# Patient Record
Sex: MALE | Race: BLACK OR AFRICAN AMERICAN | NOT HISPANIC OR LATINO | URBAN - METROPOLITAN AREA
[De-identification: names, ages, dates, MRNs, and addresses within clinical notes are randomized per-mention and may not be internally consistent; named-entity substitution may affect disease eponyms.]

---

## 2018-01-31 ENCOUNTER — INPATIENT (INPATIENT)
Facility: HOSPITAL | Age: 83
LOS: 6 days | Discharge: HOME | End: 2018-02-07
Attending: HOSPITALIST | Admitting: HOSPITALIST
Payer: MEDICAID

## 2018-01-31 VITALS
WEIGHT: 169.98 LBS | DIASTOLIC BLOOD PRESSURE: 59 MMHG | TEMPERATURE: 98 F | HEIGHT: 69 IN | SYSTOLIC BLOOD PRESSURE: 134 MMHG | RESPIRATION RATE: 18 BRPM | HEART RATE: 108 BPM | OXYGEN SATURATION: 99 %

## 2018-01-31 DIAGNOSIS — J18.9 PNEUMONIA, UNSPECIFIED ORGANISM: ICD-10-CM

## 2018-01-31 DIAGNOSIS — G93.41 METABOLIC ENCEPHALOPATHY: ICD-10-CM

## 2018-01-31 DIAGNOSIS — E11.9 TYPE 2 DIABETES MELLITUS WITHOUT COMPLICATIONS: ICD-10-CM

## 2018-01-31 LAB
ALBUMIN SERPL ELPH-MCNC: 2.7 G/DL — LOW (ref 3.3–5)
ALP SERPL-CCNC: 101 U/L — SIGNIFICANT CHANGE UP (ref 40–120)
ALT FLD-CCNC: 23 U/L — SIGNIFICANT CHANGE UP (ref 12–78)
ANION GAP SERPL CALC-SCNC: 9 MMOL/L — SIGNIFICANT CHANGE UP (ref 5–17)
APPEARANCE UR: ABNORMAL
APTT BLD: 30.9 SEC — SIGNIFICANT CHANGE UP (ref 27.5–37.4)
AST SERPL-CCNC: 31 U/L — SIGNIFICANT CHANGE UP (ref 15–37)
BASOPHILS # BLD AUTO: 0.01 K/UL — SIGNIFICANT CHANGE UP (ref 0–0.2)
BASOPHILS NFR BLD AUTO: 0.1 % — SIGNIFICANT CHANGE UP (ref 0–2)
BILIRUB SERPL-MCNC: 0.2 MG/DL — SIGNIFICANT CHANGE UP (ref 0.2–1.2)
BILIRUB UR-MCNC: NEGATIVE — SIGNIFICANT CHANGE UP
BUN SERPL-MCNC: 35 MG/DL — HIGH (ref 7–23)
CALCIUM SERPL-MCNC: 8.7 MG/DL — SIGNIFICANT CHANGE UP (ref 8.5–10.1)
CHLORIDE SERPL-SCNC: 119 MMOL/L — HIGH (ref 96–108)
CK MB CFR SERPL CALC: 1.4 NG/ML — SIGNIFICANT CHANGE UP (ref 0.5–3.6)
CO2 SERPL-SCNC: 21 MMOL/L — LOW (ref 22–31)
COLOR SPEC: YELLOW — SIGNIFICANT CHANGE UP
CREAT SERPL-MCNC: 1.66 MG/DL — HIGH (ref 0.5–1.3)
DIFF PNL FLD: ABNORMAL
EOSINOPHIL # BLD AUTO: 0 K/UL — SIGNIFICANT CHANGE UP (ref 0–0.5)
EOSINOPHIL NFR BLD AUTO: 0 % — SIGNIFICANT CHANGE UP (ref 0–6)
FLUAV SPEC QL CULT: NEGATIVE — SIGNIFICANT CHANGE UP
FLUBV AG SPEC QL IA: NEGATIVE — SIGNIFICANT CHANGE UP
GLUCOSE BLDC GLUCOMTR-MCNC: 219 MG/DL — HIGH (ref 70–99)
GLUCOSE BLDC GLUCOMTR-MCNC: 256 MG/DL — HIGH (ref 70–99)
GLUCOSE SERPL-MCNC: 239 MG/DL — HIGH (ref 70–99)
GLUCOSE UR QL: 100 MG/DL
HCT VFR BLD CALC: 39.2 % — SIGNIFICANT CHANGE UP (ref 39–50)
HGB BLD-MCNC: 12.5 G/DL — LOW (ref 13–17)
IMM GRANULOCYTES NFR BLD AUTO: 0.4 % — SIGNIFICANT CHANGE UP (ref 0–1.5)
INR BLD: 1.01 RATIO — SIGNIFICANT CHANGE UP (ref 0.88–1.16)
KETONES UR-MCNC: NEGATIVE — SIGNIFICANT CHANGE UP
LACTATE SERPL-SCNC: 1.5 MMOL/L — SIGNIFICANT CHANGE UP (ref 0.7–2)
LEUKOCYTE ESTERASE UR-ACNC: NEGATIVE — SIGNIFICANT CHANGE UP
LYMPHOCYTES # BLD AUTO: 0.52 K/UL — LOW (ref 1–3.3)
LYMPHOCYTES # BLD AUTO: 7.6 % — LOW (ref 13–44)
MCHC RBC-ENTMCNC: 29.2 PG — SIGNIFICANT CHANGE UP (ref 27–34)
MCHC RBC-ENTMCNC: 31.9 GM/DL — LOW (ref 32–36)
MCV RBC AUTO: 91.6 FL — SIGNIFICANT CHANGE UP (ref 80–100)
MONOCYTES # BLD AUTO: 0.32 K/UL — SIGNIFICANT CHANGE UP (ref 0–0.9)
MONOCYTES NFR BLD AUTO: 4.7 % — SIGNIFICANT CHANGE UP (ref 2–14)
NEUTROPHILS # BLD AUTO: 5.94 K/UL — SIGNIFICANT CHANGE UP (ref 1.8–7.4)
NEUTROPHILS NFR BLD AUTO: 87.2 % — HIGH (ref 43–77)
NITRITE UR-MCNC: NEGATIVE — SIGNIFICANT CHANGE UP
PH UR: 5 — SIGNIFICANT CHANGE UP (ref 5–8)
PLATELET # BLD AUTO: 104 K/UL — LOW (ref 150–400)
POTASSIUM SERPL-MCNC: 4.4 MMOL/L — SIGNIFICANT CHANGE UP (ref 3.5–5.3)
POTASSIUM SERPL-SCNC: 4.4 MMOL/L — SIGNIFICANT CHANGE UP (ref 3.5–5.3)
PROT SERPL-MCNC: 7.4 GM/DL — SIGNIFICANT CHANGE UP (ref 6–8.3)
PROT UR-MCNC: 100 MG/DL
PROTHROM AB SERPL-ACNC: 11 SEC — SIGNIFICANT CHANGE UP (ref 9.8–12.7)
RBC # BLD: 4.28 M/UL — SIGNIFICANT CHANGE UP (ref 4.2–5.8)
RBC # FLD: 14.6 % — HIGH (ref 10.3–14.5)
SODIUM SERPL-SCNC: 149 MMOL/L — HIGH (ref 135–145)
SP GR SPEC: 1.01 — SIGNIFICANT CHANGE UP (ref 1.01–1.02)
TROPONIN I SERPL-MCNC: 0.02 NG/ML — SIGNIFICANT CHANGE UP (ref 0.01–0.04)
UROBILINOGEN FLD QL: NEGATIVE MG/DL — SIGNIFICANT CHANGE UP
WBC # BLD: 6.82 K/UL — SIGNIFICANT CHANGE UP (ref 3.8–10.5)
WBC # FLD AUTO: 6.82 K/UL — SIGNIFICANT CHANGE UP (ref 3.8–10.5)

## 2018-01-31 PROCEDURE — 93010 ELECTROCARDIOGRAM REPORT: CPT

## 2018-01-31 PROCEDURE — 99285 EMERGENCY DEPT VISIT HI MDM: CPT

## 2018-01-31 PROCEDURE — 71045 X-RAY EXAM CHEST 1 VIEW: CPT | Mod: 26

## 2018-01-31 PROCEDURE — 70450 CT HEAD/BRAIN W/O DYE: CPT | Mod: 26

## 2018-01-31 RX ORDER — DEXTROSE 50 % IN WATER 50 %
25 SYRINGE (ML) INTRAVENOUS ONCE
Qty: 0 | Refills: 0 | Status: DISCONTINUED | OUTPATIENT
Start: 2018-01-31 | End: 2018-02-07

## 2018-01-31 RX ORDER — ACETAMINOPHEN 500 MG
650 TABLET ORAL EVERY 6 HOURS
Qty: 0 | Refills: 0 | Status: DISCONTINUED | OUTPATIENT
Start: 2018-01-31 | End: 2018-02-07

## 2018-01-31 RX ORDER — GLUCAGON INJECTION, SOLUTION 0.5 MG/.1ML
1 INJECTION, SOLUTION SUBCUTANEOUS ONCE
Qty: 0 | Refills: 0 | Status: DISCONTINUED | OUTPATIENT
Start: 2018-01-31 | End: 2018-02-07

## 2018-01-31 RX ORDER — ENOXAPARIN SODIUM 100 MG/ML
40 INJECTION SUBCUTANEOUS EVERY 24 HOURS
Qty: 0 | Refills: 0 | Status: DISCONTINUED | OUTPATIENT
Start: 2018-01-31 | End: 2018-02-07

## 2018-01-31 RX ORDER — VANCOMYCIN HCL 1 G
1000 VIAL (EA) INTRAVENOUS ONCE
Qty: 0 | Refills: 0 | Status: COMPLETED | OUTPATIENT
Start: 2018-01-31 | End: 2018-01-31

## 2018-01-31 RX ORDER — SODIUM CHLORIDE 9 MG/ML
1000 INJECTION, SOLUTION INTRAVENOUS
Qty: 0 | Refills: 0 | Status: DISCONTINUED | OUTPATIENT
Start: 2018-01-31 | End: 2018-02-07

## 2018-01-31 RX ORDER — AZITHROMYCIN 500 MG/1
500 TABLET, FILM COATED ORAL ONCE
Qty: 0 | Refills: 0 | Status: COMPLETED | OUTPATIENT
Start: 2018-01-31 | End: 2018-01-31

## 2018-01-31 RX ORDER — INSULIN LISPRO 100/ML
VIAL (ML) SUBCUTANEOUS AT BEDTIME
Qty: 0 | Refills: 0 | Status: DISCONTINUED | OUTPATIENT
Start: 2018-01-31 | End: 2018-02-07

## 2018-01-31 RX ORDER — DEXTROSE 50 % IN WATER 50 %
1 SYRINGE (ML) INTRAVENOUS ONCE
Qty: 0 | Refills: 0 | Status: DISCONTINUED | OUTPATIENT
Start: 2018-01-31 | End: 2018-02-07

## 2018-01-31 RX ORDER — SODIUM CHLORIDE 9 MG/ML
1000 INJECTION INTRAMUSCULAR; INTRAVENOUS; SUBCUTANEOUS
Qty: 0 | Refills: 0 | Status: DISCONTINUED | OUTPATIENT
Start: 2018-01-31 | End: 2018-02-01

## 2018-01-31 RX ORDER — INSULIN LISPRO 100/ML
VIAL (ML) SUBCUTANEOUS
Qty: 0 | Refills: 0 | Status: DISCONTINUED | OUTPATIENT
Start: 2018-01-31 | End: 2018-02-07

## 2018-01-31 RX ORDER — AZITHROMYCIN 500 MG/1
500 TABLET, FILM COATED ORAL EVERY 24 HOURS
Qty: 0 | Refills: 0 | Status: DISCONTINUED | OUTPATIENT
Start: 2018-02-01 | End: 2018-02-07

## 2018-01-31 RX ORDER — CEFTRIAXONE 500 MG/1
2 INJECTION, POWDER, FOR SOLUTION INTRAMUSCULAR; INTRAVENOUS ONCE
Qty: 0 | Refills: 0 | Status: COMPLETED | OUTPATIENT
Start: 2018-01-31 | End: 2018-01-31

## 2018-01-31 RX ORDER — DEXTROSE 50 % IN WATER 50 %
12.5 SYRINGE (ML) INTRAVENOUS ONCE
Qty: 0 | Refills: 0 | Status: DISCONTINUED | OUTPATIENT
Start: 2018-01-31 | End: 2018-02-07

## 2018-01-31 RX ORDER — ACETAMINOPHEN 500 MG
650 TABLET ORAL ONCE
Qty: 0 | Refills: 0 | Status: COMPLETED | OUTPATIENT
Start: 2018-01-31 | End: 2018-01-31

## 2018-01-31 RX ORDER — CEFTRIAXONE 500 MG/1
1 INJECTION, POWDER, FOR SOLUTION INTRAMUSCULAR; INTRAVENOUS EVERY 24 HOURS
Qty: 0 | Refills: 0 | Status: DISCONTINUED | OUTPATIENT
Start: 2018-02-01 | End: 2018-02-07

## 2018-01-31 RX ORDER — SODIUM CHLORIDE 9 MG/ML
2500 INJECTION INTRAMUSCULAR; INTRAVENOUS; SUBCUTANEOUS ONCE
Qty: 0 | Refills: 0 | Status: COMPLETED | OUTPATIENT
Start: 2018-01-31 | End: 2018-01-31

## 2018-01-31 RX ORDER — INFLUENZA VIRUS VACCINE 15; 15; 15; 15 UG/.5ML; UG/.5ML; UG/.5ML; UG/.5ML
0.5 SUSPENSION INTRAMUSCULAR ONCE
Qty: 0 | Refills: 0 | Status: COMPLETED | OUTPATIENT
Start: 2018-01-31 | End: 2018-01-31

## 2018-01-31 RX ADMIN — CEFTRIAXONE 100 GRAM(S): 500 INJECTION, POWDER, FOR SOLUTION INTRAMUSCULAR; INTRAVENOUS at 16:19

## 2018-01-31 RX ADMIN — SODIUM CHLORIDE 2500 MILLILITER(S): 9 INJECTION INTRAMUSCULAR; INTRAVENOUS; SUBCUTANEOUS at 16:15

## 2018-01-31 RX ADMIN — AZITHROMYCIN 255 MILLIGRAM(S): 500 TABLET, FILM COATED ORAL at 18:35

## 2018-01-31 RX ADMIN — Medication 650 MILLIGRAM(S): at 16:14

## 2018-01-31 RX ADMIN — Medication 2: at 22:05

## 2018-01-31 RX ADMIN — Medication 250 MILLIGRAM(S): at 17:07

## 2018-01-31 NOTE — ED PROVIDER NOTE - PROGRESS NOTE DETAILS
aKmala: spoke at length with daughter, no LP at this time as pt with recent uri and likely pna on cxr causing ams. pt with some dementia at baseline as well. given abx. admitted to Dr. Mckinney

## 2018-01-31 NOTE — ED PROVIDER NOTE - MEDICAL DECISION MAKING DETAILS
altered mental status, infectious vs metabolic vs less likely ich. recent uri, likely more flu vs pna/uti. no indication meningitis at this time, will send labs, cth, discuss with family about LP.

## 2018-01-31 NOTE — H&P ADULT - NSHPPHYSICALEXAM_GEN_ALL_CORE
GENERAL: NAD well-developed  HEAD:  Atraumatic, Normocephalic  EYES: EOMI, PERRLA, conjunctiva and sclera clear  ENMT: No tonsillar erythema, exudates, or enlargement; Moist mucous membranes, Good dentition, No lesions  NECK: Supple, No JVD, Normal thyroid  NERVOUS SYSTEM:  Alert & Oriented X3, Good concentration; Motor Strength 5/5 B/L upper and lower extremities; DTRs 2+ intact and symmetric  CHEST/LUNG: Clear to percussion bilaterally; No rales, rhonchi, wheezing, or rubs  HEART: Regular rate and rhythm; No murmurs, rubs, or gallops  ABDOMEN: Soft, Nontender, Nondistended; Bowel sounds present  EXTREMITIES:  2+ Peripheral Pulses, No clubbing, cyanosis, or edema  LYMPH: No lymphadenopathy   SKIN: No rashes or lesions GENERAL: NAD well-developed  HEAD:  Atraumatic, Normocephalic  EYES: EOMI, PERRLA, conjunctiva and sclera clear  ENMT: No tonsillar erythema, exudates, or enlargement; Moist mucous membranes, Good dentition, No lesions  NECK: Supple, No JVD, Normal thyroid  NERVOUS SYSTEM:  Alert but confused  Motor Strength 5/5 B/L upper and lower extremities; DTRs 2+ intact and symmetric  CHEST/LUNG: Clear to percussion bilaterally; No rales, rhonchi, wheezing, or rubs  HEART: Regular rate and rhythm; No murmurs, rubs, or gallops  ABDOMEN: Soft, Nontender, Nondistended; Bowel sounds present  EXTREMITIES:  2+ Peripheral Pulses, No clubbing, cyanosis, or edema  LYMPH: No lymphadenopathy   SKIN: No rashes or lesions

## 2018-01-31 NOTE — H&P ADULT - ASSESSMENT
82m with Altered Mental Status with fever 82m with Altered Mental Status with fever     IMPROVE VTE Individual Risk Assessment          RISK                                                          Points    [  ] Previous VTE                                                3    [  ] Thrombophilia                                             2    [  ] Lower limb paralysis                                    2        (unable to hold up >15 seconds)      [  ] Current Cancer                                             2         (within 6 months)    [ z ] Immobilization > 24 hrs                              1    [  ] ICU/CCU stay > 24 hours                            1    [ x ] Age > 60                                                    1  2  IMPROVE VTE Score __2_______    lovenox

## 2018-01-31 NOTE — ED PROVIDER NOTE - PHYSICAL EXAMINATION
Gen: mild tremors, eyes closed but rouses to stimuli.   HEENT: Mucous membranes moist, pink conjunctivae, EOMI  CV: borderline tachycardia, nl s1/s2.  Resp: breathing around 22  GI: Abdomen soft, NT, ND. No rebound, no guarding  : No CVAT  Neuro: mumbling, moving all extremities.   MSK: No spine or joint tenderness to palpation  Skin: No rashes. intact and perfused.

## 2018-01-31 NOTE — ED ADULT TRIAGE NOTE - CHIEF COMPLAINT QUOTE
BIBA for increased weaknes and altered mental status, family reports that patient has slowed increasing weakness for the past 3 days. Grandson reports patient had a cold last week.

## 2018-01-31 NOTE — H&P ADULT - HISTORY OF PRESENT ILLNESS
81 y/o male hx NIDDM biba for weakness and ams over past 1-2 days. Pt with cold like symptoms ~4 days ago per grandson, congestion, cough. Progressively ate a little less. States yesterday was still able to ambulate with cane but was declining somewhat. No vomiting or diarrhea, pt did not complain of anything else focally. Usually able to feed self, needs help with dressing. 83 y/o male hx NIDDM biba for weakness and ams over past 1-2 days. Pt with cold like symptoms ~4 days ago per grandson, congestion, cough. Progressively ate a little less. States yesterday was still able to ambulate with cane but was declining somewhat. No vomiting or diarrhea, pt did not complain of anything else focally. Usually able to feed self, needs help with dressing. the family also noted rigors

## 2018-01-31 NOTE — ED PROVIDER NOTE - OBJECTIVE STATEMENT
81 y/o male hx NIDDM biba for weakness and ams over past 1-2 days. Pt with cold like symptoms ~4 days ago per grandson, congestion, cough. Progressively ate a little less. States yesterday was still able to ambulate with cane but was declining somewhat. No vomiting or diarrhea, pt did not complain of anything else focally. Usually able to feed self, needs help with dressing.    limited ros.

## 2018-01-31 NOTE — ED ADULT NURSE NOTE - OBJECTIVE STATEMENT
as per grandson , patient had a cough last week , patient become unresponsive  yesterday , patient responding to verbal stimuli at this time , as per grandson patient has a tremors started yesterday

## 2018-01-31 NOTE — H&P ADULT - NSHPREVIEWOFSYSTEMS_GEN_ALL_CORE

## 2018-02-01 DIAGNOSIS — Z00.00 ENCOUNTER FOR GENERAL ADULT MEDICAL EXAMINATION WITHOUT ABNORMAL FINDINGS: ICD-10-CM

## 2018-02-01 DIAGNOSIS — E87.0 HYPEROSMOLALITY AND HYPERNATREMIA: ICD-10-CM

## 2018-02-01 LAB
ANION GAP SERPL CALC-SCNC: 9 MMOL/L — SIGNIFICANT CHANGE UP (ref 5–17)
BUN SERPL-MCNC: 23 MG/DL — SIGNIFICANT CHANGE UP (ref 7–23)
CALCIUM SERPL-MCNC: 7.9 MG/DL — LOW (ref 8.5–10.1)
CHLORIDE SERPL-SCNC: 122 MMOL/L — HIGH (ref 96–108)
CO2 SERPL-SCNC: 21 MMOL/L — LOW (ref 22–31)
CREAT SERPL-MCNC: 1.33 MG/DL — HIGH (ref 0.5–1.3)
CULTURE RESULTS: NO GROWTH — SIGNIFICANT CHANGE UP
GLUCOSE BLDC GLUCOMTR-MCNC: 117 MG/DL — HIGH (ref 70–99)
GLUCOSE SERPL-MCNC: 123 MG/DL — HIGH (ref 70–99)
HBA1C BLD-MCNC: 10.6 % — HIGH (ref 4–5.6)
HCT VFR BLD CALC: 38.5 % — LOW (ref 39–50)
HGB BLD-MCNC: 12.1 G/DL — LOW (ref 13–17)
MCHC RBC-ENTMCNC: 28.6 PG — SIGNIFICANT CHANGE UP (ref 27–34)
MCHC RBC-ENTMCNC: 31.4 GM/DL — LOW (ref 32–36)
MCV RBC AUTO: 91 FL — SIGNIFICANT CHANGE UP (ref 80–100)
NRBC # BLD: 0 /100 WBCS — SIGNIFICANT CHANGE UP (ref 0–0)
PLATELET # BLD AUTO: 115 K/UL — LOW (ref 150–400)
POTASSIUM SERPL-MCNC: 4.2 MMOL/L — SIGNIFICANT CHANGE UP (ref 3.5–5.3)
POTASSIUM SERPL-SCNC: 4.2 MMOL/L — SIGNIFICANT CHANGE UP (ref 3.5–5.3)
RBC # BLD: 4.23 M/UL — SIGNIFICANT CHANGE UP (ref 4.2–5.8)
RBC # FLD: 14.7 % — HIGH (ref 10.3–14.5)
SODIUM SERPL-SCNC: 152 MMOL/L — HIGH (ref 135–145)
SPECIMEN SOURCE: SIGNIFICANT CHANGE UP
WBC # BLD: 11.17 K/UL — HIGH (ref 3.8–10.5)
WBC # FLD AUTO: 11.17 K/UL — HIGH (ref 3.8–10.5)

## 2018-02-01 PROCEDURE — 99232 SBSQ HOSP IP/OBS MODERATE 35: CPT

## 2018-02-01 RX ORDER — SODIUM CHLORIDE 9 MG/ML
1000 INJECTION, SOLUTION INTRAVENOUS
Qty: 0 | Refills: 0 | Status: DISCONTINUED | OUTPATIENT
Start: 2018-02-01 | End: 2018-02-03

## 2018-02-01 RX ORDER — SODIUM CHLORIDE 9 MG/ML
1000 INJECTION INTRAMUSCULAR; INTRAVENOUS; SUBCUTANEOUS
Qty: 0 | Refills: 0 | Status: DISCONTINUED | OUTPATIENT
Start: 2018-02-01 | End: 2018-02-03

## 2018-02-01 RX ADMIN — ENOXAPARIN SODIUM 40 MILLIGRAM(S): 100 INJECTION SUBCUTANEOUS at 21:35

## 2018-02-01 RX ADMIN — SODIUM CHLORIDE 75 MILLILITER(S): 9 INJECTION, SOLUTION INTRAVENOUS at 19:39

## 2018-02-01 RX ADMIN — SODIUM CHLORIDE 75 MILLILITER(S): 9 INJECTION, SOLUTION INTRAVENOUS at 12:05

## 2018-02-01 RX ADMIN — AZITHROMYCIN 255 MILLIGRAM(S): 500 TABLET, FILM COATED ORAL at 21:34

## 2018-02-01 RX ADMIN — SODIUM CHLORIDE 100 MILLILITER(S): 9 INJECTION INTRAMUSCULAR; INTRAVENOUS; SUBCUTANEOUS at 21:37

## 2018-02-01 RX ADMIN — CEFTRIAXONE 100 GRAM(S): 500 INJECTION, POWDER, FOR SOLUTION INTRAMUSCULAR; INTRAVENOUS at 19:39

## 2018-02-01 RX ADMIN — Medication 2: at 17:25

## 2018-02-01 NOTE — CHART NOTE - NSCHARTNOTEFT_GEN_A_CORE
Pt seen and chart reviewed.  Psych consult requested to evaluate for s/s of dementia. Note that pt was too sedated to participate in assessment. Spoke with pts son in law - Basil at bedside,  at length. Will evaluate in AM on 2/2/2018.

## 2018-02-01 NOTE — PROGRESS NOTE ADULT - SUBJECTIVE AND OBJECTIVE BOX
Patient is a 82y old  Male who presents with a chief complaint of       HPI:  83 y/o male hx NIDDM biba for weakness and ams over past 1-2 days. Pt with cold like symptoms ~4 days ago per grandson, congestion, cough. Progressively ate a little less. States yesterday was still able to ambulate with cane but was declining somewhat. No vomiting or diarrhea, pt did not complain of anything else focally. Usually able to feed self, needs help with dressing. the family also noted rigors (2018 17:54)       SUBJECTIVE & OBJECTIVE: Pt seen and examined at bedside.  No acute events overnight.  Afebrile.      PHYSICAL EXAM:  T(C): 36.6 (18 @ 05:59), Max: 39.2 (18 @ 15:45)  HR: 96 (18 @ 05:59) (96 - 108)  BP: 131/73 (18 @ 05:59) (127/66 - 135/54)  RR: 18 (18 @ 05:59) (16 - 18)  SpO2: 97% (18 @ 05:59) (96% - 99%)    GENERAL: NAD, well-groomed, well-developed  HEAD:  Atraumatic, Normocephalic  EYES: EOMI, PERRLA, conjunctiva and sclera clear  ENMT: Moist mucous membranes  NECK: Supple, No JVD  NERVOUS SYSTEM:  Alert & Oriented X3, Motor Strength 5/5 B/L upper and lower extremities; DTRs 2+ intact and symmetric  CHEST/LUNG: Clear to auscultation bilaterally; No rales, rhonchi, wheezing, or rubs  HEART: Regular rate and rhythm; No murmurs, rubs, or gallops  ABDOMEN: Soft, Nontender, Nondistended; Bowel sounds present  EXTREMITIES:  2+ Peripheral Pulses, No clubbing, cyanosis, or edema        MEDICATIONS  (STANDING):  azithromycin  IVPB 500 milliGRAM(s) IV Intermittent every 24 hours  cefTRIAXone   IVPB 1 Gram(s) IV Intermittent every 24 hours  dextrose 5%. 1000 milliLiter(s) (50 mL/Hr) IV Continuous <Continuous>  dextrose 50% Injectable 12.5 Gram(s) IV Push once  dextrose 50% Injectable 25 Gram(s) IV Push once  dextrose 50% Injectable 25 Gram(s) IV Push once  enoxaparin Injectable 40 milliGRAM(s) SubCutaneous every 24 hours  insulin lispro (HumaLOG) corrective regimen sliding scale   SubCutaneous three times a day before meals  insulin lispro (HumaLOG) corrective regimen sliding scale   SubCutaneous at bedtime  sodium chloride 0.9%. 1000 milliLiter(s) (100 mL/Hr) IV Continuous <Continuous>    MEDICATIONS  (PRN):  acetaminophen   Tablet 650 milliGRAM(s) Oral every 6 hours PRN For Temp greater than 38 C (100.4 F)  dextrose Gel 1 Dose(s) Oral once PRN Blood Glucose LESS THAN 70 milliGRAM(s)/deciliter  glucagon  Injectable 1 milliGRAM(s) IntraMuscular once PRN Glucose LESS THAN 70 milligrams/deciliter      LABS:                        12.1   11.17 )-----------( 115      ( 2018 07:34 )             38.5     2018    152<H>  |  122<H>  |  23  ----------------------------<  123<H>  4.2   |  21<L>  |  1.33<H>    Calcium    7.9<L>      2018 07:34    TPro  7.4  /  Alb  2.7<L>  /  TBili  0.2  /  DBili  x   /  AST  31  /  ALT  23  /  AlkPhos  101      PT/INR - ( 2018 16:18 )   PT: 11.0 sec;   INR: 1.01 ratio         PTT - ( 2018 16:18 )  PTT:30.9 sec  Urinalysis Basic - ( 2018 16:44 )    Color: Yellow / Appearance: very cloudy / S.015 / pH: x  Gluc: x / Ketone: Negative  / Bili: Negative / Urobili: Negative mg/dL   Blood: x / Protein: 100 mg/dL / Nitrite: Negative   Leuk Esterase: Negative / RBC: x / WBC 3-5   Sq Epi: x / Non Sq Epi: Few / Bacteria: Many      POCT Blood Glucose.: 117 mg/dL (2018 07:34)  POCT Blood Glucose.: 256 mg/dL (2018 21:24)  POCT Blood Glucose.: 219 mg/dL (2018 16:04)      POCT Blood Glucose.: 117 mg/dL (2018 07:34)  POCT Blood Glucose.: 256 mg/dL (2018 21:24)  POCT Blood Glucose.: 219 mg/dL (2018 16:04)      POCT Blood Glucose.: 117 mg/dL (2018 07:34)      CARDIAC MARKERS ( 2018 16:18 )  .022 ng/mL / x     / x     / x     / 1.4 ng/mL    DVT/GI prophylaxsis   Discussed with patient @ bedside

## 2018-02-01 NOTE — PROGRESS NOTE ADULT - ASSESSMENT
82m with Altered Mental Status with fever     IMPROVE VTE Individual Risk Assessment          RISK                                                          Points    [  ] Previous VTE                                                3    [  ] Thrombophilia                                             2    [  ] Lower limb paralysis                                    2        (unable to hold up >15 seconds)      [  ] Current Cancer                                             2         (within 6 months)    [ z ] Immobilization > 24 hrs                              1    [  ] ICU/CCU stay > 24 hours                            1    [ x ] Age > 60                                                    1  2  IMPROVE VTE Score __2_______    lovenox

## 2018-02-02 LAB
ANION GAP SERPL CALC-SCNC: 11 MMOL/L — SIGNIFICANT CHANGE UP (ref 5–17)
BUN SERPL-MCNC: 21 MG/DL — SIGNIFICANT CHANGE UP (ref 7–23)
CALCIUM SERPL-MCNC: 8.1 MG/DL — LOW (ref 8.5–10.1)
CHLORIDE SERPL-SCNC: 118 MMOL/L — HIGH (ref 96–108)
CK SERPL-CCNC: 74 U/L — SIGNIFICANT CHANGE UP (ref 26–308)
CO2 SERPL-SCNC: 20 MMOL/L — LOW (ref 22–31)
CREAT SERPL-MCNC: 1.15 MG/DL — SIGNIFICANT CHANGE UP (ref 0.5–1.3)
GLUCOSE SERPL-MCNC: 100 MG/DL — HIGH (ref 70–99)
HCT VFR BLD CALC: 33 % — LOW (ref 39–50)
HGB BLD-MCNC: 10.7 G/DL — LOW (ref 13–17)
MCHC RBC-ENTMCNC: 28.7 PG — SIGNIFICANT CHANGE UP (ref 27–34)
MCHC RBC-ENTMCNC: 32.4 GM/DL — SIGNIFICANT CHANGE UP (ref 32–36)
MCV RBC AUTO: 88.5 FL — SIGNIFICANT CHANGE UP (ref 80–100)
NRBC # BLD: 0 /100 WBCS — SIGNIFICANT CHANGE UP (ref 0–0)
PLATELET # BLD AUTO: 117 K/UL — LOW (ref 150–400)
POTASSIUM SERPL-MCNC: 3.7 MMOL/L — SIGNIFICANT CHANGE UP (ref 3.5–5.3)
POTASSIUM SERPL-SCNC: 3.7 MMOL/L — SIGNIFICANT CHANGE UP (ref 3.5–5.3)
RBC # BLD: 3.73 M/UL — LOW (ref 4.2–5.8)
RBC # FLD: 14.8 % — HIGH (ref 10.3–14.5)
SODIUM SERPL-SCNC: 149 MMOL/L — HIGH (ref 135–145)
WBC # BLD: 8.89 K/UL — SIGNIFICANT CHANGE UP (ref 3.8–10.5)
WBC # FLD AUTO: 8.89 K/UL — SIGNIFICANT CHANGE UP (ref 3.8–10.5)

## 2018-02-02 PROCEDURE — 99239 HOSP IP/OBS DSCHRG MGMT >30: CPT

## 2018-02-02 RX ADMIN — CEFTRIAXONE 100 GRAM(S): 500 INJECTION, POWDER, FOR SOLUTION INTRAMUSCULAR; INTRAVENOUS at 18:48

## 2018-02-02 RX ADMIN — SODIUM CHLORIDE 100 MILLILITER(S): 9 INJECTION INTRAMUSCULAR; INTRAVENOUS; SUBCUTANEOUS at 05:16

## 2018-02-02 RX ADMIN — AZITHROMYCIN 255 MILLIGRAM(S): 500 TABLET, FILM COATED ORAL at 21:34

## 2018-02-02 RX ADMIN — ENOXAPARIN SODIUM 40 MILLIGRAM(S): 100 INJECTION SUBCUTANEOUS at 21:35

## 2018-02-02 NOTE — DISCHARGE NOTE ADULT - PROVIDER TOKENS
YO:'[id number]' TOKEN:'[id number]',TOKEN:'[id number]' TOKTAMRA:'[id number]'

## 2018-02-02 NOTE — DISCHARGE NOTE ADULT - SECONDARY DIAGNOSIS.
Type 2 diabetes mellitus without complication, without long-term current use of insulin Hypernatremia Septic encephalopathy Dementia

## 2018-02-02 NOTE — DIETITIAN INITIAL EVALUATION ADULT. - OTHER INFO
Pt seen for consult - BMI , 19. Unable to interview due to cognitive limitations; no family present. Per chart info pt takes Glipizide (no specifics on H & P) to control BG levels at home. Per CNA pt total feed; requires altered consistency food as has chewing difficulty; no issue c liquids; no coughing present. Pt seen for consult - BMI , 19. Unable to interview due to cognitive limitations; no family present. Per chart info pt lives c daughter who is caretaker; takes Glipizide (no specifics on H & P) to control BG levels at home. Per CNA pt total feed; requires altered consistency food as has chewing difficulty; no issue c liquids; no coughing present. No reports of N/V/C/D or swallowing difficulty. BM regular; last x 2 (2/2); + incontinence.

## 2018-02-02 NOTE — DISCHARGE NOTE ADULT - CARE PLAN
Principal Discharge DX:	Pneumonia due to infectious organism, unspecified laterality, unspecified part of lung  Goal:	Continue PO ABX until complete  Assessment and plan of treatment:	Follow up with PMD  Secondary Diagnosis:	Type 2 diabetes mellitus without complication, without long-term current use of insulin  Assessment and plan of treatment:	Continue home diabetic medications  Follow up with PCP  Low-carbohydrate diet  ADA Recipes - [email or web address]  Secondary Diagnosis:	Hypernatremia  Assessment and plan of treatment:	Resolved  Secondary Diagnosis:	Septic encephalopathy  Assessment and plan of treatment:	Follow up with PMD  Secondary Diagnosis:	Dementia  Assessment and plan of treatment:	Follow up with PMD Principal Discharge DX:	Pneumonia due to infectious organism, unspecified laterality, unspecified part of lung  Goal:	ABX course completed  Assessment and plan of treatment:	Follow up with PMD  Secondary Diagnosis:	Type 2 diabetes mellitus without complication, without long-term current use of insulin  Assessment and plan of treatment:	Continue home diabetic medications  Follow up with PCP  Low-carbohydrate diet  ADA Recipes - [email or web address]  Secondary Diagnosis:	Hypernatremia  Assessment and plan of treatment:	Resolved  Secondary Diagnosis:	Septic encephalopathy  Assessment and plan of treatment:	Follow up with PMD  Secondary Diagnosis:	Dementia  Assessment and plan of treatment:	Follow up with PMD Principal Discharge DX:	Pneumonia due to infectious organism, unspecified laterality, unspecified part of lung  Goal:	ABX course completed  Assessment and plan of treatment:	Follow up with Dr De La Rosa  Secondary Diagnosis:	Type 2 diabetes mellitus without complication, without long-term current use of insulin  Assessment and plan of treatment:	Continue home diabetic medications  Follow up with Dr De La Rosa  Low-carbohydrate diet  ADA Recipes - [email or web address]  Secondary Diagnosis:	Hypernatremia  Assessment and plan of treatment:	Resolved  Secondary Diagnosis:	Septic encephalopathy  Assessment and plan of treatment:	Follow up with PMD  Secondary Diagnosis:	Dementia  Assessment and plan of treatment:	Follow up with PMD

## 2018-02-02 NOTE — DISCHARGE NOTE ADULT - PLAN OF CARE
Continue PO ABX until complete Follow up with PMD Continue home diabetic medications  Follow up with PCP  Low-carbohydrate diet  ADA Recipes - [email or web address] Resolved ABX course completed Follow up with Dr De La Rosa Continue home diabetic medications  Follow up with Dr De La Rosa  Low-carbohydrate diet  ADA Recipes - [email or web address]

## 2018-02-02 NOTE — CHART NOTE - NSCHARTNOTEFT_GEN_A_CORE
Upon Nutritional Assessment by the Registered Dietitian your patient was determined to meet criteria / has evidence of the following diagnosis/diagnoses:          [ ]  Mild Protein Calorie Malnutrition        [ ]  Moderate Protein Calorie Malnutrition        [X ] Severe Protein Calorie Malnutrition        [ ] Unspecified Protein Calorie Malnutrition        [X ] Underweight / BMI <19        [ ] Morbid Obesity / BMI > 40      Findings as based on:  •  Comprehensive nutrition assessment and consultation  •  Calorie counts (nutrient intake analysis)  •  Food acceptance and intake status from observations by staff  •  Follow up  •  Patient education  •  Intervention secondary to interdisciplinary rounds  •   concerns      Treatment:    The following diet has been recommended:   CCHO c snack, mechanical soft consistency for ease of eating + Glucerna x 3/day (provides 660 kcal, 30 g protein)       PROVIDER Section:     By signing this assessment you are acknowledging and agree with the diagnosis/diagnoses assigned by the Registered Dietitian    Comments:

## 2018-02-02 NOTE — DISCHARGE NOTE ADULT - CARE PROVIDERS DIRECT ADDRESSES
[email or web address]

## 2018-02-02 NOTE — PHYSICAL THERAPY INITIAL EVALUATION ADULT - ADDITIONAL COMMENTS
Pt ambulates mostly household distances, weekly when weather in better, pt goes to [facility]. there are 3 steps to enter home with B handrails. Pt uses a cane, recently a rolling walker was purchased secondary to pt with increased unsteadiness. Pt was found x1 week ago by grand daughter on the floor and pt required assistance to get up. Family assists pt with ADLs and self care.

## 2018-02-02 NOTE — PHYSICAL THERAPY INITIAL EVALUATION ADULT - IMPAIRMENTS FOUND, PT EVAL
muscle strength/poor safety awareness/arousal, attention, and cognition/gait, locomotion, and balance

## 2018-02-02 NOTE — PHYSICAL THERAPY INITIAL EVALUATION ADULT - TRANSFER SAFETY CONCERNS NOTED: SIT/STAND, REHAB EVAL
decreased safety awareness/losing balance/decreased step length/Poor eccentric control/decreased balance during turns/decreased proprioception/decreased weight-shifting ability

## 2018-02-02 NOTE — DIETITIAN INITIAL EVALUATION ADULT. - PERTINENT LABORATORY DATA
02-02 Na149 mmol/L<H> Glu 100 mg/dL<H> K+ 3.7 mmol/L Cr  1.15 mg/dL BUN 21 mg/dL Phos n/a   Alb n/a   PAB n/a; (2/1) POCT: 140, 189, 112; A1c 10.6H

## 2018-02-02 NOTE — PHYSICAL THERAPY INITIAL EVALUATION ADULT - COORDINATION ASSESSED, REHAB EVAL
finger to nose/heel to shin/unable to follow commands at this time, no carryover unable to follow commands at this time, no carryover/finger to nose/heel to shin

## 2018-02-02 NOTE — DIETITIAN INITIAL EVALUATION ADULT. - PHYSICAL APPEARANCE
BMI = 17.1; no edema noted; Nutrition focused physical exam conducted:  Subcutaneous fat loss: [moderate ] Orbital fat pads region, [severe ]Buccal fat region, [severe ]Triceps region,  [severe ]Ribs region.  Muscle wasting: [severe ]Temples region, [severe ]Clavicle region, [severe ]Shoulder region, [unable ]Scapula region, [ severe]Interosseous region,  [severe ]thigh region, [severe ]Calf region/debilitated/emaciated/contracted/underweight

## 2018-02-02 NOTE — DISCHARGE NOTE ADULT - MEDICATION SUMMARY - MEDICATIONS TO TAKE
I will START or STAY ON the medications listed below when I get home from the hospital:    acetaminophen 325 mg oral tablet  -- 2 tab(s) by mouth every 6 hours, As needed, For Temp greater than 38 C (100.4 F)  -- Indication: For Pain    enoxaparin  -- 40 unit(s) subcutaneous once a day  -- Indication: For DVT Prophylaxis I will START or STAY ON the medications listed below when I get home from the hospital:    acetaminophen 325 mg oral tablet  -- 2 tab(s) by mouth every 6 hours, As needed, For Temp greater than 38 C (100.4 F)  -- Indication: For Pain    glipiZIDE 5 mg oral tablet  -- 1 tab(s) by mouth once a day  -- Indication: For DM (diabetes mellitus)    amLODIPine 5 mg oral tablet  -- 1 tab(s) by mouth once a day  -- Indication: For HTN    hydrocortisone 2.5% rectal cream with applicator  -- 1 application rectally 2 times a day  -- Indication: For Hemorrhoids I will START or STAY ON the medications listed below when I get home from the hospital:    acetaminophen 325 mg oral tablet  -- 2 tab(s) by mouth every 6 hours, As needed, For Temp greater than 38 C (100.4 F)  -- Indication: For Pain    glipiZIDE 5 mg oral tablet  -- 1 tab(s) by mouth once a day  -- Indication: For DM (diabetes mellitus)    amLODIPine 5 mg oral tablet  -- 1 tab(s) by mouth once a day  -- Indication: For Htn    hydrocortisone 2.5% rectal cream with applicator  -- 1 application rectally 2 times a day  -- Indication: For Hemorrhoids

## 2018-02-02 NOTE — PHYSICAL THERAPY INITIAL EVALUATION ADULT - MODIFIED CLINICAL TEST OF SENSORY INTEGRATION IN BALANCE TEST
Barthel Index: Feeding Score _0__, Bathing Score _0__, Grooming Score _0__, Dressing Score __0_, Bowels Score _0__, Bladder Score _0__, Toilet Score _0__, Transfers Score _0__, Mobility Score _0__, Stairs Score _0__,     Total Score _0__

## 2018-02-02 NOTE — DISCHARGE NOTE ADULT - CARE PROVIDER_API CALL
Waqra De La Roas), Internal Medicine  [address] [place] [address]  Phone: [phone]  Fax: [phone] Waqar De La Rosa), Internal Medicine  [address] [facility] [address]  [place] [address]  Phone: [phone]  Fax: [phone]    Bunny Evans), Medicine  [address] [facility]  [address]  [place] [address]  Phone: [phone]  Fax: [phone] Waqar De La Rosa), Internal Medicine  [address] [place] [address]  Phone: [phone]  Fax: [phone]    Aneudy Rizo), Medicine  [address] [place] [address]  Phone: [phone]  Fax: [phone]

## 2018-02-02 NOTE — CHART NOTE - NSCHARTNOTEFT_GEN_A_CORE
Pt seen and chart reviewed. Psych consult requested to evaluate for s/s of dementia. note that pt awake today, however, limited communication noted.  Spoke with pts son in law and grandson at bedside.  Full consult cannot be performed at this time.  If pt remains inpt, will have covering psychiatrist attempt full MMSE tomorrow.

## 2018-02-02 NOTE — PHYSICAL THERAPY INITIAL EVALUATION ADULT - FOLLOWS COMMANDS/ANSWERS QUESTIONS, REHAB EVAL
verbal cues and manual guidance for carryover/50% of the time/able to follow single-step instructions

## 2018-02-02 NOTE — DIETITIAN INITIAL EVALUATION ADULT. - NS AS NUTRI INTERV ED CONTENT
Left educational material bedside for family reference/Nutrition relationship to health/disease/Recommended modifications

## 2018-02-02 NOTE — DISCHARGE NOTE ADULT - PATIENT PORTAL LINK FT
“You can access the [facility] Patient Portal, offered by [facility], by registering with the following website: [email or web address]”

## 2018-02-02 NOTE — PHYSICAL THERAPY INITIAL EVALUATION ADULT - GENERAL OBSERVATIONS, REHAB EVAL
Pt encountered supine in bed, alert and oriented x1, IV and supplemental oxygen via nasal canula in place, NAD.

## 2018-02-02 NOTE — DISCHARGE NOTE ADULT - HOSPITAL COURSE
82m with Altered Mental Status with fever     Pt to stable for discharge. To complete PO ABX and follow up with PMD.    Problem/Plan - 1:  ·  Problem: Septic encephalopathy.  Plan: Intravenous hydration   Treat pneumonia infection.     Problem/Plan - 2:  ·  Problem: Pneumonia due to infectious organism, unspecified laterality, unspecified part of lung.  Plan: Continue with rocephin / zithromax (start date 1/31)  Follow clinically  Influenza A/B negative.     Problem/Plan - 3:  ·  Problem: Type 2 diabetes mellitus without complication, without long-term current use of insulin.  Plan: Insulin sliding scale  monitor accuchecks  Check HgbA1c.     Problem/Plan - 4:  ·  Problem: Hypernatremia.  Plan: Mildly elevated Er=297; change NS IV fluids to 1/2  repeat electrolytes in AM.     Problem/Plan - 5:  ·  Problem: Preventative health care.  Plan: DVT prophylaxsis  SQ lovenox daily. 82m with Altered Mental Status with fever found with pna    Pt to stable for discharge.      Septic encephalopathy.  : Pneumonia, complex; completed abx  DM, uncontrolled     Hypernatremia, resolved

## 2018-02-03 LAB
ANION GAP SERPL CALC-SCNC: 9 MMOL/L — SIGNIFICANT CHANGE UP (ref 5–17)
BUN SERPL-MCNC: 16 MG/DL — SIGNIFICANT CHANGE UP (ref 7–23)
CALCIUM SERPL-MCNC: 7.9 MG/DL — LOW (ref 8.5–10.1)
CHLORIDE SERPL-SCNC: 117 MMOL/L — HIGH (ref 96–108)
CO2 SERPL-SCNC: 21 MMOL/L — LOW (ref 22–31)
CREAT SERPL-MCNC: 0.99 MG/DL — SIGNIFICANT CHANGE UP (ref 0.5–1.3)
GLUCOSE SERPL-MCNC: 129 MG/DL — HIGH (ref 70–99)
HCT VFR BLD CALC: 36.2 % — LOW (ref 39–50)
HGB BLD-MCNC: 11.9 G/DL — LOW (ref 13–17)
MCHC RBC-ENTMCNC: 29.1 PG — SIGNIFICANT CHANGE UP (ref 27–34)
MCHC RBC-ENTMCNC: 32.9 GM/DL — SIGNIFICANT CHANGE UP (ref 32–36)
MCV RBC AUTO: 88.5 FL — SIGNIFICANT CHANGE UP (ref 80–100)
NRBC # BLD: 0 /100 WBCS — SIGNIFICANT CHANGE UP (ref 0–0)
PLATELET # BLD AUTO: 149 K/UL — LOW (ref 150–400)
POTASSIUM SERPL-MCNC: 3.9 MMOL/L — SIGNIFICANT CHANGE UP (ref 3.5–5.3)
POTASSIUM SERPL-SCNC: 3.9 MMOL/L — SIGNIFICANT CHANGE UP (ref 3.5–5.3)
RBC # BLD: 4.09 M/UL — LOW (ref 4.2–5.8)
RBC # FLD: 14.6 % — HIGH (ref 10.3–14.5)
SODIUM SERPL-SCNC: 147 MMOL/L — HIGH (ref 135–145)
WBC # BLD: 5.09 K/UL — SIGNIFICANT CHANGE UP (ref 3.8–10.5)
WBC # FLD AUTO: 5.09 K/UL — SIGNIFICANT CHANGE UP (ref 3.8–10.5)

## 2018-02-03 PROCEDURE — 99233 SBSQ HOSP IP/OBS HIGH 50: CPT

## 2018-02-03 RX ORDER — SODIUM CHLORIDE 9 MG/ML
1000 INJECTION, SOLUTION INTRAVENOUS
Qty: 0 | Refills: 0 | Status: DISCONTINUED | OUTPATIENT
Start: 2018-02-03 | End: 2018-02-04

## 2018-02-03 RX ADMIN — SODIUM CHLORIDE 100 MILLILITER(S): 9 INJECTION INTRAMUSCULAR; INTRAVENOUS; SUBCUTANEOUS at 11:45

## 2018-02-03 RX ADMIN — CEFTRIAXONE 100 GRAM(S): 500 INJECTION, POWDER, FOR SOLUTION INTRAMUSCULAR; INTRAVENOUS at 18:59

## 2018-02-03 RX ADMIN — AZITHROMYCIN 255 MILLIGRAM(S): 500 TABLET, FILM COATED ORAL at 21:38

## 2018-02-03 RX ADMIN — SODIUM CHLORIDE 100 MILLILITER(S): 9 INJECTION, SOLUTION INTRAVENOUS at 21:39

## 2018-02-03 RX ADMIN — ENOXAPARIN SODIUM 40 MILLIGRAM(S): 100 INJECTION SUBCUTANEOUS at 21:39

## 2018-02-03 RX ADMIN — Medication 2: at 11:45

## 2018-02-03 NOTE — PROGRESS NOTE ADULT - SUBJECTIVE AND OBJECTIVE BOX
CHIEF COMPLAINT/INTERVAL HISTORY:    Patient is a 82y old  Male who presents with a chief complaint of     HPI:  81 y/o male hx NIDDM biba for weakness and ams over past 1-2 days. Pt with cold like symptoms ~4 days ago per grandson, congestion, cough. Progressively ate a little less. States yesterday was still able to ambulate with cane but was declining somewhat. No vomiting or diarrhea, pt did not complain of anything else focally. Usually able to feed self, needs help with dressing. the family also noted rigors (31 Jan 2018 17:54)    Overnight issues  Patients seen and examined bedside.  Sitting up in bed having breakfast  denies any fever Chills feeling better        SUBJECTIVE & OBJECTIVE: Pt seen and examined at bedside.   ROS:  CONSTITUTIONAL: No fever, weight loss, or fatigue  EYES: No eye pain, visual disturbances, or discharge  ENMT:  No difficulty hearing, tinnitus, vertigo; No sinus or throat pain  NECK: No pain or stiffness  RESPIRATORY: No cough, wheezing, chills or hemoptysis; No shortness of breath  CARDIOVASCULAR: No chest pain, palpitations, dizziness, or leg swelling  GASTROINTESTINAL: No abdominal or epigastric pain. No nausea, vomiting, or hematemesis; No diarrhea or constipation. No melena or hematochezia.  GENITOURINARY: No dysuria, frequency, hematuria, or incontinence  NEUROLOGICAL: No headaches, memory loss, loss of strength, numbness, or tremors  SKIN: No itching, burning, rashes, or lesions   LYMPH NODES: No enlarged glands  ENDOCRINE: No heat or cold intolerance; No hair loss  MUSCULOSKELETAL: No joint pain or swelling; No muscle, back, or extremity pain  PSYCHIATRIC: No depression, anxiety, mood swings, or difficulty sleeping  HEME/LYMPH: No easy bruising, or bleeding gums  ALLERGY AND IMMUNOLOGIC: No hives or eczema  ICU Vital Signs Last 24 Hrs  T(C): 36.1 (03 Feb 2018 06:26), Max: 36.8 (02 Feb 2018 11:13)  T(F): 97 (03 Feb 2018 06:26), Max: 98.2 (02 Feb 2018 11:13)  HR: 77 (03 Feb 2018 06:26) (74 - 81)  BP: 154/85 (03 Feb 2018 06:26) (147/84 - 158/91)  BP(mean): --  ABP: --  ABP(mean): --  RR: 18 (03 Feb 2018 06:26) (18 - 19)  SpO2: 95% (03 Feb 2018 06:26) (95% - 99%)        MEDICATIONS  (STANDING):  azithromycin  IVPB 500 milliGRAM(s) IV Intermittent every 24 hours  cefTRIAXone   IVPB 1 Gram(s) IV Intermittent every 24 hours  dextrose 5%. 1000 milliLiter(s) (50 mL/Hr) IV Continuous <Continuous>  dextrose 50% Injectable 12.5 Gram(s) IV Push once  dextrose 50% Injectable 25 Gram(s) IV Push once  dextrose 50% Injectable 25 Gram(s) IV Push once  enoxaparin Injectable 40 milliGRAM(s) SubCutaneous every 24 hours  insulin lispro (HumaLOG) corrective regimen sliding scale   SubCutaneous three times a day before meals  insulin lispro (HumaLOG) corrective regimen sliding scale   SubCutaneous at bedtime  sodium chloride 0.45%. 1000 milliLiter(s) (75 mL/Hr) IV Continuous <Continuous>  sodium chloride 0.9%. 1000 milliLiter(s) (100 mL/Hr) IV Continuous <Continuous>    MEDICATIONS  (PRN):  acetaminophen   Tablet 650 milliGRAM(s) Oral every 6 hours PRN For Temp greater than 38 C (100.4 F)  dextrose Gel 1 Dose(s) Oral once PRN Blood Glucose LESS THAN 70 milliGRAM(s)/deciliter  glucagon  Injectable 1 milliGRAM(s) IntraMuscular once PRN Glucose LESS THAN 70 milligrams/deciliter        PHYSICAL EXAM:    GENERAL: NAD, well-groomed, well-developed  HEAD:  Atraumatic, Normocephalic  EYES: EOMI, PERRLA, conjunctiva and sclera clear  ENMT: Moist mucous membranes  NECK: Supple, No JVD  NERVOUS SYSTEM:  Alert & Oriented X3, Moving all 4 limbs  CHEST/LUNG: Clear to auscultation bilaterally; No rales, rhonchi, wheezing, or rubs  HEART: Regular rate and rhythm; No murmurs, rubs, or gallops  ABDOMEN: Soft, Nontender, Nondistended; Bowel sounds present  EXTREMITIES:  2+ Peripheral Pulses, No clubbing, cyanosis, or edema    LABS:                        11.9   5.09  )-----------( 149      ( 03 Feb 2018 06:00 )             36.2     02-03    147<H>  |  117<H>  |  16  ----------------------------<  129<H>  3.9   |  21<L>  |  0.99    Ca    7.9<L>      03 Feb 2018 06:00            CAPILLARY BLOOD GLUCOSE      POCT Blood Glucose.: 127 mg/dL (03 Feb 2018 07:32)  POCT Blood Glucose.: 193 mg/dL (02 Feb 2018 21:18)  POCT Blood Glucose.: 107 mg/dL (02 Feb 2018 15:33)  POCT Blood Glucose.: 131 mg/dL (02 Feb 2018 10:43)      RECENT CULTURES:      RADIOLOGY & ADDITIONAL TESTS:  Imaging Personally Reviewed:  [ ] YES      Consultant(s) Notes Reviewed:  [ ] YES     Care Discussed with [ ] Consultants [X ] Patient [ X] Family  [ ]    [x ]  Other; RN  HEALTH ISSUES - PROBLEM Dx:  Preventative health care: Preventative health care  Hypernatremia: Hypernatremia  Type 2 diabetes mellitus without complication, without long-term current use of insulin: Type 2 diabetes mellitus without complication, without long-term current use of insulin  Pneumonia due to infectious organism, unspecified laterality, unspecified part of lung: Pneumonia due to infectious organism, unspecified laterality, unspecified part of lung  Septic encephalopathy: Septic encephalopathy        DVT/GI ppx  Discussed with pt @ bedside

## 2018-02-04 LAB
ANION GAP SERPL CALC-SCNC: 9 MMOL/L — SIGNIFICANT CHANGE UP (ref 5–17)
BUN SERPL-MCNC: 16 MG/DL — SIGNIFICANT CHANGE UP (ref 7–23)
CALCIUM SERPL-MCNC: 8.4 MG/DL — LOW (ref 8.5–10.1)
CHLORIDE SERPL-SCNC: 116 MMOL/L — HIGH (ref 96–108)
CO2 SERPL-SCNC: 20 MMOL/L — LOW (ref 22–31)
CREAT SERPL-MCNC: 1.21 MG/DL — SIGNIFICANT CHANGE UP (ref 0.5–1.3)
GLUCOSE SERPL-MCNC: 120 MG/DL — HIGH (ref 70–99)
HCT VFR BLD CALC: 34.9 % — LOW (ref 39–50)
HGB BLD-MCNC: 11.3 G/DL — LOW (ref 13–17)
MCHC RBC-ENTMCNC: 28.8 PG — SIGNIFICANT CHANGE UP (ref 27–34)
MCHC RBC-ENTMCNC: 32.4 GM/DL — SIGNIFICANT CHANGE UP (ref 32–36)
MCV RBC AUTO: 89 FL — SIGNIFICANT CHANGE UP (ref 80–100)
NRBC # BLD: 0 /100 WBCS — SIGNIFICANT CHANGE UP (ref 0–0)
PLATELET # BLD AUTO: 172 K/UL — SIGNIFICANT CHANGE UP (ref 150–400)
POTASSIUM SERPL-MCNC: 3.8 MMOL/L — SIGNIFICANT CHANGE UP (ref 3.5–5.3)
POTASSIUM SERPL-SCNC: 3.8 MMOL/L — SIGNIFICANT CHANGE UP (ref 3.5–5.3)
RBC # BLD: 3.92 M/UL — LOW (ref 4.2–5.8)
RBC # FLD: 14.6 % — HIGH (ref 10.3–14.5)
SODIUM SERPL-SCNC: 145 MMOL/L — SIGNIFICANT CHANGE UP (ref 135–145)
WBC # BLD: 4.66 K/UL — SIGNIFICANT CHANGE UP (ref 3.8–10.5)
WBC # FLD AUTO: 4.66 K/UL — SIGNIFICANT CHANGE UP (ref 3.8–10.5)

## 2018-02-04 PROCEDURE — 99233 SBSQ HOSP IP/OBS HIGH 50: CPT

## 2018-02-04 RX ORDER — SODIUM CHLORIDE 9 MG/ML
1000 INJECTION, SOLUTION INTRAVENOUS
Qty: 0 | Refills: 0 | Status: DISCONTINUED | OUTPATIENT
Start: 2018-02-04 | End: 2018-02-07

## 2018-02-04 RX ADMIN — Medication 4: at 11:47

## 2018-02-04 RX ADMIN — SODIUM CHLORIDE 50 MILLILITER(S): 9 INJECTION, SOLUTION INTRAVENOUS at 10:32

## 2018-02-04 RX ADMIN — ENOXAPARIN SODIUM 40 MILLIGRAM(S): 100 INJECTION SUBCUTANEOUS at 21:27

## 2018-02-04 RX ADMIN — AZITHROMYCIN 255 MILLIGRAM(S): 500 TABLET, FILM COATED ORAL at 21:27

## 2018-02-04 RX ADMIN — CEFTRIAXONE 100 GRAM(S): 500 INJECTION, POWDER, FOR SOLUTION INTRAMUSCULAR; INTRAVENOUS at 19:20

## 2018-02-04 NOTE — PROGRESS NOTE ADULT - SUBJECTIVE AND OBJECTIVE BOX
CHIEF COMPLAINT/INTERVAL HISTORY:    Patient is a 82y old  Male who presents with a chief complaint of     HPI:  83 y/o male hx NIDDM biba for weakness and ams over past 1-2 days. Pt with cold like symptoms ~4 days ago per grandson, congestion, cough. Progressively ate a little less. States yesterday was still able to ambulate with cane but was declining somewhat. No vomiting or diarrhea, pt did not complain of anything else focally. Usually able to feed self, needs help with dressing. the family also noted rigors (31 Jan 2018 17:54)    Overnight issues    Patient sitting up in bed  Feeling well  No fever Chills  No cough  No chest pain SOB        SUBJECTIVE & OBJECTIVE: Pt seen and examined at bedside.   ROS:  CONSTITUTIONAL: No fever, weight loss, or fatigue  EYES: No eye pain, visual disturbances, or discharge  ENMT:  No difficulty hearing, tinnitus, vertigo; No sinus or throat pain  NECK: No pain or stiffness  RESPIRATORY: No cough, wheezing, chills or hemoptysis; No shortness of breath  CARDIOVASCULAR: No chest pain, palpitations, dizziness, or leg swelling  GASTROINTESTINAL: No abdominal or epigastric pain. No nausea, vomiting, or hematemesis; No diarrhea or constipation. No melena or hematochezia.  GENITOURINARY: No dysuria, frequency, hematuria, or incontinence  NEUROLOGICAL: No headaches, memory loss, loss of strength, numbness, or tremors  SKIN: No itching, burning, rashes, or lesions   LYMPH NODES: No enlarged glands  ENDOCRINE: No heat or cold intolerance; No hair loss  MUSCULOSKELETAL: No joint pain or swelling; No muscle, back, or extremity pain  PSYCHIATRIC: No depression, anxiety, mood swings, or difficulty sleeping  HEME/LYMPH: No easy bruising, or bleeding gums  ALLERGY AND IMMUNOLOGIC: No hives or eczema  ICU Vital Signs Last 24 Hrs  T(C): 36.3 (04 Feb 2018 05:39), Max: 36.3 (04 Feb 2018 05:39)  T(F): 97.3 (04 Feb 2018 05:39), Max: 97.3 (04 Feb 2018 05:39)  HR: 71 (04 Feb 2018 05:39) (71 - 82)  BP: 166/90 (04 Feb 2018 05:39) (139/97 - 178/107)  BP(mean): --  ABP: --  ABP(mean): --  RR: 18 (04 Feb 2018 05:39) (17 - 18)  SpO2: 99% (04 Feb 2018 05:39) (96% - 100%)        MEDICATIONS  (STANDING):  azithromycin  IVPB 500 milliGRAM(s) IV Intermittent every 24 hours  cefTRIAXone   IVPB 1 Gram(s) IV Intermittent every 24 hours  dextrose 5%. 1000 milliLiter(s) (50 mL/Hr) IV Continuous <Continuous>  dextrose 50% Injectable 12.5 Gram(s) IV Push once  dextrose 50% Injectable 25 Gram(s) IV Push once  dextrose 50% Injectable 25 Gram(s) IV Push once  enoxaparin Injectable 40 milliGRAM(s) SubCutaneous every 24 hours  insulin lispro (HumaLOG) corrective regimen sliding scale   SubCutaneous three times a day before meals  insulin lispro (HumaLOG) corrective regimen sliding scale   SubCutaneous at bedtime  sodium chloride 0.45%. 1000 milliLiter(s) (100 mL/Hr) IV Continuous <Continuous>    MEDICATIONS  (PRN):  acetaminophen   Tablet 650 milliGRAM(s) Oral every 6 hours PRN For Temp greater than 38 C (100.4 F)  dextrose Gel 1 Dose(s) Oral once PRN Blood Glucose LESS THAN 70 milliGRAM(s)/deciliter  glucagon  Injectable 1 milliGRAM(s) IntraMuscular once PRN Glucose LESS THAN 70 milligrams/deciliter        PHYSICAL EXAM:    GENERAL: NAD, well-groomed, well-developed  HEAD:  Atraumatic, Normocephalic  EYES: EOMI, PERRLA, conjunctiva and sclera clear  ENMT: Moist mucous membranes  NECK: Supple, No JVD  NERVOUS SYSTEM:  Alert & Oriented X3, Moving all 4 limbs  CHEST/LUNG: Clear to auscultation bilaterally; No rales, rhonchi, wheezing, or rubs  HEART: Regular rate and rhythm; No murmurs, rubs, or gallops  ABDOMEN: Soft, Nontender, Nondistended; Bowel sounds present  EXTREMITIES:  2+ Peripheral Pulses, No clubbing, cyanosis, or edema    LABS:                        11.3   4.66  )-----------( 172      ( 04 Feb 2018 06:45 )             34.9     02-04    145  |  116<H>  |  16  ----------------------------<  120<H>  3.8   |  20<L>  |  1.21    Ca    8.4<L>      04 Feb 2018 06:45            CAPILLARY BLOOD GLUCOSE      POCT Blood Glucose.: 115 mg/dL (04 Feb 2018 07:37)  POCT Blood Glucose.: 199 mg/dL (03 Feb 2018 21:44)  POCT Blood Glucose.: 140 mg/dL (03 Feb 2018 16:12)  POCT Blood Glucose.: 167 mg/dL (03 Feb 2018 11:43)      RECENT CULTURES:      RADIOLOGY & ADDITIONAL TESTS:  Imaging Personally Reviewed:  [ ] YES      Consultant(s) Notes Reviewed:  [ ] YES     Care Discussed with [ ] Consultants [X ] Patient [ ] Family  [ ]    [x ]  Other; RN  HEALTH ISSUES - PROBLEM Dx:  Preventative health care: Preventative health care  Hypernatremia: Hypernatremia  Type 2 diabetes mellitus without complication, without long-term current use of insulin: Type 2 diabetes mellitus without complication, without long-term current use of insulin  Pneumonia due to infectious organism, unspecified laterality, unspecified part of lung: Pneumonia due to infectious organism, unspecified laterality, unspecified part of lung  Septic encephalopathy: Septic encephalopathy        DVT/GI ppx  Discussed with pt @ bedside

## 2018-02-05 DIAGNOSIS — K62.5 HEMORRHAGE OF ANUS AND RECTUM: ICD-10-CM

## 2018-02-05 DIAGNOSIS — F03.90 UNSPECIFIED DEMENTIA WITHOUT BEHAVIORAL DISTURBANCE: ICD-10-CM

## 2018-02-05 LAB
ANION GAP SERPL CALC-SCNC: 9 MMOL/L — SIGNIFICANT CHANGE UP (ref 5–17)
BUN SERPL-MCNC: 16 MG/DL — SIGNIFICANT CHANGE UP (ref 7–23)
CALCIUM SERPL-MCNC: 8.4 MG/DL — LOW (ref 8.5–10.1)
CHLORIDE SERPL-SCNC: 110 MMOL/L — HIGH (ref 96–108)
CO2 SERPL-SCNC: 21 MMOL/L — LOW (ref 22–31)
CREAT SERPL-MCNC: 1.16 MG/DL — SIGNIFICANT CHANGE UP (ref 0.5–1.3)
GLUCOSE SERPL-MCNC: 200 MG/DL — HIGH (ref 70–99)
HCT VFR BLD CALC: 33.9 % — LOW (ref 39–50)
HGB BLD-MCNC: 11 G/DL — LOW (ref 13–17)
MCHC RBC-ENTMCNC: 28.5 PG — SIGNIFICANT CHANGE UP (ref 27–34)
MCHC RBC-ENTMCNC: 32.4 GM/DL — SIGNIFICANT CHANGE UP (ref 32–36)
MCV RBC AUTO: 87.8 FL — SIGNIFICANT CHANGE UP (ref 80–100)
NRBC # BLD: 0 /100 WBCS — SIGNIFICANT CHANGE UP (ref 0–0)
OB PNL STL: POSITIVE
PLATELET # BLD AUTO: 199 K/UL — SIGNIFICANT CHANGE UP (ref 150–400)
POTASSIUM SERPL-MCNC: 3.8 MMOL/L — SIGNIFICANT CHANGE UP (ref 3.5–5.3)
POTASSIUM SERPL-SCNC: 3.8 MMOL/L — SIGNIFICANT CHANGE UP (ref 3.5–5.3)
RBC # BLD: 3.86 M/UL — LOW (ref 4.2–5.8)
RBC # FLD: 14.2 % — SIGNIFICANT CHANGE UP (ref 10.3–14.5)
SODIUM SERPL-SCNC: 140 MMOL/L — SIGNIFICANT CHANGE UP (ref 135–145)
WBC # BLD: 4.68 K/UL — SIGNIFICANT CHANGE UP (ref 3.8–10.5)
WBC # FLD AUTO: 4.68 K/UL — SIGNIFICANT CHANGE UP (ref 3.8–10.5)

## 2018-02-05 PROCEDURE — 99233 SBSQ HOSP IP/OBS HIGH 50: CPT

## 2018-02-05 RX ORDER — AMLODIPINE BESYLATE 2.5 MG/1
5 TABLET ORAL DAILY
Qty: 0 | Refills: 0 | Status: DISCONTINUED | OUTPATIENT
Start: 2018-02-05 | End: 2018-02-07

## 2018-02-05 RX ORDER — ACETAMINOPHEN 500 MG
2 TABLET ORAL
Qty: 0 | Refills: 0 | COMMUNITY
Start: 2018-02-05

## 2018-02-05 RX ORDER — ENOXAPARIN SODIUM 100 MG/ML
40 INJECTION SUBCUTANEOUS
Qty: 0 | Refills: 0 | COMMUNITY
Start: 2018-02-05

## 2018-02-05 RX ORDER — HYDROCORTISONE 1 %
1 OINTMENT (GRAM) TOPICAL
Qty: 0 | Refills: 0 | Status: DISCONTINUED | OUTPATIENT
Start: 2018-02-05 | End: 2018-02-07

## 2018-02-05 RX ADMIN — Medication 2: at 07:38

## 2018-02-05 RX ADMIN — ENOXAPARIN SODIUM 40 MILLIGRAM(S): 100 INJECTION SUBCUTANEOUS at 22:28

## 2018-02-05 RX ADMIN — CEFTRIAXONE 100 GRAM(S): 500 INJECTION, POWDER, FOR SOLUTION INTRAMUSCULAR; INTRAVENOUS at 19:42

## 2018-02-05 RX ADMIN — AZITHROMYCIN 255 MILLIGRAM(S): 500 TABLET, FILM COATED ORAL at 22:28

## 2018-02-05 RX ADMIN — Medication 2: at 10:52

## 2018-02-05 RX ADMIN — Medication 6: at 15:54

## 2018-02-05 RX ADMIN — SODIUM CHLORIDE 50 MILLILITER(S): 9 INJECTION, SOLUTION INTRAVENOUS at 15:54

## 2018-02-05 NOTE — PROGRESS NOTE ADULT - PROBLEM SELECTOR PLAN 2
Continue with rocephin / zithromax (start date 1/31)  Follow clinically  Influenza A/B negative  improving  total 5 days abx Continue with rocephin / zithromax (start date 1/31)  Follow clinically  Influenza A/B negative  improving  total 5 days abx  suspect gm neg pna

## 2018-02-05 NOTE — PROGRESS NOTE ADULT - SUBJECTIVE AND OBJECTIVE BOX
Patient is a 82y old  Male who presents with a chief complaint of PNA         SUBJECTIVE / OVERNIGHT EVENTS: son at bedside and reports pt with blood in stool for last 3 days.  I informed son that blood counts have been stable and this is possibly due to hemorrhoids and can be eval as outpt; son prefers for inpt eval      MEDICATIONS  (STANDING):  azithromycin  IVPB 500 milliGRAM(s) IV Intermittent every 24 hours  cefTRIAXone   IVPB 1 Gram(s) IV Intermittent every 24 hours  dextrose 5%. 1000 milliLiter(s) (50 mL/Hr) IV Continuous <Continuous>  dextrose 50% Injectable 12.5 Gram(s) IV Push once  dextrose 50% Injectable 25 Gram(s) IV Push once  dextrose 50% Injectable 25 Gram(s) IV Push once  enoxaparin Injectable 40 milliGRAM(s) SubCutaneous every 24 hours  insulin lispro (HumaLOG) corrective regimen sliding scale   SubCutaneous three times a day before meals  insulin lispro (HumaLOG) corrective regimen sliding scale   SubCutaneous at bedtime  sodium chloride 0.45%. 1000 milliLiter(s) (50 mL/Hr) IV Continuous <Continuous>    MEDICATIONS  (PRN):  acetaminophen   Tablet 650 milliGRAM(s) Oral every 6 hours PRN For Temp greater than 38 C (100.4 F)  dextrose Gel 1 Dose(s) Oral once PRN Blood Glucose LESS THAN 70 milliGRAM(s)/deciliter  glucagon  Injectable 1 milliGRAM(s) IntraMuscular once PRN Glucose LESS THAN 70 milligrams/deciliter      Vital Signs Last 24 Hrs  T(F): 96.5 (05 Feb 2018 11:09), Max: 97.1 (04 Feb 2018 23:44)  HR: 83 (05 Feb 2018 11:48) (74 - 83)  BP: 141/90 (05 Feb 2018 11:48) (141/90 - 176/88)  RR: 16 (05 Feb 2018 11:48) (16 - 18)  SpO2: 95% (05 Feb 2018 11:48) (95% - 100%)    CAPILLARY BLOOD GLUCOSE  POCT Blood Glucose.: 174 mg/dL (05 Feb 2018 10:31)  POCT Blood Glucose.: 171 mg/dL (05 Feb 2018 07:24)  POCT Blood Glucose.: 199 mg/dL (04 Feb 2018 23:11)  POCT Blood Glucose.: 138 mg/dL (04 Feb 2018 15:44)          PHYSICAL EXAM  GENERAL: NAD, well-developed  HEAD:  Atraumatic, Normocephalic; edentulous  EYES: EOMI, PERRLA, conjunctiva and sclera clear  CHEST/LUNG: Clear to auscultation bilaterally; No wheeze  HEART: Regular rate and rhythm  ABDOMEN: Soft, Nontender, Nondistended; Bowel sounds present  EXTREMITIES:  No clubbing, cyanosis, or edema  PSYCH: pleasant, tiesha      LABS:                        11.0   4.68  )-----------( 199      ( 05 Feb 2018 06:41 )             33.9     02-05    140  |  110<H>  |  16  ----------------------------<  200<H>  3.8   |  21<L>  |  1.16    Ca    8.4<L>      05 Feb 2018 06:41            Care Discussed with Consultants/Other Providers: Dr Rizo

## 2018-02-05 NOTE — BEHAVIORAL HEALTH ASSESSMENT NOTE - NSBHCONSULTRECOMMENDOTHER_PSY_A_CORE FT
melatonin 3mg to help a healthy circadian rhythm  sleep study to r/o sleep apnea  tight sugar control  treat anemia  improve oral hygiene

## 2018-02-05 NOTE — BEHAVIORAL HEALTH ASSESSMENT NOTE - SUMMARY
83 y/o AAM h/o dementia for the past couple years, not on meds, no prior psych h/o hospitalizations/SI/violence/legal issues, PMH of DM a/w weakness in the context of decreased PO intake and gait problems. 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 No behavioral disturbance, now more lethargic questionably developing delirium due to infection and hospitalization, unable to participate in the interview. However, jovial on interview, no evidence of SI/HI/psychosis, enjoys good family support.

## 2018-02-05 NOTE — BEHAVIORAL HEALTH ASSESSMENT NOTE - RISK ASSESSMENT
chronically elevated given dementia, acute risk is that he is out of his regular environment. Protective factors are good family support, no evidence of SI/HI/psychosis

## 2018-02-05 NOTE — BEHAVIORAL HEALTH ASSESSMENT NOTE - HPI (INCLUDE ILLNESS QUALITY, SEVERITY, DURATION, TIMING, CONTEXT, MODIFYING FACTORS, ASSOCIATED SIGNS AND SYMPTOMS)
83 y/o AAM h/o dementia for the past couple years, not on meds, no prior psych h/o hospitalizations/SI/violence/legal issues, PMH of DM a/w weakness in the context of decreased PO intake and gait problems.    Pt is close to nonverbal on admission, says he is "not bad", endorses it is summer and he is at home.    Son-in-law at bedside reports pt had dementia for the past couple years, needs help dressing himself and to get around, however no behavioral disturbance whatsoever, he is usually very mellow "can't even get angry at you", enjoys his family and grandchildren. No h/o or recent SI/SIB whatsoever. Upon awakening he tends to be more confused/more disoriented pretty much every day. He has a lot of daily somnolence and dozes off several times a day and has shallow sleep pattern at night. Called daughter, who corroborated the above. No remote or recent h/o mood issues/anxiety. No evidence of psychosis. 83 y/o AAM h/o dementia for the past couple years, not on meds, no prior psych h/o hospitalizations/SI/violence/legal issues, PMH of DM a/w weakness in the context of decreased PO intake and gait problems.    Pt is close to nonverbal on admission, says he is "not bad", endorses it is summer and he is at home. MOCA was attempted however pt unable to participate.    Son-in-law at bedside reports pt had dementia for the past couple years, able to feed self, needs help dressing himself and to get around, however no behavioral disturbance whatsoever, he is usually very mellow "can't even get angry at you", enjoys his family and grandchildren. No h/o or recent SI/SIB whatsoever. Upon awakening he tends to be more confused/more disoriented pretty much every day. He has a lot of daily somnolence and dozes off several times a day and has shallow sleep pattern at night. Called daughter, who corroborated the above. No remote or recent h/o mood issues/anxiety. No evidence of psychosis.

## 2018-02-05 NOTE — CONSULT NOTE ADULT - SUBJECTIVE AND OBJECTIVE BOX
HPI:  81 y/o male hx NIDDM biba for weakness and ams over past 1-2 days. Pt with cold like symptoms ~4 days ago per grandson, congestion, cough. Progressively ate a little less. States yesterday was still able to ambulate with cane but was declining somewhat. No vomiting or diarrhea, pt did not complain of anything else focally. Usually able to feed self, needs help with dressing. the family also noted rigors (31 Jan 2018 17:54)  ----------------------------------------------------------------------------------------------------As Above -------------------------------------------------------------------------------  Work up revealed pneumonia. The patient is a poor historian.  Patient's son-in-law states that for 3 consecutive days he saw BRBPR in the toilet bowl after patient defecated. He saw it in the diaper, tissue paper and water. He saw it on he stool but was not sure if it was mixed in with it or not. The son-in-law denies melena, hematochezia, hematemesis, nausea, vomiting, abdominal pain,, diarrhea, or change in bowel movements. Constipation (+). Patient never had a colonoscopy. The son-in-lw also saw blood tinged urin a few days ago.      PAST MEDICAL & SURGICAL HISTORY:  DM (diabetes mellitus)      MEDICATIONS  (STANDING):  azithromycin  IVPB 500 milliGRAM(s) IV Intermittent every 24 hours  cefTRIAXone   IVPB 1 Gram(s) IV Intermittent every 24 hours  dextrose 5%. 1000 milliLiter(s) (50 mL/Hr) IV Continuous <Continuous>  dextrose 50% Injectable 12.5 Gram(s) IV Push once  dextrose 50% Injectable 25 Gram(s) IV Push once  dextrose 50% Injectable 25 Gram(s) IV Push once  enoxaparin Injectable 40 milliGRAM(s) SubCutaneous every 24 hours  insulin lispro (HumaLOG) corrective regimen sliding scale   SubCutaneous three times a day before meals  insulin lispro (HumaLOG) corrective regimen sliding scale   SubCutaneous at bedtime  sodium chloride 0.45%. 1000 milliLiter(s) (50 mL/Hr) IV Continuous <Continuous>    MEDICATIONS  (PRN):  acetaminophen   Tablet 650 milliGRAM(s) Oral every 6 hours PRN For Temp greater than 38 C (100.4 F)  dextrose Gel 1 Dose(s) Oral once PRN Blood Glucose LESS THAN 70 milliGRAM(s)/deciliter  glucagon  Injectable 1 milliGRAM(s) IntraMuscular once PRN Glucose LESS THAN 70 milligrams/deciliter      Allergies    No Known Allergies    Intolerances        FAMILY HISTORY:      REVIEW OF SYSTEMS:    CONSTITUTIONAL: No fever, weight loss,   EYES: No eye pain, visual disturbances, or discharge  ENMT:  No  tinnitus, vertigo; No sinus or throat pain  NECK: No pain or stiffness  BREASTS: No pain, masses, or nipple discharge  RESPIRATORY: No cough, wheezing, chills or hemoptysis; No shortness of breath  CARDIOVASCULAR: No chest pain, palpitations, dizziness, or leg swelling  GASTROINTESTINAL: See above  GENITOURINARY: No dysuria, frequency, hematuria, or incontinence  NEUROLOGICAL: No headaches, memory loss, loss of strength, numbness, or tremors  SKIN: No itching, burning, rashes, or lesions   LYMPH NODES: No enlarged glands  ENDOCRINE: No heat or cold intolerance; No hair loss  MUSCULOSKELETAL: No joint pain or swelling; No muscle, back, or extremity pain  PSYCHIATRIC: No depression, anxiety, mood swings, or difficulty sleeping  HEME/LYMPH: No easy bruising, or bleeding gums  ALLERGY AND IMMUNOLOGIC: No hives or eczema          SOCIAL HISTORY:    FAMILY HISTORY:      Vital Signs Last 24 Hrs  T(C): 36.7 (05 Feb 2018 19:09), Max: 36.7 (05 Feb 2018 19:09)  T(F): 98 (05 Feb 2018 19:09), Max: 98 (05 Feb 2018 19:09)  HR: 78 (05 Feb 2018 19:09) (74 - 83)  BP: 173/107 (05 Feb 2018 19:09) (141/90 - 173/107)  BP(mean): --  RR: 17 (05 Feb 2018 19:09) (16 - 18)  SpO2: 98% (05 Feb 2018 19:09) (95% - 100%)    PHYSICAL EXAM:    GENERAL: NAD, well-groomed, well-developed  HEAD:  Atraumatic, Normocephalic  EYES: EOMI, PERRLA, conjunctiva and sclera clear  NECK: Supple, No JVD, Normal thyroid  NERVOUS SYSTEM:  Alert & Oriented  CHEST/LUNG: Clear to percussion bilaterally; No rales, rhonchi, wheezing, or rubs  HEART: Regular rate and rhythm; No murmurs, rubs, or gallops  ABDOMEN: Soft, Nontender, Nondistended; Bowel sounds present  EXTREMITIES:  2+ Peripheral Pulses, No clubbing, cyanosis, or edema  LYMPH: No lymphadenopathy noted   RECTAL: No masses, prostate normal size and smooth, No stool in rectal vault. Mucus sent for hemocult   SKIN: No rashes or lesions    LABS:                        11.0   4.68  )-----------( 199      ( 05 Feb 2018 06:41 )             33.9       CBC:  02-05 @ 06:41  WBC  4.68  HGB 11.0  HCT 33.9 Plate 199  MCV 87.8  02-04 @ 06:45  WBC  4.66  HGB 11.3  HCT 34.9 Plate 172  MCV 89.0  02-03 @ 06:00  WBC  5.09  HGB 11.9  HCT 36.2 Plate 149  MCV 88.5  02-02 @ 06:21  WBC  8.89  HGB 10.7  HCT 33.0 Plate 117  MCV 88.5  02-01 @ 07:34  WBC  11.17  HGB 12.1  HCT 38.5 Plate 115  MCV 91.0  01-31 @ 16:18  WBC  6.82  HGB 12.5  HCT 39.2 Plate 104  MCV 91.6           05 Feb 2018 06:41    140    |  110    |  16     ----------------------------<  200    3.8     |  21     |  1.16   04 Feb 2018 06:45    145    |  116    |  16     ----------------------------<  120    3.8     |  20     |  1.21   03 Feb 2018 06:00    147    |  117    |  16     ----------------------------<  129    3.9     |  21     |  0.99   02 Feb 2018 06:21    149    |  118    |  21     ----------------------------<  100    3.7     |  20     |  1.15   01 Feb 2018 07:34    152    |  122    |  23     ----------------------------<  123    4.2     |  21     |  1.33   31 Jan 2018 16:18    149    |  119    |  35     ----------------------------<  239    4.4     |  21     |  1.66     Ca    8.4        05 Feb 2018 06:41  Ca    8.4        04 Feb 2018 06:45  Ca    7.9        03 Feb 2018 06:00  Ca    8.1        02 Feb 2018 06:21  Ca    7.9        01 Feb 2018 07:34  Ca    8.7        31 Jan 2018 16:18    TPro  7.4    /  Alb  2.7    /  TBili  0.2    /  DBili  x      /  AST  31     /  ALT  23     /  AlkPhos  101    31 Jan 2018 16:18            RADIOLOGY & ADDITIONAL STUDIES:

## 2018-02-05 NOTE — CONSULT NOTE ADULT - ASSESSMENT
HPI:  83 y/o male hx NIDDM biba for weakness and ams over past 1-2 days. Pt with cold like symptoms ~4 days ago per grandson, congestion, cough. Progressively ate a little less. States yesterday was still able to ambulate with cane but was declining somewhat. No vomiting or diarrhea, pt did not complain of anything else focally. Usually able to feed self, needs help with dressing. the family also noted rigors (31 Jan 2018 17:54)  ----------------------------------------------------------------------------------------------------As Above -------------------------------------------------------------------------------  Work up revealed pneumonia. The patient is a poor historian.  Patient's son-in-law states that for 3 consecutive days he saw BRBPR in the toilet bowl after patient defecated. He saw it in the diaper, tissue paper and water. He saw it on he stool but was not sure if it was mixed in with it or not. The son-in-law denies melena, hematochezia, hematemesis, nausea, vomiting, abdominal pain,, diarrhea, or change in bowel movements. Constipation (+). Patient never had a colonoscopy. The son-in-lw also saw blood tinged urin a few days ago.  ------Bright red blood per rectum ( BRBPR ) - H/H stable - probably hemorrhoidal/anal fissure - Discussed with son-in-law ~ 20 minutes. Testing ( invasive ) VS supportive care. Son-IN-Law to discuss with his wife before making decision. Name/number given to son-in-law. f/u CBC,     Fe/TIBC/ferritin

## 2018-02-06 LAB
ANION GAP SERPL CALC-SCNC: 8 MMOL/L — SIGNIFICANT CHANGE UP (ref 5–17)
BUN SERPL-MCNC: 14 MG/DL — SIGNIFICANT CHANGE UP (ref 7–23)
CALCIUM SERPL-MCNC: 8.8 MG/DL — SIGNIFICANT CHANGE UP (ref 8.5–10.1)
CHLORIDE SERPL-SCNC: 109 MMOL/L — HIGH (ref 96–108)
CO2 SERPL-SCNC: 24 MMOL/L — SIGNIFICANT CHANGE UP (ref 22–31)
CREAT SERPL-MCNC: 1.06 MG/DL — SIGNIFICANT CHANGE UP (ref 0.5–1.3)
CULTURE RESULTS: SIGNIFICANT CHANGE UP
CULTURE RESULTS: SIGNIFICANT CHANGE UP
FERRITIN SERPL-MCNC: 413 NG/ML — HIGH (ref 30–400)
GLUCOSE SERPL-MCNC: 135 MG/DL — HIGH (ref 70–99)
HCT VFR BLD CALC: 35.7 % — LOW (ref 39–50)
HGB BLD-MCNC: 11.9 G/DL — LOW (ref 13–17)
IRON SATN MFR SERPL: 21 % — SIGNIFICANT CHANGE UP (ref 16–55)
IRON SATN MFR SERPL: 42 UG/DL — LOW (ref 45–165)
MCHC RBC-ENTMCNC: 29.3 PG — SIGNIFICANT CHANGE UP (ref 27–34)
MCHC RBC-ENTMCNC: 33.3 GM/DL — SIGNIFICANT CHANGE UP (ref 32–36)
MCV RBC AUTO: 87.9 FL — SIGNIFICANT CHANGE UP (ref 80–100)
NRBC # BLD: 0 /100 WBCS — SIGNIFICANT CHANGE UP (ref 0–0)
PLATELET # BLD AUTO: 223 K/UL — SIGNIFICANT CHANGE UP (ref 150–400)
POTASSIUM SERPL-MCNC: 3.7 MMOL/L — SIGNIFICANT CHANGE UP (ref 3.5–5.3)
POTASSIUM SERPL-SCNC: 3.7 MMOL/L — SIGNIFICANT CHANGE UP (ref 3.5–5.3)
RBC # BLD: 4.06 M/UL — LOW (ref 4.2–5.8)
RBC # FLD: 14.1 % — SIGNIFICANT CHANGE UP (ref 10.3–14.5)
SODIUM SERPL-SCNC: 141 MMOL/L — SIGNIFICANT CHANGE UP (ref 135–145)
SPECIMEN SOURCE: SIGNIFICANT CHANGE UP
SPECIMEN SOURCE: SIGNIFICANT CHANGE UP
TIBC SERPL-MCNC: 201 UG/DL — LOW (ref 220–430)
UIBC SERPL-MCNC: 159 UG/DL — SIGNIFICANT CHANGE UP (ref 110–370)
WBC # BLD: 5.05 K/UL — SIGNIFICANT CHANGE UP (ref 3.8–10.5)
WBC # FLD AUTO: 5.05 K/UL — SIGNIFICANT CHANGE UP (ref 3.8–10.5)

## 2018-02-06 PROCEDURE — 99231 SBSQ HOSP IP/OBS SF/LOW 25: CPT

## 2018-02-06 RX ORDER — HYDROCORTISONE 1 %
1 OINTMENT (GRAM) TOPICAL
Qty: 0 | Refills: 0 | COMMUNITY
Start: 2018-02-06

## 2018-02-06 RX ORDER — AMLODIPINE BESYLATE 2.5 MG/1
1 TABLET ORAL
Qty: 0 | Refills: 0 | COMMUNITY
Start: 2018-02-06

## 2018-02-06 RX ADMIN — Medication 0: at 23:11

## 2018-02-06 RX ADMIN — SODIUM CHLORIDE 50 MILLILITER(S): 9 INJECTION, SOLUTION INTRAVENOUS at 17:49

## 2018-02-06 RX ADMIN — AZITHROMYCIN 255 MILLIGRAM(S): 500 TABLET, FILM COATED ORAL at 21:33

## 2018-02-06 RX ADMIN — AMLODIPINE BESYLATE 5 MILLIGRAM(S): 2.5 TABLET ORAL at 00:09

## 2018-02-06 RX ADMIN — Medication 1 APPLICATION(S): at 17:47

## 2018-02-06 RX ADMIN — SODIUM CHLORIDE 50 MILLILITER(S): 9 INJECTION, SOLUTION INTRAVENOUS at 04:53

## 2018-02-06 RX ADMIN — ENOXAPARIN SODIUM 40 MILLIGRAM(S): 100 INJECTION SUBCUTANEOUS at 21:36

## 2018-02-06 RX ADMIN — CEFTRIAXONE 100 GRAM(S): 500 INJECTION, POWDER, FOR SOLUTION INTRAMUSCULAR; INTRAVENOUS at 20:55

## 2018-02-06 RX ADMIN — Medication 6: at 11:35

## 2018-02-06 NOTE — PROGRESS NOTE ADULT - SUBJECTIVE AND OBJECTIVE BOX
Patient is a 82y old  Male who presents with a chief complaint of PNA (02 Feb 2018 11:04)      HPI:  83 y/o male hx NIDDM biba for weakness and ams over past 1-2 days. Pt with cold like symptoms ~4 days ago per grandson, congestion, cough. Progressively ate a little less. States yesterday was still able to ambulate with cane but was declining somewhat. No vomiting or diarrhea, pt did not complain of anything else focally. Usually able to feed self, needs help with dressing. the family also noted rigors (31 Jan 2018 17:54)      INTERVAL HPI/OVERNIGHT EVENTS:  The patient and nurse denie melena, hematochezia, hematemesis, nausea, vomiting, abdominal pain, constipation, diarrhea, or change in bowel movements. Tolerating diet.    MEDICATIONS  (STANDING):  amLODIPine   Tablet 5 milliGRAM(s) Oral daily  azithromycin  IVPB 500 milliGRAM(s) IV Intermittent every 24 hours  cefTRIAXone   IVPB 1 Gram(s) IV Intermittent every 24 hours  dextrose 5%. 1000 milliLiter(s) (50 mL/Hr) IV Continuous <Continuous>  dextrose 50% Injectable 12.5 Gram(s) IV Push once  dextrose 50% Injectable 25 Gram(s) IV Push once  dextrose 50% Injectable 25 Gram(s) IV Push once  enoxaparin Injectable 40 milliGRAM(s) SubCutaneous every 24 hours  hydrocortisone 2.5% Rectal Cream 1 Application(s) Rectal two times a day  insulin lispro (HumaLOG) corrective regimen sliding scale   SubCutaneous three times a day before meals  insulin lispro (HumaLOG) corrective regimen sliding scale   SubCutaneous at bedtime  sodium chloride 0.45%. 1000 milliLiter(s) (50 mL/Hr) IV Continuous <Continuous>    MEDICATIONS  (PRN):  acetaminophen   Tablet 650 milliGRAM(s) Oral every 6 hours PRN For Temp greater than 38 C (100.4 F)  dextrose Gel 1 Dose(s) Oral once PRN Blood Glucose LESS THAN 70 milliGRAM(s)/deciliter  glucagon  Injectable 1 milliGRAM(s) IntraMuscular once PRN Glucose LESS THAN 70 milligrams/deciliter      FAMILY HISTORY:      Allergies    No Known Allergies    Intolerances        PMH/PSH:  DM (diabetes mellitus)        REVIEW OF SYSTEMS:  CONSTITUTIONAL: No fever, or fatigue  EYES: No eye pain, visual disturbances, or discharge  ENMT:  No difficulty hearing, tinnitus, vertigo; No sinus or throat pain  NECK: No pain or stiffness  BREASTS: No pain, masses, or nipple discharge  RESPIRATORY: No cough, wheezing, chills or hemoptysis; No shortness of breath  CARDIOVASCULAR: No chest pain, palpitations, dizziness, or leg swelling  GASTROINTESTINAL: No abdominal or epigastric pain. No nausea, vomiting, or hematemesis; No diarrhea or constipation. No melena or hematochezia.  GENITOURINARY: No dysuria, frequency, hematuria, or incontinence  NEUROLOGICAL: No headaches, memory loss, loss of strength, numbness, or tremors  SKIN: No itching, burning, rashes, or lesions   LYMPH NODES: No enlarged glands  ENDOCRINE: No heat or cold intolerance; No hair loss  MUSCULOSKELETAL: No joint pain or swelling; No muscle, back, or extremity pain  PSYCHIATRIC: No depression, anxiety, mood swings, or difficulty sleeping  HEME/LYMPH: No easy bruising, or bleeding gums  ALLERGY AND IMMUNOLOGIC: No hives or eczema    Vital Signs Last 24 Hrs  T(C): 36.7 (06 Feb 2018 12:18), Max: 36.7 (05 Feb 2018 19:09)  T(F): 98 (06 Feb 2018 12:18), Max: 98 (05 Feb 2018 19:09)  HR: 86 (06 Feb 2018 10:30) (76 - 86)  BP: 128/75 (06 Feb 2018 10:30) (128/75 - 185/101)  BP(mean): --  RR: 18 (06 Feb 2018 10:30) (17 - 18)  SpO2: 96% (06 Feb 2018 10:30) (96% - 98%)    PHYSICAL EXAM:  GENERAL: NAD, well-groomed, well-developed  HEAD:  Atraumatic, Normocephalic  EYES: EOMI, PERRLA, conjunctiva and sclera clear  NECK: Supple, No JVD, Normal thyroid  NERVOUS SYSTEM:  Alert & Oriented   CHEST/LUNG: Clear to percussion bilaterally; No rales, rhonchi, wheezing, or rubs  HEART: Regular rate and rhythm; No murmurs, rubs, or gallops  ABDOMEN: Soft, Nontender, Nondistended; Bowel sounds present  EXTREMITIES:  2+ Peripheral Pulses, No clubbing, cyanosis, or edema  LYMPH: No lymphadenopathy noted  SKIN: No rashes or lesions    LAB                          11.9   5.05  )-----------( 223      ( 06 Feb 2018 07:22 )             35.7       CBC:  02-06 @ 07:22  WBC 5.05   Hgb 11.9   Hct 35.7   Plts 223  MCV 87.9  02-05 @ 06:41  WBC 4.68   Hgb 11.0   Hct 33.9   Plts 199  MCV 87.8  02-04 @ 06:45  WBC 4.66   Hgb 11.3   Hct 34.9   Plts 172  MCV 89.0  02-03 @ 06:00  WBC 5.09   Hgb 11.9   Hct 36.2   Plts 149  MCV 88.5  02-02 @ 06:21  WBC 8.89   Hgb 10.7   Hct 33.0   Plts 117  MCV 88.5  02-01 @ 07:34  WBC 11.17   Hgb 12.1   Hct 38.5   Plts 115  MCV 91.0  01-31 @ 16:18  WBC 6.82   Hgb 12.5   Hct 39.2   Plts 104  MCV 91.6      Chemistry:  02-06 @ 07:22  Na+ 141  K+ 3.7  Cl- 109  CO2 24  BUN 14  Cr 1.06     02-05 @ 06:41  Na+ 140  K+ 3.8  Cl- 110  CO2 21  BUN 16  Cr 1.16     02-04 @ 06:45  Na+ 145  K+ 3.8  Cl- 116  CO2 20  BUN 16  Cr 1.21     02-03 @ 06:00  Na+ 147  K+ 3.9  Cl- 117  CO2 21  BUN 16  Cr 0.99     02-02 @ 06:21  Na+ 149  K+ 3.7  Cl- 118  CO2 20  BUN 21  Cr 1.15     02-01 @ 07:34  Na+ 152  K+ 4.2  Cl- 122  CO2 21  BUN 23  Cr 1.33     01-31 @ 16:18  Na+ 149  K+ 4.4  Cl- 119  CO2 21  BUN 35  Cr 1.66         Glucose, Serum: 135 mg/dL (02-06 @ 07:22)  Glucose, Serum: 200 mg/dL (02-05 @ 06:41)  Glucose, Serum: 120 mg/dL (02-04 @ 06:45)  Glucose, Serum: 129 mg/dL (02-03 @ 06:00)  Glucose, Serum: 100 mg/dL (02-02 @ 06:21)  Glucose, Serum: 123 mg/dL (02-01 @ 07:34)  Glucose, Serum: 239 mg/dL (01-31 @ 16:18)      06 Feb 2018 07:22    141    |  109    |  14     ----------------------------<  135    3.7     |  24     |  1.06   05 Feb 2018 06:41    140    |  110    |  16     ----------------------------<  200    3.8     |  21     |  1.16   04 Feb 2018 06:45    145    |  116    |  16     ----------------------------<  120    3.8     |  20     |  1.21   03 Feb 2018 06:00    147    |  117    |  16     ----------------------------<  129    3.9     |  21     |  0.99   02 Feb 2018 06:21    149    |  118    |  21     ----------------------------<  100    3.7     |  20     |  1.15   01 Feb 2018 07:34    152    |  122    |  23     ----------------------------<  123    4.2     |  21     |  1.33   31 Jan 2018 16:18    149    |  119    |  35     ----------------------------<  239    4.4     |  21     |  1.66     Ca    8.8        06 Feb 2018 07:22  Ca    8.4        05 Feb 2018 06:41  Ca    8.4        04 Feb 2018 06:45  Ca    7.9        03 Feb 2018 06:00  Ca    8.1        02 Feb 2018 06:21  Ca    7.9        01 Feb 2018 07:34  Ca    8.7        31 Jan 2018 16:18    TPro  7.4    /  Alb  2.7    /  TBili  0.2    /  DBili  x      /  AST  31     /  ALT  23     /  AlkPhos  101    31 Jan 2018 16:18              CAPILLARY BLOOD GLUCOSE      POCT Blood Glucose.: 292 mg/dL (06 Feb 2018 11:06)  POCT Blood Glucose.: 117 mg/dL (06 Feb 2018 07:25)  POCT Blood Glucose.: 146 mg/dL (05 Feb 2018 22:30)  POCT Blood Glucose.: 287 mg/dL (05 Feb 2018 15:36)          RADIOLOGY & ADDITIONAL TESTS:    Imaging Personally Reviewed:  [ ] YES  [ ] NO    Consultant(s) Notes Reviewed:  [ ] YES  [ ] NO    Care Discussed with Consultants/Other Providers [ ] YES  [ ] NO

## 2018-02-06 NOTE — PROGRESS NOTE ADULT - PROBLEM SELECTOR PLAN 1
No further episodes of bleeding. HGB 11.9 ( stable ). Discussed with daughter. At the present time, she only wants supportive care. No colonoscopy/BE/virtual colonoscopy at present time. Proctosol/Sitz bath BID. Will follow

## 2018-02-07 VITALS
HEART RATE: 87 BPM | DIASTOLIC BLOOD PRESSURE: 81 MMHG | RESPIRATION RATE: 16 BRPM | SYSTOLIC BLOOD PRESSURE: 147 MMHG | OXYGEN SATURATION: 99 % | TEMPERATURE: 97 F

## 2018-02-07 PROCEDURE — 99231 SBSQ HOSP IP/OBS SF/LOW 25: CPT

## 2018-02-07 RX ORDER — AMLODIPINE BESYLATE 2.5 MG/1
1 TABLET ORAL
Qty: 30 | Refills: 0 | OUTPATIENT
Start: 2018-02-07 | End: 2018-03-08

## 2018-02-07 RX ADMIN — AMLODIPINE BESYLATE 5 MILLIGRAM(S): 2.5 TABLET ORAL at 06:07

## 2018-02-07 RX ADMIN — Medication 1 APPLICATION(S): at 06:07

## 2018-02-07 RX ADMIN — Medication 6: at 11:54

## 2018-02-07 NOTE — CHART NOTE - NSCHARTNOTEFT_GEN_A_CORE
pt remains stable for discharge  family needed to receive oil for heat prior to   cecy PO  35 min spent with d/c planning

## 2018-02-07 NOTE — CHART NOTE - NSCHARTNOTEFT_GEN_A_CORE
Assessment:     82 y.o. male c PMHx T2DM; admitted c weakness, AMS, & decreased appetite; pt non-verbal    Factors impacting intake: [ ] none [ ] nausea  [ ] vomiting [ ] diarrhea [ ] constipation  [ ]chewing problems [ ] swallowing issues  [ X] other: lethargy, AMS    Diet Prescription: Diet, Consistent Carbohydrate w/Evening Snack:   Mechanical Soft  DASH/TLC {Sodium & Cholesterol Restricted}  Supplement Feeding Modality:  Oral  Glucerna Shake Cans or Servings Per Day:  1       Frequency:  Three Times a day (02-06-18 @ 09:43)    Intake: 75 - 100% most meals c encouragement; total feeding assistance provided  Discussed c family importance of nutritional supplement to improve/maintain nutrition status & supplement PO intake    Current Weight: Weight (kg): 46 kg (2/7); previous wt 46.8 kg (2/2)  % Weight Change: 2% loss x 4 days    Pertinent Medications: MEDICATIONS  (STANDING):  amLODIPine   Tablet 5 milliGRAM(s) Oral daily  azithromycin  IVPB 500 milliGRAM(s) IV Intermittent every 24 hours  cefTRIAXone   IVPB 1 Gram(s) IV Intermittent every 24 hours  dextrose 5%. 1000 milliLiter(s) (50 mL/Hr) IV Continuous <Continuous>  dextrose 50% Injectable 12.5 Gram(s) IV Push once  dextrose 50% Injectable 25 Gram(s) IV Push once  dextrose 50% Injectable 25 Gram(s) IV Push once  enoxaparin Injectable 40 milliGRAM(s) SubCutaneous every 24 hours  hydrocortisone 2.5% Rectal Cream 1 Application(s) Rectal two times a day  insulin lispro (HumaLOG) corrective regimen sliding scale   SubCutaneous three times a day before meals  insulin lispro (HumaLOG) corrective regimen sliding scale   SubCutaneous at bedtime  sodium chloride 0.45%. 1000 milliLiter(s) (50 mL/Hr) IV Continuous <Continuous>    MEDICATIONS  (PRN):  acetaminophen   Tablet 650 milliGRAM(s) Oral every 6 hours PRN For Temp greater than 38 C (100.4 F)  dextrose Gel 1 Dose(s) Oral once PRN Blood Glucose LESS THAN 70 milliGRAM(s)/deciliter  glucagon  Injectable 1 milliGRAM(s) IntraMuscular once PRN Glucose LESS THAN 70 milligrams/deciliter    Pertinent Labs:    CAPILLARY BLOOD GLUCOSE      POCT Blood Glucose.: 252 mg/dL (07 Feb 2018 11:28)  POCT Blood Glucose.: 123 mg/dL (07 Feb 2018 07:35)  POCT Blood Glucose.: 242 mg/dL (06 Feb 2018 21:52)  POCT Blood Glucose.: 94 mg/dL (06 Feb 2018 16:09)    Skin: WDL; no edema noted  BM regular; last x 1 (2/7)    Estimated Needs:   [X ] no change since previous assessment  [ ] recalculated:     Previous Nutrition Diagnosis:   [ ] Inadequate Energy Intake [ ]Inadequate Oral Intake [ ] Excessive Energy Intake   [ ] Underweight [ ] Increased Nutrient Needs [ ] Overweight/Obesity   [ ] Altered GI Function [ ] Unintended Weight Loss [ ] Food & Nutrition Related Knowledge Deficit   [X ] Severe Malnutrition in context of chronic illness     Nutrition Diagnosis is [X ] ongoing  [ ] resolved [ ] not applicable     New Nutrition Diagnosis: [X ] not applicable       Interventions:  continue current diet rx as noted  Recommend  [ ] Change Diet To:  [ ] Nutrition Supplement  [ ] Nutrition Support  [ ] Other:     Monitoring and Evaluation:   [X ] PO intake [ x ] Tolerance to diet prescription [ x ] weights [ x ] labs[ x ] follow up per protocol  [ ] other:

## 2018-02-12 DIAGNOSIS — F03.90 UNSPECIFIED DEMENTIA WITHOUT BEHAVIORAL DISTURBANCE: ICD-10-CM

## 2018-02-12 DIAGNOSIS — R45.1 RESTLESSNESS AND AGITATION: ICD-10-CM

## 2018-02-12 DIAGNOSIS — R41.82 ALTERED MENTAL STATUS, UNSPECIFIED: ICD-10-CM

## 2018-02-12 DIAGNOSIS — E87.0 HYPEROSMOLALITY AND HYPERNATREMIA: ICD-10-CM

## 2018-02-12 DIAGNOSIS — G93.41 METABOLIC ENCEPHALOPATHY: ICD-10-CM

## 2018-02-12 DIAGNOSIS — Z87.891 PERSONAL HISTORY OF NICOTINE DEPENDENCE: ICD-10-CM

## 2018-02-12 DIAGNOSIS — E43 UNSPECIFIED SEVERE PROTEIN-CALORIE MALNUTRITION: ICD-10-CM

## 2018-02-12 DIAGNOSIS — K59.00 CONSTIPATION, UNSPECIFIED: ICD-10-CM

## 2018-02-12 DIAGNOSIS — J15.6 PNEUMONIA DUE TO OTHER GRAM-NEGATIVE BACTERIA: ICD-10-CM

## 2018-02-12 DIAGNOSIS — K64.9 UNSPECIFIED HEMORRHOIDS: ICD-10-CM

## 2018-02-12 DIAGNOSIS — E11.65 TYPE 2 DIABETES MELLITUS WITH HYPERGLYCEMIA: ICD-10-CM

## 2018-03-22 ENCOUNTER — INPATIENT (INPATIENT)
Facility: HOSPITAL | Age: 83
LOS: 3 days | Discharge: HOME HEALTH | End: 2018-03-26
Attending: INTERNAL MEDICINE | Admitting: INTERNAL MEDICINE
Payer: MEDICAID

## 2018-03-22 VITALS
OXYGEN SATURATION: 100 % | DIASTOLIC BLOOD PRESSURE: 114 MMHG | SYSTOLIC BLOOD PRESSURE: 202 MMHG | HEIGHT: 72 IN | RESPIRATION RATE: 16 BRPM | HEART RATE: 87 BPM | TEMPERATURE: 97 F | WEIGHT: 119.93 LBS

## 2018-03-22 LAB
BASOPHILS # BLD AUTO: 0.01 K/UL — SIGNIFICANT CHANGE UP (ref 0–0.2)
BASOPHILS NFR BLD AUTO: 0.1 % — SIGNIFICANT CHANGE UP (ref 0–2)
EOSINOPHIL # BLD AUTO: 0.01 K/UL — SIGNIFICANT CHANGE UP (ref 0–0.5)
EOSINOPHIL NFR BLD AUTO: 0.1 % — SIGNIFICANT CHANGE UP (ref 0–6)
HCT VFR BLD CALC: 41.5 % — SIGNIFICANT CHANGE UP (ref 39–50)
HGB BLD-MCNC: 13.5 G/DL — SIGNIFICANT CHANGE UP (ref 13–17)
IMM GRANULOCYTES NFR BLD AUTO: 0.8 % — SIGNIFICANT CHANGE UP (ref 0–1.5)
LYMPHOCYTES # BLD AUTO: 0.89 K/UL — LOW (ref 1–3.3)
LYMPHOCYTES # BLD AUTO: 9.1 % — LOW (ref 13–44)
MCHC RBC-ENTMCNC: 29.9 PG — SIGNIFICANT CHANGE UP (ref 27–34)
MCHC RBC-ENTMCNC: 32.5 GM/DL — SIGNIFICANT CHANGE UP (ref 32–36)
MCV RBC AUTO: 91.8 FL — SIGNIFICANT CHANGE UP (ref 80–100)
MONOCYTES # BLD AUTO: 0.47 K/UL — SIGNIFICANT CHANGE UP (ref 0–0.9)
MONOCYTES NFR BLD AUTO: 4.8 % — SIGNIFICANT CHANGE UP (ref 2–14)
NEUTROPHILS # BLD AUTO: 8.34 K/UL — HIGH (ref 1.8–7.4)
NEUTROPHILS NFR BLD AUTO: 85.1 % — HIGH (ref 43–77)
NRBC # BLD: 0 /100 WBCS — SIGNIFICANT CHANGE UP (ref 0–0)
PLATELET # BLD AUTO: 126 K/UL — LOW (ref 150–400)
RBC # BLD: 4.52 M/UL — SIGNIFICANT CHANGE UP (ref 4.2–5.8)
RBC # FLD: 15.8 % — HIGH (ref 10.3–14.5)
WBC # BLD: 9.8 K/UL — SIGNIFICANT CHANGE UP (ref 3.8–10.5)
WBC # FLD AUTO: 9.8 K/UL — SIGNIFICANT CHANGE UP (ref 3.8–10.5)

## 2018-03-22 PROCEDURE — 99285 EMERGENCY DEPT VISIT HI MDM: CPT | Mod: 25

## 2018-03-22 PROCEDURE — 99053 MED SERV 10PM-8AM 24 HR FAC: CPT

## 2018-03-22 NOTE — ED ADULT NURSE NOTE - CHIEF COMPLAINT QUOTE
dementia pt fall from standing position , c/o lower back pain , no loc , no head trauma no blood thinner, has unsteady gait

## 2018-03-22 NOTE — ED ADULT NURSE NOTE - OBJECTIVE STATEMENT
daughter states that patient fell at home while getting up from the bed,grandson saw patient sitting on the floor

## 2018-03-23 DIAGNOSIS — I10 ESSENTIAL (PRIMARY) HYPERTENSION: ICD-10-CM

## 2018-03-23 DIAGNOSIS — R55 SYNCOPE AND COLLAPSE: ICD-10-CM

## 2018-03-23 DIAGNOSIS — S32.019A UNSPECIFIED FRACTURE OF FIRST LUMBAR VERTEBRA, INITIAL ENCOUNTER FOR CLOSED FRACTURE: ICD-10-CM

## 2018-03-23 DIAGNOSIS — E11.9 TYPE 2 DIABETES MELLITUS WITHOUT COMPLICATIONS: ICD-10-CM

## 2018-03-23 DIAGNOSIS — I16.0 HYPERTENSIVE URGENCY: ICD-10-CM

## 2018-03-23 LAB
ALBUMIN SERPL ELPH-MCNC: 3.7 G/DL — SIGNIFICANT CHANGE UP (ref 3.3–5)
ALP SERPL-CCNC: 133 U/L — HIGH (ref 40–120)
ALT FLD-CCNC: 37 U/L — SIGNIFICANT CHANGE UP (ref 12–78)
ANION GAP SERPL CALC-SCNC: 7 MMOL/L — SIGNIFICANT CHANGE UP (ref 5–17)
APPEARANCE UR: CLEAR — SIGNIFICANT CHANGE UP
APTT BLD: 37.5 SEC — HIGH (ref 27.5–37.4)
AST SERPL-CCNC: 23 U/L — SIGNIFICANT CHANGE UP (ref 15–37)
BACTERIA # UR AUTO: ABNORMAL
BILIRUB SERPL-MCNC: 0.3 MG/DL — SIGNIFICANT CHANGE UP (ref 0.2–1.2)
BILIRUB UR-MCNC: NEGATIVE — SIGNIFICANT CHANGE UP
BUN SERPL-MCNC: 21 MG/DL — SIGNIFICANT CHANGE UP (ref 7–23)
CALCIUM SERPL-MCNC: 9.8 MG/DL — SIGNIFICANT CHANGE UP (ref 8.5–10.1)
CHLORIDE SERPL-SCNC: 104 MMOL/L — SIGNIFICANT CHANGE UP (ref 96–108)
CK MB BLD-MCNC: 1.8 % — SIGNIFICANT CHANGE UP (ref 0–3.5)
CK MB CFR SERPL CALC: 2 NG/ML — SIGNIFICANT CHANGE UP (ref 0.5–3.6)
CK SERPL-CCNC: 109 U/L — SIGNIFICANT CHANGE UP (ref 26–308)
CO2 SERPL-SCNC: 27 MMOL/L — SIGNIFICANT CHANGE UP (ref 22–31)
COLOR SPEC: YELLOW — SIGNIFICANT CHANGE UP
CREAT SERPL-MCNC: 1.28 MG/DL — SIGNIFICANT CHANGE UP (ref 0.5–1.3)
DIFF PNL FLD: ABNORMAL
EPI CELLS # UR: SIGNIFICANT CHANGE UP
GLUCOSE BLDC GLUCOMTR-MCNC: 129 MG/DL — HIGH (ref 70–99)
GLUCOSE BLDC GLUCOMTR-MCNC: 159 MG/DL — HIGH (ref 70–99)
GLUCOSE SERPL-MCNC: 277 MG/DL — HIGH (ref 70–99)
GLUCOSE UR QL: 1000 MG/DL
GRAN CASTS # UR COMP ASSIST: ABNORMAL /LPF
HYALINE CASTS # UR AUTO: ABNORMAL /LPF
INR BLD: 0.96 RATIO — SIGNIFICANT CHANGE UP (ref 0.88–1.16)
KETONES UR-MCNC: NEGATIVE — SIGNIFICANT CHANGE UP
LACTATE SERPL-SCNC: 1.3 MMOL/L — SIGNIFICANT CHANGE UP (ref 0.7–2)
LEUKOCYTE ESTERASE UR-ACNC: NEGATIVE — SIGNIFICANT CHANGE UP
NITRITE UR-MCNC: NEGATIVE — SIGNIFICANT CHANGE UP
PH UR: 7 — SIGNIFICANT CHANGE UP (ref 5–8)
POTASSIUM SERPL-MCNC: 4.7 MMOL/L — SIGNIFICANT CHANGE UP (ref 3.5–5.3)
POTASSIUM SERPL-SCNC: 4.7 MMOL/L — SIGNIFICANT CHANGE UP (ref 3.5–5.3)
PROT SERPL-MCNC: 8.6 GM/DL — HIGH (ref 6–8.3)
PROT UR-MCNC: 30 MG/DL
PROTHROM AB SERPL-ACNC: 10.4 SEC — SIGNIFICANT CHANGE UP (ref 9.8–12.7)
RBC CASTS # UR COMP ASSIST: ABNORMAL /HPF (ref 0–4)
SODIUM SERPL-SCNC: 138 MMOL/L — SIGNIFICANT CHANGE UP (ref 135–145)
SP GR SPEC: 1.01 — SIGNIFICANT CHANGE UP (ref 1.01–1.02)
TROPONIN I SERPL-MCNC: <.015 NG/ML — SIGNIFICANT CHANGE UP (ref 0.01–0.04)
UROBILINOGEN FLD QL: NEGATIVE MG/DL — SIGNIFICANT CHANGE UP
WBC UR QL: SIGNIFICANT CHANGE UP

## 2018-03-23 PROCEDURE — 71045 X-RAY EXAM CHEST 1 VIEW: CPT | Mod: 26

## 2018-03-23 PROCEDURE — 72125 CT NECK SPINE W/O DYE: CPT | Mod: 26

## 2018-03-23 PROCEDURE — 72170 X-RAY EXAM OF PELVIS: CPT | Mod: 26

## 2018-03-23 PROCEDURE — 93010 ELECTROCARDIOGRAM REPORT: CPT

## 2018-03-23 PROCEDURE — 74019 RADEX ABDOMEN 2 VIEWS: CPT | Mod: 26

## 2018-03-23 PROCEDURE — 70450 CT HEAD/BRAIN W/O DYE: CPT | Mod: 26

## 2018-03-23 PROCEDURE — 72100 X-RAY EXAM L-S SPINE 2/3 VWS: CPT | Mod: 26

## 2018-03-23 PROCEDURE — 74177 CT ABD & PELVIS W/CONTRAST: CPT | Mod: 26

## 2018-03-23 RX ORDER — ACETAMINOPHEN 500 MG
650 TABLET ORAL EVERY 6 HOURS
Qty: 0 | Refills: 0 | Status: DISCONTINUED | OUTPATIENT
Start: 2018-03-23 | End: 2018-03-26

## 2018-03-23 RX ORDER — AMLODIPINE BESYLATE 2.5 MG/1
5 TABLET ORAL DAILY
Qty: 0 | Refills: 0 | Status: DISCONTINUED | OUTPATIENT
Start: 2018-03-23 | End: 2018-03-26

## 2018-03-23 RX ORDER — SODIUM CHLORIDE 9 MG/ML
1000 INJECTION, SOLUTION INTRAVENOUS
Qty: 0 | Refills: 0 | Status: DISCONTINUED | OUTPATIENT
Start: 2018-03-23 | End: 2018-03-26

## 2018-03-23 RX ORDER — ENOXAPARIN SODIUM 100 MG/ML
40 INJECTION SUBCUTANEOUS EVERY 24 HOURS
Qty: 0 | Refills: 0 | Status: DISCONTINUED | OUTPATIENT
Start: 2018-03-23 | End: 2018-03-26

## 2018-03-23 RX ORDER — IOHEXOL 300 MG/ML
30 INJECTION, SOLUTION INTRAVENOUS ONCE
Qty: 0 | Refills: 0 | Status: COMPLETED | OUTPATIENT
Start: 2018-03-23 | End: 2018-03-23

## 2018-03-23 RX ADMIN — IOHEXOL 30 MILLILITER(S): 300 INJECTION, SOLUTION INTRAVENOUS at 03:24

## 2018-03-23 RX ADMIN — AMLODIPINE BESYLATE 5 MILLIGRAM(S): 2.5 TABLET ORAL at 12:20

## 2018-03-23 RX ADMIN — ENOXAPARIN SODIUM 40 MILLIGRAM(S): 100 INJECTION SUBCUTANEOUS at 12:20

## 2018-03-23 RX ADMIN — Medication 0.1 MILLIGRAM(S): at 01:53

## 2018-03-23 RX ADMIN — SODIUM CHLORIDE 80 MILLILITER(S): 9 INJECTION, SOLUTION INTRAVENOUS at 17:22

## 2018-03-23 NOTE — H&P ADULT - NSHPPHYSICALEXAM_GEN_ALL_CORE
Constitutional: NAD, well-groomed, asthenic,fair hydration  HEENT: PERRLA, EOMI, Normal Hearing, MMM  Neck: No LAD, No JVD  Back: Normal spine flexure, No CVA tenderness  Respiratory: CTAB/L   Cardiovascular: S1 and S2, RRR, no M/G/R  Gastrointestinal: BS+, soft, NT/ND  Extremities: No peripheral edema  Vascular: 2+ peripheral pulses  Neurological: A/O x 3, no focal deficits  Skin: No rashes

## 2018-03-23 NOTE — PHYSICAL THERAPY INITIAL EVALUATION ADULT - IMPAIRMENTS FOUND, PT EVAL
poor safety awareness/muscle strength/gait, locomotion, and balance/arousal, attention, and cognition/aerobic capacity/endurance

## 2018-03-23 NOTE — CONSULT NOTE ADULT - ASSESSMENT
A/P: 83y Male with L1 VCF  Pain control  WBAT with assistive devices as needed  PT  DVT ppx  Will d/w attending Dr. Frausto and advise as plan changes  d/w pt no need for orthopedic surgical intervention at this time  Ortho stable

## 2018-03-23 NOTE — PHYSICAL THERAPY INITIAL EVALUATION ADULT - TRANSFER SAFETY CONCERNS NOTED: SIT/STAND, REHAB EVAL
losing balance/decreased weight-shifting ability/Poor eccentric control/decreased balance during turns/decreased proprioception/decreased step length/decreased safety awareness

## 2018-03-23 NOTE — H&P ADULT - NSHPLABSRESULTS_GEN_ALL_CORE
13.5   9.80  )-----------( 126      ( 22 Mar 2018 23:48 )             41.5         138  |  104  |  21  ----------------------------<  277<H>  4.7   |  27  |  1.28    Ca    9.8      22 Mar 2018 23:48    TPro  8.6<H>  /  Alb  3.7  /  TBili  0.3  /  DBili  x   /  AST  23  /  ALT  37  /  AlkPhos  133<H>      PT/INR - ( 22 Mar 2018 23:48 )   PT: 10.4 sec;   INR: 0.96 ratio         PTT - ( 22 Mar 2018 23:48 )  PTT:37.5 sec  Urinalysis Basic - ( 22 Mar 2018 23:51 )    Color: Yellow / Appearance: Clear / S.010 / pH: x  Gluc: x / Ketone: Negative  / Bili: Negative / Urobili: Negative mg/dL   Blood: x / Protein: 30 mg/dL / Nitrite: Negative   Leuk Esterase: Negative / RBC: 6-10 /HPF / WBC 0-2   Sq Epi: x / Non Sq Epi: Few / Bacteria: Occasional        MICROBIOLOGY:  RECENT CULTURES:

## 2018-03-23 NOTE — ED PROVIDER NOTE - PHYSICAL EXAMINATION
Gen: Alert, NAD  Head: NC, AT, PERRL, EOMI, normal lids/conjunctiva  ENT: B TM WNL, normal hearing, patent oropharynx without erythema/exudate, uvula midline  Neck: +supple, no tenderness/meningismus/JVD, +Trachea midline  Pulm: Bilateral BS, normal resp effort, no wheeze/stridor/retractions  CV: RRR, no M/R/G, +dist pulses  Abd: soft, NT/ND, +BS, no hepatosplenomegaly  Mskel: no edema/erythema/cyanosis  Skin: no rash  Neuro: AAOx2, no sensory/motor deficits, CN 2-12 intact

## 2018-03-23 NOTE — PHYSICAL THERAPY INITIAL EVALUATION ADULT - FOLLOWS COMMANDS/ANSWERS QUESTIONS, REHAB EVAL
50% of the time/able to follow single-step instructions/verbal cues and manual guidance for carryover

## 2018-03-23 NOTE — ED PROVIDER NOTE - OBJECTIVE STATEMENT
83yoM; with pmh signif for early dementia, DM, HTN; now p/w fall. patient found on ground near bed when daughter went to check on him. unwitnessed fall. unknown loc.  patient unable to stand s/p fall. c/o abd pain--lower abd cramping, constipation. denies f/c/s. denies cp/sob/palp. denies sick contacts. denies travel. denies headache.  PMH: dementia, DM, HTN  SOCIAL: No tobacco/illicit substance use/EtOH

## 2018-03-23 NOTE — ED PROVIDER NOTE - CARE PLAN
Principal Discharge DX:	Syncope and collapse  Secondary Diagnosis:	L1 vertebral fracture Principal Discharge DX:	Syncope and collapse  Secondary Diagnosis:	L1 vertebral fracture  Secondary Diagnosis:	Hypertensive urgency

## 2018-03-23 NOTE — H&P ADULT - HISTORY OF PRESENT ILLNESS
83yoM; with pmh signif for early dementia, DM, HTN; now p/w fall. patient found on ground near bed when daughter went to check on him. unwitnessed fall. unknown loc.  patient unable to stand s/p fall. c/o abd pain--lower abd cramping, constipation. denies f/c/s. denies cp/sob/palp. denies sick contacts. denies travel. denies headache.  confused, unable to add to hx

## 2018-03-23 NOTE — CONSULT NOTE ADULT - SUBJECTIVE AND OBJECTIVE BOX
Patient is a 83M community ambulator w/ assistance who presents c/o exacerbation of low back pain s/p mechanical fall. Denies HS/LOC. Denies pain/injury elsewhere. Denies numbness/tingling/paresthesias/weakness. Denies bowel/bladder incontinence. Denies fevers/chills. No other complaints at this time.    PAST MEDICAL & SURGICAL HISTORY:  HTN (hypertension)  DM (diabetes mellitus)    Home Medications:  acetaminophen 325 mg oral tablet: 2 tab(s) orally every 6 hours, As needed, For Temp greater than 38 C (100.4 F) (2018 19:08)  glipiZIDE 5 mg oral tablet: 1 tab(s) orally once a day (2018 12:35)  hydrocortisone 2.5% rectal cream with applicator: 1 application rectal 2 times a day (2018 12:30)    Allergies  No Known Allergies  Intolerances                          13.5   9.80  )-----------( 126      ( 22 Mar 2018 23:48 )             41.5       22 Mar 2018 23:48    138    |  104    |  21     ----------------------------<  277    4.7     |  27     |  1.28     Ca    9.8        22 Mar 2018 23:48    TPro  8.6    /  Alb  3.7    /  TBili  0.3    /  DBili  x      /  AST  23     /  ALT  37     /  AlkPhos  133    22 Mar 2018 23:48  PT/INR - ( 22 Mar 2018 23:48 )   PT: 10.4 sec;   INR: 0.96 ratio    PTT - ( 22 Mar 2018 23:48 )  PTT:37.5 sec    Urinalysis Basic - ( 22 Mar 2018 23:51 )    Color: Yellow / Appearance: Clear / S.010 / pH: x  Gluc: x / Ketone: Negative  / Bili: Negative / Urobili: Negative mg/dL   Blood: x / Protein: 30 mg/dL / Nitrite: Negative   Leuk Esterase: Negative / RBC: 6-10 /HPF / WBC 0-2   Sq Epi: x / Non Sq Epi: Few / Bacteria: Occasional    Vital Signs Last 24 Hrs  T(C): 36.6 (18 @ 06:28), Max: 36.6 (18 @ 06:28)  T(F): 97.8 (18 @ 06:28), Max: 97.8 (18 @ 06:28)  HR: 80 (18 @ 06:28) (78 - 87)  BP: 187/103 (18 @ 06:28) (164/97 - 202/114)  BP(mean): --  RR: 20 (18 @ 06:28) (16 - 22)  SpO2: 97% (18 @ 06:28) (97% - 100%)    Gen: NAD    Spine PE:  Skin intact  No gross deformity  No midnline TTP C/T/L/S spine  No bony step offs  No paraspinal muscle ttp/hypertonicity   Negative clonus  Negative babinski  Negative singh    Motor:                   C5                C6              C7               C8           T1   R            5/5                5/5            5/5             5/5          5/5  L             5/5               5/5             5/5             5/5          5/5                L2             L3             L4               L5            S1  R         5/5           5/5          5/5             5/5           5/5  L          5/5          5/5           5/5             5/5           5/5    Sensory:            C5         C6         C7      C8       T1        (0=absent, 1=impaired, 2=normal, NT=not testable)  R         2            2           2        2         2  L          2            2           2        2         2               L2          L3         L4      L5       S1         (0=absent, 1=impaired, 2=normal, NT=not testable)  R         2            2            2        2        2  L          2            2           2        2         2    Imaging:   CTAP: L1 VCF

## 2018-03-23 NOTE — PHYSICAL THERAPY INITIAL EVALUATION ADULT - COORDINATION ASSESSED, REHAB EVAL
unable to follow commands at this time, no carryover/finger to nose/heel to shin finger to nose/heel to shin/unable to follow commands at this time, no carryover

## 2018-03-23 NOTE — ED ADULT NURSE REASSESSMENT NOTE - NS ED NURSE REASSESS COMMENT FT1
Patient is A&Ox1. Confused. Patient reports no pain and no shortness of breath. 20 gauge at left AC. No pressure ulcers noted on skin.

## 2018-03-23 NOTE — PHYSICAL THERAPY INITIAL EVALUATION ADULT - ADDITIONAL COMMENTS
As per last evaluation: Pt ambulates mostly household distances, weekly when weather in better, pt goes to [facility]. there are 3 steps to enter home with B handrails. Pt uses a cane, recently a rolling walker was purchased secondary to pt with increased unsteadiness. Pt was found x1 week ago by grand daughter on the floor and pt required assistance to get up. Family assists pt with ADLs and self care.

## 2018-03-24 LAB
ANION GAP SERPL CALC-SCNC: 9 MMOL/L — SIGNIFICANT CHANGE UP (ref 5–17)
BUN SERPL-MCNC: 19 MG/DL — SIGNIFICANT CHANGE UP (ref 7–23)
CALCIUM SERPL-MCNC: 9.1 MG/DL — SIGNIFICANT CHANGE UP (ref 8.5–10.1)
CHLORIDE SERPL-SCNC: 105 MMOL/L — SIGNIFICANT CHANGE UP (ref 96–108)
CO2 SERPL-SCNC: 23 MMOL/L — SIGNIFICANT CHANGE UP (ref 22–31)
CREAT SERPL-MCNC: 1.14 MG/DL — SIGNIFICANT CHANGE UP (ref 0.5–1.3)
CULTURE RESULTS: SIGNIFICANT CHANGE UP
GLUCOSE SERPL-MCNC: 112 MG/DL — HIGH (ref 70–99)
HCT VFR BLD CALC: 36.6 % — LOW (ref 39–50)
HGB BLD-MCNC: 12.2 G/DL — LOW (ref 13–17)
MCHC RBC-ENTMCNC: 29.8 PG — SIGNIFICANT CHANGE UP (ref 27–34)
MCHC RBC-ENTMCNC: 33.3 GM/DL — SIGNIFICANT CHANGE UP (ref 32–36)
MCV RBC AUTO: 89.3 FL — SIGNIFICANT CHANGE UP (ref 80–100)
NRBC # BLD: 0 /100 WBCS — SIGNIFICANT CHANGE UP (ref 0–0)
PLATELET # BLD AUTO: 136 K/UL — LOW (ref 150–400)
POTASSIUM SERPL-MCNC: 4.4 MMOL/L — SIGNIFICANT CHANGE UP (ref 3.5–5.3)
POTASSIUM SERPL-SCNC: 4.4 MMOL/L — SIGNIFICANT CHANGE UP (ref 3.5–5.3)
RBC # BLD: 4.1 M/UL — LOW (ref 4.2–5.8)
RBC # FLD: 15.5 % — HIGH (ref 10.3–14.5)
SODIUM SERPL-SCNC: 137 MMOL/L — SIGNIFICANT CHANGE UP (ref 135–145)
SPECIMEN SOURCE: SIGNIFICANT CHANGE UP
WBC # BLD: 8.06 K/UL — SIGNIFICANT CHANGE UP (ref 3.8–10.5)
WBC # FLD AUTO: 8.06 K/UL — SIGNIFICANT CHANGE UP (ref 3.8–10.5)

## 2018-03-24 PROCEDURE — 93306 TTE W/DOPPLER COMPLETE: CPT | Mod: 26

## 2018-03-24 RX ADMIN — ENOXAPARIN SODIUM 40 MILLIGRAM(S): 100 INJECTION SUBCUTANEOUS at 11:29

## 2018-03-24 RX ADMIN — AMLODIPINE BESYLATE 5 MILLIGRAM(S): 2.5 TABLET ORAL at 05:46

## 2018-03-24 RX ADMIN — SODIUM CHLORIDE 80 MILLILITER(S): 9 INJECTION, SOLUTION INTRAVENOUS at 05:46

## 2018-03-24 RX ADMIN — SODIUM CHLORIDE 80 MILLILITER(S): 9 INJECTION, SOLUTION INTRAVENOUS at 21:41

## 2018-03-24 NOTE — DIETITIAN INITIAL EVALUATION ADULT. - PERTINENT LABORATORY DATA
03-24 Na137 mmol/L Glu 112 mg/dL<H> K+ 4.4 mmol/L Cr  1.14 mg/dL BUN 19 mg/dL 03-22 Alb 3.7 g/dL03-22 ALT 37 U/L AST 23 U/L Alkaline Phosphatase 133 U/L<H> 02-01 HbA1/GC 10.6 % <H> 01-31 Alb 2.7 g/dL<L>

## 2018-03-24 NOTE — DIETITIAN INITIAL EVALUATION ADULT. - SOURCE
Chart review , Nursing , pt's son whom the patient does not live with was @ beside & was unable to provide diet hx/other (specify)

## 2018-03-24 NOTE — DIETITIAN INITIAL EVALUATION ADULT. - ENERGY NEEDS
Height (cm): 165.1 (03-23)  Weight (kg): 47.5 (03-23)  BMI (kg/m2): 17.4 (03-23)  IBW:  61.6 kg        % IBW: 77%            UBW: ?             %UBW: ?

## 2018-03-24 NOTE — DIETITIAN INITIAL EVALUATION ADULT. - PHYSICAL APPEARANCE
underweight/debilitated/emaciated/BMI= 17.4(03-23), Nutrition focused physical exam conducted; Subcutaneous fat Exam;  [ moderate ]  Orbital fat pads region,  [unable  ]Buccal fat region,  [  severe]triceps region, [ severe ]ribs region.  Muscle Exam; [ moderate ->severe ]temples region, [severe  ]clavicle region, [ severe ]shoulder region, [unable   ]Scapula region, [unable due to contracture  ]Interosseous region, [ severe ]thigh region, [  severe]Calf region

## 2018-03-24 NOTE — DIETITIAN INITIAL EVALUATION ADULT. - NUTRITION INTERVENTION
Medical Food Supplements/Collaboration and Referral of Nutrition Care/Meals and Snack/Nutrition Education Medical Food Supplements/Vitamin/Nutrition Education/Collaboration and Referral of Nutrition Care/Meals and Snack

## 2018-03-24 NOTE — DIETITIAN INITIAL EVALUATION ADULT. - FACTORS AFF FOOD INTAKE
difficulty chewing/difficulty feeding self/pt is edentulous, unable to tolerate whole foods, will need puree consistency

## 2018-03-24 NOTE — PROGRESS NOTE ADULT - SUBJECTIVE AND OBJECTIVE BOX
Patient is a 83y old  Male who presents with a chief complaint of s/p fall (23 Mar 2018 12:51)      INTERVAL HPI/OVERNIGHT EVENTS:    clinically stable  hx of recurrent falls  now with L1 fracture  PT follow up noted- rehab dc    MEDICATIONS  (STANDING):  amLODIPine   Tablet 5 milliGRAM(s) Oral daily  enoxaparin Injectable 40 milliGRAM(s) SubCutaneous every 24 hours  sodium chloride 0.45%. 1000 milliLiter(s) (80 mL/Hr) IV Continuous <Continuous>    MEDICATIONS  (PRN):  acetaminophen   Tablet 650 milliGRAM(s) Oral every 6 hours PRN For Temp greater than 38 C (100.4 F)        REVIEW OF SYSTEMS:  CONSTITUTIONAL: No fever, weight loss, or fatigue  EYES: No eye pain, visual disturbances, or discharge  ENMT:  No difficulty hearing, tinnitus, vertigo; No sinus or throat pain  NECK: No pain or stiffness  RESPIRATORY: No cough, wheezing, chills or hemoptysis; No shortness of breath  CARDIOVASCULAR: No chest pain, palpitations, dizziness, or leg swelling  GASTROINTESTINAL: No abdominal or epigastric pain. No nausea, vomiting, or hematemesis; No diarrhea or constipation. No melena or hematochezia.  GENITOURINARY: No dysuria, frequency, hematuria, or incontinence  NEUROLOGICAL: No headaches, memory loss, loss of strength, numbness, or tremors  SKIN: No itching, burning, rashes, or lesions      Vital Signs Last 24 Hrs  T(C): 36.4 (24 Mar 2018 11:18), Max: 36.6 (24 Mar 2018 04:51)  T(F): 97.5 (24 Mar 2018 11:18), Max: 97.8 (24 Mar 2018 04:51)  HR: 78 (24 Mar 2018 11:18) (74 - 82)  BP: 134/71 (24 Mar 2018 11:18) (134/71 - 174/94)  BP(mean): --  RR: 18 (24 Mar 2018 11:18) (16 - 18)  SpO2: 100% (24 Mar 2018 11:18) (97% - 100%)    PHYSICAL EXAM:  GENERAL: NAD, well-groomed, well-developed  HEAD:  Atraumatic, Normocephalic  EYES: EOMI, PERRLA, conjunctiva and sclera clear  ENMT: No tonsillar erythema, exudates, or enlargement; Moist mucous membranes   NECK: Supple, No JVD   NERVOUS SYSTEM:  Alert & Oriented X3, Good concentration; Motor Strength 5/5 B/L upper and lower extremities; DTRs 2+ intact and symmetric  CHEST/LUNG: Clear to percussion bilaterally; No rales, rhonchi, wheezing, or rubs  HEART: Regular rate and rhythm; No murmurs, rubs, or gallops  ABDOMEN: Soft, Nontender, Nondistended; Bowel sounds present  EXTREMITIES:  2+ Peripheral Pulses, No clubbing, cyanosis, or edema  LYMPH: No lymphadenopathy noted  SKIN: No rashes or lesions    LABS:                        12.2   8.06  )-----------( 136      ( 24 Mar 2018 07:26 )             36.6         137  |  105  |  19  ----------------------------<  112<H>  4.4   |  23  |  1.14    Ca    9.1      24 Mar 2018 07:26    TPro  8.6<H>  /  Alb  3.7  /  TBili  0.3  /  DBili  x   /  AST  23  /  ALT  37  /  AlkPhos  133<H>  03-22    PT/INR - ( 22 Mar 2018 23:48 )   PT: 10.4 sec;   INR: 0.96 ratio         PTT - ( 22 Mar 2018 23:48 )  PTT:37.5 sec  Urinalysis Basic - ( 22 Mar 2018 23:51 )    Color: Yellow / Appearance: Clear / S.010 / pH: x  Gluc: x / Ketone: Negative  / Bili: Negative / Urobili: Negative mg/dL   Blood: x / Protein: 30 mg/dL / Nitrite: Negative   Leuk Esterase: Negative / RBC: 6-10 /HPF / WBC 0-2   Sq Epi: x / Non Sq Epi: Few / Bacteria: Occasional      CAPILLARY BLOOD GLUCOSE          RADIOLOGY & ADDITIONAL TESTS:    Imaging Personally Reviewed:  [ ] YES  [ ] NO    Consultant(s) Notes Reviewed:  [ ] YES  [ ] NO    Care Discussed with Consultants/Other Providers [ ] YES  [ ] NO

## 2018-03-24 NOTE — CHART NOTE - NSCHARTNOTEFT_GEN_A_CORE
Upon Nutritional Assessment by the Registered Dietitian your patient was determined to meet criteria / has evidence of the following diagnosis/diagnoses:          [ ]  Mild Protein Calorie Malnutrition        [ ]  Moderate Protein Calorie Malnutrition        [x ] Severe Protein Calorie Malnutrition        [ ] Unspecified Protein Calorie Malnutrition        [ x] Underweight / BMI <19        [ ] Morbid Obesity / BMI > 40      Findings as based on:  •  Comprehensive nutrition assessment and consultation  •  Calorie counts (nutrient intake analysis)  •  Food acceptance and intake status from observations by staff  •  Follow up  •  Patient education  •  Intervention secondary to interdisciplinary rounds  •   concerns      Treatment:    The following diet has been recommended:  Dysphagia 1 Pureed- Thin Liquids , Low sodium , consistent carbohydrate c evening snack c Glucerna Shake 3 x daily = 600 calories, 30 grams protein daily   aspiration precautions   Recommend add multivitamin c minerals daily    PROVIDER Section:     By signing this assessment you are acknowledging and agree with the diagnosis/diagnoses assigned by the Registered Dietitian    Comments:

## 2018-03-24 NOTE — DIETITIAN INITIAL EVALUATION ADULT. - NS AS NUTRI INTERV MEALS SNACK
Recommend Dysphagia 1 Pureed- Thin Liquids , Low sodium , consistent carbohydrate c evening snack/Mineral - modified diet/Carbohydrate - modified diet

## 2018-03-25 RX ADMIN — AMLODIPINE BESYLATE 5 MILLIGRAM(S): 2.5 TABLET ORAL at 05:23

## 2018-03-25 RX ADMIN — SODIUM CHLORIDE 80 MILLILITER(S): 9 INJECTION, SOLUTION INTRAVENOUS at 22:48

## 2018-03-25 RX ADMIN — ENOXAPARIN SODIUM 40 MILLIGRAM(S): 100 INJECTION SUBCUTANEOUS at 11:06

## 2018-03-25 NOTE — PROGRESS NOTE ADULT - SUBJECTIVE AND OBJECTIVE BOX
Patient is a 83y old  Male who presents with a chief complaint of s/p fall (23 Mar 2018 12:51)      INTERVAL HPI/OVERNIGHT EVENTS:  no changes  responsive , ECHO normal  Case management follow up noted- not eligible for rehab secondary to insurance    MEDICATIONS  (STANDING):  amLODIPine   Tablet 5 milliGRAM(s) Oral daily  enoxaparin Injectable 40 milliGRAM(s) SubCutaneous every 24 hours  sodium chloride 0.45%. 1000 milliLiter(s) (80 mL/Hr) IV Continuous <Continuous>    MEDICATIONS  (PRN):  acetaminophen   Tablet 650 milliGRAM(s) Oral every 6 hours PRN For Temp greater than 38 C (100.4 F)        REVIEW OF SYSTEMS:  CONSTITUTIONAL: No fever, weight loss, or fatigue  EYES: No eye pain, visual disturbances, or discharge  ENMT:  No difficulty hearing, tinnitus, vertigo; No sinus or throat pain  NECK: No pain or stiffness  RESPIRATORY: No cough, wheezing, chills or hemoptysis; No shortness of breath  CARDIOVASCULAR: No chest pain, palpitations, dizziness, or leg swelling  GASTROINTESTINAL: No abdominal or epigastric pain. No nausea, vomiting, or hematemesis; No diarrhea or constipation. No melena or hematochezia.  GENITOURINARY: No dysuria, frequency, hematuria, or incontinence  NEUROLOGICAL: No headaches, memory loss, loss of strength, numbness, or tremors  SKIN: No itching, burning, rashes, or lesions      Vital Signs Last 24 Hrs  T(C): 36.5 (25 Mar 2018 11:25), Max: 37.1 (24 Mar 2018 23:31)  T(F): 97.7 (25 Mar 2018 11:25), Max: 98.8 (24 Mar 2018 23:31)  HR: 78 (25 Mar 2018 11:25) (74 - 78)  BP: 139/74 (25 Mar 2018 11:25) (128/74 - 146/68)  BP(mean): --  RR: 16 (25 Mar 2018 11:25) (16 - 16)  SpO2: 98% (25 Mar 2018 11:25) (98% - 99%)    PHYSICAL EXAM:  GENERAL: NAD, well-groomed, well-developed  HEAD:  Atraumatic, Normocephalic  EYES: EOMI, PERRLA, conjunctiva and sclera clear  ENMT: No tonsillar erythema, exudates, or enlargement; Moist mucous membranes   NECK: Supple, No JVD   NERVOUS SYSTEM:  Alert & Oriented X 1, Good concentration; Motor Strength 5/5 B/L upper and lower extremities; DTRs 2+ intact and symmetric  CHEST/LUNG: Clear to percussion bilaterally; No rales, rhonchi, wheezing, or rubs  HEART: Regular rate and rhythm; No murmurs, rubs, or gallops  ABDOMEN: Soft, Nontender, Nondistended; Bowel sounds present  EXTREMITIES:  2+ Peripheral Pulses, No clubbing, cyanosis, or edema  LYMPH: No lymphadenopathy noted  SKIN: No rashes or lesions    LABS:                        12.2   8.06  )-----------( 136      ( 24 Mar 2018 07:26 )             36.6     03-24    137  |  105  |  19  ----------------------------<  112<H>  4.4   |  23  |  1.14    Ca    9.1      24 Mar 2018 07:26          CAPILLARY BLOOD GLUCOSE          RADIOLOGY & ADDITIONAL TESTS:    Imaging Personally Reviewed:  [ ] YES  [ ] NO    Consultant(s) Notes Reviewed:  [ ] YES  [ ] NO    Care Discussed with Consultants/Other Providers [ ] YES  [ ] NO

## 2018-03-26 VITALS
DIASTOLIC BLOOD PRESSURE: 75 MMHG | SYSTOLIC BLOOD PRESSURE: 136 MMHG | HEART RATE: 75 BPM | TEMPERATURE: 97 F | OXYGEN SATURATION: 100 % | RESPIRATION RATE: 17 BRPM

## 2018-03-26 RX ADMIN — AMLODIPINE BESYLATE 5 MILLIGRAM(S): 2.5 TABLET ORAL at 06:09

## 2018-03-26 RX ADMIN — ENOXAPARIN SODIUM 40 MILLIGRAM(S): 100 INJECTION SUBCUTANEOUS at 12:20

## 2018-03-26 RX ADMIN — SODIUM CHLORIDE 80 MILLILITER(S): 9 INJECTION, SOLUTION INTRAVENOUS at 12:20

## 2018-03-26 NOTE — OCCUPATIONAL THERAPY INITIAL EVALUATION ADULT - PLANNED THERAPY INTERVENTIONS, OT EVAL
strengthening/stretching/IADL retraining/energy conservation techniques/balance training/cognitive, visual perceptual/joint mobilization/motor coordination training/neuromuscular re-education/ROM/ADL retraining/bed mobility training/fine motor coordination training/massage/parent/caregiver training.../transfer training

## 2018-03-26 NOTE — OCCUPATIONAL THERAPY INITIAL EVALUATION ADULT - ADDITIONAL COMMENTS
Chart review showed that prior to admission, pt was ambulating independently with a rolling walker  and went to [employer] with his family. Presently, pt needs assistance with all levels of ADL and functional mobility . Dexterity and fine motor skills are diminished in  both hands. The scale below depicts a picture of the pt's current level of functioning. Barthel Index: Feeding Score____0__, Bathing Score____0__, Grooming Score___0__, Dressing Score___0__, Bowel Score___0__, Bladder Score___0___, Toilet Score___0__, Transfer Score_____0_, Mobility Score__0___, Stairs Score____0_, Total Score_____. . Chart review showed that prior to admission, pt was ambulating independently with a rolling walker  and went to [employer] with his family. Presently, pt needs assistance with all levels of ADL and functional mobility . Dexterity and fine motor skills are diminished in both hands. The scale below depicts a picture of the pt's current level of functioning. Barthel Index: Feeding Score____0__, Bathing Score____0__, Grooming Score___0__, Dressing Score___0__, Bowel Score___0__, Bladder Score___0___, Toilet Score___0__, Transfer Score_____0_, Mobility Score__0___, Stairs Score____0_, Total Score_____. .

## 2018-03-26 NOTE — OCCUPATIONAL THERAPY INITIAL EVALUATION ADULT - PERTINENT HX OF CURRENT PROBLEM, REHAB EVAL
Pt presented to ER  after unwitnessed fall ; pt was found  on the ground by his daughter near his bed. Pt is diagnosed with  syncope, collapse , L1 vertebral fracture  and hypertensive urgency . X-ray of LS o on 3/23/8 results confirm fracture through superior endplate . Pt presented to ER  after unwitnessed fall ; pt was found on the ground by his daughter near his bed. Pt is diagnosed with  syncope, collapse , L1 vertebral fracture  and hypertensive urgency . X-ray of LS o on 3/23/8 results confirm fracture through superior endplate .

## 2018-03-26 NOTE — OCCUPATIONAL THERAPY INITIAL EVALUATION ADULT - RANGE OF MOTION EXAMINATION, UPPER EXTREMITY
bilateral UE Active Assistive ROM was WFL  (within functional limits)/bilateral UE Passive ROM was WFL  (within functional limits)

## 2018-03-26 NOTE — DISCHARGE NOTE ADULT - MEDICATION SUMMARY - MEDICATIONS TO STOP TAKING
I will STOP taking the medications listed below when I get home from the hospital:    glipiZIDE 5 mg oral tablet  -- 1 tab(s) by mouth once a day

## 2018-03-26 NOTE — OCCUPATIONAL THERAPY INITIAL EVALUATION ADULT - TRANSFER SAFETY CONCERNS NOTED: BED/CHAIR, REHAB EVAL
decreased safety awareness/losing balance/decreased step length/decreased sequencing ability/stand pivot/decreased balance during turns/decreased proprioception/squat pivot/stepping too close to front of assistive device/decreased weight-shifting ability

## 2018-03-26 NOTE — DISCHARGE NOTE ADULT - MEDICATION SUMMARY - MEDICATIONS TO TAKE
I will START or STAY ON the medications listed below when I get home from the hospital:    acetaminophen 325 mg oral tablet  -- 2 tab(s) by mouth every 6 hours, As needed, For Temp greater than 38 C (100.4 F)  -- Indication: For pain    amLODIPine 5 mg oral tablet  -- 1 tab(s) by mouth once a day  -- Indication: For HTN (hypertension)    hydrocortisone 2.5% rectal cream with applicator  -- 1 application rectally 2 times a day  -- Indication: For Hemorrhoids

## 2018-03-26 NOTE — OCCUPATIONAL THERAPY INITIAL EVALUATION ADULT - IMPAIRMENTS CONTRIBUTING IMPAIRED BED MOBILITY, REHAB EVAL
decreased flexibility/abnormal muscle tone/cognition/narrow base of support/impaired balance/decreased sensation/ataxic/impaired coordination/pain/decreased ROM/impaired sensory feedback/decreased strength

## 2018-03-26 NOTE — OCCUPATIONAL THERAPY INITIAL EVALUATION ADULT - GENERAL OBSERVATIONS, REHAB EVAL
Pt was seen for initial OT consult, encountered OOB to chair on enhanced supervision; pt was observed with DINORAO bradelgado [place]. Pt was AA&Ox to name , confused, but cooperative & followed command with verbal cues for sequencing . Pt presents with cognitive limitations, decreased endurance, balance ,ADL management and functional mobility ;pt displays retropulsive posture

## 2018-03-26 NOTE — OCCUPATIONAL THERAPY INITIAL EVALUATION ADULT - RANGE OF MOTION EXAMINATION, LOWER EXTREMITY
bilateral LE Passive ROM was WFL  (within functional limits)/bilateral LE Active Assistive ROM was WFL  (within functional limits)

## 2018-03-26 NOTE — OCCUPATIONAL THERAPY INITIAL EVALUATION ADULT - IMPAIRED TRANSFERS: BED/CHAIR, REHAB EVAL
impaired motor control/cognition/impaired coordination/abnormal muscle tone/impaired postural control/impaired sensory feedback/impaired balance/decreased flexibility/narrow base of support/pain/decreased ROM/decreased sensation/decreased strength

## 2018-03-26 NOTE — OCCUPATIONAL THERAPY INITIAL EVALUATION ADULT - PRECAUTIONS/LIMITATIONS, REHAB EVAL
Pt has diminished reaction time due to age related changes , diminished depth perception , figure ground , spatial relation  and proprioception/cardiac precautions/fall precautions

## 2018-03-26 NOTE — OCCUPATIONAL THERAPY INITIAL EVALUATION ADULT - LIVES WITH, PROFILE
his daughter an d son -in law in a private house with 3 steps to enter. Pt is unable to provide details about the set up of his  living surrounding at home. his daughter and son -in law in a private house with 3 steps to enter. Pt is unable to provide details about the set up of his  living surrounding at home.

## 2018-03-26 NOTE — DISCHARGE NOTE ADULT - HOSPITAL COURSE
83yoM; with pmh signif for early dementia, DM, HTN; now p/w fall. patient found on ground near bed when daughter went to check on him. unwitnessed fall. unknown loc.  patient unable to stand s/p fall. c/o abd pain--lower abd cramping, constipation. denies f/c/s. denies cp/sob/palp. denies sick contacts. denies travel. denies headache.  confused, unable to add to hx  Hx of falls in past. found to have L1 fracture, hydration improved. syncope w/up neg  Advised rehab dc but no insurance coverage hence dc to home pt

## 2018-03-26 NOTE — DISCHARGE NOTE ADULT - PLAN OF CARE
prevent falls fall precautions, supervision continue routine meds off hypoglycemis. monitor blood sugar pain control

## 2018-03-26 NOTE — OCCUPATIONAL THERAPY INITIAL EVALUATION ADULT - BALANCE DISTURBANCE, IDENTIFIED IMPAIRMENT CONTRIBUTE, REHAB EVAL
abnormal muscle tone/pain/decreased sensation/impaired sensory feedback/decreased strength/impaired postural control/decreased ROM/impaired coordination/impaired motor control

## 2018-03-26 NOTE — OCCUPATIONAL THERAPY INITIAL EVALUATION ADULT - SOCIAL CONCERNS
Pt is more confused due to new surrounding and care givers./Complex psychosocial needs/coping issues

## 2018-03-26 NOTE — DISCHARGE NOTE ADULT - PATIENT PORTAL LINK FT
You can access the [facility] Patient Portal, offered by [facility], by registering with the following website: [email or web address]

## 2018-03-26 NOTE — DISCHARGE NOTE ADULT - SECONDARY DIAGNOSIS.
Hypertensive urgency Type 2 diabetes mellitus without complication, without long-term current use of insulin Closed fracture of first lumbar vertebra, unspecified fracture morphology, initial encounter

## 2018-03-29 DIAGNOSIS — F03.90 UNSPECIFIED DEMENTIA WITHOUT BEHAVIORAL DISTURBANCE: ICD-10-CM
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
## 2018-03-29 DIAGNOSIS — R63.6 UNDERWEIGHT: ICD-10-CM

## 2018-03-29 DIAGNOSIS — S32.019A UNSPECIFIED FRACTURE OF FIRST LUMBAR VERTEBRA, INITIAL ENCOUNTER FOR CLOSED FRACTURE: ICD-10-CM

## 2018-03-29 DIAGNOSIS — E43 UNSPECIFIED SEVERE PROTEIN-CALORIE MALNUTRITION: ICD-10-CM

## 2018-03-29 DIAGNOSIS — E11.65 TYPE 2 DIABETES MELLITUS WITH HYPERGLYCEMIA: ICD-10-CM

## 2018-03-29 DIAGNOSIS — K64.9 UNSPECIFIED HEMORRHOIDS: ICD-10-CM

## 2018-03-29 DIAGNOSIS — Z79.84 LONG TERM (CURRENT) USE OF ORAL HYPOGLYCEMIC DRUGS: ICD-10-CM

## 2018-03-29 DIAGNOSIS — I16.0 HYPERTENSIVE URGENCY: ICD-10-CM

## 2018-03-29 DIAGNOSIS — R55 SYNCOPE AND COLLAPSE: ICD-10-CM

## 2018-03-29 DIAGNOSIS — E86.0 DEHYDRATION: ICD-10-CM
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
## 2018-03-29 DIAGNOSIS — I10 ESSENTIAL (PRIMARY) HYPERTENSION: ICD-10-CM

## 2018-05-28 ENCOUNTER — INPATIENT (INPATIENT)
Facility: HOSPITAL | Age: 83
LOS: 30 days | End: 2018-06-28
Attending: INTERNAL MEDICINE | Admitting: INTERNAL MEDICINE
Payer: MEDICAID

## 2018-05-28 VITALS
RESPIRATION RATE: 16 BRPM | OXYGEN SATURATION: 90 % | DIASTOLIC BLOOD PRESSURE: 67 MMHG | HEART RATE: 40 BPM | SYSTOLIC BLOOD PRESSURE: 155 MMHG

## 2018-05-28 DIAGNOSIS — A41.9 SEPSIS, UNSPECIFIED ORGANISM: ICD-10-CM

## 2018-05-28 DIAGNOSIS — Z96.649 PRESENCE OF UNSPECIFIED ARTIFICIAL HIP JOINT: Chronic | ICD-10-CM

## 2018-05-28 LAB
ALBUMIN SERPL ELPH-MCNC: 2.6 G/DL — LOW (ref 3.3–5)
ALBUMIN SERPL ELPH-MCNC: 2.7 G/DL — LOW (ref 3.3–5)
ALP SERPL-CCNC: 192 U/L — HIGH (ref 40–120)
ALP SERPL-CCNC: 198 U/L — HIGH (ref 40–120)
ALT FLD-CCNC: 28 U/L — SIGNIFICANT CHANGE UP (ref 4–41)
ALT FLD-CCNC: 31 U/L — SIGNIFICANT CHANGE UP (ref 4–41)
ANISOCYTOSIS BLD QL: SIGNIFICANT CHANGE UP
APPEARANCE UR: SIGNIFICANT CHANGE UP
APTT BLD: 28.7 SEC — SIGNIFICANT CHANGE UP (ref 27.5–37.4)
AST SERPL-CCNC: 27 U/L — SIGNIFICANT CHANGE UP (ref 4–40)
AST SERPL-CCNC: 45 U/L — HIGH (ref 4–40)
B-OH-BUTYR SERPL-SCNC: 0.4 MMOL/L — SIGNIFICANT CHANGE UP (ref 0–0.4)
BASE EXCESS BLDA CALC-SCNC: -17.6 MMOL/L — SIGNIFICANT CHANGE UP
BASE EXCESS BLDV CALC-SCNC: -13.4 MMOL/L — SIGNIFICANT CHANGE UP
BASOPHILS # BLD AUTO: 0.01 K/UL — SIGNIFICANT CHANGE UP (ref 0–0.2)
BASOPHILS NFR BLD AUTO: 0 % — SIGNIFICANT CHANGE UP (ref 0–2)
BASOPHILS NFR SPEC: 0 % — SIGNIFICANT CHANGE UP (ref 0–2)
BILIRUB SERPL-MCNC: 0.2 MG/DL — SIGNIFICANT CHANGE UP (ref 0.2–1.2)
BILIRUB SERPL-MCNC: < 0.2 MG/DL — LOW (ref 0.2–1.2)
BILIRUB UR-MCNC: NEGATIVE — SIGNIFICANT CHANGE UP
BLOOD GAS VENOUS - CREATININE: 5.8 MG/DL — HIGH (ref 0.5–1.3)
BLOOD UR QL VISUAL: HIGH
BUN SERPL-MCNC: 103 MG/DL — HIGH (ref 7–23)
BUN SERPL-MCNC: 104 MG/DL — HIGH (ref 7–23)
BUN SERPL-MCNC: 106 MG/DL — HIGH (ref 7–23)
CALCIUM SERPL-MCNC: 10 MG/DL — SIGNIFICANT CHANGE UP (ref 8.4–10.5)
CALCIUM SERPL-MCNC: 8.9 MG/DL — SIGNIFICANT CHANGE UP (ref 8.4–10.5)
CALCIUM SERPL-MCNC: 9.5 MG/DL — SIGNIFICANT CHANGE UP (ref 8.4–10.5)
CHLORIDE BLDA-SCNC: > 120 MMOL/L — HIGH (ref 96–108)
CHLORIDE BLDV-SCNC: > 120 MMOL/L — HIGH (ref 96–108)
CHLORIDE SERPL-SCNC: 131 MMOL/L — HIGH (ref 98–107)
CHLORIDE SERPL-SCNC: 132 MMOL/L — HIGH (ref 98–107)
CHLORIDE SERPL-SCNC: 135 MMOL/L — HIGH (ref 98–107)
CO2 SERPL-SCNC: 11 MMOL/L — LOW (ref 22–31)
CO2 SERPL-SCNC: 8 MMOL/L — CRITICAL LOW (ref 22–31)
CO2 SERPL-SCNC: 9 MMOL/L — CRITICAL LOW (ref 22–31)
COLOR SPEC: YELLOW — SIGNIFICANT CHANGE UP
CREAT SERPL-MCNC: 5.22 MG/DL — HIGH (ref 0.5–1.3)
CREAT SERPL-MCNC: 5.51 MG/DL — HIGH (ref 0.5–1.3)
CREAT SERPL-MCNC: 5.59 MG/DL — HIGH (ref 0.5–1.3)
EOSINOPHIL # BLD AUTO: 0 K/UL — SIGNIFICANT CHANGE UP (ref 0–0.5)
EOSINOPHIL NFR BLD AUTO: 0 % — SIGNIFICANT CHANGE UP (ref 0–6)
EOSINOPHIL NFR FLD: 0 % — SIGNIFICANT CHANGE UP (ref 0–6)
GAS PNL BLDV: 163 MMOL/L — CRITICAL HIGH (ref 136–146)
GIANT PLATELETS BLD QL SMEAR: PRESENT — SIGNIFICANT CHANGE UP
GLUCOSE BLDA-MCNC: 379 MG/DL — HIGH (ref 70–99)
GLUCOSE BLDC GLUCOMTR-MCNC: 316 MG/DL — HIGH (ref 70–99)
GLUCOSE BLDV-MCNC: 443 — HIGH (ref 70–99)
GLUCOSE SERPL-MCNC: 381 MG/DL — HIGH (ref 70–99)
GLUCOSE SERPL-MCNC: 399 MG/DL — HIGH (ref 70–99)
GLUCOSE SERPL-MCNC: 443 MG/DL — HIGH (ref 70–99)
GLUCOSE UR-MCNC: 300 — SIGNIFICANT CHANGE UP
HBA1C BLD-MCNC: 10.3 % — HIGH (ref 4–5.6)
HCO3 BLDA-SCNC: 11 MMOL/L — LOW (ref 22–26)
HCO3 BLDV-SCNC: 13 MMOL/L — LOW (ref 20–27)
HCT VFR BLD CALC: 36.1 % — LOW (ref 39–50)
HCT VFR BLDA CALC: 28.9 % — LOW (ref 39–51)
HCT VFR BLDV CALC: 34.5 % — LOW (ref 39–51)
HGB BLD-MCNC: 10.7 G/DL — LOW (ref 13–17)
HGB BLDA-MCNC: 9.3 G/DL — LOW (ref 13–17)
HGB BLDV-MCNC: 11.2 G/DL — LOW (ref 13–17)
HYPOCHROMIA BLD QL: SIGNIFICANT CHANGE UP
IMM GRANULOCYTES # BLD AUTO: 0.19 # — SIGNIFICANT CHANGE UP
IMM GRANULOCYTES NFR BLD AUTO: 0.9 % — SIGNIFICANT CHANGE UP (ref 0–1.5)
INR BLD: 1.09 — SIGNIFICANT CHANGE UP (ref 0.88–1.17)
KETONES UR-MCNC: NEGATIVE — SIGNIFICANT CHANGE UP
LACTATE BLDA-SCNC: 2.2 MMOL/L — HIGH (ref 0.5–2)
LACTATE BLDV-MCNC: 1.9 MMOL/L — SIGNIFICANT CHANGE UP (ref 0.5–2)
LEUKOCYTE ESTERASE UR-ACNC: HIGH
LYMPHOCYTES # BLD AUTO: 0.93 K/UL — LOW (ref 1–3.3)
LYMPHOCYTES # BLD AUTO: 4.4 % — LOW (ref 13–44)
LYMPHOCYTES NFR SPEC AUTO: 4.2 % — LOW (ref 13–44)
MACROCYTES BLD QL: SIGNIFICANT CHANGE UP
MCHC RBC-ENTMCNC: 28.6 PG — SIGNIFICANT CHANGE UP (ref 27–34)
MCHC RBC-ENTMCNC: 29.6 % — LOW (ref 32–36)
MCV RBC AUTO: 96.5 FL — SIGNIFICANT CHANGE UP (ref 80–100)
MONOCYTES # BLD AUTO: 0.6 K/UL — SIGNIFICANT CHANGE UP (ref 0–0.9)
MONOCYTES NFR BLD AUTO: 2.9 % — SIGNIFICANT CHANGE UP (ref 2–14)
MONOCYTES NFR BLD: 1.7 % — LOW (ref 2–9)
MUCOUS THREADS # UR AUTO: SIGNIFICANT CHANGE UP
NEUTROPHIL AB SER-ACNC: 94.1 % — HIGH (ref 43–77)
NEUTROPHILS # BLD AUTO: 19.23 K/UL — HIGH (ref 1.8–7.4)
NEUTROPHILS NFR BLD AUTO: 91.8 % — HIGH (ref 43–77)
NITRITE UR-MCNC: NEGATIVE — SIGNIFICANT CHANGE UP
NON-SQ EPI CELLS # UR AUTO: 2 — SIGNIFICANT CHANGE UP
NRBC # FLD: 0 — SIGNIFICANT CHANGE UP
PCO2 BLDA: 27 MMHG — LOW (ref 35–48)
PCO2 BLDV: 39 MMHG — LOW (ref 41–51)
PH BLDA: 7.17 PH — CRITICAL LOW (ref 7.35–7.45)
PH BLDV: 7.17 PH — CRITICAL LOW (ref 7.32–7.43)
PH UR: 6 — SIGNIFICANT CHANGE UP (ref 4.6–8)
PLATELET # BLD AUTO: 163 K/UL — SIGNIFICANT CHANGE UP (ref 150–400)
PLATELET COUNT - ESTIMATE: NORMAL — SIGNIFICANT CHANGE UP
PMV BLD: 12.2 FL — SIGNIFICANT CHANGE UP (ref 7–13)
PO2 BLDA: 190 MMHG — HIGH (ref 83–108)
PO2 BLDV: 36 MMHG — SIGNIFICANT CHANGE UP (ref 35–40)
POIKILOCYTOSIS BLD QL AUTO: SIGNIFICANT CHANGE UP
POLYCHROMASIA BLD QL SMEAR: SLIGHT — SIGNIFICANT CHANGE UP
POTASSIUM BLDA-SCNC: 4.4 MMOL/L — SIGNIFICANT CHANGE UP (ref 3.4–4.5)
POTASSIUM BLDV-SCNC: 6.1 MMOL/L — HIGH (ref 3.4–4.5)
POTASSIUM SERPL-MCNC: 4.6 MMOL/L — SIGNIFICANT CHANGE UP (ref 3.5–5.3)
POTASSIUM SERPL-MCNC: 6.2 MMOL/L — CRITICAL HIGH (ref 3.5–5.3)
POTASSIUM SERPL-MCNC: 7.5 MMOL/L — CRITICAL HIGH (ref 3.5–5.3)
POTASSIUM SERPL-SCNC: 4.6 MMOL/L — SIGNIFICANT CHANGE UP (ref 3.5–5.3)
POTASSIUM SERPL-SCNC: 6.2 MMOL/L — CRITICAL HIGH (ref 3.5–5.3)
POTASSIUM SERPL-SCNC: 7.5 MMOL/L — CRITICAL HIGH (ref 3.5–5.3)
PROT SERPL-MCNC: 7.2 G/DL — SIGNIFICANT CHANGE UP (ref 6–8.3)
PROT SERPL-MCNC: 7.5 G/DL — SIGNIFICANT CHANGE UP (ref 6–8.3)
PROT UR-MCNC: 150 MG/DL — HIGH
PROTHROM AB SERPL-ACNC: 12.1 SEC — SIGNIFICANT CHANGE UP (ref 9.8–13.1)
RBC # BLD: 3.74 M/UL — LOW (ref 4.2–5.8)
RBC # FLD: 18 % — HIGH (ref 10.3–14.5)
RBC CASTS # UR COMP ASSIST: >50 — HIGH (ref 0–?)
SAO2 % BLDA: 98.2 % — SIGNIFICANT CHANGE UP (ref 95–99)
SAO2 % BLDV: 51.4 % — LOW (ref 60–85)
SODIUM BLDA-SCNC: 161 MMOL/L — CRITICAL HIGH (ref 136–146)
SODIUM SERPL-SCNC: 161 MMOL/L — CRITICAL HIGH (ref 135–145)
SODIUM SERPL-SCNC: 162 MMOL/L — CRITICAL HIGH (ref 135–145)
SODIUM SERPL-SCNC: 163 MMOL/L — CRITICAL HIGH (ref 135–145)
SP GR SPEC: 1.02 — SIGNIFICANT CHANGE UP (ref 1–1.04)
SQUAMOUS # UR AUTO: SIGNIFICANT CHANGE UP
TROPONIN T SERPL-MCNC: < 0.06 NG/ML — SIGNIFICANT CHANGE UP (ref 0–0.06)
UROBILINOGEN FLD QL: NORMAL MG/DL — SIGNIFICANT CHANGE UP
WBC # BLD: 20.96 K/UL — HIGH (ref 3.8–10.5)
WBC # FLD AUTO: 20.96 K/UL — HIGH (ref 3.8–10.5)
WBC CLUMPS #/AREA URNS HPF: PRESENT — HIGH (ref 0–?)
WBC UR QL: >50 — HIGH (ref 0–?)
YEAST BUDDING # UR COMP ASSIST: SIGNIFICANT CHANGE UP

## 2018-05-28 PROCEDURE — 71045 X-RAY EXAM CHEST 1 VIEW: CPT | Mod: 26

## 2018-05-28 PROCEDURE — 99291 CRITICAL CARE FIRST HOUR: CPT

## 2018-05-28 RX ORDER — VANCOMYCIN HCL 1 G
1000 VIAL (EA) INTRAVENOUS EVERY 24 HOURS
Qty: 0 | Refills: 0 | Status: DISCONTINUED | OUTPATIENT
Start: 2018-05-28 | End: 2018-05-28

## 2018-05-28 RX ORDER — GLUCAGON INJECTION, SOLUTION 0.5 MG/.1ML
1 INJECTION, SOLUTION SUBCUTANEOUS ONCE
Qty: 0 | Refills: 0 | Status: DISCONTINUED | OUTPATIENT
Start: 2018-05-28 | End: 2018-06-28

## 2018-05-28 RX ORDER — SODIUM CHLORIDE 9 MG/ML
1000 INJECTION, SOLUTION INTRAVENOUS
Qty: 0 | Refills: 0 | Status: DISCONTINUED | OUTPATIENT
Start: 2018-05-28 | End: 2018-05-29

## 2018-05-28 RX ORDER — DEXTROSE 50 % IN WATER 50 %
25 SYRINGE (ML) INTRAVENOUS ONCE
Qty: 0 | Refills: 0 | Status: DISCONTINUED | OUTPATIENT
Start: 2018-05-28 | End: 2018-06-28

## 2018-05-28 RX ORDER — PIPERACILLIN AND TAZOBACTAM 4; .5 G/20ML; G/20ML
3.38 INJECTION, POWDER, LYOPHILIZED, FOR SOLUTION INTRAVENOUS EVERY 12 HOURS
Qty: 0 | Refills: 0 | Status: DISCONTINUED | OUTPATIENT
Start: 2018-05-28 | End: 2018-05-28

## 2018-05-28 RX ORDER — INSULIN HUMAN 100 [IU]/ML
10 INJECTION, SOLUTION SUBCUTANEOUS ONCE
Qty: 0 | Refills: 0 | Status: COMPLETED | OUTPATIENT
Start: 2018-05-28 | End: 2018-05-28

## 2018-05-28 RX ORDER — PIPERACILLIN AND TAZOBACTAM 4; .5 G/20ML; G/20ML
3.38 INJECTION, POWDER, LYOPHILIZED, FOR SOLUTION INTRAVENOUS ONCE
Qty: 0 | Refills: 0 | Status: COMPLETED | OUTPATIENT
Start: 2018-05-28 | End: 2018-05-28

## 2018-05-28 RX ORDER — INSULIN LISPRO 100/ML
VIAL (ML) SUBCUTANEOUS
Qty: 0 | Refills: 0 | Status: DISCONTINUED | OUTPATIENT
Start: 2018-05-28 | End: 2018-05-28

## 2018-05-28 RX ORDER — FENTANYL CITRATE 50 UG/ML
3 INJECTION INTRAVENOUS
Qty: 2500 | Refills: 0 | Status: DISCONTINUED | OUTPATIENT
Start: 2018-05-28 | End: 2018-05-31

## 2018-05-28 RX ORDER — CALCIUM GLUCONATE 100 MG/ML
1 VIAL (ML) INTRAVENOUS ONCE
Qty: 0 | Refills: 0 | Status: COMPLETED | OUTPATIENT
Start: 2018-05-28 | End: 2018-05-28

## 2018-05-28 RX ORDER — PROPOFOL 10 MG/ML
3 INJECTION, EMULSION INTRAVENOUS ONCE
Qty: 0 | Refills: 0 | Status: DISCONTINUED | OUTPATIENT
Start: 2018-05-28 | End: 2018-05-28

## 2018-05-28 RX ORDER — SODIUM CHLORIDE 9 MG/ML
1000 INJECTION INTRAMUSCULAR; INTRAVENOUS; SUBCUTANEOUS ONCE
Qty: 0 | Refills: 0 | Status: DISCONTINUED | OUTPATIENT
Start: 2018-05-28 | End: 2018-05-28

## 2018-05-28 RX ORDER — PIPERACILLIN AND TAZOBACTAM 4; .5 G/20ML; G/20ML
3.38 INJECTION, POWDER, LYOPHILIZED, FOR SOLUTION INTRAVENOUS EVERY 12 HOURS
Qty: 0 | Refills: 0 | Status: DISCONTINUED | OUTPATIENT
Start: 2018-05-29 | End: 2018-06-03

## 2018-05-28 RX ORDER — IPRATROPIUM/ALBUTEROL SULFATE 18-103MCG
3 AEROSOL WITH ADAPTER (GRAM) INHALATION ONCE
Qty: 0 | Refills: 0 | Status: COMPLETED | OUTPATIENT
Start: 2018-05-28 | End: 2018-05-28

## 2018-05-28 RX ORDER — HEPARIN SODIUM 5000 [USP'U]/ML
5000 INJECTION INTRAVENOUS; SUBCUTANEOUS EVERY 8 HOURS
Qty: 0 | Refills: 0 | Status: DISCONTINUED | OUTPATIENT
Start: 2018-05-28 | End: 2018-06-06

## 2018-05-28 RX ORDER — VANCOMYCIN HCL 1 G
1000 VIAL (EA) INTRAVENOUS ONCE
Qty: 0 | Refills: 0 | Status: COMPLETED | OUTPATIENT
Start: 2018-05-28 | End: 2018-05-28

## 2018-05-28 RX ORDER — PROPOFOL 10 MG/ML
5 INJECTION, EMULSION INTRAVENOUS
Qty: 1000 | Refills: 0 | Status: DISCONTINUED | OUTPATIENT
Start: 2018-05-28 | End: 2018-06-01

## 2018-05-28 RX ORDER — SODIUM CHLORIDE 9 MG/ML
1000 INJECTION INTRAMUSCULAR; INTRAVENOUS; SUBCUTANEOUS ONCE
Qty: 0 | Refills: 0 | Status: COMPLETED | OUTPATIENT
Start: 2018-05-28 | End: 2018-05-28

## 2018-05-28 RX ORDER — DEXTROSE 50 % IN WATER 50 %
12.5 SYRINGE (ML) INTRAVENOUS ONCE
Qty: 0 | Refills: 0 | Status: DISCONTINUED | OUTPATIENT
Start: 2018-05-28 | End: 2018-06-28

## 2018-05-28 RX ORDER — FENTANYL CITRATE 50 UG/ML
50 INJECTION INTRAVENOUS ONCE
Qty: 0 | Refills: 0 | Status: DISCONTINUED | OUTPATIENT
Start: 2018-05-28 | End: 2018-05-28

## 2018-05-28 RX ORDER — INSULIN HUMAN 100 [IU]/ML
5 INJECTION, SOLUTION SUBCUTANEOUS ONCE
Qty: 0 | Refills: 0 | Status: DISCONTINUED | OUTPATIENT
Start: 2018-05-28 | End: 2018-05-28

## 2018-05-28 RX ORDER — INSULIN HUMAN 100 [IU]/ML
5 INJECTION, SOLUTION SUBCUTANEOUS ONCE
Qty: 0 | Refills: 0 | Status: COMPLETED | OUTPATIENT
Start: 2018-05-28 | End: 2018-05-28

## 2018-05-28 RX ORDER — DEXTROSE 50 % IN WATER 50 %
50 SYRINGE (ML) INTRAVENOUS ONCE
Qty: 0 | Refills: 0 | Status: COMPLETED | OUTPATIENT
Start: 2018-05-28 | End: 2018-05-28

## 2018-05-28 RX ORDER — FUROSEMIDE 40 MG
80 TABLET ORAL ONCE
Qty: 0 | Refills: 0 | Status: COMPLETED | OUTPATIENT
Start: 2018-05-28 | End: 2018-05-28

## 2018-05-28 RX ORDER — NOREPINEPHRINE BITARTRATE/D5W 8 MG/250ML
0.05 PLASTIC BAG, INJECTION (ML) INTRAVENOUS
Qty: 8 | Refills: 0 | Status: DISCONTINUED | OUTPATIENT
Start: 2018-05-28 | End: 2018-05-29

## 2018-05-28 RX ORDER — SODIUM CHLORIDE 9 MG/ML
500 INJECTION INTRAMUSCULAR; INTRAVENOUS; SUBCUTANEOUS ONCE
Qty: 0 | Refills: 0 | Status: COMPLETED | OUTPATIENT
Start: 2018-05-28 | End: 2018-05-28

## 2018-05-28 RX ORDER — INSULIN LISPRO 100/ML
VIAL (ML) SUBCUTANEOUS EVERY 6 HOURS
Qty: 0 | Refills: 0 | Status: DISCONTINUED | OUTPATIENT
Start: 2018-05-28 | End: 2018-06-08

## 2018-05-28 RX ORDER — CHLORHEXIDINE GLUCONATE 213 G/1000ML
1 SOLUTION TOPICAL ONCE
Qty: 0 | Refills: 0 | Status: COMPLETED | OUTPATIENT
Start: 2018-05-28 | End: 2018-05-28

## 2018-05-28 RX ORDER — DEXTROSE 50 % IN WATER 50 %
15 SYRINGE (ML) INTRAVENOUS ONCE
Qty: 0 | Refills: 0 | Status: DISCONTINUED | OUTPATIENT
Start: 2018-05-28 | End: 2018-06-28

## 2018-05-28 RX ORDER — SODIUM CHLORIDE 9 MG/ML
1000 INJECTION, SOLUTION INTRAVENOUS
Qty: 0 | Refills: 0 | Status: DISCONTINUED | OUTPATIENT
Start: 2018-05-28 | End: 2018-06-28

## 2018-05-28 RX ORDER — CHLORHEXIDINE GLUCONATE 213 G/1000ML
1 SOLUTION TOPICAL
Qty: 0 | Refills: 0 | Status: DISCONTINUED | OUTPATIENT
Start: 2018-05-29 | End: 2018-06-19

## 2018-05-28 RX ORDER — PROPOFOL 10 MG/ML
30 INJECTION, EMULSION INTRAVENOUS ONCE
Qty: 0 | Refills: 0 | Status: COMPLETED | OUTPATIENT
Start: 2018-05-28 | End: 2018-05-28

## 2018-05-28 RX ORDER — SODIUM CHLORIDE 9 MG/ML
1000 INJECTION INTRAMUSCULAR; INTRAVENOUS; SUBCUTANEOUS
Qty: 0 | Refills: 0 | Status: DISCONTINUED | OUTPATIENT
Start: 2018-05-28 | End: 2018-05-28

## 2018-05-28 RX ORDER — INSULIN LISPRO 100/ML
VIAL (ML) SUBCUTANEOUS AT BEDTIME
Qty: 0 | Refills: 0 | Status: DISCONTINUED | OUTPATIENT
Start: 2018-05-28 | End: 2018-05-28

## 2018-05-28 RX ADMIN — PIPERACILLIN AND TAZOBACTAM 200 GRAM(S): 4; .5 INJECTION, POWDER, LYOPHILIZED, FOR SOLUTION INTRAVENOUS at 16:30

## 2018-05-28 RX ADMIN — INSULIN HUMAN 5 UNIT(S): 100 INJECTION, SOLUTION SUBCUTANEOUS at 17:03

## 2018-05-28 RX ADMIN — Medication 50 MILLILITER(S): at 20:07

## 2018-05-28 RX ADMIN — Medication 3.87 MICROGRAM(S)/KG/MIN: at 23:34

## 2018-05-28 RX ADMIN — Medication 3 MILLILITER(S): at 17:15

## 2018-05-28 RX ADMIN — INSULIN HUMAN 10 UNIT(S): 100 INJECTION, SOLUTION SUBCUTANEOUS at 20:15

## 2018-05-28 RX ADMIN — FENTANYL CITRATE 50 MICROGRAM(S): 50 INJECTION INTRAVENOUS at 23:50

## 2018-05-28 RX ADMIN — PROPOFOL 30 MILLIGRAM(S): 10 INJECTION, EMULSION INTRAVENOUS at 22:59

## 2018-05-28 RX ADMIN — Medication 3 MILLILITER(S): at 17:36

## 2018-05-28 RX ADMIN — PROPOFOL 1.24 MICROGRAM(S)/KG/MIN: 10 INJECTION, EMULSION INTRAVENOUS at 23:35

## 2018-05-28 RX ADMIN — Medication 80 MILLIGRAM(S): at 17:15

## 2018-05-28 RX ADMIN — SODIUM CHLORIDE 75 MILLILITER(S): 9 INJECTION, SOLUTION INTRAVENOUS at 23:34

## 2018-05-28 RX ADMIN — Medication 3 MILLILITER(S): at 17:03

## 2018-05-28 RX ADMIN — SODIUM CHLORIDE 1000 MILLILITER(S): 9 INJECTION INTRAMUSCULAR; INTRAVENOUS; SUBCUTANEOUS at 19:51

## 2018-05-28 RX ADMIN — CHLORHEXIDINE GLUCONATE 1 APPLICATION(S): 213 SOLUTION TOPICAL at 22:37

## 2018-05-28 RX ADMIN — SODIUM CHLORIDE 500 MILLILITER(S): 9 INJECTION INTRAMUSCULAR; INTRAVENOUS; SUBCUTANEOUS at 18:02

## 2018-05-28 RX ADMIN — PROPOFOL 30 MILLIGRAM(S): 10 INJECTION, EMULSION INTRAVENOUS at 23:00

## 2018-05-28 RX ADMIN — Medication 200 GRAM(S): at 17:04

## 2018-05-28 RX ADMIN — FENTANYL CITRATE 8.26 MICROGRAM(S)/KG/HR: 50 INJECTION INTRAVENOUS at 23:51

## 2018-05-28 RX ADMIN — Medication 250 MILLIGRAM(S): at 18:16

## 2018-05-28 RX ADMIN — SODIUM CHLORIDE 75 MILLILITER(S): 9 INJECTION INTRAMUSCULAR; INTRAVENOUS; SUBCUTANEOUS at 20:50

## 2018-05-28 RX ADMIN — HEPARIN SODIUM 5000 UNIT(S): 5000 INJECTION INTRAVENOUS; SUBCUTANEOUS at 22:37

## 2018-05-28 NOTE — H&P ADULT - ASSESSMENT
A/P  82 y/o M w/ pmh significant for mild dementia, DM2 (last A1c, ) HTN presenting to the ED w/ declining functional status, subjective hypothermia, and rigors  fuond to have leukocytosis (20.9)       #Neuro  - Metabolic encephalopathy in setting of Sepsis 2/2 UTI and PNA   - Pt awake, alert, and conversive at baseline w/ intermittent periods of confusion  - s/p Vanc and Zosyn in ED x 1. Will dose Vanc by level. Zosyn (renally dosed)  - Bcx and Ucx pending      #CV  - Hemodynamically stable, not requiring pressor support in ED  - Hx of HTN on Amlodipine in setting of Sepsis     #Pulm  -Acute hypoxemic respiratory failure requiring supplemental oxygen 2/2 PNA as seen on CXR  - Pt tachypneic in ED to 40's requiring NRB w/ improvement in respiratory status     #ID  - Hypothermic w/ leukocytosis w/ RML opacity concerning for PNA  - Urine appeared grossly abnormal. UA pending. Lactate 1.9   - s/p Vanc and Zosyn x1 in ED  - Will cont w/ empiric abx coverage w/ Vanc and Zosyn  - Bcx and Ucx pending    #GI  - NPO     #Renal  - Hypernatremia (161) Hyperkalemia (6.2) w/ metabolic acidosi (HCO3-11),Glucose 400's  - Initial concern for potential ensuing DKA however will repeat labs to further characterize electrolyte derangements  - Cr- 5.1 concern for acute renal failure. Likely prerenal azotemia in setting of hypoperfusion 2/2 distributive shock.   - s/p Lasix 80mg x1 in ED. Bhakta in place  - Monitor UOP     #Endo  - Initially presented following FSG's 400's at home. Serum glucose on admission 443 s/p Glipizide x2 at home  - In ED, pt given Humulin 5u x1.   - Will monitor FSG's on ISS and add basal as needed    #Heme  - Chronic normocytic anemia  - Hb stable 10 (baseline 10)   - No evidence of overt bleeding at this time  - Trend CBC's    #DVT ppx  - HSQ q12 A/P  82 y/o M w/ pmh significant for mild dementia, DM2 (last A1c, ) HTN presenting to the ED w/ declining functional status, subjective hypothermia, and rigors  found to have leukocytosis (20.9) admitted to MICU for sepsis 2/2 PNA and ?UTI       #Neuro  - Metabolic encephalopathy in setting of Sepsis 2/2 UTI and PNA   - Pt awake, alert, and conversive at baseline w/ intermittent periods of confusion  - s/p Vanc and Zosyn in ED x 1. Will dose Vanc by level. Zosyn (renally dosed)  - Bcx and Ucx pending      #CV  - Hemodynamically stable, not requiring pressor support in ED  - Hx of HTN on Amlodipine in setting of Sepsis     #Pulm  -Acute hypoxemic respiratory failure requiring supplemental oxygen 2/2 PNA as seen on CXR  - Pt tachypneic in ED to 40's requiring NRB w/ improvement in respiratory status     #ID  - Hypothermic w/ leukocytosis w/ RML opacity concerning for PNA  - Urine appeared grossly abnormal. UA pending. Lactate 1.9   - s/p Vanc and Zosyn x1 in ED  - Will cont w/ empiric abx coverage w/ Vanc and Zosyn  - Bcx and Ucx pending    #GI  - NPO     #Renal  - Hypernatremia (161) Hyperkalemia (6.2) w/ metabolic acidosi (HCO3-11),Glucose 400's  - Initial concern for potential ensuing DKA however will repeat labs to further characterize electrolyte derangements  - Cr- 5.1 concern for acute renal failure. Likely prerenal azotemia in setting of hypoperfusion 2/2 distributive shock.   - s/p Lasix 80mg x1 in ED. Bhakta in place  - Monitor UOP     #Endo  - Initially presented following FSG's 400's at home. Serum glucose on admission 443 s/p Glipizide x2 at home  - In ED, pt given Humulin 5u x1.   - Will monitor FSG's on ISS and add basal as needed    #Heme  - Chronic normocytic anemia  - Hb stable 10 (baseline 10)   - No evidence of overt bleeding at this time  - Trend CBC's    #DVT ppx  - HSQ q12

## 2018-05-28 NOTE — H&P ADULT - NSHPLABSRESULTS_GEN_ALL_CORE
10.7   20.96 )-----------( 163      ( 28 May 2018 16:20 )             36.1       05-28    161<HH>  |  131<H>  |  104<H>  ----------------------------<  443<H>  6.2<HH>   |  11<L>  |  5.59<H>    Ca    9.5      28 May 2018 16:20    TPro  7.2  /  Alb  2.7<L>  /  TBili  < 0.2<L>  /  DBili  x   /  AST  27  /  ALT  28  /  AlkPhos  192<H>  05-28                      Lactate Trend      CARDIAC MARKERS ( 28 May 2018 16:20 )  x     / < 0.06 ng/mL / x     / x     / x            CAPILLARY BLOOD GLUCOSE      POCT Blood Glucose.: 368 mg/dL (28 May 2018 15:43)

## 2018-05-28 NOTE — ED ADULT TRIAGE NOTE - CHIEF COMPLAINT QUOTE
Pt w/ hx of DM dementia does not respond to interview Daughter at bedside reports Pt aaox3 w/ confusion at baseline  c/o weakness x 1 week and chest pain today with associated abdominal and neck pain reported glucose = 400.    Unable to obtain valid temperature in triage.  FS = 368 in triage.  EKG in progress.

## 2018-05-28 NOTE — ED PROVIDER NOTE - MEDICAL DECISION MAKING DETAILS
pt with sepsis, likely 2/2 pna vs UTI, also ARF, micu consulted. severe metabolic acidosis.  Family would like pt to be full code.

## 2018-05-28 NOTE — GOALS OF CARE CONVERSATION - PERSONAL ADVANCE DIRECTIVE - CONVERSATION DETAILS
Discussed goals of care with daughter Alexia Guevara who is health care surrogate by next of kin.  Explained to daughter that pt is currently critical ill with evidence of severe infection, acute renal failure and poor respiratory status which may lead to require more aggressive interventions such as hemodialysis and mechanical ventilation.  Inquired about code status and pt wishes in case of an emergency.  Daughter reports that this was never discussed but believes that her father would have wanted aggressive measures including mechanical ventilation, hemodialysis. Discussed goals of care with daughter Alexia Guevara who is health care surrogate by next of kin.  Explained to daughter that pt is currently critical ill with evidence of severe infection, acute renal failure and poor respiratory status which may lead to require more aggressive interventions such as hemodialysis and mechanical ventilation.  Inquired about code status and pt wishes in case of an emergency.  Daughter reports that this was never discussed but believes that her father would have wanted aggressive measures including mechanical ventilation, hemodialysis and CPR in the event of a cardiac arrest.  Pt currently FULL CODE. Discussed goals of care with daughter Alexia Guevara who is health care surrogate by next of kin.  Explained to daughter that pt is currently critical ill with evidence of severe infection, acute renal failure and poor respiratory status which may lead to require more aggressive interventions such as hemodialysis and mechanical ventilation.  Inquired about code status and pt wishes in case of an emergency.  Daughter reports that the pt does not have formal advanced directives and that they never personally discussed this before but she believes that her father would have wanted aggressive measures including mechanical ventilation, hemodialysis and CPR in the event of a cardiac arrest.  Pt currently FULL CODE.

## 2018-05-28 NOTE — ED PROVIDER NOTE - OBJECTIVE STATEMENT
82yo m pmh dm misael, presents from home for generalized weakness, ? chest discomfort. Hx obtained from family, pt was acting usual self yesterday, today would not get out of bed, less verbal today, aox1 at baseline.

## 2018-05-28 NOTE — H&P ADULT - ATTENDING COMMENTS
pt seen and examined at bedside . Pt is an 84 yo male hx dm, htn mild dementia  per daughter can walk around with a cane. Pt presents with 2 days of weakness and  not getting out of bed. Pt noted to be bradycardic in the 40's with hypothermia  tachypneic. Noted to be in acute renal failure , cr 5.3.  hco3 11.  UA gross pus with  cxr showing right sided consolidation.  PE bp 120/80 p 74 rr 28 lungs rhonchi b/l heart s1s2  abdomen cachectic, ext poor skin turgor.  labs noted.  -sepsis, urinalysis  -acute renal failure, likely sepsis, urine vs pneumonia.  -panculture, zosyn, vanco,  repeat labs for hypernatremia and hyperkalemia.  critically ill

## 2018-05-28 NOTE — CHART NOTE - NSCHARTNOTEFT_GEN_A_CORE
Critically ill pt requiring PIV access for medical management and/or pressor support. Under US guidance identified Rt  UE vein, proximal to AC and successfully placed 20g x 1.88 inch angiocath into vessel. .Placement confirmed s/p with ultrasound and catheter determined to be in patent lumen of vein. Pt tolerated well w/o complication.

## 2018-05-28 NOTE — ED PROVIDER NOTE - ATTENDING CONTRIBUTION TO CARE
clive: hx from daughter at bedside.   Pt lives with family. Past 2 weeks pt at times felt well, at times weak.   Not eating by himself past 2 weeks (usually feeds self). FAmily had to feed him. Had a cough approx 8 days ago. Today did not eat and became less responsive. Temp 92 orally at home. Brought to ED.   PMH includes HTN DM and dementia.   exam: Pt awake. responds to name. HE states his first name. will squeeze fingers and close eyes when asked. RR mid-upper 20s but not appearing to fatigue at time of exam.   rectal temp 95. sat 100% partial rebreather. abd soft and nontender. exam otherwise unremarkable.   labs: hyernatremic, RF (creat earlier this year from [facility] was normal).   CXR suggests pneumonia and fluid in fissure.   impression: renal failure, likely sepsis, metabolic abnormalities.  antibiotics given after cultures obtained. noland with thermometer placed; on warming blanket.   ICU consulted. Dialysis discussed.   Family at this time requests full code status. clive: hx from daughter at bedside.   Pt lives with family. Past 2 weeks pt at times felt well, at times weak.   Not eating by himself past 2 weeks (usually feeds self). FAmily had to feed him. Had a cough approx 8 days ago. Today did not eat and became less responsive. Temp 92 orally at home. Brought to ED.   PMH includes HTN DM and dementia.   exam: Pt awake. responds to name. HE states his first name. will squeeze fingers and close eyes when asked. RR mid-upper 20s but not appearing to fatigue at time of exam.   rectal temp 95. sat 100% partial rebreather. abd soft and nontender. exam otherwise unremarkable.   labs: hyernatremic, RF (creat earlier this year from [facility] was normal).   CXR suggests pneumonia and fluid in fissure.   impression: renal failure, likely sepsis, metabolic abnormalities.  antibiotics given after cultures obtained. noland with thermometer placed; on warming blanket.   ICU consulted. Dialysis discussed.   Family at this time requests full code status..

## 2018-05-28 NOTE — ED PROVIDER NOTE - CRITICAL CARE PROVIDED
consultation with other physicians/additional history taking/consult w/ pt's family directly relating to pts condition/documentation/direct patient care (not related to procedure)/interpretation of diagnostic studies/review of labs from [facility].

## 2018-05-28 NOTE — H&P ADULT - HISTORY OF PRESENT ILLNESS
83M hx mild dementia, DM, HTN who was brought to ED by his daughter for functional decline over the past week.  Hx provided by daughter, Alexia Guevara ([phone]) as pt unable to participate in interview due to altered mental status.  At baseline, pt is awake, alert, conversant with intermittent periods of confusion and can ambulate with a cane.  However this week, he was more lethargic this week and required full person assistance to get around.  Pt also stopped feeding himself and he had to be fed by his daughter, which is unusual for him.  Pt eats regular meals, has no known hx of dysphagia and does not cough after he eats.  On day of admission, pt complained of some chest and abdominal pain and had a shivering episode  At home, he had a low oral temperature of 92 and had a home finger stick of about 400.  Daughter gave the pt 2 glipizide tablets and then took him to the ED for further eval.     ED course:  T 95.1, HR 40 (repeat 92), /67, O2 90% RA.  Labs remarkable for WBC 20.9, PMN 83M hx mild dementia, DM, HTN who was brought to ED by his daughter for functional decline over the past week.  Hx provided by daughter, Alexia Guevara ([phone]) as pt unable to participate in interview due to altered mental status.  At baseline, pt is awake, alert, conversant with intermittent periods of confusion and can ambulate with a cane.  However this week, he was more lethargic this week and required full person assistance to get around.  Pt also stopped feeding himself and he had to be fed by his daughter, which is unusual for him.  Pt eats regular meals, has no known hx of dysphagia and does not cough after he eats.  On day of admission, pt complained of some chest and abdominal pain and had a shivering episode  At home, he had a low oral temperature of 92 and had a home finger stick of about 400.  Daughter gave the pt 2 glipizide tablets and then took him to the ED for further eval.     ED course:  T 95.1, HR 40 (repeat 92), /67, O2 90% RA.  Labs remarkable for WBC 20.96, Na 161, K 6.2, HCO3 11, BUN/Cr 104/5.59.  VB.17/39/36/13.  CXR showed mid R-lung opacity.  UA pending, but appearance of urine was pink and frothy. Received vanco, zosyn, lasix, Ca2+ gluconate in ED. 83M hx mild dementia, DM, HTN who was brought to ED by his daughter for functional decline over the past week.  Hx provided by daughter, Alexia Guevara ([phone]) as pt unable to participate in interview due to altered mental status.  At baseline, pt is awake, alert, conversant with intermittent periods of confusion and can ambulate with a cane.   This week, he became lethargic and required full person assistance to get around.  Pt also stopped feeding himself and he had to be fed by his daughter, which is unusual for him.  Pt eats regular meals, has no known hx of dysphagia and does not cough after he eats.  On day of admission, pt complained of some chest and abdominal pain and had a shivering episode  At home, he had a low oral temperature of 92 and had a home finger stick of about 400.  Daughter gave the pt 2 glipizide tablets and then took him to the ED for further eval.     ED course:  T 95.1, HR 40 (repeat 92), /67, O2 90% RA.  Labs remarkable for WBC 20.96, Na 161, K 6.2, HCO3 11, BUN/Cr 104/5.59.  VB.17/39/36/13.  CXR showed mid R-lung opacity.  UA pending, but appearance of urine was pink and frothy. Received vanco, zosyn, lasix, Ca2+ gluconate in ED.

## 2018-05-28 NOTE — ED ADULT NURSE NOTE - OBJECTIVE STATEMENT
83M PMH of DM, dementia, HTN, brought in by family for AMS and weakness. As per family pt has been declining over the past week, not taking care of himself, not eating much and this morning was more altered than normal. Pt is AOx1 at baseline, currently AOx0, minimally alert, cachetic, not responding appropriately, in respiratory distress, tachypneic in the 40s, on NRB. Full code as per family. Pt hypothermic, CC noland placed draining grossly abnormal urine, jose hugger applied. Scattered stage II half dollar sized decubitus to the sacral region. ED team and family at bedside during assessment.

## 2018-05-29 DIAGNOSIS — N17.9 ACUTE KIDNEY FAILURE, UNSPECIFIED: ICD-10-CM

## 2018-05-29 DIAGNOSIS — E87.2 ACIDOSIS: ICD-10-CM

## 2018-05-29 DIAGNOSIS — E87.0 HYPEROSMOLALITY AND HYPERNATREMIA: ICD-10-CM

## 2018-05-29 DIAGNOSIS — E87.5 HYPERKALEMIA: ICD-10-CM

## 2018-05-29 LAB
ALBUMIN SERPL ELPH-MCNC: 1.7 G/DL — LOW (ref 3.3–5)
ALBUMIN SERPL ELPH-MCNC: 1.8 G/DL — LOW (ref 3.3–5)
ALP SERPL-CCNC: 146 U/L — HIGH (ref 40–120)
ALP SERPL-CCNC: 176 U/L — HIGH (ref 40–120)
ALT FLD-CCNC: 27 U/L — SIGNIFICANT CHANGE UP (ref 4–41)
ALT FLD-CCNC: 29 U/L — SIGNIFICANT CHANGE UP (ref 4–41)
APTT BLD: 32.2 SEC — SIGNIFICANT CHANGE UP (ref 27.5–37.4)
AST SERPL-CCNC: 30 U/L — SIGNIFICANT CHANGE UP (ref 4–40)
AST SERPL-CCNC: 35 U/L — SIGNIFICANT CHANGE UP (ref 4–40)
BASE EXCESS BLDA CALC-SCNC: -15 MMOL/L — SIGNIFICANT CHANGE UP
BASE EXCESS BLDA CALC-SCNC: -15.9 MMOL/L — SIGNIFICANT CHANGE UP
BASE EXCESS BLDA CALC-SCNC: -18.2 MMOL/L — SIGNIFICANT CHANGE UP
BASE EXCESS BLDA CALC-SCNC: -18.3 MMOL/L — SIGNIFICANT CHANGE UP
BASOPHILS # BLD AUTO: 0.01 K/UL — SIGNIFICANT CHANGE UP (ref 0–0.2)
BASOPHILS NFR BLD AUTO: 0.1 % — SIGNIFICANT CHANGE UP (ref 0–2)
BILIRUB SERPL-MCNC: 0.3 MG/DL — SIGNIFICANT CHANGE UP (ref 0.2–1.2)
BILIRUB SERPL-MCNC: 0.3 MG/DL — SIGNIFICANT CHANGE UP (ref 0.2–1.2)
BUN SERPL-MCNC: 100 MG/DL — HIGH (ref 7–23)
BUN SERPL-MCNC: 101 MG/DL — HIGH (ref 7–23)
BUN SERPL-MCNC: 105 MG/DL — HIGH (ref 7–23)
BUN SERPL-MCNC: 107 MG/DL — HIGH (ref 7–23)
BUN SERPL-MCNC: 95 MG/DL — HIGH (ref 7–23)
CALCIUM SERPL-MCNC: 7.5 MG/DL — LOW (ref 8.4–10.5)
CALCIUM SERPL-MCNC: 8 MG/DL — LOW (ref 8.4–10.5)
CALCIUM SERPL-MCNC: 8.1 MG/DL — LOW (ref 8.4–10.5)
CALCIUM SERPL-MCNC: 8.8 MG/DL — SIGNIFICANT CHANGE UP (ref 8.4–10.5)
CALCIUM SERPL-MCNC: 9.2 MG/DL — SIGNIFICANT CHANGE UP (ref 8.4–10.5)
CHLORIDE BLDA-SCNC: 138 MMOL/L — HIGH (ref 96–108)
CHLORIDE BLDA-SCNC: 143 MMOL/L — HIGH (ref 96–108)
CHLORIDE BLDA-SCNC: 143 MMOL/L — HIGH (ref 96–108)
CHLORIDE BLDA-SCNC: > 120 MMOL/L — HIGH (ref 96–108)
CHLORIDE SERPL-SCNC: 121 MMOL/L — HIGH (ref 98–107)
CHLORIDE SERPL-SCNC: 128 MMOL/L — HIGH (ref 98–107)
CHLORIDE SERPL-SCNC: 129 MMOL/L — HIGH (ref 98–107)
CHLORIDE SERPL-SCNC: 134 MMOL/L — HIGH (ref 98–107)
CHLORIDE SERPL-SCNC: 136 MMOL/L — HIGH (ref 98–107)
CO2 SERPL-SCNC: 11 MMOL/L — LOW (ref 22–31)
CO2 SERPL-SCNC: 8 MMOL/L — CRITICAL LOW (ref 22–31)
CO2 SERPL-SCNC: 9 MMOL/L — CRITICAL LOW (ref 22–31)
CREAT SERPL-MCNC: 4.84 MG/DL — HIGH (ref 0.5–1.3)
CREAT SERPL-MCNC: 5.21 MG/DL — HIGH (ref 0.5–1.3)
CREAT SERPL-MCNC: 5.23 MG/DL — HIGH (ref 0.5–1.3)
CREAT SERPL-MCNC: 5.34 MG/DL — HIGH (ref 0.5–1.3)
CREAT SERPL-MCNC: 5.36 MG/DL — HIGH (ref 0.5–1.3)
EOSINOPHIL # BLD AUTO: 0 K/UL — SIGNIFICANT CHANGE UP (ref 0–0.5)
EOSINOPHIL NFR BLD AUTO: 0 % — SIGNIFICANT CHANGE UP (ref 0–6)
GLUCOSE BLDA-MCNC: 104 MG/DL — HIGH (ref 70–99)
GLUCOSE BLDA-MCNC: 162 MG/DL — HIGH (ref 70–99)
GLUCOSE BLDA-MCNC: 167 MG/DL — HIGH (ref 70–99)
GLUCOSE BLDA-MCNC: 219 MG/DL — HIGH (ref 70–99)
GLUCOSE BLDC GLUCOMTR-MCNC: 130 MG/DL — HIGH (ref 70–99)
GLUCOSE BLDC GLUCOMTR-MCNC: 144 MG/DL — HIGH (ref 70–99)
GLUCOSE BLDC GLUCOMTR-MCNC: 165 MG/DL — HIGH (ref 70–99)
GLUCOSE BLDC GLUCOMTR-MCNC: 199 MG/DL — HIGH (ref 70–99)
GLUCOSE BLDC GLUCOMTR-MCNC: 82 MG/DL — SIGNIFICANT CHANGE UP (ref 70–99)
GLUCOSE SERPL-MCNC: 101 MG/DL — HIGH (ref 70–99)
GLUCOSE SERPL-MCNC: 146 MG/DL — HIGH (ref 70–99)
GLUCOSE SERPL-MCNC: 153 MG/DL — HIGH (ref 70–99)
GLUCOSE SERPL-MCNC: 169 MG/DL — HIGH (ref 70–99)
GLUCOSE SERPL-MCNC: 204 MG/DL — HIGH (ref 70–99)
GRAM STN SPT: SIGNIFICANT CHANGE UP
HCO3 BLDA-SCNC: 10 MMOL/L — CRITICAL LOW (ref 22–26)
HCO3 BLDA-SCNC: 11 MMOL/L — LOW (ref 22–26)
HCO3 BLDA-SCNC: 12 MMOL/L — LOW (ref 22–26)
HCO3 BLDA-SCNC: 13 MMOL/L — LOW (ref 22–26)
HCT VFR BLD CALC: 33.7 % — LOW (ref 39–50)
HCT VFR BLDA CALC: 25.1 % — LOW (ref 39–51)
HCT VFR BLDA CALC: 28.3 % — LOW (ref 39–51)
HCT VFR BLDA CALC: 31.2 % — LOW (ref 39–51)
HCT VFR BLDA CALC: 31.7 % — LOW (ref 39–51)
HGB BLD-MCNC: 9.8 G/DL — LOW (ref 13–17)
HGB BLDA-MCNC: 10.1 G/DL — LOW (ref 13–17)
HGB BLDA-MCNC: 10.3 G/DL — LOW (ref 13–17)
HGB BLDA-MCNC: 8.1 G/DL — LOW (ref 13–17)
HGB BLDA-MCNC: 9.1 G/DL — LOW (ref 13–17)
IMM GRANULOCYTES # BLD AUTO: 0.11 # — SIGNIFICANT CHANGE UP
IMM GRANULOCYTES NFR BLD AUTO: 1.3 % — SIGNIFICANT CHANGE UP (ref 0–1.5)
INR BLD: 1.05 — SIGNIFICANT CHANGE UP (ref 0.88–1.17)
LACTATE BLDA-SCNC: 1.5 MMOL/L — SIGNIFICANT CHANGE UP (ref 0.5–2)
LACTATE BLDA-SCNC: 1.6 MMOL/L — SIGNIFICANT CHANGE UP (ref 0.5–2)
LACTATE BLDA-SCNC: 2.1 MMOL/L — HIGH (ref 0.5–2)
LACTATE BLDA-SCNC: 2.1 MMOL/L — HIGH (ref 0.5–2)
LYMPHOCYTES # BLD AUTO: 0.7 K/UL — LOW (ref 1–3.3)
LYMPHOCYTES # BLD AUTO: 8.2 % — LOW (ref 13–44)
MAGNESIUM SERPL-MCNC: 2 MG/DL — SIGNIFICANT CHANGE UP (ref 1.6–2.6)
MAGNESIUM SERPL-MCNC: 2.2 MG/DL — SIGNIFICANT CHANGE UP (ref 1.6–2.6)
MAGNESIUM SERPL-MCNC: 2.2 MG/DL — SIGNIFICANT CHANGE UP (ref 1.6–2.6)
MAGNESIUM SERPL-MCNC: 2.5 MG/DL — SIGNIFICANT CHANGE UP (ref 1.6–2.6)
MAGNESIUM SERPL-MCNC: 2.7 MG/DL — HIGH (ref 1.6–2.6)
MANUAL SMEAR VERIFICATION: SIGNIFICANT CHANGE UP
MCHC RBC-ENTMCNC: 28.2 PG — SIGNIFICANT CHANGE UP (ref 27–34)
MCHC RBC-ENTMCNC: 29.1 % — LOW (ref 32–36)
MCV RBC AUTO: 96.8 FL — SIGNIFICANT CHANGE UP (ref 80–100)
MONOCYTES # BLD AUTO: 0.16 K/UL — SIGNIFICANT CHANGE UP (ref 0–0.9)
MONOCYTES NFR BLD AUTO: 1.9 % — LOW (ref 2–14)
NEUTROPHILS # BLD AUTO: 7.53 K/UL — HIGH (ref 1.8–7.4)
NEUTROPHILS NFR BLD AUTO: 88.5 % — HIGH (ref 43–77)
NRBC # FLD: 0.04 — SIGNIFICANT CHANGE UP
PCO2 BLDA: 25 MMHG — LOW (ref 35–48)
PCO2 BLDA: 26 MMHG — LOW (ref 35–48)
PCO2 BLDA: 29 MMHG — LOW (ref 35–48)
PCO2 BLDA: 36 MMHG — SIGNIFICANT CHANGE UP (ref 35–48)
PH BLDA: 7.13 PH — CRITICAL LOW (ref 7.35–7.45)
PH BLDA: 7.16 PH — CRITICAL LOW (ref 7.35–7.45)
PH BLDA: 7.17 PH — CRITICAL LOW (ref 7.35–7.45)
PH BLDA: 7.22 PH — LOW (ref 7.35–7.45)
PHOSPHATE SERPL-MCNC: 4.4 MG/DL — SIGNIFICANT CHANGE UP (ref 2.5–4.5)
PHOSPHATE SERPL-MCNC: 4.9 MG/DL — HIGH (ref 2.5–4.5)
PHOSPHATE SERPL-MCNC: 5.2 MG/DL — HIGH (ref 2.5–4.5)
PHOSPHATE SERPL-MCNC: 5.7 MG/DL — HIGH (ref 2.5–4.5)
PHOSPHATE SERPL-MCNC: 6.1 MG/DL — HIGH (ref 2.5–4.5)
PLATELET # BLD AUTO: 174 K/UL — SIGNIFICANT CHANGE UP (ref 150–400)
PMV BLD: 11.9 FL — SIGNIFICANT CHANGE UP (ref 7–13)
PO2 BLDA: 106 MMHG — SIGNIFICANT CHANGE UP (ref 83–108)
PO2 BLDA: 125 MMHG — HIGH (ref 83–108)
PO2 BLDA: 177 MMHG — HIGH (ref 83–108)
PO2 BLDA: 96 MMHG — SIGNIFICANT CHANGE UP (ref 83–108)
POTASSIUM BLDA-SCNC: 5.1 MMOL/L — HIGH (ref 3.4–4.5)
POTASSIUM BLDA-SCNC: 5.3 MMOL/L — HIGH (ref 3.4–4.5)
POTASSIUM BLDA-SCNC: 5.8 MMOL/L — HIGH (ref 3.4–4.5)
POTASSIUM BLDA-SCNC: 5.9 MMOL/L — HIGH (ref 3.4–4.5)
POTASSIUM SERPL-MCNC: 5.4 MMOL/L — HIGH (ref 3.5–5.3)
POTASSIUM SERPL-MCNC: 5.6 MMOL/L — HIGH (ref 3.5–5.3)
POTASSIUM SERPL-MCNC: 5.8 MMOL/L — HIGH (ref 3.5–5.3)
POTASSIUM SERPL-MCNC: 6.2 MMOL/L — CRITICAL HIGH (ref 3.5–5.3)
POTASSIUM SERPL-MCNC: 6.3 MMOL/L — CRITICAL HIGH (ref 3.5–5.3)
POTASSIUM SERPL-SCNC: 5.4 MMOL/L — HIGH (ref 3.5–5.3)
POTASSIUM SERPL-SCNC: 5.6 MMOL/L — HIGH (ref 3.5–5.3)
POTASSIUM SERPL-SCNC: 5.8 MMOL/L — HIGH (ref 3.5–5.3)
POTASSIUM SERPL-SCNC: 6.2 MMOL/L — CRITICAL HIGH (ref 3.5–5.3)
POTASSIUM SERPL-SCNC: 6.3 MMOL/L — CRITICAL HIGH (ref 3.5–5.3)
PROT SERPL-MCNC: 5.2 G/DL — LOW (ref 6–8.3)
PROT SERPL-MCNC: 5.7 G/DL — LOW (ref 6–8.3)
PROTHROM AB SERPL-ACNC: 11.7 SEC — SIGNIFICANT CHANGE UP (ref 9.8–13.1)
RBC # BLD: 3.48 M/UL — LOW (ref 4.2–5.8)
RBC # FLD: 18.4 % — HIGH (ref 10.3–14.5)
SAO2 % BLDA: 95.3 % — SIGNIFICANT CHANGE UP (ref 95–99)
SAO2 % BLDA: 95.9 % — SIGNIFICANT CHANGE UP (ref 95–99)
SAO2 % BLDA: 97 % — SIGNIFICANT CHANGE UP (ref 95–99)
SAO2 % BLDA: 98 % — SIGNIFICANT CHANGE UP (ref 95–99)
SODIUM BLDA-SCNC: 146 MMOL/L — SIGNIFICANT CHANGE UP (ref 136–146)
SODIUM BLDA-SCNC: 151 MMOL/L — HIGH (ref 136–146)
SODIUM BLDA-SCNC: 160 MMOL/L — CRITICAL HIGH (ref 136–146)
SODIUM BLDA-SCNC: 161 MMOL/L — CRITICAL HIGH (ref 136–146)
SODIUM SERPL-SCNC: 148 MMOL/L — HIGH (ref 135–145)
SODIUM SERPL-SCNC: 156 MMOL/L — HIGH (ref 135–145)
SODIUM SERPL-SCNC: 157 MMOL/L — HIGH (ref 135–145)
SODIUM SERPL-SCNC: 163 MMOL/L — CRITICAL HIGH (ref 135–145)
SODIUM SERPL-SCNC: 164 MMOL/L — CRITICAL HIGH (ref 135–145)
SPECIMEN SOURCE: SIGNIFICANT CHANGE UP
VANCOMYCIN FLD-MCNC: 12.7 UG/ML — SIGNIFICANT CHANGE UP
WBC # BLD: 8.51 K/UL — SIGNIFICANT CHANGE UP (ref 3.8–10.5)
WBC # FLD AUTO: 8.51 K/UL — SIGNIFICANT CHANGE UP (ref 3.8–10.5)

## 2018-05-29 PROCEDURE — 36556 INSERT NON-TUNNEL CV CATH: CPT | Mod: GC

## 2018-05-29 PROCEDURE — 36800 INSERTION OF CANNULA: CPT | Mod: GC

## 2018-05-29 PROCEDURE — 99255 IP/OBS CONSLTJ NEW/EST HI 80: CPT | Mod: GC

## 2018-05-29 PROCEDURE — 31500 INSERT EMERGENCY AIRWAY: CPT

## 2018-05-29 PROCEDURE — 36620 INSERTION CATHETER ARTERY: CPT | Mod: GC

## 2018-05-29 PROCEDURE — 71045 X-RAY EXAM CHEST 1 VIEW: CPT | Mod: 26,77

## 2018-05-29 PROCEDURE — 99291 CRITICAL CARE FIRST HOUR: CPT | Mod: 25

## 2018-05-29 PROCEDURE — 71045 X-RAY EXAM CHEST 1 VIEW: CPT | Mod: 26

## 2018-05-29 RX ORDER — SODIUM BICARBONATE 1 MEQ/ML
50 SYRINGE (ML) INTRAVENOUS ONCE
Qty: 0 | Refills: 0 | Status: COMPLETED | OUTPATIENT
Start: 2018-05-29 | End: 2018-05-29

## 2018-05-29 RX ORDER — NOREPINEPHRINE BITARTRATE/D5W 8 MG/250ML
0.05 PLASTIC BAG, INJECTION (ML) INTRAVENOUS
Qty: 8 | Refills: 0 | Status: DISCONTINUED | OUTPATIENT
Start: 2018-05-29 | End: 2018-05-29

## 2018-05-29 RX ORDER — DESMOPRESSIN ACETATE 0.1 MG/1
12 TABLET ORAL ONCE
Qty: 0 | Refills: 0 | Status: DISCONTINUED | OUTPATIENT
Start: 2018-05-29 | End: 2018-05-29

## 2018-05-29 RX ORDER — SODIUM CHLORIDE 9 MG/ML
1000 INJECTION INTRAMUSCULAR; INTRAVENOUS; SUBCUTANEOUS ONCE
Qty: 0 | Refills: 0 | Status: COMPLETED | OUTPATIENT
Start: 2018-05-29 | End: 2018-05-29

## 2018-05-29 RX ORDER — VANCOMYCIN HCL 1 G
1250 VIAL (EA) INTRAVENOUS ONCE
Qty: 0 | Refills: 0 | Status: COMPLETED | OUTPATIENT
Start: 2018-05-29 | End: 2018-05-29

## 2018-05-29 RX ORDER — DESMOPRESSIN ACETATE 0.1 MG/1
12 TABLET ORAL ONCE
Qty: 0 | Refills: 0 | Status: COMPLETED | OUTPATIENT
Start: 2018-05-29 | End: 2018-05-29

## 2018-05-29 RX ORDER — SODIUM CHLORIDE 9 MG/ML
500 INJECTION INTRAMUSCULAR; INTRAVENOUS; SUBCUTANEOUS ONCE
Qty: 0 | Refills: 0 | Status: COMPLETED | OUTPATIENT
Start: 2018-05-29 | End: 2018-05-29

## 2018-05-29 RX ORDER — NOREPINEPHRINE BITARTRATE/D5W 8 MG/250ML
0.05 PLASTIC BAG, INJECTION (ML) INTRAVENOUS
Qty: 16 | Refills: 0 | Status: DISCONTINUED | OUTPATIENT
Start: 2018-05-29 | End: 2018-05-29

## 2018-05-29 RX ORDER — SODIUM CHLORIDE 9 MG/ML
1000 INJECTION, SOLUTION INTRAVENOUS
Qty: 0 | Refills: 0 | Status: DISCONTINUED | OUTPATIENT
Start: 2018-05-29 | End: 2018-06-01

## 2018-05-29 RX ORDER — SODIUM CHLORIDE 9 MG/ML
1000 INJECTION, SOLUTION INTRAVENOUS
Qty: 0 | Refills: 0 | Status: DISCONTINUED | OUTPATIENT
Start: 2018-05-29 | End: 2018-05-29

## 2018-05-29 RX ORDER — NOREPINEPHRINE BITARTRATE/D5W 8 MG/250ML
0.05 PLASTIC BAG, INJECTION (ML) INTRAVENOUS
Qty: 16 | Refills: 0 | Status: DISCONTINUED | OUTPATIENT
Start: 2018-05-29 | End: 2018-06-01

## 2018-05-29 RX ADMIN — PROPOFOL 1.24 MICROGRAM(S)/KG/MIN: 10 INJECTION, EMULSION INTRAVENOUS at 07:56

## 2018-05-29 RX ADMIN — Medication 3.87 MICROGRAM(S)/KG/MIN: at 19:36

## 2018-05-29 RX ADMIN — Medication 1.94 MICROGRAM(S)/KG/MIN: at 22:18

## 2018-05-29 RX ADMIN — Medication 1: at 23:33

## 2018-05-29 RX ADMIN — Medication 50 MILLIEQUIVALENT(S): at 03:57

## 2018-05-29 RX ADMIN — DESMOPRESSIN ACETATE 212 MICROGRAM(S): 0.1 TABLET ORAL at 14:55

## 2018-05-29 RX ADMIN — Medication 3.87 MICROGRAM(S)/KG/MIN: at 07:55

## 2018-05-29 RX ADMIN — Medication 1: at 00:42

## 2018-05-29 RX ADMIN — SODIUM CHLORIDE 2000 MILLILITER(S): 9 INJECTION INTRAMUSCULAR; INTRAVENOUS; SUBCUTANEOUS at 14:55

## 2018-05-29 RX ADMIN — HEPARIN SODIUM 5000 UNIT(S): 5000 INJECTION INTRAVENOUS; SUBCUTANEOUS at 22:19

## 2018-05-29 RX ADMIN — SODIUM CHLORIDE 75 MILLILITER(S): 9 INJECTION, SOLUTION INTRAVENOUS at 07:54

## 2018-05-29 RX ADMIN — SODIUM CHLORIDE 500 MILLILITER(S): 9 INJECTION INTRAMUSCULAR; INTRAVENOUS; SUBCUTANEOUS at 01:55

## 2018-05-29 RX ADMIN — SODIUM CHLORIDE 75 MILLILITER(S): 9 INJECTION, SOLUTION INTRAVENOUS at 19:36

## 2018-05-29 RX ADMIN — Medication 166.67 MILLIGRAM(S): at 07:56

## 2018-05-29 RX ADMIN — PROPOFOL 1.24 MICROGRAM(S)/KG/MIN: 10 INJECTION, EMULSION INTRAVENOUS at 19:34

## 2018-05-29 RX ADMIN — SODIUM CHLORIDE 75 MILLILITER(S): 9 INJECTION, SOLUTION INTRAVENOUS at 06:37

## 2018-05-29 RX ADMIN — SODIUM CHLORIDE 75 MILLILITER(S): 9 INJECTION, SOLUTION INTRAVENOUS at 02:59

## 2018-05-29 RX ADMIN — FENTANYL CITRATE 50 MICROGRAM(S): 50 INJECTION INTRAVENOUS at 00:00

## 2018-05-29 RX ADMIN — HEPARIN SODIUM 5000 UNIT(S): 5000 INJECTION INTRAVENOUS; SUBCUTANEOUS at 14:18

## 2018-05-29 RX ADMIN — SODIUM CHLORIDE 1000 MILLILITER(S): 9 INJECTION INTRAMUSCULAR; INTRAVENOUS; SUBCUTANEOUS at 09:00

## 2018-05-29 RX ADMIN — PIPERACILLIN AND TAZOBACTAM 25 GRAM(S): 4; .5 INJECTION, POWDER, LYOPHILIZED, FOR SOLUTION INTRAVENOUS at 17:27

## 2018-05-29 RX ADMIN — HEPARIN SODIUM 5000 UNIT(S): 5000 INJECTION INTRAVENOUS; SUBCUTANEOUS at 05:01

## 2018-05-29 RX ADMIN — FENTANYL CITRATE 8.26 MICROGRAM(S)/KG/HR: 50 INJECTION INTRAVENOUS at 07:55

## 2018-05-29 RX ADMIN — CHLORHEXIDINE GLUCONATE 1 APPLICATION(S): 213 SOLUTION TOPICAL at 05:02

## 2018-05-29 RX ADMIN — FENTANYL CITRATE 12.39 MICROGRAM(S)/KG/HR: 50 INJECTION INTRAVENOUS at 19:37

## 2018-05-29 RX ADMIN — PIPERACILLIN AND TAZOBACTAM 25 GRAM(S): 4; .5 INJECTION, POWDER, LYOPHILIZED, FOR SOLUTION INTRAVENOUS at 05:01

## 2018-05-29 RX ADMIN — Medication 3.87 MICROGRAM(S)/KG/MIN: at 05:02

## 2018-05-29 NOTE — PROCEDURE NOTE - SUPERVISORY STATEMENT
pt tachypneic, rr 40's, from metabolic acidosis.  Intubated under direct laryngoscopy,  positive color change on end tidal,  positive tube frost, b/l air movement in both  lungs, taped at 23 cm. pulled back 2 cm at the rj.    Pt tolerated the procedure well.  o2 sat 96%.  f/u cxr reviewed.

## 2018-05-29 NOTE — PROCEDURE NOTE - NSINDICATIONS_GEN_A_CORE
critical patient/monitoring purposes/blood sampling
dialysis/CRRT/critical illness
venous access/critical illness
critical patient/respiratory distress

## 2018-05-29 NOTE — CONSULT NOTE ADULT - PROBLEM SELECTOR RECOMMENDATION 3
In the setting of septic shock and acute renal failure.  Continue IV bicarbonate PRN.  monitor pH, serum bicarbonate. In the setting of septic shock and acute renal failure. Last ABG suggestive of mixed resp and metabolic acidosis. Continue IV bicarbonate PRN.  monitor pH, serum bicarbonate.

## 2018-05-29 NOTE — PROGRESS NOTE ADULT - ATTENDING COMMENTS
Agree with above. Seen and examined with residents. Stroke s/p TPA. Non-verbal today. Follows simple commands. Neurology following. PT/OT.  failed bedside speech and swallow. Will need full swallow evaluation. Agree with above. Seen and examined with residents. Altered mental status with UTI, poor po intake, cachectic with failure to thrive. ARF with creatinine of 6, may need HD for correction of acid base. Critically ill requiring frequent bedside visits. Renal evaluation appreciated. GOC discussion with family.

## 2018-05-29 NOTE — PROCEDURE NOTE - NSPROCDETAILS_GEN_ALL_CORE
positive blood return obtained via catheter/location identified, draped/prepped, sterile technique used, needle inserted/introduced/Seldinger technique/sutured in place/connected to a pressurized flush line
connected to ventilator/patient pre-oxygenated, tube inserted, placement confirmed
sterile technique, catheter placed/guidewire recovered/sterile dressing applied/lumen(s) aspirated and flushed/ultrasound guidance

## 2018-05-29 NOTE — CONSULT NOTE ADULT - PROBLEM SELECTOR RECOMMENDATION 9
Acute renal failure in the setting of septic shock from pneumonia vs urinary tract infection with increasing vasopressor requirements and decreasing, turbid brown urine output.  Patient is significantly acidotic and progressively hyperkalemic and volume depleted on exam.  Would replete volume with isotonic fluids, recommend saline at this time.  If urine output does not improve in next several hours then will reach out to family to obtain consent for CVVHDF. Acute renal failure in the setting of septic shock with increasing vasopressor requirements and decreasing urine output.  Patient is significantly acidotic and progressively hyperkalemic and volume depleted on exam.  Would replete volume with isotonic fluids, recommend saline at this time.  If urine output does not improve in next several hours then might need to consider CVVHDF.

## 2018-05-29 NOTE — PROGRESS NOTE ADULT - ASSESSMENT
83M hx mild dementia, uncontrolled T2DM, HTN presenting to the ED w/ declining functional status, subjective hypothermia, and rigors  found to have leukocytosis (20.9) with, R-lung opacity and purulent urine/+UA admitted to MICU for sepsis 2/2 PNA and UTI     #Neuro  -Sedated with fentanyl, daily sedation vacation   -Reported hx of mild dementia, not on any home meds    #CV  -Distributive shock in setting of sepsis and iatrogenic from sedation  -On levophed since 5/28, wean as tolerated    #Pulm  -Intubated on 5/28 respiratory distress/tachypnea, likely related setting of respiratory compensation for severe metabolic acidosis and underlying PNA  -Satting well on ventilator     #ID  -Septic shock in setting PNA and UTI  -On zosyn and vanco by level since 5/28  -Urine and blood cultures pending    #GI  -Currently NPO but withOGT in place, plan to initiate enteral feedings within 48hrs    #Renal  -Acute renal failure with severe acidosis and multiple electrolyte derangements (hypernatremia, hyperkalemia)  -Etiology of acute renal failure possible hemodynamically mediated in setting of sepsis, unclear if superimposed on pre-existing CKD  -Received lasix in ED, then Urine output overnight of about 1.2L into Bhakta  -Renal consult re: if need for CVVH given refractory acidosis and hyperkalemia    #Endo  -Uncontrolled T2DM (HgbA1c 10.3) with hyperglycemia peaking in the 400s on admission  -Low suspicion for DKA as B-hydroxy negative, acidosis more likely related to acute renal failure  -More euglycemia today with most recent FS <100, monitor for hypoglycemia as pt NPO    #Heme  - Chronic normocytic anemia with Hgb stable in 10 range, no thrombocytopenia    #VTE ppx  -Heparin SQ    #Advanced care planning  -GOC discussion with health care surrogate (daughter, Alexia Guevara) who wishes for aggressive measures and FULL CODE at this time. Refer to GOC note dated on 5/28 for more details      Nelly Guerrero MD, PGY3  Internal Medicine Resident  Pager #[phone] ([facility]), [phone] ([facility])

## 2018-05-29 NOTE — CONSULT NOTE ADULT - PROBLEM SELECTOR RECOMMENDATION 2
IV saline repletion, increased sodium delivery to distal tubule should increase potassium excretion.  s/p IV bicarb x 1 dose this AM.  monitor serum potassium.  once volume replete can consider IV lasix. in the setting of RUSH and acidosis. s/p IV bicarb x 1 dose this AM.  monitor serum potassium.  Recommend to give IV NS.

## 2018-05-29 NOTE — PROCEDURE NOTE - NSPOSTPRCRAD_GEN_A_CORE
post-procedure radiography performed
central line located in the superior vena cava/post-procedure radiography performed

## 2018-05-29 NOTE — PROGRESS NOTE ADULT - SUBJECTIVE AND OBJECTIVE BOX
INTERVAL HPI/OVERNIGHT EVENTS:  Admitted to the ICU yesterday evening for sepsis in setting of PNA and UTI, acute renal failure with hypernatremia, hyperkalemia severe acidosis.  Overnight, was intubated for respiratory distress (tachypnea to 50s) and was started on pressors (SBP 50s).  POCUS- IVC collapsed, given more IVF.  Changed IVF from NS to D5 due to persistent hypernatremia and received free water boluses.     SUBJECTIVE: Patient seen and examined at bedside.     ROS:  Unable to obtain ROS due to sedation and intubation.    VITAL SIGNS:  ICU Vital Signs Last 24 Hrs  T(C): 36.6 (29 May 2018 04:00), Max: 36.6 (29 May 2018 04:00)  T(F): 97.9 (29 May 2018 04:00), Max: 97.9 (29 May 2018 04:00)  HR: 92 (29 May 2018 06:22) (40 - 103)  BP: 93/54 (29 May 2018 06:00) (77/48 - 155/67)  BP(mean): 66 (29 May 2018 06:00) (47 - 87)  ABP: --  ABP(mean): --  RR: 28 (29 May 2018 06:00) (16 - 52)  SpO2: 99% (29 May 2018 06:22) (85% - 100%)    Mode: AC/ CMV (Assist Control/ Continuous Mandatory Ventilation), RR (machine): 20, TV (machine): 350, FiO2: 80, PEEP: 5, MAP: 7, PIP: 11     @ 07:01  -   @ 07:00  --------------------------------------------------------  IN: 1859.8 mL / OUT: 1300 mL / NET: 559.8 mL      CAPILLARY BLOOD GLUCOSE  POCT Blood Glucose.: 82 mg/dL (29 May 2018 05:14)      PHYSICAL EXAM:  General:   HEENT:   Neck:   Respiratory:   Cardiovascular:   Abdomen:   Extremities:  Skin:   Neurological:    MEDICATIONS:  MEDICATIONS  (STANDING):  chlorhexidine 4% Liquid 1 Application(s) Topical <User Schedule>  dextrose 5%. 1000 milliLiter(s) (50 mL/Hr) IV Continuous <Continuous>  dextrose 5%. 1000 milliLiter(s) (75 mL/Hr) IV Continuous <Continuous>  dextrose 50% Injectable 12.5 Gram(s) IV Push once  dextrose 50% Injectable 25 Gram(s) IV Push once  dextrose 50% Injectable 25 Gram(s) IV Push once  fentaNYL   Infusion. 2 MICROgram(s)/kG/Hr (8.26 mL/Hr) IV Continuous <Continuous>  heparin  Injectable 5000 Unit(s) SubCutaneous every 8 hours  insulin lispro (HumaLOG) corrective regimen sliding scale   SubCutaneous every 6 hours  norepinephrine Infusion 0.05 MICROgram(s)/kG/Min (3.872 mL/Hr) IV Continuous <Continuous>  piperacillin/tazobactam IVPB. 3.375 Gram(s) IV Intermittent every 12 hours  propofol Infusion 5 MICROgram(s)/kG/Min (1.239 mL/Hr) IV Continuous <Continuous>  vancomycin  IVPB 1250 milliGRAM(s) IV Intermittent once    MEDICATIONS  (PRN):  dextrose 40% Gel 15 Gram(s) Oral once PRN Blood Glucose LESS THAN 70 milliGRAM(s)/deciliter  glucagon  Injectable 1 milliGRAM(s) IntraMuscular once PRN Glucose LESS THAN 70 milligrams/deciliter    ALLERGIES:  No Known Allergies    LABS:                        9.8    8.51  )-----------( 174      ( 29 May 2018 04:40 )             33.7     05-    163<HH>  |  136<H>  |  105<H>  ----------------------------<  101<H>  5.6<H>   |  9<LL>  |  5.34<H>    Ca    8.8      29 May 2018 04:40  Phos  5.2       Mg     2.5         TPro  5.7<L>  /  Alb  1.7<L>  /  TBili  0.3  /  DBili  x   /  AST  35  /  ALT  29  /  AlkPhos  176<H>  05-29    PT/INR - ( 29 May 2018 04:40 )   PT: 11.7 SEC;   INR: 1.05          PTT - ( 29 May 2018 04:40 )  PTT:32.2 SEC  Urinalysis Basic - ( 28 May 2018 17:30 )    Color: YELLOW / Appearance: TURBID / S.016 / pH: 6.0  Gluc: 300 / Ketone: NEGATIVE  / Bili: NEGATIVE / Urobili: NORMAL mg/dL   Blood: LARGE / Protein: 150 mg/dL / Nitrite: NEGATIVE   Leuk Esterase: LARGE / RBC: >50 / WBC >50   Sq Epi: OCC / Non Sq Epi: x / Bacteria: x        RADIOLOGY & ADDITIONAL TESTS: Reviewed. INTERVAL HPI/OVERNIGHT EVENTS:  Admitted to the ICU yesterday evening for sepsis in setting of PNA and UTI, acute renal failure with hypernatremia, hyperkalemia severe acidosis.  Overnight, was intubated for respiratory distress (tachypnea to 50s) and was started on pressors (SBP 50s).  POCUS- IVC collapsed, given more IVF.  Changed IVF from NS to D5 due to persistent hypernatremia and received free water boluses.     SUBJECTIVE: Patient seen and examined at bedside.     ROS:  Unable to obtain ROS due to sedation and intubation.    VITAL SIGNS:  ICU Vital Signs Last 24 Hrs  T(C): 36.6 (29 May 2018 04:00), Max: 36.6 (29 May 2018 04:00)  T(F): 97.9 (29 May 2018 04:00), Max: 97.9 (29 May 2018 04:00)  HR: 92 (29 May 2018 06:22) (40 - 103)  BP: 93/54 (29 May 2018 06:00) (77/48 - 155/67)  BP(mean): 66 (29 May 2018 06:00) (47 - 87)  ABP: --  ABP(mean): --  RR: 28 (29 May 2018 06:00) (16 - 52)  SpO2: 99% (29 May 2018 06:22) (85% - 100%)    Mode: AC/ CMV (Assist Control/ Continuous Mandatory Ventilation), RR (machine): 20, TV (machine): 350, FiO2: 80, PEEP: 5, MAP: 7, PIP: 11    - @ 07:01  -   @ 07:00  --------------------------------------------------------  IN: 1859.8 mL / OUT: 1300 mL / NET: 559.8 mL      CAPILLARY BLOOD GLUCOSE  POCT Blood Glucose.: 82 mg/dL (29 May 2018 05:14)      PHYSICAL EXAM:  General:  Elderly, cachetic male, currently sedated and intubated  HEENT:  Normocephalic, atrumatic  Respiratory:  Non-labored breathing pattern, purulent secretions in vent tubing, transmitted upper airway breath sounds  Cardiovascular:  Regular rate and rhythm   Abdomen: Soft, non-distended  Extremities: Thin, no edema  Skin:  Warm and dry  Neurological: Unable to assess, sedated    MEDICATIONS:  MEDICATIONS  (STANDING):  chlorhexidine 4% Liquid 1 Application(s) Topical <User Schedule>  dextrose 5%. 1000 milliLiter(s) (50 mL/Hr) IV Continuous <Continuous>  dextrose 5%. 1000 milliLiter(s) (75 mL/Hr) IV Continuous <Continuous>  dextrose 50% Injectable 12.5 Gram(s) IV Push once  dextrose 50% Injectable 25 Gram(s) IV Push once  dextrose 50% Injectable 25 Gram(s) IV Push once  fentaNYL   Infusion. 2 MICROgram(s)/kG/Hr (8.26 mL/Hr) IV Continuous <Continuous>  heparin  Injectable 5000 Unit(s) SubCutaneous every 8 hours  insulin lispro (HumaLOG) corrective regimen sliding scale   SubCutaneous every 6 hours  norepinephrine Infusion 0.05 MICROgram(s)/kG/Min (3.872 mL/Hr) IV Continuous <Continuous>  piperacillin/tazobactam IVPB. 3.375 Gram(s) IV Intermittent every 12 hours  propofol Infusion 5 MICROgram(s)/kG/Min (1.239 mL/Hr) IV Continuous <Continuous>  vancomycin  IVPB 1250 milliGRAM(s) IV Intermittent once    MEDICATIONS  (PRN):  dextrose 40% Gel 15 Gram(s) Oral once PRN Blood Glucose LESS THAN 70 milliGRAM(s)/deciliter  glucagon  Injectable 1 milliGRAM(s) IntraMuscular once PRN Glucose LESS THAN 70 milligrams/deciliter    ALLERGIES:  No Known Allergies    LABS:                        9.8    8.51  )-----------( 174      ( 29 May 2018 04:40 )             33.7     -    163<HH>  |  136<H>  |  105<H>  ----------------------------<  101<H>  5.6<H>   |  9<LL>  |  5.34<H>    Ca    8.8      29 May 2018 04:40  Phos  5.2       Mg     2.5         TPro  5.7<L>  /  Alb  1.7<L>  /  TBili  0.3  /  DBili  x   /  AST  35  /  ALT  29  /  AlkPhos  176<H>  05-29    PT/INR - ( 29 May 2018 04:40 )   PT: 11.7 SEC;   INR: 1.05          PTT - ( 29 May 2018 04:40 )  PTT:32.2 SEC  Urinalysis Basic - ( 28 May 2018 17:30 )    Color: YELLOW / Appearance: TURBID / S.016 / pH: 6.0  Gluc: 300 / Ketone: NEGATIVE  / Bili: NEGATIVE / Urobili: NORMAL mg/dL   Blood: LARGE / Protein: 150 mg/dL / Nitrite: NEGATIVE   Leuk Esterase: LARGE / RBC: >50 / WBC >50   Sq Epi: OCC / Non Sq Epi: x / Bacteria: x        RADIOLOGY & ADDITIONAL TESTS: Reviewed.

## 2018-05-29 NOTE — CONSULT NOTE ADULT - PROBLEM SELECTOR RECOMMENDATION 4
Volume depletion with hypernatremia.  Would replete volume first before managing hypernatremia.  Recommend isotonic fluids at this time.  Monitor serum sodium. Volume depletion with hypernatremia. Recommend isotonic fluids at this time.  Monitor serum sodium.

## 2018-05-29 NOTE — PROCEDURE NOTE - NSINFORMCONSENT_GEN_A_CORE
Benefits, risks, and possible complications of procedure explained to patient/caregiver who verbalized understanding and gave verbal consent.
Benefits, risks, and possible complications of procedure explained to patient/caregiver who verbalized understanding and gave written consent.
This was an emergent procedure.

## 2018-05-30 PROBLEM — E11.9 TYPE 2 DIABETES MELLITUS WITHOUT COMPLICATIONS: Chronic | Status: ACTIVE | Noted: 2018-01-31

## 2018-05-30 LAB
ANISOCYTOSIS BLD QL: SLIGHT — SIGNIFICANT CHANGE UP
APTT BLD: 38.9 SEC — HIGH (ref 27.5–37.4)
BASE EXCESS BLDA CALC-SCNC: -11.5 MMOL/L — SIGNIFICANT CHANGE UP
BASE EXCESS BLDA CALC-SCNC: -12.6 MMOL/L — SIGNIFICANT CHANGE UP
BASOPHILS # BLD AUTO: 0.02 K/UL — SIGNIFICANT CHANGE UP (ref 0–0.2)
BASOPHILS NFR BLD AUTO: 0.1 % — SIGNIFICANT CHANGE UP (ref 0–2)
BASOPHILS NFR SPEC: 0 % — SIGNIFICANT CHANGE UP (ref 0–2)
BUN SERPL-MCNC: 70 MG/DL — HIGH (ref 7–23)
BUN SERPL-MCNC: 74 MG/DL — HIGH (ref 7–23)
BUN SERPL-MCNC: 74 MG/DL — HIGH (ref 7–23)
CALCIUM SERPL-MCNC: 7.5 MG/DL — LOW (ref 8.4–10.5)
CALCIUM SERPL-MCNC: 7.5 MG/DL — LOW (ref 8.4–10.5)
CALCIUM SERPL-MCNC: 7.7 MG/DL — LOW (ref 8.4–10.5)
CHLORIDE BLDA-SCNC: 116 MMOL/L — HIGH (ref 96–108)
CHLORIDE BLDA-SCNC: 118 MMOL/L — HIGH (ref 96–108)
CHLORIDE SERPL-SCNC: 107 MMOL/L — SIGNIFICANT CHANGE UP (ref 98–107)
CHLORIDE SERPL-SCNC: 110 MMOL/L — HIGH (ref 98–107)
CHLORIDE SERPL-SCNC: 111 MMOL/L — HIGH (ref 98–107)
CO2 SERPL-SCNC: 13 MMOL/L — LOW (ref 22–31)
CO2 SERPL-SCNC: 13 MMOL/L — LOW (ref 22–31)
CO2 SERPL-SCNC: 14 MMOL/L — LOW (ref 22–31)
CORTIS SERPL-MCNC: 9.5 UG/DL — SIGNIFICANT CHANGE UP (ref 2.7–18.4)
CREAT SERPL-MCNC: 3.49 MG/DL — HIGH (ref 0.5–1.3)
CREAT SERPL-MCNC: 3.59 MG/DL — HIGH (ref 0.5–1.3)
CREAT SERPL-MCNC: 3.65 MG/DL — HIGH (ref 0.5–1.3)
EOSINOPHIL # BLD AUTO: 0.05 K/UL — SIGNIFICANT CHANGE UP (ref 0–0.5)
EOSINOPHIL NFR BLD AUTO: 0.3 % — SIGNIFICANT CHANGE UP (ref 0–6)
EOSINOPHIL NFR FLD: 0.9 % — SIGNIFICANT CHANGE UP (ref 0–6)
GIANT PLATELETS BLD QL SMEAR: PRESENT — SIGNIFICANT CHANGE UP
GLUCOSE BLDA-MCNC: 164 MG/DL — HIGH (ref 70–99)
GLUCOSE BLDA-MCNC: 166 MG/DL — HIGH (ref 70–99)
GLUCOSE BLDC GLUCOMTR-MCNC: 150 MG/DL — HIGH (ref 70–99)
GLUCOSE BLDC GLUCOMTR-MCNC: 158 MG/DL — HIGH (ref 70–99)
GLUCOSE BLDC GLUCOMTR-MCNC: 218 MG/DL — HIGH (ref 70–99)
GLUCOSE BLDC GLUCOMTR-MCNC: 256 MG/DL — HIGH (ref 70–99)
GLUCOSE SERPL-MCNC: 157 MG/DL — HIGH (ref 70–99)
GLUCOSE SERPL-MCNC: 164 MG/DL — HIGH (ref 70–99)
GLUCOSE SERPL-MCNC: 165 MG/DL — HIGH (ref 70–99)
HBV SURFACE AG SER-ACNC: NEGATIVE — SIGNIFICANT CHANGE UP
HCO3 BLDA-SCNC: 14 MMOL/L — LOW (ref 22–26)
HCO3 BLDA-SCNC: 15 MMOL/L — LOW (ref 22–26)
HCT VFR BLD CALC: 23.4 % — LOW (ref 39–50)
HCT VFR BLDA CALC: 23.1 % — LOW (ref 39–51)
HCT VFR BLDA CALC: 23.9 % — LOW (ref 39–51)
HGB BLD-MCNC: 7.3 G/DL — LOW (ref 13–17)
HGB BLDA-MCNC: 7.4 G/DL — LOW (ref 13–17)
HGB BLDA-MCNC: 7.7 G/DL — LOW (ref 13–17)
IMM GRANULOCYTES # BLD AUTO: 0.55 # — SIGNIFICANT CHANGE UP
IMM GRANULOCYTES NFR BLD AUTO: 3 % — HIGH (ref 0–1.5)
INR BLD: 1.1 — SIGNIFICANT CHANGE UP (ref 0.88–1.17)
LACTATE BLDA-SCNC: 1.6 MMOL/L — SIGNIFICANT CHANGE UP (ref 0.5–2)
LACTATE BLDA-SCNC: 1.9 MMOL/L — SIGNIFICANT CHANGE UP (ref 0.5–2)
LYMPHOCYTES # BLD AUTO: 1.44 K/UL — SIGNIFICANT CHANGE UP (ref 1–3.3)
LYMPHOCYTES # BLD AUTO: 7.8 % — LOW (ref 13–44)
LYMPHOCYTES NFR SPEC AUTO: 7 % — LOW (ref 13–44)
MACROCYTES BLD QL: SLIGHT — SIGNIFICANT CHANGE UP
MAGNESIUM SERPL-MCNC: 1.8 MG/DL — SIGNIFICANT CHANGE UP (ref 1.6–2.6)
MAGNESIUM SERPL-MCNC: 1.9 MG/DL — SIGNIFICANT CHANGE UP (ref 1.6–2.6)
MAGNESIUM SERPL-MCNC: 1.9 MG/DL — SIGNIFICANT CHANGE UP (ref 1.6–2.6)
MCHC RBC-ENTMCNC: 29.1 PG — SIGNIFICANT CHANGE UP (ref 27–34)
MCHC RBC-ENTMCNC: 31.2 % — LOW (ref 32–36)
MCV RBC AUTO: 93.2 FL — SIGNIFICANT CHANGE UP (ref 80–100)
METAMYELOCYTES # FLD: 18.4 % — HIGH (ref 0–1)
MONOCYTES # BLD AUTO: 0.18 K/UL — SIGNIFICANT CHANGE UP (ref 0–0.9)
MONOCYTES NFR BLD AUTO: 1 % — LOW (ref 2–14)
MONOCYTES NFR BLD: 0.9 % — LOW (ref 2–9)
MYELOCYTES NFR BLD: 3.5 % — HIGH (ref 0–0)
NEUTROPHIL AB SER-ACNC: 16.7 % — LOW (ref 43–77)
NEUTROPHILS # BLD AUTO: 16.32 K/UL — HIGH (ref 1.8–7.4)
NEUTROPHILS NFR BLD AUTO: 87.8 % — HIGH (ref 43–77)
NEUTS BAND # BLD: 51.7 % — HIGH (ref 0–6)
NRBC # FLD: 0 — SIGNIFICANT CHANGE UP
OVALOCYTES BLD QL SMEAR: SLIGHT — SIGNIFICANT CHANGE UP
PCO2 BLDA: 33 MMHG — LOW (ref 35–48)
PCO2 BLDA: 33 MMHG — LOW (ref 35–48)
PH BLDA: 7.24 PH — LOW (ref 7.35–7.45)
PH BLDA: 7.26 PH — LOW (ref 7.35–7.45)
PHOSPHATE SERPL-MCNC: 5 MG/DL — HIGH (ref 2.5–4.5)
PHOSPHATE SERPL-MCNC: 5.8 MG/DL — HIGH (ref 2.5–4.5)
PHOSPHATE SERPL-MCNC: 6.9 MG/DL — HIGH (ref 2.5–4.5)
PLATELET # BLD AUTO: 105 K/UL — LOW (ref 150–400)
PLATELET COUNT - ESTIMATE: SIGNIFICANT CHANGE UP
PMV BLD: 11.8 FL — SIGNIFICANT CHANGE UP (ref 7–13)
PO2 BLDA: 111 MMHG — HIGH (ref 83–108)
PO2 BLDA: 141 MMHG — HIGH (ref 83–108)
POIKILOCYTOSIS BLD QL AUTO: SLIGHT — SIGNIFICANT CHANGE UP
POTASSIUM BLDA-SCNC: 3.9 MMOL/L — SIGNIFICANT CHANGE UP (ref 3.4–4.5)
POTASSIUM BLDA-SCNC: 4.6 MMOL/L — HIGH (ref 3.4–4.5)
POTASSIUM SERPL-MCNC: 4.2 MMOL/L — SIGNIFICANT CHANGE UP (ref 3.5–5.3)
POTASSIUM SERPL-MCNC: 4.5 MMOL/L — SIGNIFICANT CHANGE UP (ref 3.5–5.3)
POTASSIUM SERPL-MCNC: 5.1 MMOL/L — SIGNIFICANT CHANGE UP (ref 3.5–5.3)
POTASSIUM SERPL-SCNC: 4.2 MMOL/L — SIGNIFICANT CHANGE UP (ref 3.5–5.3)
POTASSIUM SERPL-SCNC: 4.5 MMOL/L — SIGNIFICANT CHANGE UP (ref 3.5–5.3)
POTASSIUM SERPL-SCNC: 5.1 MMOL/L — SIGNIFICANT CHANGE UP (ref 3.5–5.3)
PROTHROM AB SERPL-ACNC: 12.7 SEC — SIGNIFICANT CHANGE UP (ref 9.8–13.1)
RBC # BLD: 2.51 M/UL — LOW (ref 4.2–5.8)
RBC # FLD: 18.1 % — HIGH (ref 10.3–14.5)
SAO2 % BLDA: 97.1 % — SIGNIFICANT CHANGE UP (ref 95–99)
SAO2 % BLDA: 98.2 % — SIGNIFICANT CHANGE UP (ref 95–99)
SODIUM BLDA-SCNC: 135 MMOL/L — LOW (ref 136–146)
SODIUM BLDA-SCNC: 138 MMOL/L — SIGNIFICANT CHANGE UP (ref 136–146)
SODIUM SERPL-SCNC: 138 MMOL/L — SIGNIFICANT CHANGE UP (ref 135–145)
SODIUM SERPL-SCNC: 141 MMOL/L — SIGNIFICANT CHANGE UP (ref 135–145)
SODIUM SERPL-SCNC: 143 MMOL/L — SIGNIFICANT CHANGE UP (ref 135–145)
SPECIMEN SOURCE: SIGNIFICANT CHANGE UP
VANCOMYCIN FLD-MCNC: 17.7 UG/ML — SIGNIFICANT CHANGE UP
VARIANT LYMPHS # BLD: 0.9 % — SIGNIFICANT CHANGE UP
WBC # BLD: 18.56 K/UL — HIGH (ref 3.8–10.5)
WBC # FLD AUTO: 18.56 K/UL — HIGH (ref 3.8–10.5)

## 2018-05-30 PROCEDURE — 76604 US EXAM CHEST: CPT | Mod: 26,GC

## 2018-05-30 PROCEDURE — 99291 CRITICAL CARE FIRST HOUR: CPT | Mod: 25

## 2018-05-30 PROCEDURE — 99233 SBSQ HOSP IP/OBS HIGH 50: CPT | Mod: GC

## 2018-05-30 PROCEDURE — 93308 TTE F-UP OR LMTD: CPT | Mod: 26,GC

## 2018-05-30 RX ORDER — HYDROCORTISONE 20 MG
100 TABLET ORAL EVERY 8 HOURS
Qty: 0 | Refills: 0 | Status: DISCONTINUED | OUTPATIENT
Start: 2018-05-30 | End: 2018-05-30

## 2018-05-30 RX ORDER — HYDROCORTISONE 20 MG
50 TABLET ORAL EVERY 8 HOURS
Qty: 0 | Refills: 0 | Status: DISCONTINUED | OUTPATIENT
Start: 2018-05-30 | End: 2018-06-01

## 2018-05-30 RX ORDER — VANCOMYCIN HCL 1 G
1250 VIAL (EA) INTRAVENOUS ONCE
Qty: 0 | Refills: 0 | Status: COMPLETED | OUTPATIENT
Start: 2018-05-30 | End: 2018-05-30

## 2018-05-30 RX ADMIN — HEPARIN SODIUM 5000 UNIT(S): 5000 INJECTION INTRAVENOUS; SUBCUTANEOUS at 22:18

## 2018-05-30 RX ADMIN — Medication 1.94 MICROGRAM(S)/KG/MIN: at 08:16

## 2018-05-30 RX ADMIN — FENTANYL CITRATE 12.39 MICROGRAM(S)/KG/HR: 50 INJECTION INTRAVENOUS at 19:31

## 2018-05-30 RX ADMIN — SODIUM CHLORIDE 75 MILLILITER(S): 9 INJECTION, SOLUTION INTRAVENOUS at 08:17

## 2018-05-30 RX ADMIN — Medication 1.94 MICROGRAM(S)/KG/MIN: at 19:31

## 2018-05-30 RX ADMIN — Medication 1: at 05:08

## 2018-05-30 RX ADMIN — SODIUM CHLORIDE 75 MILLILITER(S): 9 INJECTION, SOLUTION INTRAVENOUS at 19:32

## 2018-05-30 RX ADMIN — SODIUM CHLORIDE 75 MILLILITER(S): 9 INJECTION, SOLUTION INTRAVENOUS at 17:06

## 2018-05-30 RX ADMIN — HEPARIN SODIUM 5000 UNIT(S): 5000 INJECTION INTRAVENOUS; SUBCUTANEOUS at 05:07

## 2018-05-30 RX ADMIN — PROPOFOL 1.24 MICROGRAM(S)/KG/MIN: 10 INJECTION, EMULSION INTRAVENOUS at 14:32

## 2018-05-30 RX ADMIN — Medication 50 MILLIGRAM(S): at 22:18

## 2018-05-30 RX ADMIN — Medication 50 MILLIGRAM(S): at 14:07

## 2018-05-30 RX ADMIN — FENTANYL CITRATE 12.39 MICROGRAM(S)/KG/HR: 50 INJECTION INTRAVENOUS at 14:31

## 2018-05-30 RX ADMIN — FENTANYL CITRATE 12.39 MICROGRAM(S)/KG/HR: 50 INJECTION INTRAVENOUS at 08:16

## 2018-05-30 RX ADMIN — PROPOFOL 1.24 MICROGRAM(S)/KG/MIN: 10 INJECTION, EMULSION INTRAVENOUS at 19:31

## 2018-05-30 RX ADMIN — Medication 100 MILLIGRAM(S): at 05:07

## 2018-05-30 RX ADMIN — PROPOFOL 1.24 MICROGRAM(S)/KG/MIN: 10 INJECTION, EMULSION INTRAVENOUS at 08:16

## 2018-05-30 RX ADMIN — Medication 1.94 MICROGRAM(S)/KG/MIN: at 01:49

## 2018-05-30 RX ADMIN — Medication 3: at 23:28

## 2018-05-30 RX ADMIN — HEPARIN SODIUM 5000 UNIT(S): 5000 INJECTION INTRAVENOUS; SUBCUTANEOUS at 14:07

## 2018-05-30 RX ADMIN — PIPERACILLIN AND TAZOBACTAM 25 GRAM(S): 4; .5 INJECTION, POWDER, LYOPHILIZED, FOR SOLUTION INTRAVENOUS at 17:07

## 2018-05-30 RX ADMIN — Medication 1.94 MICROGRAM(S)/KG/MIN: at 14:31

## 2018-05-30 RX ADMIN — Medication 2: at 11:18

## 2018-05-30 RX ADMIN — PIPERACILLIN AND TAZOBACTAM 25 GRAM(S): 4; .5 INJECTION, POWDER, LYOPHILIZED, FOR SOLUTION INTRAVENOUS at 05:07

## 2018-05-30 RX ADMIN — CHLORHEXIDINE GLUCONATE 1 APPLICATION(S): 213 SOLUTION TOPICAL at 05:08

## 2018-05-30 RX ADMIN — Medication 166.67 MILLIGRAM(S): at 22:18

## 2018-05-30 NOTE — PROGRESS NOTE ADULT - ASSESSMENT
Patient is an 84 y/o man with history of dementia, DM, HTN who presents with a chief complaint of abdominal pain and chills found to be in septic shock from pneumonia versus urinary tract infection and in acute renal failure.

## 2018-05-30 NOTE — PROGRESS NOTE ADULT - ATTENDING COMMENTS
Agree with above. Seen and examined with residents. Gram negative eduardo bacteremia with multi organ failure. Will try HD today as CVVHD clotted off. Poor prognosis for functional recovery. GOC discussion with daughter. Critically ill requiring frequent bedside visits.

## 2018-05-30 NOTE — PROGRESS NOTE ADULT - SUBJECTIVE AND OBJECTIVE BOX
CHIEF COMPLAINT:  Admitted to the ICU  evening for sepsis in setting of PNA and UTI, acute renal failure with hypernatremia, hyperkalemia severe acidosis, intubated  for respiratory distress (tachypnea to 50s) and was started on pressors (SBP 50s).  POCUS- IVC collapsed, given more IVF.  Changed IVF from NS to D5 due to persistent hypernatremia and received free water boluses.    Interval Events: received CVVHD yesterday with improvement in electrolyte and acid/base abnormalalites; continues to be sedated while intubated; CVVHD circuit clotted at approximately 3:00 AM yesterday, planning on attempting HD today if BP allows (still on pressors). Unable to obtain ROS from patient directly.    REVIEW OF SYSTEMS:  [ ] All other systems negative  [X] Unable to assess ROS because patient is sedated, intubated    OBJECTIVE:  ICU Vital Signs Last 24 Hrs  T(C): 34.4 (30 May 2018 04:00), Max: 36.6 (29 May 2018 16:00)  T(F): 94 (30 May 2018 04:00), Max: 97.8 (29 May 2018 16:00)  HR: 71 (30 May 2018 07:09) (58 - 92)  BP: 112/59 (29 May 2018 20:00) (91/58 - 120/79)  BP(mean): 75 (29 May 2018 20:00) (62 - 92)  ABP: 109/43 (30 May 2018 06:00) (104/40 - 141/52)  ABP(mean): 62 (30 May 2018 06:00) (60 - 80)  RR: 24 (30 May 2018 06:00) (20 - 29)  SpO2: 100% (30 May 2018 07:09) (98% - 100%)    Mode: AC/ CMV (Assist Control/ Continuous Mandatory Ventilation), RR (machine): 24, TV (machine): 400, FiO2: 35, PEEP: 5, MAP: 9, PIP: 18    - @ 07:01  -   @ 07:00  --------------------------------------------------------  IN: 7250.8 mL / OUT: 1346 mL / NET: 5904.8 mL     @ 07:01   @ 08:05  --------------------------------------------------------  IN: 157.5 mL / OUT: 0 mL / NET: 157.5 mL      CAPILLARY BLOOD GLUCOSE      POCT Blood Glucose.: 158 mg/dL (30 May 2018 05:06)      PHYSICAL EXAM:  General:  Elderly, cachetic male, currently sedated and intubated; nonresponsive to noxious stimuli; warming blanket in place  HEENT:  Normocephalic, atrumatic; pupils equally round, minimally responsive to light  Respiratory:  Non-labored breathing pattern, purulent secretions in vent tubing, transmitted upper airway breath sounds; concordant with vent  Cardiovascular:  Regular rate and rhythm, no m/r/g appreciated  Abdomen: Soft, non-distended  Genital: no lymphadenopathy; noland in place with red sediment in noland tubing; dark / tea-colored urine in noland bag  Extremities: Thin, no edema; palpable pulses  Skin:  Warm and dry  Neurological: Unable to assess, sedated    LINES:  R a-line  Left [facility] MEDICATIONS:  Standing Meds:  chlorhexidine 4% Liquid 1 Application(s) Topical <User Schedule>  dextrose 5%. 1000 milliLiter(s) IV Continuous <Continuous>  dextrose 5%. 1000 milliLiter(s) IV Continuous <Continuous>  dextrose 50% Injectable 12.5 Gram(s) IV Push once  dextrose 50% Injectable 25 Gram(s) IV Push once  dextrose 50% Injectable 25 Gram(s) IV Push once  fentaNYL   Infusion. 3 MICROgram(s)/kG/Hr IV Continuous <Continuous>  heparin  Injectable 5000 Unit(s) SubCutaneous every 8 hours  hydrocortisone sodium succinate Injectable 100 milliGRAM(s) IV Push every 8 hours  insulin lispro (HumaLOG) corrective regimen sliding scale   SubCutaneous every 6 hours  norepinephrine Infusion 0.05 MICROgram(s)/kG/Min IV Continuous <Continuous>  piperacillin/tazobactam IVPB. 3.375 Gram(s) IV Intermittent every 12 hours  PrismaSATE Dialysate BK 0 / 3.5 5000 milliLiter(s) CRRT <Continuous>  PrismaSOL Filtration BGK 0 / 2.5 5000 milliLiter(s) CRRT <Continuous>  PrismaSOL Filtration BGK 0 / 2.5 5000 milliLiter(s) CRRT <Continuous>  propofol Infusion 5 MICROgram(s)/kG/Min IV Continuous <Continuous>      PRN Meds:  dextrose 40% Gel 15 Gram(s) Oral once PRN  glucagon  Injectable 1 milliGRAM(s) IntraMuscular once PRN      LABS:                        7.3    18.56 )-----------( 105      ( 30 May 2018 06:00 )             23.4     Hgb Trend: 7.3<--, 9.8<--, 10.7<--      141  |  110<H>  |  70<H>  ----------------------------<  164<H>  4.2   |  13<L>  |  3.49<H>    Ca    7.5<L>      30 May 2018 06:00  Phos  5.8       Mg     1.9         TPro  5.2<L>  /  Alb  1.8<L>  /  TBili  0.3  /  DBili  x   /  AST  30  /  ALT  27  /  AlkPhos  146<H>      Creatinine Trend: 3.49<--, 3.59<--, 4.84<--, 5.21<--, 5.23<--, 5.34<--  PT/INR - ( 30 May 2018 06:00 )   PT: 12.7 SEC;   INR: 1.10          PTT - ( 30 May 2018 06:00 )  PTT:38.9 SEC  Urinalysis Basic - ( 28 May 2018 17:30 )    Color: YELLOW / Appearance: TURBID / S.016 / pH: 6.0  Gluc: 300 / Ketone: NEGATIVE  / Bili: NEGATIVE / Urobili: NORMAL mg/dL   Blood: LARGE / Protein: 150 mg/dL / Nitrite: NEGATIVE   Leuk Esterase: LARGE / RBC: >50 / WBC >50   Sq Epi: OCC / Non Sq Epi: x / Bacteria: x      Arterial Blood Gas:   @ 06:00  7.26/33/141/15/98.2/-11.5  ABG lactate: 1.6  Arterial Blood Gas:   @ 20:25  7./29/125/10/97.0/-18.3  ABG lactate: 1.5  Arterial Blood Gas:   @ 13:15  7.22/26/96/12/95.3/-15.9  ABG lactate: 1.6  Arterial Blood Gas:   @ 04:40  7.16/36/106/13/95.9/-15.0  ABG lactate: 2.1  Arterial Blood Gas:   @ 23:59  7./25/177/11/98.0/-18.2  ABG lactate: 2.1  Arterial Blood Gas:   @ 21:00  7.17/27/190/11/98.2/-17.6  ABG lactate: 2.2    Venous Blood Gas:   @ 16:20  7.17/39/36/13/51.4  VBG Lactate: 1.9      MICROBIOLOGY:     RADIOLOGY:  [X] Reviewed and interpreted by me

## 2018-05-30 NOTE — PROGRESS NOTE ADULT - ASSESSMENT
83M hx mild dementia, uncontrolled T2DM, HTN presenting to the ED w/ declining functional status, subjective hypothermia, and rigors  found to have leukocytosis (20.9) with, R-lung opacity and purulent urine/+UA admitted to MICU for sepsis 2/2 PNA and UTI     #Neuro  -Sedated with fentanyl, propofol; sedation vacation  -Reported hx of mild dementia, not on any home meds    #CV  -Distributive shock in setting of sepsis and iatrogenic from sedation  -On levophed since 5/28, wean as tolerated    #Pulm  -Intubated on 5/28 respiratory distress/tachypnea, likely related setting of respiratory compensation for severe metabolic acidosis and underlying PNA  -Satting well on ventilator  -ABG Q6H    #ID  -Septic shock in setting PNA and UTI  -On zosyn and vanco by level since 5/28; vanc level this morning therapeutic  -Urine and blood cultures pending  -hemodynamically stable; rising white count in setting of getting solucortef overnight; no new fevers    #GI  -enteral feeds started on 5/29 via OGT    #Renal  -Acute renal failure with severe acidosis and multiple electrolyte derangements (hypernatremia, hyperkalemia) --> corrected  -Etiology of acute renal failure possible hemodynamically mediated in setting of sepsis, unclear if superimposed on pre-existing CKD  -possible HD today instead of CVVHD  -appreciate nephrology recs    #Endo  -Uncontrolled T2DM (HgbA1c 10.3) with hyperglycemia peaking in the 400s on admission  -Low suspicion for DKA as B-hydroxy negative, acidosis more likely related to acute renal failure  -More euglycemia today with most recent FS <100, monitor for hypoglycemia as pt NPO  -started on hydrocorisone 100 mg Q8H due to concern for adrenal insufficiency by night team    #Heme  - Chronic normocytic anemia with Hgb stable in 10 range --> acute drop to 7.3  - will start PPI for GI ppx  - monitor Hgb    #VTE ppx  -Heparin SQ    #Advanced care planning  -GOC discussion with health care surrogate (daughter, Alexia Guevara) who wishes for aggressive measures and FULL CODE at this time. Refer to GOC note dated on 5/28 for more details      Km Desai MD  PGY3, MICU resident  spectra [phone]

## 2018-05-30 NOTE — PROGRESS NOTE ADULT - SUBJECTIVE AND OBJECTIVE BOX
[facility] DIVISION OF KIDNEY DISEASES AND HYPERTENSION -- FOLLOW UP NOTE  --------------------------------------------------------------------------------  Chief Complaint:  [facility] requiring dialysis    24 hour events/subjective:  CVVHDF clotted overnight around 3 am and has been held since.  Patient remains intubated, sedated on vasopressors.        PAST HISTORY  --------------------------------------------------------------------------------  No significant changes to PMH, PSH, FHx, SHx, unless otherwise noted    ALLERGIES & MEDICATIONS  --------------------------------------------------------------------------------  Allergies    No Known Allergies    Intolerances      Standing Inpatient Medications  chlorhexidine 4% Liquid 1 Application(s) Topical <User Schedule>  dextrose 5%. 1000 milliLiter(s) IV Continuous <Continuous>  dextrose 5%. 1000 milliLiter(s) IV Continuous <Continuous>  dextrose 50% Injectable 12.5 Gram(s) IV Push once  dextrose 50% Injectable 25 Gram(s) IV Push once  dextrose 50% Injectable 25 Gram(s) IV Push once  fentaNYL   Infusion. 3 MICROgram(s)/kG/Hr IV Continuous <Continuous>  heparin  Injectable 5000 Unit(s) SubCutaneous every 8 hours  hydrocortisone sodium succinate Injectable 100 milliGRAM(s) IV Push every 8 hours  insulin lispro (HumaLOG) corrective regimen sliding scale   SubCutaneous every 6 hours  norepinephrine Infusion 0.05 MICROgram(s)/kG/Min IV Continuous <Continuous>  piperacillin/tazobactam IVPB. 3.375 Gram(s) IV Intermittent every 12 hours  PrismaSATE Dialysate BK 0 / 3.5 5000 milliLiter(s) CRRT <Continuous>  PrismaSOL Filtration BGK 0 / 2.5 5000 milliLiter(s) CRRT <Continuous>  PrismaSOL Filtration BGK 0 / 2.5 5000 milliLiter(s) CRRT <Continuous>  propofol Infusion 5 MICROgram(s)/kG/Min IV Continuous <Continuous>    PRN Inpatient Medications  dextrose 40% Gel 15 Gram(s) Oral once PRN  glucagon  Injectable 1 milliGRAM(s) IntraMuscular once PRN      REVIEW OF SYSTEMS  --------------------------------------------------------------------------------  Patient is intubated, sedated, nonverbal at this time.    VITALS/PHYSICAL EXAM  --------------------------------------------------------------------------------  T(C): 34.4 (05-30-18 @ 04:00), Max: 36.7 (05-29-18 @ 08:00)  HR: 71 (05-30-18 @ 07:09) (58 - 94)  BP: 112/59 (05-29-18 @ 20:00) (91/58 - 120/79)  RR: 24 (05-30-18 @ 06:00) (20 - 29)  SpO2: 100% (05-30-18 @ 07:09) (98% - 100%)  Wt(kg): --  Height (cm): 160.02 (05-28-18 @ 19:31)  Weight (kg): 41.3 (05-28-18 @ 19:31)  BMI (kg/m2): 16.1 (05-28-18 @ 19:31)  BSA (m2): 1.38 (05-28-18 @ 19:31)      05-29-18 @ 07:01  -  05-30-18 @ 07:00  --------------------------------------------------------  IN: 7250.8 mL / OUT: 1346 mL / NET: 5904.8 mL    05-30-18 @ 07:01  -  05-30-18 @ 07:54  --------------------------------------------------------  IN: 157.5 mL / OUT: 0 mL / NET: 157.5 mL      Physical Exam:  	Gen: NAD, cachectic  	HEENT: +intubated  	Pulm: +bilateral ventilator breath sounds audible  	CV: RRR, S1S2; no rub  	Abd: soft, nontender/nondistended  	: No suprapubic tenderness  	UE:  no edema  	LE:  no edema  	Neuro: nonverbal  	Psych: sedated  	Skin: Warm  	Vascular access:  RIJ nontunneled diaylsis catheter nontender, dried blood at insertion site    LABS/STUDIES  --------------------------------------------------------------------------------              7.3    18.56 >-----------<  105      [05-30-18 @ 06:00]              23.4     141  |  110  |  70  ----------------------------<  164      [05-30-18 @ 06:00]  4.2   |  13  |  3.49        Ca     7.5     [05-30-18 @ 06:00]      Mg     1.9     [05-30-18 @ 06:00]      Phos  5.8     [05-30-18 @ 06:00]    Creatinine Trend:  SCr 3.49 [05-30 @ 06:00]  SCr 3.59 [05-30 @ 01:30]  SCr 4.84 [05-29 @ 20:40]  SCr 5.21 [05-29 @ 13:15]  SCr 5.23 [05-29 @ 10:30] [facility] DIVISION OF KIDNEY DISEASES AND HYPERTENSION -- FOLLOW UP NOTE  --------------------------------------------------------------------------------  Chief Complaint:  [facility] requiring dialysis    24 hour events/subjective:  CVVHDF clotted overnight around 3 am and has been held since.  Patient remains intubated, sedated on vasopressors.        PAST HISTORY  --------------------------------------------------------------------------------  No significant changes to PMH, PSH, FHx, SHx, unless otherwise noted    ALLERGIES & MEDICATIONS  --------------------------------------------------------------------------------  Allergies    No Known Allergies    Intolerances      Standing Inpatient Medications  chlorhexidine 4% Liquid 1 Application(s) Topical <User Schedule>  dextrose 5%. 1000 milliLiter(s) IV Continuous <Continuous>  dextrose 5%. 1000 milliLiter(s) IV Continuous <Continuous>  dextrose 50% Injectable 12.5 Gram(s) IV Push once  dextrose 50% Injectable 25 Gram(s) IV Push once  dextrose 50% Injectable 25 Gram(s) IV Push once  fentaNYL   Infusion. 3 MICROgram(s)/kG/Hr IV Continuous <Continuous>  heparin  Injectable 5000 Unit(s) SubCutaneous every 8 hours  hydrocortisone sodium succinate Injectable 100 milliGRAM(s) IV Push every 8 hours  insulin lispro (HumaLOG) corrective regimen sliding scale   SubCutaneous every 6 hours  norepinephrine Infusion 0.05 MICROgram(s)/kG/Min IV Continuous <Continuous>  piperacillin/tazobactam IVPB. 3.375 Gram(s) IV Intermittent every 12 hours  PrismaSATE Dialysate BK 0 / 3.5 5000 milliLiter(s) CRRT <Continuous>  PrismaSOL Filtration BGK 0 / 2.5 5000 milliLiter(s) CRRT <Continuous>  PrismaSOL Filtration BGK 0 / 2.5 5000 milliLiter(s) CRRT <Continuous>  propofol Infusion 5 MICROgram(s)/kG/Min IV Continuous <Continuous>    PRN Inpatient Medications  dextrose 40% Gel 15 Gram(s) Oral once PRN  glucagon  Injectable 1 milliGRAM(s) IntraMuscular once PRN      REVIEW OF SYSTEMS  --------------------------------------------------------------------------------  Patient is intubated, sedated, nonverbal at this time.    VITALS/PHYSICAL EXAM  --------------------------------------------------------------------------------  T(C): 34.4 (05-30-18 @ 04:00), Max: 36.7 (05-29-18 @ 08:00)  HR: 71 (05-30-18 @ 07:09) (58 - 94)  BP: 112/59 (05-29-18 @ 20:00) (91/58 - 120/79)  RR: 24 (05-30-18 @ 06:00) (20 - 29)  SpO2: 100% (05-30-18 @ 07:09) (98% - 100%)  Wt(kg): --  Height (cm): 160.02 (05-28-18 @ 19:31)  Weight (kg): 41.3 (05-28-18 @ 19:31)  BMI (kg/m2): 16.1 (05-28-18 @ 19:31)  BSA (m2): 1.38 (05-28-18 @ 19:31)      05-29-18 @ 07:01  -  05-30-18 @ 07:00  --------------------------------------------------------  IN: 7250.8 mL / OUT: 1346 mL / NET: 5904.8 mL    05-30-18 @ 07:01  -  05-30-18 @ 07:54  --------------------------------------------------------  IN: 157.5 mL / OUT: 0 mL / NET: 157.5 mL      Physical Exam:  	Gen: NAD, cachectic  	HEENT: +intubated  	Pulm: +bilateral ventilator breath sounds audible  	CV: RRR, S1S2; no rub  	Abd: soft, nontender/nondistended  	: No suprapubic tenderness  	UE:  no edema  	LE:  no edema  	Neuro: nonverbal  	Psych: sedated  	Skin: Warm  	Vascular access:  RIJ nontunneled diaylsis catheter , dried blood at insertion site    LABS/STUDIES  --------------------------------------------------------------------------------              7.3    18.56 >-----------<  105      [05-30-18 @ 06:00]              23.4     141  |  110  |  70  ----------------------------<  164      [05-30-18 @ 06:00]  4.2   |  13  |  3.49        Ca     7.5     [05-30-18 @ 06:00]      Mg     1.9     [05-30-18 @ 06:00]      Phos  5.8     [05-30-18 @ 06:00]    Creatinine Trend:  SCr 3.49 [05-30 @ 06:00]  SCr 3.59 [05-30 @ 01:30]  SCr 4.84 [05-29 @ 20:40]  SCr 5.21 [05-29 @ 13:15]  SCr 5.23 [05-29 @ 10:30]

## 2018-05-30 NOTE — PROGRESS NOTE ADULT - PROBLEM SELECTOR PLAN 1
Patient is now oliguric.  Bhakta in place.  CVVHDF held.  Patient remains acidotic with rapid correction of serum sodium.  Will plan for HD today with high sodium bath.  Monitor BMP, UOP.  No plans for volume removal at this time. Patient is now oliguric.  Bhakta in place.  CVVHDF held.  Patient remains acidotic with rapid correction of serum sodium. Plan for HD today with high sodium bath.  Monitor BMP, UOP.  No plans for volume removal at this time.

## 2018-05-30 NOTE — PROGRESS NOTE ADULT - PROBLEM SELECTOR PLAN 2
Improved after hemodialysis and improvement of acidosis.  Plan for additional HD today.  Monitor serum K. Improved with CVVHDF and improvement of acidosis.  Plan for additional HD today.  Monitor serum K.

## 2018-05-30 NOTE — CHART NOTE - NSCHARTNOTEFT_GEN_A_CORE
: Beni    INDICATION: cp failure    PROCEDURE:  [ x] LIMITED ECHO  [ x] LIMITED CHEST  [ ] LIMITED RETROPERITONEAL  [ ] LIMITED ABDOMINAL  [ ] LIMITED DVT  [ ] NEEDLE GUIDANCE VASCULAR  [ ] NEEDLE GUIDANCE THORACENTESIS  [ ] NEEDLE GUIDANCE PARACENTESIS  [ ] NEEDLE GUIDANCE PERICARDIOCENTESIS  [ ] OTHER    FINDINGS:     lung: bilateral consolidations at bases. Bilateral A line pattern     Cardiac: Nl LV fxn, no Rv enlargement, no PEF, IVC indeterminate    INTERPRETATION:    Normal aeration pattern. Bilateral consolidations at bases.

## 2018-05-30 NOTE — PROGRESS NOTE ADULT - PROBLEM SELECTOR PLAN 3
Remains acidotic.  No urgency for bicarbonate infusion.  Should improve with HD today.  Monitor serum bicarbonate. Remains acidotic. Plan for HD today.  Monitor serum bicarbonate.

## 2018-05-30 NOTE — PROGRESS NOTE ADULT - PROBLEM SELECTOR PLAN 4
Overcorrection 2/2 CVVHDF for shock with life-threatening hyperkalemia and metabolic acidosis.  High sodium bath with HD today.

## 2018-05-31 LAB
-  AMIKACIN: SIGNIFICANT CHANGE UP
-  AMIKACIN: SIGNIFICANT CHANGE UP
-  AMPICILLIN/SULBACTAM: SIGNIFICANT CHANGE UP
-  AMPICILLIN/SULBACTAM: SIGNIFICANT CHANGE UP
-  AMPICILLIN: SIGNIFICANT CHANGE UP
-  AMPICILLIN: SIGNIFICANT CHANGE UP
-  AZTREONAM: SIGNIFICANT CHANGE UP
-  AZTREONAM: SIGNIFICANT CHANGE UP
-  CEFAZOLIN: SIGNIFICANT CHANGE UP
-  CEFAZOLIN: SIGNIFICANT CHANGE UP
-  CEFEPIME: SIGNIFICANT CHANGE UP
-  CEFEPIME: SIGNIFICANT CHANGE UP
-  CEFOXITIN: SIGNIFICANT CHANGE UP
-  CEFOXITIN: SIGNIFICANT CHANGE UP
-  CEFTAZIDIME: SIGNIFICANT CHANGE UP
-  CEFTAZIDIME: SIGNIFICANT CHANGE UP
-  CEFTRIAXONE: SIGNIFICANT CHANGE UP
-  CEFTRIAXONE: SIGNIFICANT CHANGE UP
-  CIPROFLOXACIN: SIGNIFICANT CHANGE UP
-  CIPROFLOXACIN: SIGNIFICANT CHANGE UP
-  ERTAPENEM: SIGNIFICANT CHANGE UP
-  ERTAPENEM: SIGNIFICANT CHANGE UP
-  GENTAMICIN: SIGNIFICANT CHANGE UP
-  GENTAMICIN: SIGNIFICANT CHANGE UP
-  IMIPENEM: SIGNIFICANT CHANGE UP
-  IMIPENEM: SIGNIFICANT CHANGE UP
-  LEVOFLOXACIN: SIGNIFICANT CHANGE UP
-  LEVOFLOXACIN: SIGNIFICANT CHANGE UP
-  MEROPENEM: SIGNIFICANT CHANGE UP
-  MEROPENEM: SIGNIFICANT CHANGE UP
-  NITROFURANTOIN: SIGNIFICANT CHANGE UP
-  PIPERACILLIN/TAZOBACTAM: SIGNIFICANT CHANGE UP
-  PIPERACILLIN/TAZOBACTAM: SIGNIFICANT CHANGE UP
-  TIGECYCLINE: SIGNIFICANT CHANGE UP
-  TIGECYCLINE: SIGNIFICANT CHANGE UP
-  TOBRAMYCIN: SIGNIFICANT CHANGE UP
-  TOBRAMYCIN: SIGNIFICANT CHANGE UP
-  TRIMETHOPRIM/SULFAMETHOXAZOLE: SIGNIFICANT CHANGE UP
-  TRIMETHOPRIM/SULFAMETHOXAZOLE: SIGNIFICANT CHANGE UP
ALBUMIN SERPL ELPH-MCNC: 1.3 G/DL — LOW (ref 3.3–5)
ALP SERPL-CCNC: 129 U/L — HIGH (ref 40–120)
ALT FLD-CCNC: 21 U/L — SIGNIFICANT CHANGE UP (ref 4–41)
ANISOCYTOSIS BLD QL: SLIGHT — SIGNIFICANT CHANGE UP
AST SERPL-CCNC: 20 U/L — SIGNIFICANT CHANGE UP (ref 4–40)
BACTERIA SPT RESP CULT: SIGNIFICANT CHANGE UP
BACTERIA UR CULT: SIGNIFICANT CHANGE UP
BASE EXCESS BLDA CALC-SCNC: -4.9 MMOL/L — SIGNIFICANT CHANGE UP
BASOPHILS # BLD AUTO: 0.01 K/UL — SIGNIFICANT CHANGE UP (ref 0–0.2)
BASOPHILS NFR BLD AUTO: 0.1 % — SIGNIFICANT CHANGE UP (ref 0–2)
BASOPHILS NFR SPEC: 0 % — SIGNIFICANT CHANGE UP (ref 0–2)
BILIRUB SERPL-MCNC: 0.2 MG/DL — SIGNIFICANT CHANGE UP (ref 0.2–1.2)
BLD GP AB SCN SERPL QL: NEGATIVE — SIGNIFICANT CHANGE UP
BUN SERPL-MCNC: 46 MG/DL — HIGH (ref 7–23)
CALCIUM SERPL-MCNC: 7.4 MG/DL — LOW (ref 8.4–10.5)
CHLORIDE BLDA-SCNC: 105 MMOL/L — SIGNIFICANT CHANGE UP (ref 96–108)
CHLORIDE SERPL-SCNC: 97 MMOL/L — LOW (ref 98–107)
CO2 SERPL-SCNC: 18 MMOL/L — LOW (ref 22–31)
CREAT SERPL-MCNC: 2.58 MG/DL — HIGH (ref 0.5–1.3)
EOSINOPHIL # BLD AUTO: 0 K/UL — SIGNIFICANT CHANGE UP (ref 0–0.5)
EOSINOPHIL NFR BLD AUTO: 0 % — SIGNIFICANT CHANGE UP (ref 0–6)
EOSINOPHIL NFR FLD: 0 % — SIGNIFICANT CHANGE UP (ref 0–6)
GIANT PLATELETS BLD QL SMEAR: PRESENT — SIGNIFICANT CHANGE UP
GLUCOSE BLDA-MCNC: 170 MG/DL — HIGH (ref 70–99)
GLUCOSE BLDC GLUCOMTR-MCNC: 129 MG/DL — HIGH (ref 70–99)
GLUCOSE BLDC GLUCOMTR-MCNC: 173 MG/DL — HIGH (ref 70–99)
GLUCOSE BLDC GLUCOMTR-MCNC: 224 MG/DL — HIGH (ref 70–99)
GLUCOSE SERPL-MCNC: 170 MG/DL — HIGH (ref 70–99)
GRAM STN SPT: SIGNIFICANT CHANGE UP
HAPTOGLOB SERPL-MCNC: 293 MG/DL — HIGH (ref 34–200)
HBV CORE AB SER-ACNC: NONREACTIVE — SIGNIFICANT CHANGE UP
HBV SURFACE AB SER-ACNC: <3 MLU/ML — LOW
HCO3 BLDA-SCNC: 20 MMOL/L — LOW (ref 22–26)
HCT VFR BLD CALC: 19.1 % — CRITICAL LOW (ref 39–50)
HCT VFR BLD CALC: 19.4 % — CRITICAL LOW (ref 39–50)
HCT VFR BLD CALC: 22.8 % — LOW (ref 39–50)
HCT VFR BLDA CALC: 20.8 % — CRITICAL LOW (ref 39–51)
HCV AB S/CO SERPL IA: 0.12 S/CO — SIGNIFICANT CHANGE UP
HCV AB SERPL-IMP: SIGNIFICANT CHANGE UP
HGB BLD-MCNC: 6.3 G/DL — CRITICAL LOW (ref 13–17)
HGB BLD-MCNC: 6.4 G/DL — CRITICAL LOW (ref 13–17)
HGB BLD-MCNC: 7.6 G/DL — LOW (ref 13–17)
HGB BLDA-MCNC: 6.6 G/DL — CRITICAL LOW (ref 13–17)
IMM GRANULOCYTES # BLD AUTO: 0.22 # — SIGNIFICANT CHANGE UP
IMM GRANULOCYTES # BLD AUTO: 0.9 # — SIGNIFICANT CHANGE UP
IMM GRANULOCYTES # BLD AUTO: 1.12 # — SIGNIFICANT CHANGE UP
IMM GRANULOCYTES NFR BLD AUTO: 2.1 % — HIGH (ref 0–1.5)
IMM GRANULOCYTES NFR BLD AUTO: 6.5 % — HIGH (ref 0–1.5)
IMM GRANULOCYTES NFR BLD AUTO: 8.3 % — HIGH (ref 0–1.5)
LACTATE BLDA-SCNC: 2.2 MMOL/L — HIGH (ref 0.5–2)
LDH SERPL L TO P-CCNC: 206 U/L — SIGNIFICANT CHANGE UP (ref 135–225)
LYMPHOCYTES # BLD AUTO: 0.46 K/UL — LOW (ref 1–3.3)
LYMPHOCYTES # BLD AUTO: 0.47 K/UL — LOW (ref 1–3.3)
LYMPHOCYTES # BLD AUTO: 0.48 K/UL — LOW (ref 1–3.3)
LYMPHOCYTES # BLD AUTO: 3.3 % — LOW (ref 13–44)
LYMPHOCYTES # BLD AUTO: 3.5 % — LOW (ref 13–44)
LYMPHOCYTES # BLD AUTO: 4.6 % — LOW (ref 13–44)
LYMPHOCYTES NFR SPEC AUTO: 2.8 % — LOW (ref 13–44)
MACROCYTES BLD QL: SLIGHT — SIGNIFICANT CHANGE UP
MAGNESIUM SERPL-MCNC: 1.9 MG/DL — SIGNIFICANT CHANGE UP (ref 1.6–2.6)
MCHC RBC-ENTMCNC: 28.2 PG — SIGNIFICANT CHANGE UP (ref 27–34)
MCHC RBC-ENTMCNC: 28.9 PG — SIGNIFICANT CHANGE UP (ref 27–34)
MCHC RBC-ENTMCNC: 29 PG — SIGNIFICANT CHANGE UP (ref 27–34)
MCHC RBC-ENTMCNC: 33 % — SIGNIFICANT CHANGE UP (ref 32–36)
MCHC RBC-ENTMCNC: 33 % — SIGNIFICANT CHANGE UP (ref 32–36)
MCHC RBC-ENTMCNC: 33.3 % — SIGNIFICANT CHANGE UP (ref 32–36)
MCV RBC AUTO: 85.5 FL — SIGNIFICANT CHANGE UP (ref 80–100)
MCV RBC AUTO: 86.7 FL — SIGNIFICANT CHANGE UP (ref 80–100)
MCV RBC AUTO: 88 FL — SIGNIFICANT CHANGE UP (ref 80–100)
METHOD TYPE: SIGNIFICANT CHANGE UP
METHOD TYPE: SIGNIFICANT CHANGE UP
MONOCYTES # BLD AUTO: 0.17 K/UL — SIGNIFICANT CHANGE UP (ref 0–0.9)
MONOCYTES # BLD AUTO: 0.17 K/UL — SIGNIFICANT CHANGE UP (ref 0–0.9)
MONOCYTES # BLD AUTO: 0.18 K/UL — SIGNIFICANT CHANGE UP (ref 0–0.9)
MONOCYTES NFR BLD AUTO: 1.3 % — LOW (ref 2–14)
MONOCYTES NFR BLD AUTO: 1.3 % — LOW (ref 2–14)
MONOCYTES NFR BLD AUTO: 1.6 % — LOW (ref 2–14)
MONOCYTES NFR BLD: 0.9 % — LOW (ref 2–9)
NEUTROPHIL AB SER-ACNC: 93.5 % — HIGH (ref 43–77)
NEUTROPHILS # BLD AUTO: 11.72 K/UL — HIGH (ref 1.8–7.4)
NEUTROPHILS # BLD AUTO: 12.3 K/UL — HIGH (ref 1.8–7.4)
NEUTROPHILS # BLD AUTO: 9.49 K/UL — HIGH (ref 1.8–7.4)
NEUTROPHILS NFR BLD AUTO: 86.8 % — HIGH (ref 43–77)
NEUTROPHILS NFR BLD AUTO: 88.8 % — HIGH (ref 43–77)
NEUTROPHILS NFR BLD AUTO: 91.6 % — HIGH (ref 43–77)
NEUTS BAND # BLD: 2.8 % — SIGNIFICANT CHANGE UP (ref 0–6)
NRBC # FLD: 0 — SIGNIFICANT CHANGE UP
NRBC # FLD: 0.02 — SIGNIFICANT CHANGE UP
NRBC # FLD: 0.02 — SIGNIFICANT CHANGE UP
ORGANISM # SPEC MICROSCOPIC CNT: SIGNIFICANT CHANGE UP
PCO2 BLDA: 33 MMHG — LOW (ref 35–48)
PH BLDA: 7.39 PH — SIGNIFICANT CHANGE UP (ref 7.35–7.45)
PHOSPHATE SERPL-MCNC: 5.1 MG/DL — HIGH (ref 2.5–4.5)
PLATELET # BLD AUTO: 104 K/UL — LOW (ref 150–400)
PLATELET # BLD AUTO: 90 K/UL — LOW (ref 150–400)
PLATELET # BLD AUTO: 90 K/UL — LOW (ref 150–400)
PLATELET COUNT - ESTIMATE: SIGNIFICANT CHANGE UP
PMV BLD: 10.9 FL — SIGNIFICANT CHANGE UP (ref 7–13)
PMV BLD: 11.1 FL — SIGNIFICANT CHANGE UP (ref 7–13)
PMV BLD: 11.2 FL — SIGNIFICANT CHANGE UP (ref 7–13)
PO2 BLDA: 128 MMHG — HIGH (ref 83–108)
POIKILOCYTOSIS BLD QL AUTO: SLIGHT — SIGNIFICANT CHANGE UP
POTASSIUM BLDA-SCNC: 3.7 MMOL/L — SIGNIFICANT CHANGE UP (ref 3.4–4.5)
POTASSIUM SERPL-MCNC: 4.1 MMOL/L — SIGNIFICANT CHANGE UP (ref 3.5–5.3)
POTASSIUM SERPL-SCNC: 4.1 MMOL/L — SIGNIFICANT CHANGE UP (ref 3.5–5.3)
PROT SERPL-MCNC: 4.7 G/DL — LOW (ref 6–8.3)
RBC # BLD: 2.17 M/UL — LOW (ref 4.2–5.8)
RBC # BLD: 2.27 M/UL — LOW (ref 4.2–5.8)
RBC # BLD: 2.63 M/UL — LOW (ref 4.2–5.8)
RBC # FLD: 15.9 % — HIGH (ref 10.3–14.5)
RBC # FLD: 17.1 % — HIGH (ref 10.3–14.5)
RBC # FLD: 17.3 % — HIGH (ref 10.3–14.5)
RH IG SCN BLD-IMP: POSITIVE — SIGNIFICANT CHANGE UP
SAO2 % BLDA: 98.1 % — SIGNIFICANT CHANGE UP (ref 95–99)
SCHISTOCYTES BLD QL AUTO: SLIGHT — SIGNIFICANT CHANGE UP
SODIUM BLDA-SCNC: 133 MMOL/L — LOW (ref 136–146)
SODIUM SERPL-SCNC: 135 MMOL/L — SIGNIFICANT CHANGE UP (ref 135–145)
VANCOMYCIN FLD-MCNC: 38.3 UG/ML — CRITICAL HIGH
WBC # BLD: 10.37 K/UL — SIGNIFICANT CHANGE UP (ref 3.8–10.5)
WBC # BLD: 13.49 K/UL — HIGH (ref 3.8–10.5)
WBC # BLD: 13.85 K/UL — HIGH (ref 3.8–10.5)
WBC # FLD AUTO: 10.37 K/UL — SIGNIFICANT CHANGE UP (ref 3.8–10.5)
WBC # FLD AUTO: 13.49 K/UL — HIGH (ref 3.8–10.5)
WBC # FLD AUTO: 13.85 K/UL — HIGH (ref 3.8–10.5)

## 2018-05-31 PROCEDURE — 99291 CRITICAL CARE FIRST HOUR: CPT

## 2018-05-31 PROCEDURE — 99233 SBSQ HOSP IP/OBS HIGH 50: CPT | Mod: GC

## 2018-05-31 RX ADMIN — SODIUM CHLORIDE 75 MILLILITER(S): 9 INJECTION, SOLUTION INTRAVENOUS at 10:55

## 2018-05-31 RX ADMIN — Medication 1.94 MICROGRAM(S)/KG/MIN: at 08:03

## 2018-05-31 RX ADMIN — Medication 50 MILLIGRAM(S): at 21:25

## 2018-05-31 RX ADMIN — FENTANYL CITRATE 12.39 MICROGRAM(S)/KG/HR: 50 INJECTION INTRAVENOUS at 10:54

## 2018-05-31 RX ADMIN — Medication 50 MILLIGRAM(S): at 05:22

## 2018-05-31 RX ADMIN — HEPARIN SODIUM 5000 UNIT(S): 5000 INJECTION INTRAVENOUS; SUBCUTANEOUS at 21:25

## 2018-05-31 RX ADMIN — PROPOFOL 1.24 MICROGRAM(S)/KG/MIN: 10 INJECTION, EMULSION INTRAVENOUS at 10:54

## 2018-05-31 RX ADMIN — HEPARIN SODIUM 5000 UNIT(S): 5000 INJECTION INTRAVENOUS; SUBCUTANEOUS at 13:51

## 2018-05-31 RX ADMIN — Medication 1.94 MICROGRAM(S)/KG/MIN: at 19:48

## 2018-05-31 RX ADMIN — SODIUM CHLORIDE 75 MILLILITER(S): 9 INJECTION, SOLUTION INTRAVENOUS at 19:49

## 2018-05-31 RX ADMIN — Medication 50 MILLIGRAM(S): at 13:51

## 2018-05-31 RX ADMIN — Medication 2: at 11:09

## 2018-05-31 RX ADMIN — FENTANYL CITRATE 12.39 MICROGRAM(S)/KG/HR: 50 INJECTION INTRAVENOUS at 08:04

## 2018-05-31 RX ADMIN — SODIUM CHLORIDE 75 MILLILITER(S): 9 INJECTION, SOLUTION INTRAVENOUS at 08:05

## 2018-05-31 RX ADMIN — PIPERACILLIN AND TAZOBACTAM 25 GRAM(S): 4; .5 INJECTION, POWDER, LYOPHILIZED, FOR SOLUTION INTRAVENOUS at 17:18

## 2018-05-31 RX ADMIN — PIPERACILLIN AND TAZOBACTAM 25 GRAM(S): 4; .5 INJECTION, POWDER, LYOPHILIZED, FOR SOLUTION INTRAVENOUS at 05:21

## 2018-05-31 RX ADMIN — CHLORHEXIDINE GLUCONATE 1 APPLICATION(S): 213 SOLUTION TOPICAL at 05:23

## 2018-05-31 RX ADMIN — PROPOFOL 1.24 MICROGRAM(S)/KG/MIN: 10 INJECTION, EMULSION INTRAVENOUS at 08:04

## 2018-05-31 RX ADMIN — Medication 1: at 05:22

## 2018-05-31 RX ADMIN — HEPARIN SODIUM 5000 UNIT(S): 5000 INJECTION INTRAVENOUS; SUBCUTANEOUS at 05:22

## 2018-05-31 RX ADMIN — SODIUM CHLORIDE 75 MILLILITER(S): 9 INJECTION, SOLUTION INTRAVENOUS at 17:18

## 2018-05-31 NOTE — PROGRESS NOTE ADULT - ASSESSMENT
83M hx mild dementia, uncontrolled T2DM, HTN presenting to the ED w/ declining functional status, subjective hypothermia, and rigors  found to have leukocytosis (20.9) with, R-lung opacity and purulent urine/+UA admitted to MICU for severe sepsis 2/2 PNA and UTI and ARF, course c/b septic shock with refractory acidosis and hyperkalemic requiring CVVH, now with down-trending Hgb and platelets    #Neuro  -Sedated with fentanyl, propofol; daily sedation vacation    #CV  -Distributive shock in setting of sepsis and iatrogenic from sedation  -On levophed since 5/28, wean as tolerated  -Started on hydrocortisone on 5/30 due to concern for adrenal insufficiency     #Pulm  -Intubated on 5/28 respiratory distress/tachypnea, likely related setting of respiratory compensation for severe metabolic acidosis and underlying PNA  -Sedation vacation and CPAP trials when indicated     #ID  -Septic shock in setting PNA and UTI with tracheal aspirate and urine culture growing GNR >100K, yet to be speciated. Blood cultures NGTD.  -On zosyn and vanco by level since 5/28  -Vanc level elevated at 38, hold dose and repeat AM vanco level  -Remains on levophed, wean as tolerated    #GI  -Enteral feeds started on 5/29 via OGT    #Renal  -Acute renal failure with severe acidosis and multiple electrolyte derangements (hypernatremia, hyperkalemia), now with improvement in acidosis (most recent ABG pH 7.39) and resolution of hypernatremia and hyperkalemia  -Etiology of acute renal failure possible hemodynamically mediated in setting of sepsis, unclear if superimposed on pre-existing CKD  -CVVH as per nephrology    #Endo  -Uncontrolled T2DM (HgbA1c 10.3) with hyperglycemia peaking in the 400s on admission  -More euglycemia with FS in  -Started on hydrocortisone due to concern for adrenal insufficiency, wean as tolerated    #Heme  -Acute on chronic anemia (Hgb steadily dropping from 10 to 6.3 today) without no active source of bleeding identified.  -Worsening thrombocytopenia (platelets decreased from 163 to 90 in 3 days); 4T score about 2 which is low clinical probability of HIT  -Suspect acute anemia and worsening thrombocytopenia related to myelosuppression from critical illness  -Plan for pRBC today.  Cont to trend CBC with active T+S    #VTE ppx  -Heparin SQ, monitor platelets closely and hold if platelets <50    #Advanced care planning  -GOC discussion with health care surrogate (daughter, Alexia Guevara) who wishes for aggressive measures and FULL CODE at this time. Refer to GOC note dated on 5/28 for more details      Nelly Guerrero MD, PGY3  Internal Medicine Resident  Pager #[phone] ([facility]), [phone] ([facility]) 83M hx mild dementia, uncontrolled T2DM, HTN presenting to the ED w/ declining functional status, subjective hypothermia, and rigors  found to have leukocytosis (20.9) with, R-lung opacity and purulent urine/+UA admitted to MICU for severe sepsis 2/2 PNA and UTI and ARF, course c/b septic shock with refractory acidosis and hyperkalemic requiring CVVH, now with down-trending Hgb and platelets    #Neuro  -Sedated with fentanyl, propofol; daily sedation vacation    #CV  -Distributive shock in setting of sepsis and iatrogenic from sedation  -On levophed since 5/28, wean as tolerated  -Started on hydrocortisone on 5/30 due to concern for adrenal insufficiency     #Pulm  -Intubated on 5/28 respiratory distress/tachypnea, likely related setting of respiratory compensation for severe metabolic acidosis and underlying PNA  -Sedation vacation and CPAP trials when indicated     #ID  -Septic shock in setting PNA and UTI with tracheal aspirate and urine culture growing GNR >100K, yet to be speciated. Blood cultures NGTD.  -On zosyn and vanco by level since 5/28  -Vanc level elevated at 38, hold dose and repeat AM vanco level  -Remains on levophed, wean as tolerated    #GI  -Enteral feeds started on 5/29 via OGT    #Renal  -Acute renal failure with severe acidosis and multiple electrolyte derangements (hypernatremia, hyperkalemia), now with improvement in acidosis (most recent ABG pH 7.39) and resolution of hypernatremia and hyperkalemia  -Etiology of acute renal failure possible hemodynamically mediated in setting of sepsis, unclear if superimposed on pre-existing CKD  -s/p CVVH, tolerated HD yesterday, further HD as per renal    #Endo  -Uncontrolled T2DM (HgbA1c 10.3) with hyperglycemia peaking in the 400s on admission  -More euglycemia with FS in  -Started on hydrocortisone due to concern for adrenal insufficiency, wean as tolerated    #Heme  -Acute on chronic anemia (Hgb steadily dropping from 10 to 6.3 today) without no active source of bleeding identified.  -Worsening thrombocytopenia (platelets decreased from 163 to 90 in 3 days); 4T score about 2 which is low clinical probability of HIT  -Suspect acute anemia and worsening thrombocytopenia related to myelosuppression from critical illness  -Plan for pRBC today once consent obtained from daughter. Cont to trend CBC with active T+S    #VTE ppx  -Heparin SQ, monitor platelets closely and hold if platelets <50    #Advanced care planning  -GOC discussion with health care surrogate (daughter, Alexia Guevraa) who wishes for aggressive measures and FULL CODE at this time. Refer to GOC note dated on 5/28 for more details      Nelly Guerrero MD, PGY3  Internal Medicine Resident  Pager #[phone] ([facility]), [phone] ([facility]) 83M hx mild dementia, uncontrolled T2DM, HTN presenting to the ED w/ declining functional status, subjective hypothermia, and rigors  found to have leukocytosis (20.9) with, R-lung opacity and purulent urine/+UA admitted to MICU for severe sepsis 2/2 PNA and UTI and ARF, course c/b septic shock with refractory acidosis and hyperkalemic requiring CVVH, now with down-trending Hgb and platelets    #Neuro  -Sedated with fentanyl, propofol  -Plan to start weaning sedation today, will d/c fentanyl and titrate propofol     #CV  -Distributive shock in setting of sepsis and iatrogenic from sedation  -On levophed since 5/28, now with decreasing requirements  -Started on hydrocortisone on 5/30 for adrenal insufficiency in setting of sepsis, will cont with current dose of 50mg q8hr until levophed is off    #Pulm  -Intubated on 5/28 respiratory distress/tachypnea, likely related setting of respiratory compensation for severe metabolic acidosis and underlying PNA  -Sedation vacation as above, then will attempt CPAP trials when indicated     #ID  -Septic shock in setting PNA and UTI with tracheal aspirate growing GPC and GNR.  Urine culture growing GNR >100K which as speciated out to Enterobacter.  Blood cultures NGTD.  -On zosyn and vanco by level since 5/28   -Vanc level elevated at 38, vanco d/c on 5/31 since  organism is GNR & zosyn offers some gram positive coverage for GPC in sputum     #GI  -Enteral feeds started on 5/29 via OGT  -Holding feeds due to concern for residuals seen in ET tube    #Renal  -Acute renal failure with severe acidosis and multiple electrolyte derangements (hypernatremia, hyperkalemia), now with improvement in acidosis (most recent ABG pH 7.39) and resolution of hypernatremia and hyperkalemia  -Etiology of acute renal failure likely hemodynamically mediated in setting of sepsis  -s/p CVVH (stopped once machine clotted), tolerated HD yesterday, further HD as per renal    #Endo  -Uncontrolled T2DM (HgbA1c 10.3) with hyperglycemia peaking in the 400s on admission  -Some hyperglycemia in 200s likely related to tube feeds and steroids, may need NPH to persistent hyperglycemia   -Started on hydrocortisone for relative adrenal insufficiency in setting of sepsis    #Heme  -Acute on chronic anemia (Hgb steadily dropping from 10 to 6.3 today) without no active source of bleeding identified.  -Worsening thrombocytopenia (platelets decreased from 163 to 90 in 3 days); 4T score about 2 which is low clinical probability of HIT  -Suspect acute anemia and worsening thrombocytopenia related to myelosuppression from critical illness  -Plan for pRBC today once consent obtained from daughter. Cont to trend CBC with active type and screen    #VTE ppx  -Heparin SQ, monitor platelets closely and hold if platelets <50    #Advanced care planning  -Initial GOC discussion with health care surrogate (daughter, Alexia Guevara) who wishes for aggressive measures and FULL CODE at this time. Refer to GOC note dated on 5/28 for more details  -Palliative care team consulted on 5/30 for ongoing GOC    Nelly Guerrero MD, PGY3  Internal Medicine Resident  Pager #[phone] ([facility]), [phone] ([facility])

## 2018-05-31 NOTE — PROGRESS NOTE ADULT - SUBJECTIVE AND OBJECTIVE BOX
INTERVAL HPI/OVERNIGHT EVENTS:  Decreasing levophed requirements.  Down-trending Hgb and platelets, without signs of active bleeding.     SUBJECTIVE: Patient seen and examined at bedside.     ROS: Unable to obtain ROS     VITAL SIGNS:  ICU Vital Signs Last 24 Hrs  T(C): 36.7 (31 May 2018 00:00), Max: 36.7 (31 May 2018 00:00)  T(F): 98.1 (31 May 2018 00:00), Max: 98.1 (31 May 2018 00:00)  HR: 60 (31 May 2018 07:00) (60 - 88)  BP: 124/58 (30 May 2018 08:18) (124/58 - 124/58)  BP(mean): --  ABP: 110/47 (31 May 2018 07:00) (76/33 - 195/62)  ABP(mean): 69 (31 May 2018 07:00) (46 - 102)  RR: 25 (31 May 2018 07:00) (23 - 26)  SpO2: 100% (31 May 2018 07:00) (94% - 100%)    Mode: AC/ CMV (Assist Control/ Continuous Mandatory Ventilation), RR (machine): 24, TV (machine): 400, FiO2: 30, PEEP: 5, MAP: 10.7, PIP: 19    05-30 @ 07:01  -  05-31 @ 07:00  --------------------------------------------------------  IN: 3994 mL / OUT: 862 mL / NET: 3132 mL      CAPILLARY BLOOD GLUCOSE  POCT Blood Glucose.: 173 mg/dL (31 May 2018 05:15)      PHYSICAL EXAM:  General:  Elderly, cachetic male, currently sedated and intubated; nonresponsive to noxious stimuli; warming blanket in place  HEENT:  Normocephalic, atrumatic; pupils equally round, minimally responsive to light  Respiratory:  Non-labored breathing pattern, transmitted upper airway breath sounds; concordant with vent  Cardiovascular:    Abdomen: Soft, non-distended  Genital: Bhakta  Extremities: Thin, no edema; palpable pulses  Skin:  Warm and dry  Neurological: Unable to assess, sedated    MEDICATIONS:  MEDICATIONS  (STANDING):  chlorhexidine 4% Liquid 1 Application(s) Topical <User Schedule>  dextrose 5%. 1000 milliLiter(s) (50 mL/Hr) IV Continuous <Continuous>  dextrose 5%. 1000 milliLiter(s) (75 mL/Hr) IV Continuous <Continuous>  dextrose 50% Injectable 12.5 Gram(s) IV Push once  dextrose 50% Injectable 25 Gram(s) IV Push once  dextrose 50% Injectable 25 Gram(s) IV Push once  fentaNYL   Infusion. 3 MICROgram(s)/kG/Hr (12.39 mL/Hr) IV Continuous <Continuous>  heparin  Injectable 5000 Unit(s) SubCutaneous every 8 hours  hydrocortisone sodium succinate Injectable 50 milliGRAM(s) IV Push every 8 hours  insulin lispro (HumaLOG) corrective regimen sliding scale   SubCutaneous every 6 hours  norepinephrine Infusion 0.05 MICROgram(s)/kG/Min (1.936 mL/Hr) IV Continuous <Continuous>  piperacillin/tazobactam IVPB. 3.375 Gram(s) IV Intermittent every 12 hours  propofol Infusion 5 MICROgram(s)/kG/Min (1.239 mL/Hr) IV Continuous <Continuous>    MEDICATIONS  (PRN):  dextrose 40% Gel 15 Gram(s) Oral once PRN Blood Glucose LESS THAN 70 milliGRAM(s)/deciliter  glucagon  Injectable 1 milliGRAM(s) IntraMuscular once PRN Glucose LESS THAN 70 milligrams/deciliter    ALLERGIES:  No Known Allergies    LABS:                        6.3    13.49 )-----------( 90       ( 31 May 2018 03:06 )             19.1     05-31    135  |  97<L>  |  46<H>  ----------------------------<  170<H>  4.1   |  18<L>  |  2.58<H>    Ca    7.4<L>      31 May 2018 02:18  Phos  5.1     05-31  Mg     1.9     05-31    TPro  4.7<L>  /  Alb  1.3<L>  /  TBili  0.2  /  DBili  x   /  AST  20  /  ALT  21  /  AlkPhos  129<H>  05-31    PT/INR - ( 30 May 2018 06:00 )   PT: 12.7 SEC;   INR: 1.10          PTT - ( 30 May 2018 06:00 )  PTT:38.9 SEC      RADIOLOGY & ADDITIONAL TESTS: Reviewed. INTERVAL HPI/OVERNIGHT EVENTS:  Decreasing levophed requirements.  Down-trending Hgb and platelets, without signs of active bleeding.     SUBJECTIVE: Patient seen and examined at bedside.     ROS: Unable to obtain ROS     VITAL SIGNS:  ICU Vital Signs Last 24 Hrs  T(C): 36.7 (31 May 2018 00:00), Max: 36.7 (31 May 2018 00:00)  T(F): 98.1 (31 May 2018 00:00), Max: 98.1 (31 May 2018 00:00)  HR: 60 (31 May 2018 07:00) (60 - 88)  BP: 124/58 (30 May 2018 08:18) (124/58 - 124/58)  BP(mean): --  ABP: 110/47 (31 May 2018 07:00) (76/33 - 195/62)  ABP(mean): 69 (31 May 2018 07:00) (46 - 102)  RR: 25 (31 May 2018 07:00) (23 - 26)  SpO2: 100% (31 May 2018 07:00) (94% - 100%)    Mode: AC/ CMV (Assist Control/ Continuous Mandatory Ventilation), RR (machine): 24, TV (machine): 400, FiO2: 30, PEEP: 5, MAP: 10.7, PIP: 19    05-30 @ 07:01  -  05-31 @ 07:00  --------------------------------------------------------  IN: 3994 mL / OUT: 862 mL / NET: 3132 mL      CAPILLARY BLOOD GLUCOSE  POCT Blood Glucose.: 173 mg/dL (31 May 2018 05:15)      PHYSICAL EXAM:  General:  Elderly, cachetic male, currently sedated and intubated; nonresponsive to noxious stimuli; warming blanket in place  HEENT:  Normocephalic, atraumatic pupils equally round, minimally responsive to light, R neck Shiley  Respiratory:  Non-labored breathing pattern, transmitted upper airway breath sounds; concordant with vent  Cardiovascular:  Regular rate and rhythm   Abdomen: Soft, non-distended  Genital: Bhakta with dark urine  Extremities: R radial A-line, thin, no edema; palpable pulses  Skin:  Warm and dry  Neurological: Unable to assess, sedated    MEDICATIONS:  MEDICATIONS  (STANDING):  chlorhexidine 4% Liquid 1 Application(s) Topical <User Schedule>  dextrose 5%. 1000 milliLiter(s) (50 mL/Hr) IV Continuous <Continuous>  dextrose 5%. 1000 milliLiter(s) (75 mL/Hr) IV Continuous <Continuous>  dextrose 50% Injectable 12.5 Gram(s) IV Push once  dextrose 50% Injectable 25 Gram(s) IV Push once  dextrose 50% Injectable 25 Gram(s) IV Push once  fentaNYL   Infusion. 3 MICROgram(s)/kG/Hr (12.39 mL/Hr) IV Continuous <Continuous>  heparin  Injectable 5000 Unit(s) SubCutaneous every 8 hours  hydrocortisone sodium succinate Injectable 50 milliGRAM(s) IV Push every 8 hours  insulin lispro (HumaLOG) corrective regimen sliding scale   SubCutaneous every 6 hours  norepinephrine Infusion 0.05 MICROgram(s)/kG/Min (1.936 mL/Hr) IV Continuous <Continuous>  piperacillin/tazobactam IVPB. 3.375 Gram(s) IV Intermittent every 12 hours  propofol Infusion 5 MICROgram(s)/kG/Min (1.239 mL/Hr) IV Continuous <Continuous>    MEDICATIONS  (PRN):  dextrose 40% Gel 15 Gram(s) Oral once PRN Blood Glucose LESS THAN 70 milliGRAM(s)/deciliter  glucagon  Injectable 1 milliGRAM(s) IntraMuscular once PRN Glucose LESS THAN 70 milligrams/deciliter    ALLERGIES:  No Known Allergies    LABS:                        6.3    13.49 )-----------( 90       ( 31 May 2018 03:06 )             19.1     05-31    135  |  97<L>  |  46<H>  ----------------------------<  170<H>  4.1   |  18<L>  |  2.58<H>    Ca    7.4<L>      31 May 2018 02:18  Phos  5.1     05-31  Mg     1.9     05-31    TPro  4.7<L>  /  Alb  1.3<L>  /  TBili  0.2  /  DBili  x   /  AST  20  /  ALT  21  /  AlkPhos  129<H>  05-31    PT/INR - ( 30 May 2018 06:00 )   PT: 12.7 SEC;   INR: 1.10          PTT - ( 30 May 2018 06:00 )  PTT:38.9 SEC      RADIOLOGY & ADDITIONAL TESTS: Reviewed. INTERVAL HPI/OVERNIGHT EVENTS:  Decreasing levophed requirements.  Down-trending Hgb and platelets, without signs of active bleeding.     SUBJECTIVE: Patient seen and examined at bedside.     ROS: Unable to obtain ROS as pt intubated & sedated.    VITAL SIGNS:  ICU Vital Signs Last 24 Hrs  T(C): 36.7 (31 May 2018 00:00), Max: 36.7 (31 May 2018 00:00)  T(F): 98.1 (31 May 2018 00:00), Max: 98.1 (31 May 2018 00:00)  HR: 60 (31 May 2018 07:00) (60 - 88)  BP: 124/58 (30 May 2018 08:18) (124/58 - 124/58)  BP(mean): --  ABP: 110/47 (31 May 2018 07:00) (76/33 - 195/62)  ABP(mean): 69 (31 May 2018 07:00) (46 - 102)  RR: 25 (31 May 2018 07:00) (23 - 26)  SpO2: 100% (31 May 2018 07:00) (94% - 100%)    Mode: AC/ CMV (Assist Control/ Continuous Mandatory Ventilation), RR (machine): 24, TV (machine): 400, FiO2: 30, PEEP: 5, MAP: 10.7, PIP: 19    05-30 @ 07:01  -  05-31 @ 07:00  --------------------------------------------------------  IN: 3994 mL / OUT: 862 mL / NET: 3132 mL      CAPILLARY BLOOD GLUCOSE  POCT Blood Glucose.: 173 mg/dL (31 May 2018 05:15)      PHYSICAL EXAM:  General:  Elderly, cachetic male, currently sedated and intubated; nonresponsive to noxious stimuli; warming blanket in place  HEENT:  Normocephalic, atraumatic pupils equally round, minimally responsive to light, R neck Shiley  Respiratory:  Non-labored breathing pattern, transmitted upper airway breath sounds; concordant with vent  Cardiovascular:  Regular rate and rhythm   Abdomen: Soft, non-distended  Genital: Bhakta with dark urine  Extremities: R radial A-line, thin, no edema; palpable pulses  Skin:  Warm and dry  Neurological: Unable to assess, sedated    MEDICATIONS:  MEDICATIONS  (STANDING):  chlorhexidine 4% Liquid 1 Application(s) Topical <User Schedule>  dextrose 5%. 1000 milliLiter(s) (50 mL/Hr) IV Continuous <Continuous>  dextrose 5%. 1000 milliLiter(s) (75 mL/Hr) IV Continuous <Continuous>  dextrose 50% Injectable 12.5 Gram(s) IV Push once  dextrose 50% Injectable 25 Gram(s) IV Push once  dextrose 50% Injectable 25 Gram(s) IV Push once  fentaNYL   Infusion. 3 MICROgram(s)/kG/Hr (12.39 mL/Hr) IV Continuous <Continuous>  heparin  Injectable 5000 Unit(s) SubCutaneous every 8 hours  hydrocortisone sodium succinate Injectable 50 milliGRAM(s) IV Push every 8 hours  insulin lispro (HumaLOG) corrective regimen sliding scale   SubCutaneous every 6 hours  norepinephrine Infusion 0.05 MICROgram(s)/kG/Min (1.936 mL/Hr) IV Continuous <Continuous>  piperacillin/tazobactam IVPB. 3.375 Gram(s) IV Intermittent every 12 hours  propofol Infusion 5 MICROgram(s)/kG/Min (1.239 mL/Hr) IV Continuous <Continuous>    MEDICATIONS  (PRN):  dextrose 40% Gel 15 Gram(s) Oral once PRN Blood Glucose LESS THAN 70 milliGRAM(s)/deciliter  glucagon  Injectable 1 milliGRAM(s) IntraMuscular once PRN Glucose LESS THAN 70 milligrams/deciliter    ALLERGIES:  No Known Allergies    LABS:                        6.3    13.49 )-----------( 90       ( 31 May 2018 03:06 )             19.1     05-31    135  |  97<L>  |  46<H>  ----------------------------<  170<H>  4.1   |  18<L>  |  2.58<H>    Ca    7.4<L>      31 May 2018 02:18  Phos  5.1     05-31  Mg     1.9     05-31    TPro  4.7<L>  /  Alb  1.3<L>  /  TBili  0.2  /  DBili  x   /  AST  20  /  ALT  21  /  AlkPhos  129<H>  05-31    PT/INR - ( 30 May 2018 06:00 )   PT: 12.7 SEC;   INR: 1.10          PTT - ( 30 May 2018 06:00 )  PTT:38.9 SEC      RADIOLOGY & ADDITIONAL TESTS: Reviewed. INTERVAL HPI/OVERNIGHT EVENTS:  Decreasing levophed requirements.  Down-trending Hgb and platelets, without signs of active bleeding.  MICU staff has been unable to reach daughter regarding consent for transfusion.  Tube feeds on hold due to feeds appearing in ET tube.  Vanco level at 38.    SUBJECTIVE: Patient seen and examined at bedside.     ROS: Unable to obtain ROS as pt intubated & sedated.    VITAL SIGNS:  ICU Vital Signs Last 24 Hrs  T(C): 36.7 (31 May 2018 00:00), Max: 36.7 (31 May 2018 00:00)  T(F): 98.1 (31 May 2018 00:00), Max: 98.1 (31 May 2018 00:00)  HR: 60 (31 May 2018 07:00) (60 - 88)  BP: 124/58 (30 May 2018 08:18) (124/58 - 124/58)  BP(mean): --  ABP: 110/47 (31 May 2018 07:00) (76/33 - 195/62)  ABP(mean): 69 (31 May 2018 07:00) (46 - 102)  RR: 25 (31 May 2018 07:00) (23 - 26)  SpO2: 100% (31 May 2018 07:00) (94% - 100%)    Mode: AC/ CMV (Assist Control/ Continuous Mandatory Ventilation), RR (machine): 24, TV (machine): 400, FiO2: 30, PEEP: 5, MAP: 10.7, PIP: 19    05-30 @ 07:01  -  05-31 @ 07:00  --------------------------------------------------------  IN: 3994 mL / OUT: 862 mL / NET: 3132 mL      CAPILLARY BLOOD GLUCOSE  POCT Blood Glucose.: 173 mg/dL (31 May 2018 05:15)      PHYSICAL EXAM:  General:  Elderly, cachetic male, currently sedated and intubated; nonresponsive to noxious stimuli; warming blanket in place  HEENT:  Normocephalic, atraumatic pupils equally round, minimally responsive to light, R-Shiley and L-subclavian TLC  Respiratory:  Non-labored breathing pattern, transmitted upper airway breath sounds; concordant with vent  Cardiovascular:  Regular rate and rhythm   Abdomen: Soft, non-distended  Genital: Bhakta with dark urine  Extremities: R radial A-line, thin, no edema; palpable pulses  Skin:  Warm and dry  Neurological: Unable to assess, sedated    MEDICATIONS:  MEDICATIONS  (STANDING):  chlorhexidine 4% Liquid 1 Application(s) Topical <User Schedule>  dextrose 5%. 1000 milliLiter(s) (50 mL/Hr) IV Continuous <Continuous>  dextrose 5%. 1000 milliLiter(s) (75 mL/Hr) IV Continuous <Continuous>  dextrose 50% Injectable 12.5 Gram(s) IV Push once  dextrose 50% Injectable 25 Gram(s) IV Push once  dextrose 50% Injectable 25 Gram(s) IV Push once  fentaNYL   Infusion. 3 MICROgram(s)/kG/Hr (12.39 mL/Hr) IV Continuous <Continuous>  heparin  Injectable 5000 Unit(s) SubCutaneous every 8 hours  hydrocortisone sodium succinate Injectable 50 milliGRAM(s) IV Push every 8 hours  insulin lispro (HumaLOG) corrective regimen sliding scale   SubCutaneous every 6 hours  norepinephrine Infusion 0.05 MICROgram(s)/kG/Min (1.936 mL/Hr) IV Continuous <Continuous>  piperacillin/tazobactam IVPB. 3.375 Gram(s) IV Intermittent every 12 hours  propofol Infusion 5 MICROgram(s)/kG/Min (1.239 mL/Hr) IV Continuous <Continuous>    MEDICATIONS  (PRN):  dextrose 40% Gel 15 Gram(s) Oral once PRN Blood Glucose LESS THAN 70 milliGRAM(s)/deciliter  glucagon  Injectable 1 milliGRAM(s) IntraMuscular once PRN Glucose LESS THAN 70 milligrams/deciliter    ALLERGIES:  No Known Allergies    LABS:                        6.3    13.49 )-----------( 90       ( 31 May 2018 03:06 )             19.1     05-31    135  |  97<L>  |  46<H>  ----------------------------<  170<H>  4.1   |  18<L>  |  2.58<H>    Ca    7.4<L>      31 May 2018 02:18  Phos  5.1     05-31  Mg     1.9     05-31    TPro  4.7<L>  /  Alb  1.3<L>  /  TBili  0.2  /  DBili  x   /  AST  20  /  ALT  21  /  AlkPhos  129<H>  05-31    PT/INR - ( 30 May 2018 06:00 )   PT: 12.7 SEC;   INR: 1.10          PTT - ( 30 May 2018 06:00 )  PTT:38.9 SEC      RADIOLOGY & ADDITIONAL TESTS: Reviewed.

## 2018-05-31 NOTE — PROGRESS NOTE ADULT - SUBJECTIVE AND OBJECTIVE BOX
[facility] DIVISION OF KIDNEY DISEASES AND HYPERTENSION -- FOLLOW UP NOTE  --------------------------------------------------------------------------------  Chief Complaint:  RUSH requiring RRT    24 hour events/subjective:  Patient remains intubated, sedated on decreasing vasopressor support.  Patient tolerated dialysis yesterday.        PAST HISTORY  --------------------------------------------------------------------------------  No significant changes to PMH, PSH, FHx, SHx, unless otherwise noted    ALLERGIES & MEDICATIONS  --------------------------------------------------------------------------------  Allergies    No Known Allergies    Intolerances      Standing Inpatient Medications  chlorhexidine 4% Liquid 1 Application(s) Topical <User Schedule>  dextrose 5%. 1000 milliLiter(s) IV Continuous <Continuous>  dextrose 5%. 1000 milliLiter(s) IV Continuous <Continuous>  dextrose 50% Injectable 12.5 Gram(s) IV Push once  dextrose 50% Injectable 25 Gram(s) IV Push once  dextrose 50% Injectable 25 Gram(s) IV Push once  fentaNYL   Infusion. 3 MICROgram(s)/kG/Hr IV Continuous <Continuous>  heparin  Injectable 5000 Unit(s) SubCutaneous every 8 hours  hydrocortisone sodium succinate Injectable 50 milliGRAM(s) IV Push every 8 hours  insulin lispro (HumaLOG) corrective regimen sliding scale   SubCutaneous every 6 hours  norepinephrine Infusion 0.05 MICROgram(s)/kG/Min IV Continuous <Continuous>  piperacillin/tazobactam IVPB. 3.375 Gram(s) IV Intermittent every 12 hours  propofol Infusion 5 MICROgram(s)/kG/Min IV Continuous <Continuous>    PRN Inpatient Medications  dextrose 40% Gel 15 Gram(s) Oral once PRN  glucagon  Injectable 1 milliGRAM(s) IntraMuscular once PRN      REVIEW OF SYSTEMS  --------------------------------------------------------------------------------  Patient is intubated, sedated on vasopressor support.    VITALS/PHYSICAL EXAM  --------------------------------------------------------------------------------  T(C): 35.4 (05-31-18 @ 07:58), Max: 36.7 (05-31-18 @ 00:00)  HR: 75 (05-31-18 @ 07:58) (60 - 88)  BP: --  RR: 25 (05-31-18 @ 07:58) (23 - 26)  SpO2: 100% (05-31-18 @ 07:58) (94% - 100%)  Wt(kg): --        05-30-18 @ 07:01  -  05-31-18 @ 07:00  --------------------------------------------------------  IN: 3994 mL / OUT: 862 mL / NET: 3132 mL    05-31-18 @ 07:01  -  05-31-18 @ 08:23  --------------------------------------------------------  IN: 98.5 mL / OUT: 26 mL / NET: 72.5 mL      Physical Exam:  	Gen: NAD  	HEENT: +intubated  	Pulm: +bilateral ventilator breath sounds audible  	CV: RRR, S1S2; no rub  	Abd: soft, nontender/nondistended  	: No suprapubic tenderness  	UE:  no edema  	LE:  no pitting edema  	Neuro: nonverbal  	Psych: sedated  	Skin: Warm  	Vascular access:  RIJ nontunneled dialysis catheter nontender    LABS/STUDIES  --------------------------------------------------------------------------------              6.3    13.49 >-----------<  90       [05-31-18 @ 03:06]              19.1     135  |  97  |  46  ----------------------------<  170      [05-31-18 @ 02:18]  4.1   |  18  |  2.58        Ca     7.4     [05-31-18 @ 02:18]      Mg     1.9     [05-31-18 @ 02:18]      Phos  5.1     [05-31-18 @ 02:18]    Creatinine Trend:  SCr 2.58 [05-31 @ 02:18]  SCr 3.65 [05-30 @ 12:40]  SCr 3.49 [05-30 @ 06:00]  SCr 3.59 [05-30 @ 01:30]  SCr 4.84 [05-29 @ 20:40] [facility] DIVISION OF KIDNEY DISEASES AND HYPERTENSION -- FOLLOW UP NOTE  --------------------------------------------------------------------------------  Chief Complaint:  RUSH requiring RRT    24 hour events/subjective:  Patient remains intubated, sedated on decreasing vasopressor support.  Patient tolerated dialysis yesterday.        PAST HISTORY  --------------------------------------------------------------------------------  No significant changes to PMH, PSH, FHx, SHx, unless otherwise noted    ALLERGIES & MEDICATIONS  --------------------------------------------------------------------------------  Allergies    No Known Allergies    Intolerances      Standing Inpatient Medications  chlorhexidine 4% Liquid 1 Application(s) Topical <User Schedule>  dextrose 5%. 1000 milliLiter(s) IV Continuous <Continuous>  dextrose 5%. 1000 milliLiter(s) IV Continuous <Continuous>  dextrose 50% Injectable 12.5 Gram(s) IV Push once  dextrose 50% Injectable 25 Gram(s) IV Push once  dextrose 50% Injectable 25 Gram(s) IV Push once  fentaNYL   Infusion. 3 MICROgram(s)/kG/Hr IV Continuous <Continuous>  heparin  Injectable 5000 Unit(s) SubCutaneous every 8 hours  hydrocortisone sodium succinate Injectable 50 milliGRAM(s) IV Push every 8 hours  insulin lispro (HumaLOG) corrective regimen sliding scale   SubCutaneous every 6 hours  norepinephrine Infusion 0.05 MICROgram(s)/kG/Min IV Continuous <Continuous>  piperacillin/tazobactam IVPB. 3.375 Gram(s) IV Intermittent every 12 hours  propofol Infusion 5 MICROgram(s)/kG/Min IV Continuous <Continuous>    PRN Inpatient Medications  dextrose 40% Gel 15 Gram(s) Oral once PRN  glucagon  Injectable 1 milliGRAM(s) IntraMuscular once PRN      REVIEW OF SYSTEMS  --------------------------------------------------------------------------------  Patient is intubated, sedated on vasopressor support.    VITALS/PHYSICAL EXAM  --------------------------------------------------------------------------------  T(C): 35.4 (05-31-18 @ 07:58), Max: 36.7 (05-31-18 @ 00:00)  HR: 75 (05-31-18 @ 07:58) (60 - 88)  BP: --  RR: 25 (05-31-18 @ 07:58) (23 - 26)  SpO2: 100% (05-31-18 @ 07:58) (94% - 100%)  Wt(kg): --        05-30-18 @ 07:01  -  05-31-18 @ 07:00  --------------------------------------------------------  IN: 3994 mL / OUT: 862 mL / NET: 3132 mL    05-31-18 @ 07:01  -  05-31-18 @ 08:23  --------------------------------------------------------  IN: 98.5 mL / OUT: 26 mL / NET: 72.5 mL      Physical Exam:  	Gen: NAD  	HEENT: +intubated  	Pulm: +bilateral ventilator breath sounds audible  	CV: RRR, S1S2; no rub  	Abd: soft, nontender/nondistended  	: No suprapubic tenderness  	UE:  no edema  	LE:  no pitting edema  	Neuro: nonverbal  	Psych: sedated  	Skin: Warm  	Vascular access:  RIJ nontunneled dialysis catheter present    LABS/STUDIES  --------------------------------------------------------------------------------              6.3    13.49 >-----------<  90       [05-31-18 @ 03:06]              19.1     135  |  97  |  46  ----------------------------<  170      [05-31-18 @ 02:18]  4.1   |  18  |  2.58        Ca     7.4     [05-31-18 @ 02:18]      Mg     1.9     [05-31-18 @ 02:18]      Phos  5.1     [05-31-18 @ 02:18]    Creatinine Trend:  SCr 2.58 [05-31 @ 02:18]  SCr 3.65 [05-30 @ 12:40]  SCr 3.49 [05-30 @ 06:00]  SCr 3.59 [05-30 @ 01:30]  SCr 4.84 [05-29 @ 20:40]

## 2018-05-31 NOTE — PROGRESS NOTE ADULT - PROBLEM SELECTOR PLAN 1
Patient is increasingly oliguric, bordering anuria.  S/P transition to hemodialysis yesterday.  No plans for HD today.  Monitor BMP, UOP.  Daily monitoring for HD indications. Hemodynamically mediated RUSH in the setting of septic shock. Patient is oliguric, was initiated on CVVHDF.  S/P transition to hemodialysis from on 5/30. Labs reviewed. No plan for HD today. Will likely need HD tomorrow as still with minimal UO. Will continue daily monitoring for HD indications. Monitor BMP. Strict I/Os. Avoid nephrotoxins. Renally dose all medications.

## 2018-05-31 NOTE — PROGRESS NOTE ADULT - ATTENDING COMMENTS
Agree with above. Seen and examined with residents. UTI with sepsis and mutiorgan failure. Failure to thrive. Poor prognosis overall. Decreased hemoglobin today, unclear etiology. GOC discussion with daughter.

## 2018-06-01 DIAGNOSIS — J96.00 ACUTE RESPIRATORY FAILURE, UNSPECIFIED WHETHER WITH HYPOXIA OR HYPERCAPNIA: ICD-10-CM

## 2018-06-01 DIAGNOSIS — Z51.5 ENCOUNTER FOR PALLIATIVE CARE: ICD-10-CM

## 2018-06-01 DIAGNOSIS — G30.9 ALZHEIMER'S DISEASE, UNSPECIFIED: ICD-10-CM

## 2018-06-01 LAB
ALBUMIN SERPL ELPH-MCNC: 1.5 G/DL — LOW (ref 3.3–5)
ALP SERPL-CCNC: 153 U/L — HIGH (ref 40–120)
ALT FLD-CCNC: 21 U/L — SIGNIFICANT CHANGE UP (ref 4–41)
AST SERPL-CCNC: 22 U/L — SIGNIFICANT CHANGE UP (ref 4–40)
BASOPHILS # BLD AUTO: 0.02 K/UL — SIGNIFICANT CHANGE UP (ref 0–0.2)
BASOPHILS NFR BLD AUTO: 0.2 % — SIGNIFICANT CHANGE UP (ref 0–2)
BILIRUB SERPL-MCNC: 0.4 MG/DL — SIGNIFICANT CHANGE UP (ref 0.2–1.2)
BUN SERPL-MCNC: 58 MG/DL — HIGH (ref 7–23)
CALCIUM SERPL-MCNC: 7.4 MG/DL — LOW (ref 8.4–10.5)
CHLORIDE SERPL-SCNC: 92 MMOL/L — LOW (ref 98–107)
CO2 SERPL-SCNC: 16 MMOL/L — LOW (ref 22–31)
CREAT SERPL-MCNC: 3.2 MG/DL — HIGH (ref 0.5–1.3)
EOSINOPHIL # BLD AUTO: 0 K/UL — SIGNIFICANT CHANGE UP (ref 0–0.5)
EOSINOPHIL NFR BLD AUTO: 0 % — SIGNIFICANT CHANGE UP (ref 0–6)
GLUCOSE BLDC GLUCOMTR-MCNC: 102 MG/DL — HIGH (ref 70–99)
GLUCOSE BLDC GLUCOMTR-MCNC: 138 MG/DL — HIGH (ref 70–99)
GLUCOSE BLDC GLUCOMTR-MCNC: 146 MG/DL — HIGH (ref 70–99)
GLUCOSE BLDC GLUCOMTR-MCNC: 65 MG/DL — LOW (ref 70–99)
GLUCOSE BLDC GLUCOMTR-MCNC: 69 MG/DL — LOW (ref 70–99)
GLUCOSE BLDC GLUCOMTR-MCNC: 76 MG/DL — SIGNIFICANT CHANGE UP (ref 70–99)
GLUCOSE BLDC GLUCOMTR-MCNC: 82 MG/DL — SIGNIFICANT CHANGE UP (ref 70–99)
GLUCOSE BLDC GLUCOMTR-MCNC: 91 MG/DL — SIGNIFICANT CHANGE UP (ref 70–99)
GLUCOSE SERPL-MCNC: 85 MG/DL — SIGNIFICANT CHANGE UP (ref 70–99)
HCT VFR BLD CALC: 20.8 % — CRITICAL LOW (ref 39–50)
HGB BLD-MCNC: 7.2 G/DL — LOW (ref 13–17)
IMM GRANULOCYTES # BLD AUTO: 0.15 # — SIGNIFICANT CHANGE UP
IMM GRANULOCYTES NFR BLD AUTO: 1.7 % — HIGH (ref 0–1.5)
LYMPHOCYTES # BLD AUTO: 0.43 K/UL — LOW (ref 1–3.3)
LYMPHOCYTES # BLD AUTO: 5 % — LOW (ref 13–44)
MAGNESIUM SERPL-MCNC: 1.8 MG/DL — SIGNIFICANT CHANGE UP (ref 1.6–2.6)
MCHC RBC-ENTMCNC: 28.6 PG — SIGNIFICANT CHANGE UP (ref 27–34)
MCHC RBC-ENTMCNC: 34.6 % — SIGNIFICANT CHANGE UP (ref 32–36)
MCV RBC AUTO: 82.5 FL — SIGNIFICANT CHANGE UP (ref 80–100)
MONOCYTES # BLD AUTO: 0.15 K/UL — SIGNIFICANT CHANGE UP (ref 0–0.9)
MONOCYTES NFR BLD AUTO: 1.7 % — LOW (ref 2–14)
NEUTROPHILS # BLD AUTO: 7.88 K/UL — HIGH (ref 1.8–7.4)
NEUTROPHILS NFR BLD AUTO: 91.4 % — HIGH (ref 43–77)
NRBC # FLD: 0 — SIGNIFICANT CHANGE UP
PHOSPHATE SERPL-MCNC: 6.2 MG/DL — HIGH (ref 2.5–4.5)
PLATELET # BLD AUTO: 80 K/UL — LOW (ref 150–400)
PMV BLD: 11.1 FL — SIGNIFICANT CHANGE UP (ref 7–13)
POTASSIUM SERPL-MCNC: 4 MMOL/L — SIGNIFICANT CHANGE UP (ref 3.5–5.3)
POTASSIUM SERPL-SCNC: 4 MMOL/L — SIGNIFICANT CHANGE UP (ref 3.5–5.3)
PROT SERPL-MCNC: 4.9 G/DL — LOW (ref 6–8.3)
RBC # BLD: 2.52 M/UL — LOW (ref 4.2–5.8)
RBC # FLD: 16.1 % — HIGH (ref 10.3–14.5)
SODIUM SERPL-SCNC: 128 MMOL/L — LOW (ref 135–145)
WBC # BLD: 8.63 K/UL — SIGNIFICANT CHANGE UP (ref 3.8–10.5)
WBC # FLD AUTO: 8.63 K/UL — SIGNIFICANT CHANGE UP (ref 3.8–10.5)

## 2018-06-01 PROCEDURE — 99291 CRITICAL CARE FIRST HOUR: CPT

## 2018-06-01 PROCEDURE — 99233 SBSQ HOSP IP/OBS HIGH 50: CPT | Mod: GC

## 2018-06-01 PROCEDURE — 99223 1ST HOSP IP/OBS HIGH 75: CPT | Mod: GC

## 2018-06-01 RX ORDER — HYDROCORTISONE 20 MG
25 TABLET ORAL EVERY 8 HOURS
Qty: 0 | Refills: 0 | Status: DISCONTINUED | OUTPATIENT
Start: 2018-06-01 | End: 2018-06-02

## 2018-06-01 RX ORDER — DEXTROSE 50 % IN WATER 50 %
12.5 SYRINGE (ML) INTRAVENOUS ONCE
Qty: 0 | Refills: 0 | Status: COMPLETED | OUTPATIENT
Start: 2018-06-01 | End: 2018-06-01

## 2018-06-01 RX ORDER — FENTANYL CITRATE 50 UG/ML
50 INJECTION INTRAVENOUS ONCE
Qty: 0 | Refills: 0 | Status: DISCONTINUED | OUTPATIENT
Start: 2018-06-01 | End: 2018-06-01

## 2018-06-01 RX ORDER — FENTANYL CITRATE 50 UG/ML
25 INJECTION INTRAVENOUS ONCE
Qty: 0 | Refills: 0 | Status: DISCONTINUED | OUTPATIENT
Start: 2018-06-01 | End: 2018-06-01

## 2018-06-01 RX ORDER — DEXTROSE 50 % IN WATER 50 %
50 SYRINGE (ML) INTRAVENOUS ONCE
Qty: 0 | Refills: 0 | Status: COMPLETED | OUTPATIENT
Start: 2018-06-01 | End: 2018-06-01

## 2018-06-01 RX ADMIN — HEPARIN SODIUM 5000 UNIT(S): 5000 INJECTION INTRAVENOUS; SUBCUTANEOUS at 05:24

## 2018-06-01 RX ADMIN — HEPARIN SODIUM 5000 UNIT(S): 5000 INJECTION INTRAVENOUS; SUBCUTANEOUS at 21:26

## 2018-06-01 RX ADMIN — HEPARIN SODIUM 5000 UNIT(S): 5000 INJECTION INTRAVENOUS; SUBCUTANEOUS at 13:08

## 2018-06-01 RX ADMIN — PIPERACILLIN AND TAZOBACTAM 25 GRAM(S): 4; .5 INJECTION, POWDER, LYOPHILIZED, FOR SOLUTION INTRAVENOUS at 17:50

## 2018-06-01 RX ADMIN — FENTANYL CITRATE 50 MICROGRAM(S): 50 INJECTION INTRAVENOUS at 22:53

## 2018-06-01 RX ADMIN — Medication 25 MILLIGRAM(S): at 13:08

## 2018-06-01 RX ADMIN — FENTANYL CITRATE 25 MICROGRAM(S): 50 INJECTION INTRAVENOUS at 20:12

## 2018-06-01 RX ADMIN — Medication 25 MILLIGRAM(S): at 21:25

## 2018-06-01 RX ADMIN — Medication 12.5 GRAM(S): at 17:57

## 2018-06-01 RX ADMIN — CHLORHEXIDINE GLUCONATE 1 APPLICATION(S): 213 SOLUTION TOPICAL at 05:25

## 2018-06-01 RX ADMIN — Medication 50 MILLIGRAM(S): at 05:24

## 2018-06-01 RX ADMIN — PIPERACILLIN AND TAZOBACTAM 25 GRAM(S): 4; .5 INJECTION, POWDER, LYOPHILIZED, FOR SOLUTION INTRAVENOUS at 05:24

## 2018-06-01 RX ADMIN — Medication 50 MILLILITER(S): at 06:16

## 2018-06-01 NOTE — PROGRESS NOTE ADULT - SUBJECTIVE AND OBJECTIVE BOX
INTERVAL HPI/OVERNIGHT EVENTS:    SUBJECTIVE: Patient seen and examined at bedside.     ROS:    VITAL SIGNS:  ICU Vital Signs Last 24 Hrs  T(C): 36.1 (01 Jun 2018 04:00), Max: 36.9 (31 May 2018 11:00)  T(F): 96.9 (01 Jun 2018 04:00), Max: 98.5 (31 May 2018 11:00)  HR: 78 (01 Jun 2018 07:00) (61 - 78)  BP: 134/83 (01 Jun 2018 07:00) (103/63 - 135/86)  BP(mean): 97 (01 Jun 2018 07:00) (75 - 102)  ABP: 102/45 (31 May 2018 11:00) (102/45 - 128/55)  ABP(mean): 63 (31 May 2018 11:00) (63 - 82)  RR: 29 (01 Jun 2018 07:00) (21 - 29)  SpO2: 100% (01 Jun 2018 07:00) (100% - 100%)    Mode: AC/ CMV (Assist Control/ Continuous Mandatory Ventilation), RR (machine): 24, TV (machine): 400, FiO2: 30, PEEP: 5, MAP: 10.8, PIP: 16    05-31 @ 07:01  -  06-01 @ 07:00  --------------------------------------------------------  IN: 2428.5 mL / OUT: 266 mL / NET: 2162.5 mL      CAPILLARY BLOOD GLUCOSE  POCT Blood Glucose.: 76 mg/dL (01 Jun 2018 05:21)      PHYSICAL EXAM:  General:  Elderly, cachetic male, currently intubated; nonresponsive to noxious stimuli  HEENT:  Normocephalic, atraumatic pupils equally round, minimally responsive to light, R-Shiley and L-subclavian TLC  Respiratory:  Non-labored breathing pattern, transmitted upper airway breath sounds; concordant with vent  Cardiovascular:  Regular rate and rhythm   Abdomen: Soft, non-distended  Genital: Bhakta with dark urine  Extremities: R radial A-line, thin, no edema; palpable pulses  Skin:  Warm and dry  Neurological: Unable to assess, sedated    MEDICATIONS:  MEDICATIONS  (STANDING):  chlorhexidine 4% Liquid 1 Application(s) Topical <User Schedule>  dextrose 5%. 1000 milliLiter(s) (50 mL/Hr) IV Continuous <Continuous>  dextrose 50% Injectable 12.5 Gram(s) IV Push once  dextrose 50% Injectable 25 Gram(s) IV Push once  dextrose 50% Injectable 25 Gram(s) IV Push once  heparin  Injectable 5000 Unit(s) SubCutaneous every 8 hours  hydrocortisone sodium succinate Injectable 50 milliGRAM(s) IV Push every 8 hours  insulin lispro (HumaLOG) corrective regimen sliding scale   SubCutaneous every 6 hours  piperacillin/tazobactam IVPB. 3.375 Gram(s) IV Intermittent every 12 hours    MEDICATIONS  (PRN):  dextrose 40% Gel 15 Gram(s) Oral once PRN Blood Glucose LESS THAN 70 milliGRAM(s)/deciliter  glucagon  Injectable 1 milliGRAM(s) IntraMuscular once PRN Glucose LESS THAN 70 milligrams/deciliter      ALLERGIES:  No Known Allergies  Intolerances      LABS:                        7.2    8.63  )-----------( 80       ( 01 Jun 2018 02:40 )             20.8     06-01    128<L>  |  92<L>  |  58<H>  ----------------------------<  85  4.0   |  16<L>  |  3.20<H>    Ca    7.4<L>      01 Jun 2018 02:40  Phos  6.2     06-01  Mg     1.8     06-01    TPro  4.9<L>  /  Alb  1.5<L>  /  TBili  0.4  /  DBili  x   /  AST  22  /  ALT  21  /  AlkPhos  153<H>  06-01      RADIOLOGY & ADDITIONAL TESTS: Reviewed. INTERVAL HPI/OVERNIGHT EVENTS:  Received 1pRBC yesterday for acute anemia (Hgb 6.3).  Tracheal aspirate growing Klebsiella and urine culture growing Enterobacter.  Weaned off sedation on 5/31.  Titrated off levophed overnight.    SUBJECTIVE: Patient seen and examined at bedside.     ROS:    VITAL SIGNS:  ICU Vital Signs Last 24 Hrs  T(C): 36.1 (01 Jun 2018 04:00), Max: 36.9 (31 May 2018 11:00)  T(F): 96.9 (01 Jun 2018 04:00), Max: 98.5 (31 May 2018 11:00)  HR: 78 (01 Jun 2018 07:00) (61 - 78)  BP: 134/83 (01 Jun 2018 07:00) (103/63 - 135/86)  BP(mean): 97 (01 Jun 2018 07:00) (75 - 102)  ABP: 102/45 (31 May 2018 11:00) (102/45 - 128/55)  ABP(mean): 63 (31 May 2018 11:00) (63 - 82)  RR: 29 (01 Jun 2018 07:00) (21 - 29)  SpO2: 100% (01 Jun 2018 07:00) (100% - 100%)    Mode: AC/ CMV (Assist Control/ Continuous Mandatory Ventilation), RR (machine): 24, TV (machine): 400, FiO2: 30, PEEP: 5, MAP: 10.8, PIP: 16    05-31 @ 07:01  -  06-01 @ 07:00  --------------------------------------------------------  IN: 2428.5 mL / OUT: 266 mL / NET: 2162.5 mL      CAPILLARY BLOOD GLUCOSE  POCT Blood Glucose.: 76 mg/dL (01 Jun 2018 05:21)      PHYSICAL EXAM:  General:  Elderly, cachetic male, currently intubated; nonresponsive to noxious stimuli  HEENT:  Normocephalic, atraumatic pupils equally round, minimally responsive to light, R-Shiley and L-subclavian TLC  Respiratory:  Non-labored breathing pattern, transmitted upper airway breath sounds; concordant with vent  Cardiovascular:  Regular rate and rhythm   Abdomen: Soft, non-distended  Genital: Bhakta with dark urine  Extremities: R radial A-line, thin, no edema; palpable pulses  Skin:  Warm and dry  Neurological: Unable to assess, sedated    MEDICATIONS:  MEDICATIONS  (STANDING):  chlorhexidine 4% Liquid 1 Application(s) Topical <User Schedule>  dextrose 5%. 1000 milliLiter(s) (50 mL/Hr) IV Continuous <Continuous>  dextrose 50% Injectable 12.5 Gram(s) IV Push once  dextrose 50% Injectable 25 Gram(s) IV Push once  dextrose 50% Injectable 25 Gram(s) IV Push once  heparin  Injectable 5000 Unit(s) SubCutaneous every 8 hours  hydrocortisone sodium succinate Injectable 50 milliGRAM(s) IV Push every 8 hours  insulin lispro (HumaLOG) corrective regimen sliding scale   SubCutaneous every 6 hours  piperacillin/tazobactam IVPB. 3.375 Gram(s) IV Intermittent every 12 hours    MEDICATIONS  (PRN):  dextrose 40% Gel 15 Gram(s) Oral once PRN Blood Glucose LESS THAN 70 milliGRAM(s)/deciliter  glucagon  Injectable 1 milliGRAM(s) IntraMuscular once PRN Glucose LESS THAN 70 milligrams/deciliter      ALLERGIES:  No Known Allergies  Intolerances      LABS:                        7.2    8.63  )-----------( 80       ( 01 Jun 2018 02:40 )             20.8     06-01    128<L>  |  92<L>  |  58<H>  ----------------------------<  85  4.0   |  16<L>  |  3.20<H>    Ca    7.4<L>      01 Jun 2018 02:40  Phos  6.2     06-01  Mg     1.8     06-01    TPro  4.9<L>  /  Alb  1.5<L>  /  TBili  0.4  /  DBili  x   /  AST  22  /  ALT  21  /  AlkPhos  153<H>  06-01      RADIOLOGY & ADDITIONAL TESTS: Reviewed. INTERVAL HPI/OVERNIGHT EVENTS:  Received 1pRBC yesterday for acute anemia (Hgb 6.3).  Tracheal aspirate growing Klebsiella and urine culture growing Enterobacter.  Weaned off sedation on 5/31.  Titrated off levophed overnight.    SUBJECTIVE: Patient seen and examined at bedside.     ROS: Unable to obtain ROS 2/2 intubation    VITAL SIGNS:  ICU Vital Signs Last 24 Hrs  T(C): 36.1 (01 Jun 2018 04:00), Max: 36.9 (31 May 2018 11:00)  T(F): 96.9 (01 Jun 2018 04:00), Max: 98.5 (31 May 2018 11:00)  HR: 78 (01 Jun 2018 07:00) (61 - 78)  BP: 134/83 (01 Jun 2018 07:00) (103/63 - 135/86)  BP(mean): 97 (01 Jun 2018 07:00) (75 - 102)  ABP: 102/45 (31 May 2018 11:00) (102/45 - 128/55)  ABP(mean): 63 (31 May 2018 11:00) (63 - 82)  RR: 29 (01 Jun 2018 07:00) (21 - 29)  SpO2: 100% (01 Jun 2018 07:00) (100% - 100%)    Mode: AC/ CMV (Assist Control/ Continuous Mandatory Ventilation), RR (machine): 24, TV (machine): 400, FiO2: 30, PEEP: 5, MAP: 10.8, PIP: 16    05-31 @ 07:01  -  06-01 @ 07:00  --------------------------------------------------------  IN: 2428.5 mL / OUT: 266 mL / NET: 2162.5 mL      CAPILLARY BLOOD GLUCOSE  POCT Blood Glucose.: 76 mg/dL (01 Jun 2018 05:21)      PHYSICAL EXAM:  General:  Elderly, cachetic male, currently intubated; nonresponsive to noxious stimuli  HEENT:  Normocephalic, atraumatic pupils equally round, minimally responsive to light, R-Shiley and L-subclavian TLC  Respiratory:  Non-labored breathing pattern, transmitted upper airway breath sounds; concordant with vent  Cardiovascular:  Regular rate and rhythm   Abdomen: Soft, non-distended  Genital: Bhakta with dark urine  Extremities: R radial A-line, thin, no edema; palpable pulses  Skin:  Warm and dry  Neurological: Unable to assess, sedated    MEDICATIONS:  MEDICATIONS  (STANDING):  chlorhexidine 4% Liquid 1 Application(s) Topical <User Schedule>  dextrose 5%. 1000 milliLiter(s) (50 mL/Hr) IV Continuous <Continuous>  dextrose 50% Injectable 12.5 Gram(s) IV Push once  dextrose 50% Injectable 25 Gram(s) IV Push once  dextrose 50% Injectable 25 Gram(s) IV Push once  heparin  Injectable 5000 Unit(s) SubCutaneous every 8 hours  hydrocortisone sodium succinate Injectable 50 milliGRAM(s) IV Push every 8 hours  insulin lispro (HumaLOG) corrective regimen sliding scale   SubCutaneous every 6 hours  piperacillin/tazobactam IVPB. 3.375 Gram(s) IV Intermittent every 12 hours    MEDICATIONS  (PRN):  dextrose 40% Gel 15 Gram(s) Oral once PRN Blood Glucose LESS THAN 70 milliGRAM(s)/deciliter  glucagon  Injectable 1 milliGRAM(s) IntraMuscular once PRN Glucose LESS THAN 70 milligrams/deciliter      ALLERGIES:  No Known Allergies  Intolerances      LABS:                        7.2    8.63  )-----------( 80       ( 01 Jun 2018 02:40 )             20.8     06-01    128<L>  |  92<L>  |  58<H>  ----------------------------<  85  4.0   |  16<L>  |  3.20<H>    Ca    7.4<L>      01 Jun 2018 02:40  Phos  6.2     06-01  Mg     1.8     06-01    TPro  4.9<L>  /  Alb  1.5<L>  /  TBili  0.4  /  DBili  x   /  AST  22  /  ALT  21  /  AlkPhos  153<H>  06-01      RADIOLOGY & ADDITIONAL TESTS: Reviewed. INTERVAL HPI/OVERNIGHT EVENTS:  Received 1pRBC yesterday for acute anemia (Hgb 6.3).  Tracheal aspirate growing Klebsiella and urine culture growing Enterobacter.  Weaned off sedation on 5/31.  Titrated off levophed overnight.    SUBJECTIVE: Patient seen and examined at bedside.     ROS: Unable to obtain ROS 2/2 intubation    VITAL SIGNS:  ICU Vital Signs Last 24 Hrs  T(C): 36.1 (01 Jun 2018 04:00), Max: 36.9 (31 May 2018 11:00)  T(F): 96.9 (01 Jun 2018 04:00), Max: 98.5 (31 May 2018 11:00)  HR: 78 (01 Jun 2018 07:00) (61 - 78)  BP: 134/83 (01 Jun 2018 07:00) (103/63 - 135/86)  BP(mean): 97 (01 Jun 2018 07:00) (75 - 102)  ABP: 102/45 (31 May 2018 11:00) (102/45 - 128/55)  ABP(mean): 63 (31 May 2018 11:00) (63 - 82)  RR: 29 (01 Jun 2018 07:00) (21 - 29)  SpO2: 100% (01 Jun 2018 07:00) (100% - 100%)    Mode: AC/ CMV (Assist Control/ Continuous Mandatory Ventilation), RR (machine): 24, TV (machine): 400, FiO2: 30, PEEP: 5, MAP: 10.8, PIP: 16    05-31 @ 07:01  -  06-01 @ 07:00  --------------------------------------------------------  IN: 2428.5 mL / OUT: 266 mL / NET: 2162.5 mL      CAPILLARY BLOOD GLUCOSE  POCT Blood Glucose.: 76 mg/dL (01 Jun 2018 05:21)      PHYSICAL EXAM:  General:  Elderly, cachetic male, currently intubated; non-responsive to verbal or tactile stimuli  HEENT:  Normocephalic, atraumatic pupils equally round, minimally responsive to light, R-Shiley and L-subclavian TLC  Respiratory:  Non-labored breathing pattern, transmitted upper airway breath sounds; concordant with vent  Cardiovascular:  Regular rate and rhythm   Abdomen: Soft, non-distended  Genital: Bhakta with dark urine  Extremities: Thin, no edema; palpable pulses  Skin:  Warm and dry  Neurological: Unable to participate in exam    MEDICATIONS:  MEDICATIONS  (STANDING):  chlorhexidine 4% Liquid 1 Application(s) Topical <User Schedule>  dextrose 5%. 1000 milliLiter(s) (50 mL/Hr) IV Continuous <Continuous>  dextrose 50% Injectable 12.5 Gram(s) IV Push once  dextrose 50% Injectable 25 Gram(s) IV Push once  dextrose 50% Injectable 25 Gram(s) IV Push once  heparin  Injectable 5000 Unit(s) SubCutaneous every 8 hours  hydrocortisone sodium succinate Injectable 50 milliGRAM(s) IV Push every 8 hours  insulin lispro (HumaLOG) corrective regimen sliding scale   SubCutaneous every 6 hours  piperacillin/tazobactam IVPB. 3.375 Gram(s) IV Intermittent every 12 hours    MEDICATIONS  (PRN):  dextrose 40% Gel 15 Gram(s) Oral once PRN Blood Glucose LESS THAN 70 milliGRAM(s)/deciliter  glucagon  Injectable 1 milliGRAM(s) IntraMuscular once PRN Glucose LESS THAN 70 milligrams/deciliter      ALLERGIES:  No Known Allergies  Intolerances      LABS:                        7.2    8.63  )-----------( 80       ( 01 Jun 2018 02:40 )             20.8     06-01    128<L>  |  92<L>  |  58<H>  ----------------------------<  85  4.0   |  16<L>  |  3.20<H>    Ca    7.4<L>      01 Jun 2018 02:40  Phos  6.2     06-01  Mg     1.8     06-01    TPro  4.9<L>  /  Alb  1.5<L>  /  TBili  0.4  /  DBili  x   /  AST  22  /  ALT  21  /  AlkPhos  153<H>  06-01      RADIOLOGY & ADDITIONAL TESTS: Reviewed. INTERVAL HPI/OVERNIGHT EVENTS:  Received 1pRBC yesterday for acute anemia (Hgb 6.3).  Tracheal aspirate growing Klebsiella and urine culture growing Enterobacter.  Weaned off sedation on 5/31.  Titrated off levophed overnight.    SUBJECTIVE: Patient seen and examined at bedside.     ROS: Unable to obtain ROS 2/2 intubation.    VITAL SIGNS:  ICU Vital Signs Last 24 Hrs  T(C): 36.1 (01 Jun 2018 04:00), Max: 36.9 (31 May 2018 11:00)  T(F): 96.9 (01 Jun 2018 04:00), Max: 98.5 (31 May 2018 11:00)  HR: 78 (01 Jun 2018 07:00) (61 - 78)  BP: 134/83 (01 Jun 2018 07:00) (103/63 - 135/86)  BP(mean): 97 (01 Jun 2018 07:00) (75 - 102)  ABP: 102/45 (31 May 2018 11:00) (102/45 - 128/55)  ABP(mean): 63 (31 May 2018 11:00) (63 - 82)  RR: 29 (01 Jun 2018 07:00) (21 - 29)  SpO2: 100% (01 Jun 2018 07:00) (100% - 100%)    Mode: AC/ CMV (Assist Control/ Continuous Mandatory Ventilation), RR (machine): 24, TV (machine): 400, FiO2: 30, PEEP: 5, MAP: 10.8, PIP: 16    05-31 @ 07:01  -  06-01 @ 07:00  --------------------------------------------------------  IN: 2428.5 mL / OUT: 266 mL / NET: 2162.5 mL      CAPILLARY BLOOD GLUCOSE  POCT Blood Glucose.: 76 mg/dL (01 Jun 2018 05:21)      PHYSICAL EXAM:  General:  Elderly, cachetic male, currently intubated; non-responsive to verbal or tactile stimuli  HEENT:  Normocephalic, atraumatic pupils equally round, minimally responsive to light, R-Shiley and L-subclavian TLC  Respiratory:  Non-labored breathing pattern, transmitted upper airway breath sounds; concordant with vent  Cardiovascular:  Regular rate and rhythm   Abdomen: Soft, non-distended  Genital: Bhakta with dark urine  Extremities: Thin, no edema; palpable pulses  Skin:  Warm and dry  Neurological: Unable to participate in exam    MEDICATIONS:  MEDICATIONS  (STANDING):  chlorhexidine 4% Liquid 1 Application(s) Topical <User Schedule>  dextrose 5%. 1000 milliLiter(s) (50 mL/Hr) IV Continuous <Continuous>  dextrose 50% Injectable 12.5 Gram(s) IV Push once  dextrose 50% Injectable 25 Gram(s) IV Push once  dextrose 50% Injectable 25 Gram(s) IV Push once  heparin  Injectable 5000 Unit(s) SubCutaneous every 8 hours  hydrocortisone sodium succinate Injectable 50 milliGRAM(s) IV Push every 8 hours  insulin lispro (HumaLOG) corrective regimen sliding scale   SubCutaneous every 6 hours  piperacillin/tazobactam IVPB. 3.375 Gram(s) IV Intermittent every 12 hours    MEDICATIONS  (PRN):  dextrose 40% Gel 15 Gram(s) Oral once PRN Blood Glucose LESS THAN 70 milliGRAM(s)/deciliter  glucagon  Injectable 1 milliGRAM(s) IntraMuscular once PRN Glucose LESS THAN 70 milligrams/deciliter      ALLERGIES:  No Known Allergies  Intolerances      LABS:                        7.2    8.63  )-----------( 80       ( 01 Jun 2018 02:40 )             20.8     06-01    128<L>  |  92<L>  |  58<H>  ----------------------------<  85  4.0   |  16<L>  |  3.20<H>    Ca    7.4<L>      01 Jun 2018 02:40  Phos  6.2     06-01  Mg     1.8     06-01    TPro  4.9<L>  /  Alb  1.5<L>  /  TBili  0.4  /  DBili  x   /  AST  22  /  ALT  21  /  AlkPhos  153<H>  06-01      RADIOLOGY & ADDITIONAL TESTS: Reviewed.

## 2018-06-01 NOTE — PROGRESS NOTE ADULT - ASSESSMENT
83M hx mild dementia, uncontrolled T2DM, HTN presenting to the ED w/ declining functional status, subjective hypothermia, and rigors  found to have leukocytosis (20.9) with, R-lung opacity and purulent urine/+UA admitted to MICU for severe sepsis 2/2 PNA and UTI and ARF, course c/b septic shock with refractory acidosis and hyperkalemic requiring CVVH    #Neuro  -Sedated vacation, fentanyl & propofol d/c'ed on 5/31    #CV  -Distributive shock in setting of sepsis and iatrogenic from sedation, now resolved, s/p levophed from 5/28 to 6/1.  -Started on hydrocortisone on 5/30 for adrenal insufficiency in setting of sepsis, wean as tolerated    #Pulm  -Intubated on 5/28 respiratory distress/tachypnea, likely related setting of respiratory compensation for severe metabolic acidosis and underlying PNA  -Undergoing sedation vacation, CPAP trials     #ID  -Septic shock in setting Klebsiella PNA and Enterobacter UTI.  -s/p vanco from 5/28 to 5/31, on Zosyn from 5/28 to present     #GI  -Enteral feeds started on 5/29 via OGT  -Holding feeds due to concern for residuals seen in ET tube    #Renal  -Acute renal failure with severe acidosis and multiple electrolyte derangements (hypernatremia, hyperkalemia), now resolved, today with hyponatremia likely iatrogenic in setting of D5W use for several days  -Etiology of acute renal failure likely hemodynamically mediated in setting of sepsis  -HD as per renal    #Endo  -Uncontrolled T2DM (HgbA1c 10.3) with hyperglycemia peaking in the 400s on admission  -Some hyperglycemia in 200s likely related to tube feeds and steroids, may need NPH to persistent hyperglycemia   -Started on hydrocortisone for relative adrenal insufficiency in setting of sepsis    #Heme  -Acute on chronic anemia without no active source of bleeding identified, s/p 1pRBC on 5/31 with improvement   -Worsening thrombocytopenia (platelets decreased from 163 to 90 in 3 days); 4T score about 2 which is low clinical probability of HIT  -Suspect acute anemia and worsening thrombocytopenia related to myelosuppression from critical illness    #VTE ppx  -Heparin SQ, monitor platelets closely and hold if platelets <50    #Advanced care planning  -Initial [facility] discussion with health care surrogate (daughter, Alexia Guevara) who wishes for aggressive measures and FULL CODE at this time. Refer to GOC note dated on 5/28 for more details  -Plan for family meeting between daughter and palliative care team today on 6/1    Nelly Guerrero MD, PGY3  Internal Medicine Resident  Pager #[phone] ([facility]), [phone] ([facility]) 83M hx mild dementia, uncontrolled T2DM, HTN presenting to the ED w/ declining functional status, subjective hypothermia, and rigors  found to have leukocytosis (20.9) with, R-lung opacity and purulent urine/+UA admitted to MICU for severe sepsis 2/2 PNA and UTI and ARF, course c/b septic shock with refractory acidosis and hyperkalemic requiring RRT    #Neuro  -Sedated vacation, fentanyl & propofol d/c'ed on 5/31    #CV  -Distributive shock in setting of sepsis and iatrogenic from sedation, now resolved, s/p levophed from 5/28 to 6/1.  -Started on hydrocortisone on 5/30 for adrenal insufficiency in setting of sepsis, wean as tolerated    #Pulm  -Intubated on 5/28 respiratory distress/tachypnea, likely related setting of respiratory compensation for severe metabolic acidosis and underlying PNA  -Undergoing sedation vacation, CPAP trials     #ID  -Septic shock in setting Klebsiella PNA and Enterobacter UTI.  -s/p vanco from 5/28 to 5/31, on Zosyn from 5/28 to present     #GI  -Enteral feeds started on 5/29 via OGT  -Holding feeds due to concern for residuals seen in ET tube    #Renal  -Acute renal failure with severe acidosis and multiple electrolyte derangements (hypernatremia, hyperkalemia), now resolved, today with hyponatremia likely iatrogenic in setting of D5W use for several days  -Etiology of acute renal failure likely hemodynamically mediated in setting of sepsis  -HD as per renal    #Endo  -Uncontrolled T2DM (HgbA1c 10.3) with hyperglycemia peaking in the 400s on admission  -Some hyperglycemia in 200s likely related to tube feeds and steroids, may need NPH to persistent hyperglycemia   -Started on hydrocortisone for relative adrenal insufficiency in setting of sepsis    #Heme  -Acute on chronic anemia without no active source of bleeding identified, s/p 1pRBC on 5/31 with improvement   -Worsening thrombocytopenia (platelets decreased from 163 to 90 in 3 days); 4T score about 2 which is low clinical probability of HIT  -Suspect acute anemia and worsening thrombocytopenia related to myelosuppression from critical illness    #VTE ppx  -Heparin SQ, monitor platelets closely and hold if platelets <50    #Advanced care planning  -Initial [facility] discussion with health care surrogate (daughter, Alexia Guevara) who wishes for aggressive measures and FULL CODE at this time. Refer to GOC note dated on 5/28 for more details  -Plan for family meeting between daughter and palliative care team today on 6/1    Nelly Guerrero MD, PGY3  Internal Medicine Resident  Pager #[phone] ([facility]), [phone] ([facility]) 83M hx mild dementia, uncontrolled T2DM, HTN presenting to the ED w/ declining functional status, subjective hypothermia, and rigors  found to have leukocytosis (20.9) with, R-lung opacity and purulent urine/+UA admitted to MICU for severe sepsis 2/2 PNA and UTI and ARF, course c/b septic shock with refractory acidosis and hyperkalemic requiring RRT    #Neuro  -Sedated vacation, fentanyl & propofol d/c'ed on 5/31    #CV  -Distributive shock in setting of sepsis and iatrogenic from sedation, now resolved, s/p levophed from 5/28 to 6/1.  -Started on hydrocortisone on 5/30 for adrenal insufficiency in setting of sepsis, wean as tolerated    #Pulm  -Intubated on 5/28 respiratory distress/tachypnea, likely related setting of respiratory compensation for severe metabolic acidosis and underlying PNA  -Undergoing sedation vacation, CPAP trials     #ID  -Septic shock in setting Klebsiella PNA and Enterobacter UTI.  -s/p vanco from 5/28 to 5/31, on Zosyn from 5/28 to present     #GI  -Enteral feeds started on 5/29 via OGT  -Holding feeds due to concern for residuals seen in ET tube    #Renal  -Acute renal failure, likely hemodynamically mediated in setting of septic shock, with severe acidosis and multiple electrolyte derangements (hypernatremia, hyperkalemia) requiring RRT.    -Remains oliguric with UOP <500 in past 24 hours.  Further HD as per renal team and ongoing GOC  -Now hyponatremia, which is likely iatrogenic in setting of D5W use for several days, d/c'ed D5W    #Endo  -Uncontrolled T2DM (HgbA1c 10.3) with hyperglycemia peaking in the 400s on admission  -Some hyperglycemia in 200s likely related to tube feeds and steroids, may need NPH to persistent hyperglycemia   -Started on hydrocortisone for relative adrenal insufficiency in setting of sepsis    #Heme  -Acute on chronic anemia without no active source of bleeding identified, s/p 1pRBC on 5/31 with Hgb response   -Worsening thrombocytopenia (platelets decreased from 163 to 90 in 3 days); 4T score about 2 which is low clinical probability of HIT  -Suspect acute anemia and worsening thrombocytopenia related to myelosuppression from critical illness    #VTE ppx  -Heparin SQ, monitor platelets closely and hold if platelets <50    #Advanced care planning  -Initial GOC discussion with health care surrogate (daughter, Alexia Guevara) who wishes for aggressive measures and FULL CODE at this time. Refer to [facility] note dated on 5/28 for more details  -Plan for family meeting between daughter and palliative care team today on 6/1    Nelly Guerrero MD, PGY3  Internal Medicine Resident  Pager #[phone] ([facility]), [phone] ([facility]) 83M hx mild dementia, uncontrolled T2DM, HTN presenting to the ED w/ declining functional status, subjective hypothermia, and rigors  found to have leukocytosis (20.9) with, R-lung opacity and purulent urine/+UA admitted to MICU for severe sepsis 2/2 PNA and UTI and ARF, course c/b septic shock with refractory acidosis and hyperkalemic requiring RRT    #Neuro  -Currently non-responsive off sedation, fentanyl & propofol d/c'ed on 5/31    #CV  -Distributive shock in setting of sepsis and iatrogenic from sedation, now resolved, s/p levophed from 5/28 to 6/1.  -Started on hydrocortisone on 5/30 for adrenal insufficiency in setting of sepsis, wean as tolerated    #Pulm  -Intubated on 5/28 respiratory distress/tachypnea, likely related setting of respiratory compensation for severe metabolic acidosis and underlying PNA  -Undergoing sedation vacation, daily CPAP trials    #ID  -Septic shock in setting Klebsiella PNA and Enterobacter UTI.  -s/p vanco from 5/28 to 5/31, on Zosyn from 5/28 to present     #GI  -Enteral feeds started on 5/29 via OGT  -Feeds held on 5/31 2/2 residuals seen in ET tube    #Renal  -Acute renal failure, likely hemodynamically mediated in setting of septic shock, with severe acidosis and multiple electrolyte derangements (hypernatremia, hyperkalemia) requiring RRT.    -Remains oliguric with UOP <500 in past 24 hours.  Further HD as per renal team and ongoing GOC  -Now hyponatremia, which is likely iatrogenic in setting of D5W use for several days, d/c'ed D5W    #Endo  -Uncontrolled T2DM (HgbA1c 10.3) with hyperglycemia peaking in the 400s on admission  -Some hyperglycemia in 200s likely related to tube feeds and steroids, may need NPH to persistent hyperglycemia   -Started on hydrocortisone for relative adrenal insufficiency in setting of sepsis    #Heme  -Acute on chronic anemia without no active source of bleeding identified, s/p 1pRBC on 5/31 with Hgb response   -Worsening thrombocytopenia (platelets decreased from 163 to 90 in 3 days); 4T score about 2 which is low clinical probability of HIT  -Suspect acute anemia and worsening thrombocytopenia related to myelosuppression from critical illness    #VTE ppx  -Heparin SQ, monitor platelets closely and hold if platelets <50    #Advanced care planning  -Initial GOC discussion with health care surrogate (daughter, Alexia Guevara) who wishes for aggressive measures and FULL CODE at this time. Refer to [facility] note dated on 5/28 for more details  -Plan for family meeting between daughter and palliative care team today on 6/1    Nelly Guerrero MD, PGY3  Internal Medicine Resident  Pager #[phone] ([facility]), [phone] ([facility]) 83M hx mild dementia, uncontrolled T2DM, HTN presenting to the ED w/ declining functional status, subjective hypothermia, and rigors  found to have leukocytosis (20.9) with, R-lung opacity and purulent urine/+UA admitted to MICU for severe sepsis 2/2 PNA and UTI and ARF, course c/b septic shock with refractory acidosis and hyperkalemic requiring RRT    #Neuro  -Currently non-responsive off sedation, fentanyl & propofol d/c'ed on 5/31    #CV  -Distributive shock in setting of sepsis and iatrogenic from sedation, now resolved, s/p levophed from 5/28 to 6/1.  -Started on hydrocortisone on 5/30 for adrenal insufficiency in setting of sepsis, wean as tolerated    #Pulm  -Intubated on 5/28 respiratory distress/tachypnea, likely related setting of respiratory compensation for severe metabolic acidosis and underlying PNA  -Undergoing sedation vacation, daily CPAP trials    #ID  -Septic shock in setting Klebsiella PNA and Enterobacter UTI.  -s/p vanco from 5/28 to 5/31, on Zosyn from 5/28 to present     #GI  -Enteral feeds started on 5/29 via OGT  -Feeds held on 5/31 2/2 residuals seen in ET tube    #Renal  -Acute renal failure, likely hemodynamically mediated in setting of septic shock, with severe acidosis and multiple electrolyte derangements (hypernatremia, hyperkalemia) requiring RRT.    -Remains oliguric with UOP <500 in past 24 hours.  Further HD as per renal team and ongoing GOC  -Now hyponatremia, which is likely iatrogenic in setting of D5W use for several days, d/c'ed D5W    #Endo  -Uncontrolled T2DM (HgbA1c 10.3) with hyperglycemia peaking in the 400s on admission  -Euglycemic today with some FS <100 noted, likely related to tube feeds being held yesterday  -Started on hydrocortisone on 5/30 for adrenal insufficiency in setting of sepsis    #Heme  -Acute on chronic anemia without no active source of bleeding identified, s/p 1pRBC on 5/31 with Hgb response   -Worsening thrombocytopenia (platelets decreased from 163 to 90 in 3 days); 4T score about 2 which is low clinical probability of HIT  -Suspect acute anemia and worsening thrombocytopenia related to myelosuppression from critical illness    #VTE ppx  -Heparin SQ, monitor platelets closely and hold if platelets <50    #Advanced care planning  -Initial GOC discussion with health care surrogate (daughter, Alexia Guevara) who wishes for aggressive measures and FULL CODE at this time. Refer to GOC note dated on 5/28 for more details  -Plan for family meeting between daughter and palliative care team today on 6/1    Nelly Guerrero MD, PGY3  Internal Medicine Resident  Pager #[phone] ([facility]), [phone] ([facility])

## 2018-06-01 NOTE — DIETITIAN INITIAL EVALUATION ADULT. - PHYSICAL APPEARANCE
other (specify)/underweight/Nutrition focused physical exam conducted - found signs of malnutrition - Subcutaneous fat loss:- Orbital fat pads region,  Buccal fat region, Triceps region,  Ribs region. Muscle wasting: - Temples region,  Clavicle region , Shoulder region,  Scapula region,

## 2018-06-01 NOTE — PROGRESS NOTE ADULT - ATTENDING COMMENTS
Agree with above. Seen and examined with resident. Now making some urine, off vasopressors. Wean off sedation and then weaning trials. Daughter at bedside.

## 2018-06-01 NOTE — DIETITIAN INITIAL EVALUATION ADULT. - OTHER INFO
Nutrition assessment initiated for critical care LOS. Pt. intubated ,sedated, daughter @ bedside , pt. was on PO diet prior to admission , currently  maintained NPO 2/2 increased residual with EN . Noted 2+ L & R arm edema , pressure ulcers R buttock stage 2 and coccyx stage 2 . Also noted GOC meeting with daughter scheduled today @ 1 PM.

## 2018-06-01 NOTE — CHART NOTE - NSCHARTNOTEFT_GEN_A_CORE
: dacia    INDICATION: cp failure    PROCEDURE:  [ x] LIMITED ECHO  [ x] LIMITED CHEST  [ ] LIMITED RETROPERITONEAL  [ ] LIMITED ABDOMINAL  [ ] LIMITED DVT  [ ] NEEDLE GUIDANCE VASCULAR  [ ] NEEDLE GUIDANCE THORACENTESIS  [ ] NEEDLE GUIDANCE PARACENTESIS  [ ] NEEDLE GUIDANCE PERICARDIOCENTESIS  [ ] OTHER    FINDINGS:     lung: consolidation at base of left lung, small pleff. moderate pleff on right lung. a lines predominance    Cardiac: Nl LV fxn, enlarged RA, no PEF, IVC indeterminate    INTERPRETATION:    Normal aeration pattern. Consolidation at left lung base, moderate pleff on right. Enlarged RA.

## 2018-06-01 NOTE — DIETITIAN INITIAL EVALUATION ADULT. - MD RECOMMEND
EN support if consistent with GOC - Nepro carb steady  @ 35 ml/hr 24hrs . Add promotility if increased residual  and intolerance present  and if  medically indicated.

## 2018-06-01 NOTE — CONSULT NOTE ADULT - ASSESSMENT
Patient is an 84 y/o man with history of dementia, DM, HTN who presents with a chief complaint of abdominal pain and chills found to be in septic shock from pneumonia versus urinary tract infection and in acute renal failure.
82 yo male with Dementia admitted with respiratory failure and renal failure asked to see for goc

## 2018-06-01 NOTE — PROGRESS NOTE ADULT - SUBJECTIVE AND OBJECTIVE BOX
[facility] DIVISION OF KIDNEY DISEASES AND HYPERTENSION -- FOLLOW UP NOTE  --------------------------------------------------------------------------------  Chief Complaint:  RUSH requiring dialysis    24 hour events/subjective:  Patient remains intubated, nonverbal        PAST HISTORY  --------------------------------------------------------------------------------  No significant changes to PMH, PSH, FHx, SHx, unless otherwise noted    ALLERGIES & MEDICATIONS  --------------------------------------------------------------------------------  Allergies    No Known Allergies    Intolerances      Standing Inpatient Medications  chlorhexidine 4% Liquid 1 Application(s) Topical <User Schedule>  dextrose 5%. 1000 milliLiter(s) IV Continuous <Continuous>  dextrose 50% Injectable 12.5 Gram(s) IV Push once  dextrose 50% Injectable 25 Gram(s) IV Push once  dextrose 50% Injectable 25 Gram(s) IV Push once  heparin  Injectable 5000 Unit(s) SubCutaneous every 8 hours  hydrocortisone sodium succinate Injectable 50 milliGRAM(s) IV Push every 8 hours  insulin lispro (HumaLOG) corrective regimen sliding scale   SubCutaneous every 6 hours  piperacillin/tazobactam IVPB. 3.375 Gram(s) IV Intermittent every 12 hours    PRN Inpatient Medications  dextrose 40% Gel 15 Gram(s) Oral once PRN  glucagon  Injectable 1 milliGRAM(s) IntraMuscular once PRN      REVIEW OF SYSTEMS  --------------------------------------------------------------------------------  Patient is intubated, nonverbal, unable to provide history    VITALS/PHYSICAL EXAM  --------------------------------------------------------------------------------  T(C): 36.1 (06-01-18 @ 04:00), Max: 36.9 (05-31-18 @ 11:00)  HR: 75 (06-01-18 @ 07:52) (62 - 78)  BP: 134/83 (06-01-18 @ 07:00) (103/63 - 135/86)  RR: 29 (06-01-18 @ 07:00) (21 - 29)  SpO2: 100% (06-01-18 @ 07:52) (100% - 100%)  Wt(kg): --        05-31-18 @ 07:01  -  06-01-18 @ 07:00  --------------------------------------------------------  IN: 2428.5 mL / OUT: 266 mL / NET: 2162.5 mL      Physical Exam:  	Gen: NAD  	HEENT: +intubated  	Pulm: +bilateral ventilator breath sounds audible  	CV: RRR, S1S2; no rub  	Abd: soft, nontender/nondistended  	: No suprapubic tenderness  	UE:  no edema  	LE:  no pitting edema  	Neuro: nonverbal  	Skin: Warm  	Vascular access: RIJ nontunneled dialysis catheter    LABS/STUDIES  --------------------------------------------------------------------------------              7.2    8.63  >-----------<  80       [06-01-18 @ 02:40]              20.8     128  |  92  |  58  ----------------------------<  85      [06-01-18 @ 02:40]  4.0   |  16  |  3.20        Ca     7.4     [06-01-18 @ 02:40]      Mg     1.8     [06-01-18 @ 02:40]      Phos  6.2     [06-01-18 @ 02:40]    Creatinine Trend:  SCr 3.20 [06-01 @ 02:40]  SCr 2.58 [05-31 @ 02:18]  SCr 3.65 [05-30 @ 12:40]  SCr 3.49 [05-30 @ 06:00]  SCr 3.59 [05-30 @ 01:30]

## 2018-06-01 NOTE — CONSULT NOTE ADULT - PROBLEM SELECTOR RECOMMENDATION 4
d/w with daughter and granddaughter  they are still  hopeful at this time pt will come off the vent at this time  but they understand overall prognosis is poor  will readdress once mental status appreciated.

## 2018-06-01 NOTE — PROGRESS NOTE ADULT - PROBLEM SELECTOR PLAN 1
Hemodynamically mediated RUSH in the setting of septic shock. Patient is oliguric, was initiated on CVVHDF.  S/P transition to hemodialysis from on 5/30. Labs reviewed. No urgent indications for dialysis at this time, no plans for dialysis today. Urine output is improving 160 cc yesterday, 260 today.  Pending goals of care discussion with family today.  If diaylsis needed per MICU for volume removal then will plan for HD later today.  Will continue daily monitoring for HD indications. Monitor BMP. Strict I/Os. Avoid nephrotoxins. Renally dose all medications. Hemodynamically mediated RUSH in the setting of septic shock. Patient is oliguric, was initiated on CVVHDF.  S/P transition to hemodialysis from on 5/30. Labs reviewed. No urgent indication for dialysis at this time, no plan for hemodialysis today. Urine output is improving 160 cc yesterday, 260 today.  Pending goals of care discussion with family today. Will continue daily monitoring for HD indications. Monitor BMP. Strict I/Os. Avoid nephrotoxins. Renally dose all medications.

## 2018-06-01 NOTE — CONSULT NOTE ADULT - PROBLEM SELECTOR RECOMMENDATION 9
Pt with decline since he fell and broke his hip 2 years ago  pt is cared for by his daughter and son in law  supportive care

## 2018-06-01 NOTE — CONSULT NOTE ADULT - PROBLEM SELECTOR RECOMMENDATION 3
pt still needing HD but making some urine  will need to address long term HD is needed with family  given his underlying dementia may be a poor candidate

## 2018-06-02 DIAGNOSIS — E87.1 HYPO-OSMOLALITY AND HYPONATREMIA: ICD-10-CM

## 2018-06-02 LAB
ALBUMIN SERPL ELPH-MCNC: 1.4 G/DL — LOW (ref 3.3–5)
ALP SERPL-CCNC: 149 U/L — HIGH (ref 40–120)
ALT FLD-CCNC: 21 U/L — SIGNIFICANT CHANGE UP (ref 4–41)
AST SERPL-CCNC: 35 U/L — SIGNIFICANT CHANGE UP (ref 4–40)
BACTERIA BLD CULT: SIGNIFICANT CHANGE UP
BACTERIA BLD CULT: SIGNIFICANT CHANGE UP
BASE EXCESS BLDA CALC-SCNC: -7.4 MMOL/L — SIGNIFICANT CHANGE UP
BASOPHILS # BLD AUTO: 0.02 K/UL — SIGNIFICANT CHANGE UP (ref 0–0.2)
BASOPHILS NFR BLD AUTO: 0.3 % — SIGNIFICANT CHANGE UP (ref 0–2)
BILIRUB SERPL-MCNC: 0.3 MG/DL — SIGNIFICANT CHANGE UP (ref 0.2–1.2)
BLD GP AB SCN SERPL QL: NEGATIVE — SIGNIFICANT CHANGE UP
BUN SERPL-MCNC: 68 MG/DL — HIGH (ref 7–23)
BUN SERPL-MCNC: 72 MG/DL — HIGH (ref 7–23)
C DIFF TOX GENS STL QL NAA+PROBE: SIGNIFICANT CHANGE UP
CALCIUM SERPL-MCNC: 7.1 MG/DL — LOW (ref 8.4–10.5)
CALCIUM SERPL-MCNC: 7.3 MG/DL — LOW (ref 8.4–10.5)
CHLORIDE SERPL-SCNC: 90 MMOL/L — LOW (ref 98–107)
CHLORIDE SERPL-SCNC: 94 MMOL/L — LOW (ref 98–107)
CHLORIDE UR-SCNC: 21 MMOL/L — SIGNIFICANT CHANGE UP
CO2 SERPL-SCNC: 15 MMOL/L — LOW (ref 22–31)
CO2 SERPL-SCNC: 15 MMOL/L — LOW (ref 22–31)
CREAT SERPL-MCNC: 3.7 MG/DL — HIGH (ref 0.5–1.3)
CREAT SERPL-MCNC: 3.98 MG/DL — HIGH (ref 0.5–1.3)
EOSINOPHIL # BLD AUTO: 0 K/UL — SIGNIFICANT CHANGE UP (ref 0–0.5)
EOSINOPHIL NFR BLD AUTO: 0 % — SIGNIFICANT CHANGE UP (ref 0–6)
GLUCOSE BLDC GLUCOMTR-MCNC: 147 MG/DL — HIGH (ref 70–99)
GLUCOSE BLDC GLUCOMTR-MCNC: 152 MG/DL — HIGH (ref 70–99)
GLUCOSE BLDC GLUCOMTR-MCNC: 56 MG/DL — LOW (ref 70–99)
GLUCOSE BLDC GLUCOMTR-MCNC: 61 MG/DL — LOW (ref 70–99)
GLUCOSE BLDC GLUCOMTR-MCNC: 75 MG/DL — SIGNIFICANT CHANGE UP (ref 70–99)
GLUCOSE BLDC GLUCOMTR-MCNC: 89 MG/DL — SIGNIFICANT CHANGE UP (ref 70–99)
GLUCOSE SERPL-MCNC: 120 MG/DL — HIGH (ref 70–99)
GLUCOSE SERPL-MCNC: 93 MG/DL — SIGNIFICANT CHANGE UP (ref 70–99)
HCO3 BLDA-SCNC: 19 MMOL/L — LOW (ref 22–26)
HCT VFR BLD CALC: 21.7 % — LOW (ref 39–50)
HGB BLD-MCNC: 7.5 G/DL — LOW (ref 13–17)
IMM GRANULOCYTES # BLD AUTO: 0.32 # — SIGNIFICANT CHANGE UP
IMM GRANULOCYTES NFR BLD AUTO: 4.2 % — HIGH (ref 0–1.5)
LYMPHOCYTES # BLD AUTO: 0.38 K/UL — LOW (ref 1–3.3)
LYMPHOCYTES # BLD AUTO: 5 % — LOW (ref 13–44)
MAGNESIUM SERPL-MCNC: 1.8 MG/DL — SIGNIFICANT CHANGE UP (ref 1.6–2.6)
MCHC RBC-ENTMCNC: 28.5 PG — SIGNIFICANT CHANGE UP (ref 27–34)
MCHC RBC-ENTMCNC: 34.6 % — SIGNIFICANT CHANGE UP (ref 32–36)
MCV RBC AUTO: 82.5 FL — SIGNIFICANT CHANGE UP (ref 80–100)
MONOCYTES # BLD AUTO: 0.16 K/UL — SIGNIFICANT CHANGE UP (ref 0–0.9)
MONOCYTES NFR BLD AUTO: 2.1 % — SIGNIFICANT CHANGE UP (ref 2–14)
NEUTROPHILS # BLD AUTO: 6.68 K/UL — SIGNIFICANT CHANGE UP (ref 1.8–7.4)
NEUTROPHILS NFR BLD AUTO: 88.4 % — HIGH (ref 43–77)
NRBC # FLD: 0.02 — SIGNIFICANT CHANGE UP
OSMOLALITY UR: 283 MOSMO/KG — SIGNIFICANT CHANGE UP (ref 50–1200)
PCO2 BLDA: 21 MMHG — LOW (ref 35–48)
PH BLDA: 7.48 PH — HIGH (ref 7.35–7.45)
PHOSPHATE SERPL-MCNC: 7.2 MG/DL — HIGH (ref 2.5–4.5)
PLATELET # BLD AUTO: 87 K/UL — LOW (ref 150–400)
PMV BLD: 11.3 FL — SIGNIFICANT CHANGE UP (ref 7–13)
PO2 BLDA: 122 MMHG — HIGH (ref 83–108)
POTASSIUM SERPL-MCNC: 3.2 MMOL/L — LOW (ref 3.5–5.3)
POTASSIUM SERPL-MCNC: 3.5 MMOL/L — SIGNIFICANT CHANGE UP (ref 3.5–5.3)
POTASSIUM SERPL-SCNC: 3.2 MMOL/L — LOW (ref 3.5–5.3)
POTASSIUM SERPL-SCNC: 3.5 MMOL/L — SIGNIFICANT CHANGE UP (ref 3.5–5.3)
PROT SERPL-MCNC: 4.8 G/DL — LOW (ref 6–8.3)
RBC # BLD: 2.63 M/UL — LOW (ref 4.2–5.8)
RBC # FLD: 15.8 % — HIGH (ref 10.3–14.5)
RH IG SCN BLD-IMP: POSITIVE — SIGNIFICANT CHANGE UP
SAO2 % BLDA: 98 % — SIGNIFICANT CHANGE UP (ref 95–99)
SODIUM SERPL-SCNC: 127 MMOL/L — LOW (ref 135–145)
SODIUM SERPL-SCNC: 130 MMOL/L — LOW (ref 135–145)
SODIUM UR-SCNC: 38 MMOL/L — SIGNIFICANT CHANGE UP
WBC # BLD: 7.56 K/UL — SIGNIFICANT CHANGE UP (ref 3.8–10.5)
WBC # FLD AUTO: 7.56 K/UL — SIGNIFICANT CHANGE UP (ref 3.8–10.5)

## 2018-06-02 PROCEDURE — 99233 SBSQ HOSP IP/OBS HIGH 50: CPT | Mod: GC

## 2018-06-02 PROCEDURE — 99291 CRITICAL CARE FIRST HOUR: CPT

## 2018-06-02 RX ORDER — HYDROCORTISONE 20 MG
25 TABLET ORAL EVERY 12 HOURS
Qty: 0 | Refills: 0 | Status: DISCONTINUED | OUTPATIENT
Start: 2018-06-02 | End: 2018-06-04

## 2018-06-02 RX ORDER — DEXMEDETOMIDINE HYDROCHLORIDE IN 0.9% SODIUM CHLORIDE 4 UG/ML
0.2 INJECTION INTRAVENOUS
Qty: 200 | Refills: 0 | Status: DISCONTINUED | OUTPATIENT
Start: 2018-06-02 | End: 2018-06-04

## 2018-06-02 RX ORDER — DEXTROSE 50 % IN WATER 50 %
25 SYRINGE (ML) INTRAVENOUS ONCE
Qty: 0 | Refills: 0 | Status: COMPLETED | OUTPATIENT
Start: 2018-06-02 | End: 2018-06-02

## 2018-06-02 RX ORDER — POTASSIUM CHLORIDE 20 MEQ
10 PACKET (EA) ORAL
Qty: 0 | Refills: 0 | Status: COMPLETED | OUTPATIENT
Start: 2018-06-02 | End: 2018-06-02

## 2018-06-02 RX ORDER — FENTANYL CITRATE 50 UG/ML
50 INJECTION INTRAVENOUS ONCE
Qty: 0 | Refills: 0 | Status: DISCONTINUED | OUTPATIENT
Start: 2018-06-02 | End: 2018-06-02

## 2018-06-02 RX ORDER — SODIUM CHLORIDE 9 MG/ML
1000 INJECTION, SOLUTION INTRAVENOUS
Qty: 0 | Refills: 0 | Status: DISCONTINUED | OUTPATIENT
Start: 2018-06-02 | End: 2018-06-02

## 2018-06-02 RX ORDER — DEXTROSE 50 % IN WATER 50 %
12.5 SYRINGE (ML) INTRAVENOUS ONCE
Qty: 0 | Refills: 0 | Status: COMPLETED | OUTPATIENT
Start: 2018-06-02 | End: 2018-06-02

## 2018-06-02 RX ADMIN — HEPARIN SODIUM 5000 UNIT(S): 5000 INJECTION INTRAVENOUS; SUBCUTANEOUS at 22:22

## 2018-06-02 RX ADMIN — Medication 100 MILLIEQUIVALENT(S): at 21:15

## 2018-06-02 RX ADMIN — FENTANYL CITRATE 50 MICROGRAM(S): 50 INJECTION INTRAVENOUS at 02:14

## 2018-06-02 RX ADMIN — DEXMEDETOMIDINE HYDROCHLORIDE IN 0.9% SODIUM CHLORIDE 2.06 MICROGRAM(S)/KG/HR: 4 INJECTION INTRAVENOUS at 12:26

## 2018-06-02 RX ADMIN — Medication 12.5 GRAM(S): at 17:36

## 2018-06-02 RX ADMIN — Medication 25 MILLIGRAM(S): at 17:20

## 2018-06-02 RX ADMIN — SODIUM CHLORIDE 75 MILLILITER(S): 9 INJECTION, SOLUTION INTRAVENOUS at 05:28

## 2018-06-02 RX ADMIN — PIPERACILLIN AND TAZOBACTAM 25 GRAM(S): 4; .5 INJECTION, POWDER, LYOPHILIZED, FOR SOLUTION INTRAVENOUS at 05:27

## 2018-06-02 RX ADMIN — Medication 25 MILLIGRAM(S): at 05:27

## 2018-06-02 RX ADMIN — Medication 25 GRAM(S): at 02:02

## 2018-06-02 RX ADMIN — PIPERACILLIN AND TAZOBACTAM 25 GRAM(S): 4; .5 INJECTION, POWDER, LYOPHILIZED, FOR SOLUTION INTRAVENOUS at 17:20

## 2018-06-02 RX ADMIN — DEXMEDETOMIDINE HYDROCHLORIDE IN 0.9% SODIUM CHLORIDE 2.06 MICROGRAM(S)/KG/HR: 4 INJECTION INTRAVENOUS at 19:40

## 2018-06-02 RX ADMIN — CHLORHEXIDINE GLUCONATE 1 APPLICATION(S): 213 SOLUTION TOPICAL at 05:27

## 2018-06-02 RX ADMIN — HEPARIN SODIUM 5000 UNIT(S): 5000 INJECTION INTRAVENOUS; SUBCUTANEOUS at 13:41

## 2018-06-02 RX ADMIN — HEPARIN SODIUM 5000 UNIT(S): 5000 INJECTION INTRAVENOUS; SUBCUTANEOUS at 05:27

## 2018-06-02 RX ADMIN — DEXMEDETOMIDINE HYDROCHLORIDE IN 0.9% SODIUM CHLORIDE 2.06 MICROGRAM(S)/KG/HR: 4 INJECTION INTRAVENOUS at 04:08

## 2018-06-02 RX ADMIN — Medication 100 MILLIEQUIVALENT(S): at 22:18

## 2018-06-02 NOTE — PROGRESS NOTE ADULT - ASSESSMENT
Patient is an 82 y/o man with history of dementia, DM, HTN who presents with a chief complaint of abdominal pain and chills found to be in septic shock with oliguric RUSH requiring dialysis.

## 2018-06-02 NOTE — PROGRESS NOTE ADULT - PROBLEM SELECTOR PLAN 1
Hemodynamically mediated RUSH in the setting of septic shock. Pt. was on CVVHDF which was transitioned to HD on 5/30. Pt. last had HD on 5/30 tolerated well without complications. Scr elevated at 3.7 today from 3.2 yesterday. Pt. non-oliguric with improved urine output (~600 mL).  No plans for HD today, will reassess for HD tomorrow. Monitor BMP, urine output, I,Os. Avoid any potential nephrotoxins. Dose medications to Crcl.

## 2018-06-02 NOTE — PROGRESS NOTE ADULT - ATTENDING COMMENTS
84 yo with septic shock - Klebsiella pneumonia and Enterobacter UTI - now off pressors.  Remains vent dependent.  On low dose of dex, pt arousable, CPAP trial became tachypnic with ABG showing respiratory alkalosis on top of his metabolic acidosis.  Will try [facility] again tomorrow.  repeat metabolic panel this afternoon to follow Na off D5W.  Tapering steroid    Critical care time 35 min

## 2018-06-02 NOTE — PROGRESS NOTE ADULT - SUBJECTIVE AND OBJECTIVE BOX
INTERVAL HPI/OVERNIGHT EVENTS:  Yesterday evening was tachypnea peaking into 30s.  Received fentanyl push and then was started on precedex.     SUBJECTIVE: Patient seen and examined at bedside.     ROS:  Unable to obtains 2/2 intubation.    OBJECTIVE:    VITAL SIGNS:  ICU Vital Signs Last 24 Hrs  T(C): 36.8 (02 Jun 2018 04:00), Max: 36.8 (02 Jun 2018 04:00)  T(F): 98.3 (02 Jun 2018 04:00), Max: 98.3 (02 Jun 2018 04:00)  HR: 88 (02 Jun 2018 06:00) (72 - 88)  BP: 131/72 (02 Jun 2018 06:00) (110/63 - 137/75)  BP(mean): 90 (02 Jun 2018 06:00) (78 - 103)  ABP: --  ABP(mean): --  RR: 28 (02 Jun 2018 06:00) (19 - 37)  SpO2: 100% (02 Jun 2018 06:00) (96% - 100%)    Mode: AC/ CMV (Assist Control/ Continuous Mandatory Ventilation), RR (machine): 24, TV (machine): 400, FiO2: 30, PEEP: 5, MAP: 9, PIP: 18    06-01 @ 07:01  -  06-02 @ 07:00  --------------------------------------------------------  IN: 583 mL / OUT: 659 mL / NET: -76 mL      CAPILLARY BLOOD GLUCOSE  POCT Blood Glucose.: 89 mg/dL (02 Jun 2018 05:12)    PHYSICAL EXAM:  General:  Elderly, cachetic male, currently intubated; non-responsive to verbal or tactile stimuli  HEENT:  Normocephalic, atraumatic pupils equally round, minimally responsive to light, R-Shiley and L-subclavian TLC  Respiratory:  Non-labored breathing pattern, transmitted upper airway breath sounds; concordant with vent  Cardiovascular:  Regular rate and rhythm   Abdomen: Soft, non-distended  Genital: Bhakta with dark urine  Extremities: Thin, no edema; palpable pulses  Skin:  Warm and dry  Neurological: Unable to participate in exam    MEDICATIONS:  MEDICATIONS  (STANDING):  chlorhexidine 4% Liquid 1 Application(s) Topical <User Schedule>  dexmedetomidine Infusion 0.2 MICROgram(s)/kG/Hr (2.065 mL/Hr) IV Continuous <Continuous>  dextrose 5%. 1000 milliLiter(s) (50 mL/Hr) IV Continuous <Continuous>  dextrose 50% Injectable 12.5 Gram(s) IV Push once  dextrose 50% Injectable 25 Gram(s) IV Push once  dextrose 50% Injectable 25 Gram(s) IV Push once  heparin  Injectable 5000 Unit(s) SubCutaneous every 8 hours  hydrocortisone sodium succinate Injectable 25 milliGRAM(s) IV Push every 8 hours  insulin lispro (HumaLOG) corrective regimen sliding scale   SubCutaneous every 6 hours  piperacillin/tazobactam IVPB. 3.375 Gram(s) IV Intermittent every 12 hours    MEDICATIONS  (PRN):  dextrose 40% Gel 15 Gram(s) Oral once PRN Blood Glucose LESS THAN 70 milliGRAM(s)/deciliter  glucagon  Injectable 1 milliGRAM(s) IntraMuscular once PRN Glucose LESS THAN 70 milligrams/deciliter      ALLERGIES:  No Known Allergies  Intolerances      LABS:                        7.5    7.56  )-----------( 87       ( 02 Jun 2018 03:00 )             21.7     06-02    127<L>  |  90<L>  |  68<H>  ----------------------------<  120<H>  3.5   |  15<L>  |  3.70<H>    Ca    7.1<L>      02 Jun 2018 03:00  Phos  7.2     06-02  Mg     1.8     06-02    TPro  4.8<L>  /  Alb  1.4<L>  /  TBili  0.3  /  DBili  x   /  AST  35  /  ALT  21  /  AlkPhos  149<H>  06-02      RADIOLOGY & ADDITIONAL TESTS: Reviewed. INTERVAL HPI/OVERNIGHT EVENTS:  Yesterday evening was tachypnea peaking into 30s.  Received fentanyl push and then was started on precedex.     SUBJECTIVE: Patient seen and examined at bedside.     ROS:  Unable to obtains 2/2 intubation.    VITAL SIGNS:  ICU Vital Signs Last 24 Hrs  T(C): 36.8 (02 Jun 2018 04:00), Max: 36.8 (02 Jun 2018 04:00)  T(F): 98.3 (02 Jun 2018 04:00), Max: 98.3 (02 Jun 2018 04:00)  HR: 88 (02 Jun 2018 06:00) (72 - 88)  BP: 131/72 (02 Jun 2018 06:00) (110/63 - 137/75)  BP(mean): 90 (02 Jun 2018 06:00) (78 - 103)  ABP: --  ABP(mean): --  RR: 28 (02 Jun 2018 06:00) (19 - 37)  SpO2: 100% (02 Jun 2018 06:00) (96% - 100%)    Mode: AC/ CMV (Assist Control/ Continuous Mandatory Ventilation), RR (machine): 24, TV (machine): 400, FiO2: 30, PEEP: 5, MAP: 9, PIP: 18    06-01 @ 07:01  -  06-02 @ 07:00  --------------------------------------------------------  IN: 583 mL / OUT: 659 mL / NET: -76 mL      CAPILLARY BLOOD GLUCOSE  POCT Blood Glucose.: 89 mg/dL (02 Jun 2018 05:12)    PHYSICAL EXAM:  General:  Elderly, cachetic male, currently intubated; non-responsive to verbal or tactile stimuli  HEENT:  Normocephalic, atraumatic pupils equally round, minimally responsive to light, R-Shiley  Respiratory:  Non-labored breathing pattern, transmitted upper airway breath sounds; concordant with vent  Cardiovascular:  Regular rate and rhythm   Abdomen: Soft, non-distended  Genital: Bhakta present  Extremities: Thin, no edema; palpable pulses  Skin:  Warm and dry  Neurological: Unable to participate in exam    MEDICATIONS:  MEDICATIONS  (STANDING):  chlorhexidine 4% Liquid 1 Application(s) Topical <User Schedule>  dexmedetomidine Infusion 0.2 MICROgram(s)/kG/Hr (2.065 mL/Hr) IV Continuous <Continuous>  dextrose 5%. 1000 milliLiter(s) (50 mL/Hr) IV Continuous <Continuous>  dextrose 50% Injectable 12.5 Gram(s) IV Push once  dextrose 50% Injectable 25 Gram(s) IV Push once  dextrose 50% Injectable 25 Gram(s) IV Push once  heparin  Injectable 5000 Unit(s) SubCutaneous every 8 hours  hydrocortisone sodium succinate Injectable 25 milliGRAM(s) IV Push every 8 hours  insulin lispro (HumaLOG) corrective regimen sliding scale   SubCutaneous every 6 hours  piperacillin/tazobactam IVPB. 3.375 Gram(s) IV Intermittent every 12 hours    MEDICATIONS  (PRN):  dextrose 40% Gel 15 Gram(s) Oral once PRN Blood Glucose LESS THAN 70 milliGRAM(s)/deciliter  glucagon  Injectable 1 milliGRAM(s) IntraMuscular once PRN Glucose LESS THAN 70 milligrams/deciliter      ALLERGIES:  No Known Allergies  Intolerances      LABS:                        7.5    7.56  )-----------( 87       ( 02 Jun 2018 03:00 )             21.7     06-02    127<L>  |  90<L>  |  68<H>  ----------------------------<  120<H>  3.5   |  15<L>  |  3.70<H>    Ca    7.1<L>      02 Jun 2018 03:00  Phos  7.2     06-02  Mg     1.8     06-02    TPro  4.8<L>  /  Alb  1.4<L>  /  TBili  0.3  /  DBili  x   /  AST  35  /  ALT  21  /  AlkPhos  149<H>  06-02      RADIOLOGY & ADDITIONAL TESTS: Reviewed.

## 2018-06-02 NOTE — PROGRESS NOTE ADULT - PROBLEM SELECTOR PLAN 2
Pt. with hyponatremia in the setting of hypovolemia from sepsis and IV D5W. Serum sodium decreased to127 today. Advise to hold IV D5W. Monitor serum sodium. Check urine osm and urine sodium Pt. with hyponatremia in the setting of hypovolemia from sepsis and IV D5W. Serum sodium decreased to127 today. Advise to hold IV D5W. Monitor serum sodium Q 4 to 6. Check urine osm and urine sodium

## 2018-06-02 NOTE — PROGRESS NOTE ADULT - ASSESSMENT
83M hx mild dementia, uncontrolled T2DM, HTN presenting to the ED w/ declining functional status, subjective hypothermia, and rigors  found to have leukocytosis (20.9) with, R-lung opacity and purulent urine/+UA admitted to MICU for severe sepsis 2/2 Klebsiella PNA and Enterobacter UTI and ARF, course c/b septic shock with refractory acidosis and hyperkalemic requiring RRT    #Neuro  -Currently non-responsive off sedation, fentanyl & propofol d/c'ed on 5/31    #CV  -Distributive shock in setting of sepsis and iatrogenic from sedation, now resolved, s/p levophed from 5/28 to 6/1.  -Started on hydrocortisone on 5/30 for adrenal insufficiency in setting of sepsis, wean as tolerated    #Pulm  -Intubated on 5/28 respiratory distress/tachypnea, likely related setting of respiratory compensation for severe metabolic acidosis and underlying PNA  -Undergoing sedation vacation, daily CPAP trials    #ID  -Septic shock in setting Klebsiella PNA and Enterobacter UTI  -s/p vanco from 5/28 to 5/31, on Zosyn from 5/28 to present     #GI  -Enteral feeds started on 5/29 via OGT  -Feeds held on 5/31 2/2 residuals seen in ET tube    #Renal  -Acute renal failure, likely hemodynamically mediated in setting of septic shock, with severe acidosis and multiple electrolyte derangements (hypernatremia, hyperkalemia) requiring RRT.    -Remains oliguric with UOP <500 in past 24 hours.  Further HD as per renal team and ongoing GOC  -Now hyponatremia, which is likely iatrogenic in setting of D5W use for several days, d/c'ed D5W on 6/1    #Endo  -Uncontrolled T2DM (HgbA1c 10.3) with hyperglycemia peaking in the 400s on admission  -Intermittent hypoglycemia noted as some FS <100 noted, likely related to tube feeds being held on 5/31  -Started on hydrocortisone on 5/30 for adrenal insufficiency in setting of sepsis    #Heme  -Acute on chronic anemia without no active source of bleeding identified, s/p 1pRBC on 5/31 with Hgb response   -Worsening thrombocytopenia (admission platelets 136, currently in 80s), sent panel to rule out HIT  -Suspect acute anemia and worsening thrombocytopenia related to myelosuppression from critical illness    #VTE ppx  -Heparin SQ, monitor platelets closely and hold if platelets <50    #Advanced care planning  -Initial GOC discussion with health care surrogate (daughter, Alexia Guevara) who wishes for aggressive measures and FULL CODE at this time. Refer to GOC note dated on 5/28 for more details  -Family between daughter and palliative care team today on 6/1, ongoing GOC based on pt clinica. improvement     Nelly Guerrero MD, PGY3  Internal Medicine Resident  Pager #[phone] ([facility]), [phone] ([facility]) 83M hx mild dementia, uncontrolled T2DM, HTN presenting to the ED w/ declining functional status, subjective hypothermia, and rigors  found to have leukocytosis (20.9) with, R-lung opacity and purulent urine/+UA admitted to MICU for severe sepsis 2/2 Klebsiella PNA and Enterobacter UTI and ARF, course c/b septic shock with refractory metabolic acidosis and hyperkalemia requiring RRT    #Neuro  -Sedated with precedex since 6/2  -Off fentanyl & propofol since 5/31    #CV  -Distributive shock in setting of sepsis and iatrogenic from sedation, now resolved, s/p levophed from 5/28 to 6/1.  -Started on hydrocortisone on 5/30 for adrenal insufficiency in setting of sepsis, wean as tolerated    #Pulm  -Intubated on 5/28 respiratory distress/tachypnea, likely related setting of respiratory compensation for severe metabolic acidosis and underlying PNA  -Daily CPAP trials    #ID  -Septic shock in setting Klebsiella PNA and Enterobacter UTI  -s/p vanco from 5/28 to 5/31, on Zosyn from 5/28 to present     #GI  -Enteral feeds started on 5/29 via OGT  -Feeds held on 5/31 2/2 residuals seen in ET tube    #Renal  -Acute renal failure, likely hemodynamically mediated in setting of septic shock, with severe acidosis and multiple electrolyte derangements (hypernatremia, hyperkalemia) requiring RRT.    -Remains oliguric with UOP <500 in past 24 hours.  Further HD as per renal team and ongoing GOC  -Now hyponatremia, which is likely iatrogenic in setting of D5W use for several days, d/c'ed D5W on 6/1    #Endo  -Uncontrolled T2DM (HgbA1c 10.3) with hyperglycemia peaking in the 400s on admission  -Intermittent hypoglycemia noted as some FS <100 noted, likely related to tube feeds being held on 5/31  -Started on hydrocortisone on 5/30 for adrenal insufficiency in setting of sepsis    #Heme  -Acute on chronic anemia without no active source of bleeding identified, s/p 1pRBC on 5/31 with Hgb response   -Worsening thrombocytopenia (admission platelets 136, currently in 80s), sent panel to rule out HIT  -Suspect acute anemia and worsening thrombocytopenia related to myelosuppression from critical illness    #VTE ppx  -Heparin SQ, monitor platelets closely and hold if platelets <50    #Advanced care planning  -Initial GOC discussion with health care surrogate (daughter, Alexia Guevara) who wishes for aggressive measures and FULL CODE at this time. Refer to GOC note dated on 5/28 for more details  -Family between daughter and palliative care team today on 6/1, ongoing GOC based on pt clinical improvement     Nelly Guerrero MD, PGY3  Internal Medicine Resident  Pager #[phone] ([facility]), [phone] ([facility]) 83M hx mild dementia, uncontrolled T2DM, HTN presenting to the ED w/ declining functional status, subjective hypothermia, and rigors admitted to MICU for severe sepsis 2/2 Klebsiella PNA and Enterobacter UTI and ARF, course c/b septic shock with refractory metabolic acidosis and hyperkalemia requiring RRT    #Neuro  -Sedated with precedex since 6/2  -Off fentanyl & propofol since 5/31    #CV  -Distributive shock in setting of sepsis and iatrogenic from sedation, now resolved, s/p levophed from 5/28 to 6/1  -Started on hydrocortisone on 5/30 for adrenal insufficiency in setting of sepsis, wean as tolerated    #Pulm  -Intubated on 5/28 respiratory distress/tachypnea, likely related setting of respiratory compensation for severe metabolic acidosis and underlying PNA  -Daily CPAP trials    #ID  -Septic shock in setting Klebsiella PNA and Enterobacter UTI  -s/p vanco from 5/28 to 5/31, on Zosyn from 5/28 to present     #GI  -Enteral feeds started on 5/29 via OGT  -Feeds held on 5/31 2/2 residuals seen in ET tube    #Renal  -Acute renal failure, likely hemodynamically mediated in setting of septic shock, with severe acidosis and multiple electrolyte derangements (hypernatremia, hyperkalemia) requiring RRT  -Urine output improving, no urgent needs for HD today. Further HD as per renal team  -Now hyponatremic, which is likely iatrogenic in setting of D5W use for several days, d/c'ed D5W on 6/1    #Endo  -Uncontrolled T2DM (HgbA1c 10.3) with hyperglycemia peaking in the 400s on admission  -Intermittent hypoglycemia noted as some FS <100 noted, likely related to tube feeds being held on 5/31  -Started on hydrocortisone on 5/30 for adrenal insufficiency in setting of sepsis    #Heme  -Acute on chronic anemia without no active source of bleeding identified, s/p 1pRBC on 5/31 with Hgb response   -Worsening thrombocytopenia (admission platelets 136, currently in 80s), sent panel to rule out HIT  -Suspect acute anemia and worsening thrombocytopenia related to myelosuppression from critical illness    #VTE ppx  -Heparin SQ, monitor platelets closely and hold if platelets <50    #Advanced care planning  -Initial C discussion with health care surrogate (daughter, Alexia Guevara) who wishes for aggressive measures and FULL CODE at this time. Refer to GOC note dated on 5/28 for more details  -Family between daughter and palliative care team today on 6/1, ongoing GOC based on pt clinical improvement     Nelly Guerrero MD, PGY3  Internal Medicine Resident  Pager #[phone] ([facility]), [phone] ([facility]) 83M hx mild dementia, uncontrolled T2DM, HTN presenting to the ED w/ declining functional status, subjective hypothermia, and rigors admitted to MICU for severe sepsis 2/2 Klebsiella PNA and Enterobacter UTI and ARF, course c/b septic shock with refractory metabolic acidosis and hyperkalemia requiring RRT    #Neuro  -Sedated with precedex since 6/2  -Off fentanyl & propofol since 5/31    #CV  -Distributive shock in setting of sepsis and iatrogenic from sedation, now resolved, s/p levophed from 5/28 to 6/1  -Started on hydrocortisone on 5/30 for adrenal insufficiency in setting of sepsis, wean as tolerated (decreased from 25mg q8hr to q12hr)    #Pulm  -Intubated on 5/28 respiratory distress/tachypnea, likely related setting of respiratory compensation for severe metabolic acidosis and underlying PNA  -Daily CPAP trials    #ID  -Septic shock in setting Klebsiella PNA and Enterobacter UTI  -s/p vanco from 5/28 to 5/31, on Zosyn from 5/28 to present     #GI  -Enteral feeds started on 5/29 via OGT  -Feeds held on 5/31 2/2 residuals seen in ET tube    #Renal  -Acute renal failure, likely hemodynamically mediated in setting of septic shock, with severe acidosis and multiple electrolyte derangements (hypernatremia, hyperkalemia) requiring RRT  -Urine output improving, no urgent needs for HD today. Further HD as per renal team  -Now hyponatremic, which is likely iatrogenic in setting of D5W use for several days, d/c'ed D5W on 6/1    #Endo  -Uncontrolled T2DM (HgbA1c 10.3) with hyperglycemia peaking in the 400s on admission  -Intermittent hypoglycemia noted as some FS <100 noted, likely related to tube feeds being held on 5/31  -Started on hydrocortisone on 5/30 for adrenal insufficiency in setting of sepsis, weaning as above    #Heme  -Acute on chronic anemia without no active source of bleeding identified, s/p 1pRBC on 5/31 with Hgb response   -Worsening thrombocytopenia (admission platelets 136, currently in 80s), sent panel to rule out HIT  -Suspect acute anemia and worsening thrombocytopenia related to myelosuppression from critical illness    #VTE ppx  -Heparin SQ, monitor platelets closely and hold if platelets <50    #Advanced care planning  -Initial GOC discussion with health care surrogate (daughter, Alexia Guevara) who wishes for aggressive measures and FULL CODE at this time. Refer to GOC note dated on 5/28 for more details  -Family between daughter and palliative care team today on 6/1, ongoing GO based on pt clinical improvement       Nelly Guerrero MD, PGY3  Internal Medicine Resident  Pager #[phone] ([facility]), [phone] ([facility]) 83M hx mild dementia, uncontrolled T2DM, HTN presenting to the ED w/ declining functional status, subjective hypothermia, and rigors admitted to MICU for severe sepsis 2/2 Klebsiella PNA and Enterobacter UTI and ARF, course c/b septic shock with refractory metabolic acidosis and hyperkalemia requiring RRT    #Neuro  -Sedated with precedex since 6/2  -Off fentanyl & propofol since 5/31    #CV  -Distributive shock in setting of sepsis and iatrogenic from sedation, now resolved, s/p levophed from 5/28 to 6/1  -Started on hydrocortisone on 5/30 for adrenal insufficiency in setting of sepsis, wean as tolerated (today decreased from 25mg q8hr to q12hr)    #Pulm  -Intubated on 5/28 respiratory distress/tachypnea, likely related setting of respiratory compensation for severe metabolic acidosis and underlying PNA  -Daily CPAP trials    #ID  -Septic shock in setting Klebsiella PNA and Enterobacter UTI  -s/p vanco from 5/28 to 5/31, on Zosyn from 5/28 to present, anticipate 7 day course, likely d/c today    #GI  -Enteral feeds started on 5/29 via OGT  -Feeds held on 5/31 2/2 residuals seen in ET tube    #Renal  -Acute renal failure, likely hemodynamically mediated in setting of septic shock, with severe acidosis and multiple electrolyte derangements (hypernatremia, hyperkalemia) requiring RRT  -Urine output improving, no urgent needs for HD today, further HD as per renal team  -Now hyponatremic, which is likely iatrogenic in setting of D5W use for several days, d/c'ed D5W on 6/1    #Endo  -Uncontrolled T2DM (HgbA1c 10.3) with hyperglycemia peaking in the 400s on admission  -Intermittent hypoglycemia noted as some FS <100 noted, likely related to tube feeds being held on 5/31  -Started on hydrocortisone on 5/30 for adrenal insufficiency in setting of sepsis, weaning as above    #Heme  -Acute on chronic anemia without no active source of bleeding identified, s/p 1pRBC on 5/31 with Hgb response   -Worsening thrombocytopenia (admission platelets 136, currently in 80s), sent panel to rule out HIT  -Suspect acute anemia and worsening thrombocytopenia related to myelosuppression from critical illness    #VTE ppx  -Heparin SQ, monitor platelets closely and hold if platelets <50    #Advanced care planning  -Initial GOC discussion with health care surrogate (daughter, Alexia Guevara) who wishes for aggressive measures and FULL CODE at this time. Refer to GOC note dated on 5/28 for more details  -Family between daughter and palliative care team today on 6/1, ongoing GOC based on pt clinical improvement       Nelly Guerrero MD, PGY3  Internal Medicine Resident  Pager #[phone] ([facility]), [phone] ([facility])

## 2018-06-02 NOTE — PROGRESS NOTE ADULT - SUBJECTIVE AND OBJECTIVE BOX
[facility] DIVISION OF KIDNEY DISEASES AND HYPERTENSION -- FOLLOW UP NOTE  --------------------------------------------------------------------------------  HPI:  This is a 82 y/o man with history of dementia, DM, HTN who presents with abdominal pain with chills and was found to be in septic shock from pneumonia started on IV ABX. Pt. with no history of kidney disease in the past. Pt. was found to have oligiuric RUSH and was initiated on CVVHDF. CVVHDF was held on 5/30 and pt. was transitioned of IHD. Currently pt. remains intubated and sedated.         PAST HISTORY  --------------------------------------------------------------------------------  No significant changes to PMH, PSH, FHx, SHx, unless otherwise noted    ALLERGIES & MEDICATIONS  --------------------------------------------------------------------------------  Allergies    No Known Allergies    Intolerances      Standing Inpatient Medications  chlorhexidine 4% Liquid 1 Application(s) Topical <User Schedule>  dexmedetomidine Infusion 0.2 MICROgram(s)/kG/Hr IV Continuous <Continuous>  dextrose 5%. 1000 milliLiter(s) IV Continuous <Continuous>  dextrose 50% Injectable 12.5 Gram(s) IV Push once  dextrose 50% Injectable 25 Gram(s) IV Push once  dextrose 50% Injectable 25 Gram(s) IV Push once  heparin  Injectable 5000 Unit(s) SubCutaneous every 8 hours  hydrocortisone sodium succinate Injectable 25 milliGRAM(s) IV Push every 8 hours  insulin lispro (HumaLOG) corrective regimen sliding scale   SubCutaneous every 6 hours  piperacillin/tazobactam IVPB. 3.375 Gram(s) IV Intermittent every 12 hours        REVIEW OF SYSTEMS  --------------------------------------------------------------------------------  Unable to obtain     VITALS/PHYSICAL EXAM  --------------------------------------------------------------------------------  T(C): 36.8 (06-02-18 @ 04:00), Max: 36.8 (06-02-18 @ 04:00)  HR: 88 (06-02-18 @ 06:00) (72 - 88)  BP: 131/72 (06-02-18 @ 06:00) (110/63 - 137/75)  RR: 28 (06-02-18 @ 06:00) (19 - 37)  SpO2: 100% (06-02-18 @ 06:00) (96% - 100%)  Wt(kg): --        06-01-18 @ 07:01  -  06-02-18 @ 07:00  --------------------------------------------------------  IN: 583 mL / OUT: 659 mL / NET: -76 mL      Physical Exam:  	Gen: NAD  	HEENT:  Intubated   	Pulm: CTA B/L  	CV: RRR,  	Abd:  soft  	LE: Warm, no edema  	Neuro:  sedated   	Skin: Warm, without rashes  	Vascular access: RIJ HD non-tunneled catheter intact/without bleeding    LABS/STUDIES  --------------------------------------------------------------------------------              7.5    7.56  >-----------<  87       [06-02-18 @ 03:00]              21.7     127  |  90  |  68  ----------------------------<  120      [06-02-18 @ 03:00]  3.5   |  15  |  3.70        Ca     7.1     [06-02-18 @ 03:00]      Mg     1.8     [06-02-18 @ 03:00]      Phos  7.2     [06-02-18 @ 03:00]    TPro  4.8  /  Alb  1.4  /  TBili  0.3  /  DBili  x   /  AST  35  /  ALT  21  /  AlkPhos  149  [06-02-18 @ 03:00]          Creatinine Trend:  SCr 3.70 [06-02 @ 03:00]  SCr 3.20 [06-01 @ 02:40]  SCr 2.58 [05-31 @ 02:18]  SCr 3.65 [05-30 @ 12:40]  SCr 3.49 [05-30 @ 06:00]

## 2018-06-03 LAB
ALBUMIN SERPL ELPH-MCNC: 1.5 G/DL — LOW (ref 3.3–5)
ALP SERPL-CCNC: 191 U/L — HIGH (ref 40–120)
ALT FLD-CCNC: 22 U/L — SIGNIFICANT CHANGE UP (ref 4–41)
AST SERPL-CCNC: 30 U/L — SIGNIFICANT CHANGE UP (ref 4–40)
BASOPHILS # BLD AUTO: 0.01 K/UL — SIGNIFICANT CHANGE UP (ref 0–0.2)
BASOPHILS NFR BLD AUTO: 0.2 % — SIGNIFICANT CHANGE UP (ref 0–2)
BILIRUB SERPL-MCNC: 0.4 MG/DL — SIGNIFICANT CHANGE UP (ref 0.2–1.2)
BUN SERPL-MCNC: 74 MG/DL — HIGH (ref 7–23)
CALCIUM SERPL-MCNC: 7.4 MG/DL — LOW (ref 8.4–10.5)
CHLORIDE SERPL-SCNC: 92 MMOL/L — LOW (ref 98–107)
CO2 SERPL-SCNC: 14 MMOL/L — LOW (ref 22–31)
CREAT SERPL-MCNC: 4.2 MG/DL — HIGH (ref 0.5–1.3)
EOSINOPHIL # BLD AUTO: 0.01 K/UL — SIGNIFICANT CHANGE UP (ref 0–0.5)
EOSINOPHIL NFR BLD AUTO: 0.2 % — SIGNIFICANT CHANGE UP (ref 0–6)
GLUCOSE BLDC GLUCOMTR-MCNC: 137 MG/DL — HIGH (ref 70–99)
GLUCOSE BLDC GLUCOMTR-MCNC: 144 MG/DL — HIGH (ref 70–99)
GLUCOSE BLDC GLUCOMTR-MCNC: 209 MG/DL — HIGH (ref 70–99)
GLUCOSE BLDC GLUCOMTR-MCNC: 215 MG/DL — HIGH (ref 70–99)
GLUCOSE BLDC GLUCOMTR-MCNC: 219 MG/DL — HIGH (ref 70–99)
GLUCOSE SERPL-MCNC: 133 MG/DL — HIGH (ref 70–99)
HCT VFR BLD CALC: 21.7 % — LOW (ref 39–50)
HGB BLD-MCNC: 7.4 G/DL — LOW (ref 13–17)
IMM GRANULOCYTES # BLD AUTO: 0.17 # — SIGNIFICANT CHANGE UP
IMM GRANULOCYTES NFR BLD AUTO: 3 % — HIGH (ref 0–1.5)
LYMPHOCYTES # BLD AUTO: 0.27 K/UL — LOW (ref 1–3.3)
LYMPHOCYTES # BLD AUTO: 4.7 % — LOW (ref 13–44)
MAGNESIUM SERPL-MCNC: 2 MG/DL — SIGNIFICANT CHANGE UP (ref 1.6–2.6)
MANUAL SMEAR VERIFICATION: SIGNIFICANT CHANGE UP
MCHC RBC-ENTMCNC: 29.2 PG — SIGNIFICANT CHANGE UP (ref 27–34)
MCHC RBC-ENTMCNC: 34.1 % — SIGNIFICANT CHANGE UP (ref 32–36)
MCV RBC AUTO: 85.8 FL — SIGNIFICANT CHANGE UP (ref 80–100)
MONOCYTES # BLD AUTO: 0.27 K/UL — SIGNIFICANT CHANGE UP (ref 0–0.9)
MONOCYTES NFR BLD AUTO: 4.7 % — SIGNIFICANT CHANGE UP (ref 2–14)
NEUTROPHILS # BLD AUTO: 5.02 K/UL — SIGNIFICANT CHANGE UP (ref 1.8–7.4)
NEUTROPHILS NFR BLD AUTO: 87.2 % — HIGH (ref 43–77)
NRBC # FLD: 0 — SIGNIFICANT CHANGE UP
PHOSPHATE SERPL-MCNC: 7.2 MG/DL — HIGH (ref 2.5–4.5)
PLATELET # BLD AUTO: 85 K/UL — LOW (ref 150–400)
PMV BLD: 10.9 FL — SIGNIFICANT CHANGE UP (ref 7–13)
POTASSIUM SERPL-MCNC: 3.5 MMOL/L — SIGNIFICANT CHANGE UP (ref 3.5–5.3)
POTASSIUM SERPL-SCNC: 3.5 MMOL/L — SIGNIFICANT CHANGE UP (ref 3.5–5.3)
PROT SERPL-MCNC: 4.9 G/DL — LOW (ref 6–8.3)
RBC # BLD: 2.53 M/UL — LOW (ref 4.2–5.8)
RBC # FLD: 15.3 % — HIGH (ref 10.3–14.5)
SODIUM SERPL-SCNC: 130 MMOL/L — LOW (ref 135–145)
WBC # BLD: 5.75 K/UL — SIGNIFICANT CHANGE UP (ref 3.8–10.5)
WBC # FLD AUTO: 5.75 K/UL — SIGNIFICANT CHANGE UP (ref 3.8–10.5)

## 2018-06-03 PROCEDURE — 99232 SBSQ HOSP IP/OBS MODERATE 35: CPT | Mod: GC

## 2018-06-03 PROCEDURE — 99291 CRITICAL CARE FIRST HOUR: CPT

## 2018-06-03 RX ORDER — ACETAMINOPHEN 500 MG
650 TABLET ORAL EVERY 6 HOURS
Qty: 0 | Refills: 0 | Status: DISCONTINUED | OUTPATIENT
Start: 2018-06-03 | End: 2018-06-08

## 2018-06-03 RX ADMIN — HEPARIN SODIUM 5000 UNIT(S): 5000 INJECTION INTRAVENOUS; SUBCUTANEOUS at 05:56

## 2018-06-03 RX ADMIN — Medication 2: at 06:16

## 2018-06-03 RX ADMIN — HEPARIN SODIUM 5000 UNIT(S): 5000 INJECTION INTRAVENOUS; SUBCUTANEOUS at 22:39

## 2018-06-03 RX ADMIN — Medication 25 MILLIGRAM(S): at 17:36

## 2018-06-03 RX ADMIN — Medication 2: at 23:56

## 2018-06-03 RX ADMIN — Medication 650 MILLIGRAM(S): at 03:53

## 2018-06-03 RX ADMIN — PIPERACILLIN AND TAZOBACTAM 25 GRAM(S): 4; .5 INJECTION, POWDER, LYOPHILIZED, FOR SOLUTION INTRAVENOUS at 17:36

## 2018-06-03 RX ADMIN — CHLORHEXIDINE GLUCONATE 1 APPLICATION(S): 213 SOLUTION TOPICAL at 11:58

## 2018-06-03 RX ADMIN — Medication 2: at 11:57

## 2018-06-03 RX ADMIN — PIPERACILLIN AND TAZOBACTAM 25 GRAM(S): 4; .5 INJECTION, POWDER, LYOPHILIZED, FOR SOLUTION INTRAVENOUS at 05:57

## 2018-06-03 RX ADMIN — HEPARIN SODIUM 5000 UNIT(S): 5000 INJECTION INTRAVENOUS; SUBCUTANEOUS at 14:00

## 2018-06-03 RX ADMIN — Medication 25 MILLIGRAM(S): at 05:56

## 2018-06-03 NOTE — PROGRESS NOTE ADULT - ATTENDING COMMENTS
84 yo with sepsis due to Klebsiella PNA and Enterobacter UTI, ARF briefly requiring RRT; off pressors now; remains vent dependent with continued metabolic acidosis  --failing CPAP trials due to tachypnea; ABG with overcompensating respiratory alk.  Given metabolic derangement, frail baseline and poor nutritional status, thick secretions; he would be high risk of recurrent respiratory failure once extubated.  Resident had GOC discussion with pt's daughter Alexia (please see GOC note); full code for now  --opens eyes and responds to voice on dex  --d/c Zosyn s/p 7 days  --taper steroids    Critical care time today 35 min 82 yo with sepsis due to Klebsiella PNA and Enterobacter UTI, ARF briefly requiring RRT; off pressors now; remains vent dependent with continued metabolic acidosis  --failing CPAP trials due to tachypnea; ABG with overcompensating respiratory alk.  Given metabolic derangement, frail baseline and poor nutritional status, thick secretions; he would be high risk of recurrent respiratory failure once extubated.  Resident Dr. Guerrero had GOC discussion with pt's daughter Alexia (please see GOC note); full code for now  --opens eyes and responds to voice on dex  --d/c Zosyn s/p 7 days  --taper steroids    Critical care time today 35 min

## 2018-06-03 NOTE — PROGRESS NOTE ADULT - SUBJECTIVE AND OBJECTIVE BOX
[facility] DIVISION OF KIDNEY DISEASES AND HYPERTENSION -- FOLLOW UP NOTE  --------------------------------------------------------------------------------  HPI:  This is a 82 y/o man with history of dementia, DM, HTN who presents with abdominal pain with chills and was found to be in septic shock from pneumonia started on IV ABX. Pt. with no history of kidney disease in the past. Pt. was found to have oligiuric RUSH and was initiated on CVVHDF. CVVHDF was held on 5/30 and pt. was transitioned of IHD. Pt. last had HD on 5/30, tolerated well. Currently pt. remains intubated and sedated.      PAST HISTORY  --------------------------------------------------------------------------------  No significant changes to PMH, PSH, FHx, SHx, unless otherwise noted    ALLERGIES & MEDICATIONS  --------------------------------------------------------------------------------  Allergies    No Known Allergies    Intolerances      Standing Inpatient Medications  chlorhexidine 4% Liquid 1 Application(s) Topical <User Schedule>  dexmedetomidine Infusion 0.2 MICROgram(s)/kG/Hr IV Continuous <Continuous>  dextrose 5%. 1000 milliLiter(s) IV Continuous <Continuous>  dextrose 50% Injectable 12.5 Gram(s) IV Push once  dextrose 50% Injectable 25 Gram(s) IV Push once  dextrose 50% Injectable 25 Gram(s) IV Push once  heparin  Injectable 5000 Unit(s) SubCutaneous every 8 hours  hydrocortisone sodium succinate Injectable 25 milliGRAM(s) IV Push every 12 hours  insulin lispro (HumaLOG) corrective regimen sliding scale   SubCutaneous every 6 hours  piperacillin/tazobactam IVPB. 3.375 Gram(s) IV Intermittent every 12 hours          REVIEW OF SYSTEMS  --------------------------------------------------------------------------------  Unable to obtain.     VITALS/PHYSICAL EXAM  --------------------------------------------------------------------------------  T(C): 37.7 (06-03-18 @ 04:00), Max: 37.7 (06-03-18 @ 04:00)  HR: 80 (06-03-18 @ 08:00) (76 - 89)  BP: 119/58 (06-03-18 @ 08:00) (96/58 - 132/72)  RR: 31 (06-03-18 @ 08:00) (22 - 35)  SpO2: 100% (06-03-18 @ 08:00) (100% - 100%)  Wt(kg): --        06-02-18 @ 07:01  -  06-03-18 @ 07:00  --------------------------------------------------------  IN: 1005 mL / OUT: 1110 mL / NET: -105 mL      Physical Exam:  	Gen: NAD  	HEENT:  Intubated   	Pulm: CTA B/L  	CV: RRR,  	Abd:  soft  	LE: Warm, no edema  	Neuro:  sedated   	Skin: Warm, without rashes  	Vascular access: RIJ HD non-tunneled catheter intact/without bleeding    LABS/STUDIES  --------------------------------------------------------------------------------              7.4    5.75  >-----------<  85       [06-03-18 @ 02:50]              21.7     130  |  92  |  74  ----------------------------<  133      [06-03-18 @ 02:50]  3.5   |  14  |  4.20        Ca     7.4     [06-03-18 @ 02:50]      Mg     2.0     [06-03-18 @ 02:50]      Phos  7.2     [06-03-18 @ 02:50]    TPro  4.9  /  Alb  1.5  /  TBili  0.4  /  DBili  x   /  AST  30  /  ALT  22  /  AlkPhos  191  [06-03-18 @ 02:50]          Creatinine Trend:  SCr 4.20 [06-03 @ 02:50]  SCr 3.98 [06-02 @ 19:21]  SCr 3.70 [06-02 @ 03:00]  SCr 3.20 [06-01 @ 02:40]  SCr 2.58 [05-31 @ 02:18]      Urine Sodium 38      [06-02-18 @ 06:16]  Urine Chloride 21      [06-02-18 @ 06:16]  Urine Osmolality 283      [06-02-18 @ 06:16]

## 2018-06-03 NOTE — PROGRESS NOTE ADULT - SUBJECTIVE AND OBJECTIVE BOX
INTERVAL HPI/OVERNIGHT EVENTS:  No major overnight events.  Na has improved from 127 to 130 today.    SUBJECTIVE: Patient seen and examined at bedside.     ROS: Unable to obtain as pt intubated.     VITAL SIGNS:  ICU Vital Signs Last 24 Hrs  T(C): 37.7 (03 Jun 2018 04:00), Max: 37.7 (03 Jun 2018 04:00)  T(F): 99.9 (03 Jun 2018 04:00), Max: 99.9 (03 Jun 2018 04:00)  HR: 83 (03 Jun 2018 06:00) (76 - 89)  BP: 113/63 (03 Jun 2018 06:00) (96/58 - 132/72)  BP(mean): 78 (03 Jun 2018 06:00) (67 - 88)  ABP: --  ABP(mean): --  RR: 25 (03 Jun 2018 06:00) (22 - 35)  SpO2: 100% (03 Jun 2018 06:00) (100% - 100%)    Mode: AC/ CMV (Assist Control/ Continuous Mandatory Ventilation), RR (machine): 18, TV (machine): 450, FiO2: 30, PEEP: 5, MAP: 10, PIP: 22    06-02 @ 07:01  -  06-03 @ 07:00  --------------------------------------------------------  IN: 1005 mL / OUT: 1110 mL / NET: -105 mL      CAPILLARY BLOOD GLUCOSE  POCT Blood Glucose.: 209 mg/dL (03 Jun 2018 06:13)      PHYSICAL EXAM:  General:  Elderly, cachetic male, currently intubated; non-responsive to verbal or tactile stimuli  HEENT:  Normocephalic, atraumatic pupils equally round, minimally responsive to light, R-Shiley  Respiratory:  Non-labored breathing pattern, transmitted upper airway breath sounds; concordant with vent  Cardiovascular:  Regular rate and rhythm   Abdomen: Soft, non-distended  Genital: Bhakta present  Extremities: Thin, no edema; palpable pulses  Skin:  Warm and dry  Neurological: Unable to participate in exam    MEDICATIONS:  MEDICATIONS  (STANDING):  chlorhexidine 4% Liquid 1 Application(s) Topical <User Schedule>  dexmedetomidine Infusion 0.2 MICROgram(s)/kG/Hr (2.065 mL/Hr) IV Continuous <Continuous>  dextrose 5%. 1000 milliLiter(s) (50 mL/Hr) IV Continuous <Continuous>  dextrose 50% Injectable 12.5 Gram(s) IV Push once  dextrose 50% Injectable 25 Gram(s) IV Push once  dextrose 50% Injectable 25 Gram(s) IV Push once  heparin  Injectable 5000 Unit(s) SubCutaneous every 8 hours  hydrocortisone sodium succinate Injectable 25 milliGRAM(s) IV Push every 12 hours  insulin lispro (HumaLOG) corrective regimen sliding scale   SubCutaneous every 6 hours  piperacillin/tazobactam IVPB. 3.375 Gram(s) IV Intermittent every 12 hours    MEDICATIONS  (PRN):  acetaminophen    Suspension 650 milliGRAM(s) Oral every 6 hours PRN For Temp greater than 38 C (100.4 F)  dextrose 40% Gel 15 Gram(s) Oral once PRN Blood Glucose LESS THAN 70 milliGRAM(s)/deciliter  glucagon  Injectable 1 milliGRAM(s) IntraMuscular once PRN Glucose LESS THAN 70 milligrams/deciliter    ALLERGIES:  No Known Allergies  Intolerances    LABS:                        7.4    5.75  )-----------( 85       ( 03 Jun 2018 02:50 )             21.7     06-03    130<L>  |  92<L>  |  74<H>  ----------------------------<  133<H>  3.5   |  14<L>  |  4.20<H>    Ca    7.4<L>      03 Jun 2018 02:50  Phos  7.2     06-03  Mg     2.0     06-03    TPro  4.9<L>  /  Alb  1.5<L>  /  TBili  0.4  /  DBili  x   /  AST  30  /  ALT  22  /  AlkPhos  191<H>  06-03      RADIOLOGY & ADDITIONAL TESTS: Reviewed. INTERVAL HPI/OVERNIGHT EVENTS:  No major overnight events.  Na has improved from 127 to 130 today.  Remains on precedex and doing CPAP trial this AM.    SUBJECTIVE: Patient seen and examined at bedside.     ROS: Unable to obtain as pt intubated.     VITAL SIGNS:  ICU Vital Signs Last 24 Hrs  T(C): 37.7 (03 Jun 2018 04:00), Max: 37.7 (03 Jun 2018 04:00)  T(F): 99.9 (03 Jun 2018 04:00), Max: 99.9 (03 Jun 2018 04:00)  HR: 83 (03 Jun 2018 06:00) (76 - 89)  BP: 113/63 (03 Jun 2018 06:00) (96/58 - 132/72)  BP(mean): 78 (03 Jun 2018 06:00) (67 - 88)  ABP: --  ABP(mean): --  RR: 25 (03 Jun 2018 06:00) (22 - 35)  SpO2: 100% (03 Jun 2018 06:00) (100% - 100%)    Mode: AC/ CMV (Assist Control/ Continuous Mandatory Ventilation), RR (machine): 18, TV (machine): 450, FiO2: 30, PEEP: 5, MAP: 10, PIP: 22    06-02 @ 07:01  -  06-03 @ 07:00  --------------------------------------------------------  IN: 1005 mL / OUT: 1110 mL / NET: -105 mL      CAPILLARY BLOOD GLUCOSE  POCT Blood Glucose.: 209 mg/dL (03 Jun 2018 06:13)      PHYSICAL EXAM:  General:  Elderly, cachetic male, currently intubated; intermittently opens eyes to voice, otherwise not following commands  HEENT:  Normocephalic, atraumatic pupils equally round, minimally responsive to light, R-Shiley  Respiratory:  Rapid, shallow breathing during CPAP trial, transmitted upper airway breath sounds  Cardiovascular:  Regular rate and rhythm   Abdomen: Soft, non-distended  Genital: Bhakta and rectal tube present   Extremities: Thin, no edema; palpable pulses  Skin:  Warm and dry  Neurological: Unable to participate in exam, not following commands    MEDICATIONS:  MEDICATIONS  (STANDING):  chlorhexidine 4% Liquid 1 Application(s) Topical <User Schedule>  dexmedetomidine Infusion 0.2 MICROgram(s)/kG/Hr (2.065 mL/Hr) IV Continuous <Continuous>  dextrose 5%. 1000 milliLiter(s) (50 mL/Hr) IV Continuous <Continuous>  dextrose 50% Injectable 12.5 Gram(s) IV Push once  dextrose 50% Injectable 25 Gram(s) IV Push once  dextrose 50% Injectable 25 Gram(s) IV Push once  heparin  Injectable 5000 Unit(s) SubCutaneous every 8 hours  hydrocortisone sodium succinate Injectable 25 milliGRAM(s) IV Push every 12 hours  insulin lispro (HumaLOG) corrective regimen sliding scale   SubCutaneous every 6 hours  piperacillin/tazobactam IVPB. 3.375 Gram(s) IV Intermittent every 12 hours    MEDICATIONS  (PRN):  acetaminophen    Suspension 650 milliGRAM(s) Oral every 6 hours PRN For Temp greater than 38 C (100.4 F)  dextrose 40% Gel 15 Gram(s) Oral once PRN Blood Glucose LESS THAN 70 milliGRAM(s)/deciliter  glucagon  Injectable 1 milliGRAM(s) IntraMuscular once PRN Glucose LESS THAN 70 milligrams/deciliter    ALLERGIES:  No Known Allergies  Intolerances    LABS:                        7.4    5.75  )-----------( 85       ( 03 Jun 2018 02:50 )             21.7     06-03    130<L>  |  92<L>  |  74<H>  ----------------------------<  133<H>  3.5   |  14<L>  |  4.20<H>    Ca    7.4<L>      03 Jun 2018 02:50  Phos  7.2     06-03  Mg     2.0     06-03    TPro  4.9<L>  /  Alb  1.5<L>  /  TBili  0.4  /  DBili  x   /  AST  30  /  ALT  22  /  AlkPhos  191<H>  06-03      RADIOLOGY & ADDITIONAL TESTS: Reviewed.

## 2018-06-03 NOTE — PROGRESS NOTE ADULT - ATTENDING COMMENTS
Patient with RUSH, non oliguric , electrolyte and fluid abnormalities. Multiple RUSH and CKD risks as detailed in consult note. Current management will include :  Monitor chemistry including K,. Mg   Monitoring of I/O and maintaining euvolemic state  Will continue to monitor for dialysis need.  Avoiding nephrotoxic agents-NSAIDs, contrast, nephrotoxic antibiotics  Adjusting dosage of medications for abnormal creatinine clearance (<10ml/min when creatinine is rising)  Reviewed workup available. Other work up as suggested in consult.  Please call with any additional questions or on availability of any new information or reports or change in clinical condition. Will follow.

## 2018-06-03 NOTE — PROGRESS NOTE ADULT - ASSESSMENT
83M hx mild dementia, uncontrolled T2DM, HTN presenting to the ED w/ declining functional status, subjective hypothermia, and rigors admitted to MICU for severe sepsis 2/2 Klebsiella PNA and Enterobacter UTI and ARF, course c/b septic shock with refractory metabolic acidosis and hyperkalemia requiring RRT    #Neuro  -Sedated with precedex since 6/2  -Off fentanyl & propofol since 5/31    #CV  -Distributive shock in setting of sepsis and iatrogenic from sedation, now resolved, s/p levophed from 5/28 to 6/1  -Started on hydrocortisone on 5/30 for adrenal insufficiency in setting of sepsis, wean as tolerated    #Pulm  -Intubated on 5/28 respiratory distress/tachypnea, likely related setting of respiratory compensation for severe metabolic acidosis and underlying PNA  -Daily CPAP trials    #ID  -Septic shock in setting Klebsiella PNA and Enterobacter UTI with resolution of shock state  -s/p vanco from 5/28 to 5/31, on Zosyn from 5/28 to present, anticipate 7 day course, likely d/c on 6/3    #GI  -Enteral feeds started on 5/29 via OGT  -Feeds held on 5/31 2/2 residuals seen in ET tube    #Renal  -Acute renal failure, likely hemodynamically mediated in setting of septic shock, with severe acidosis and multiple electrolyte derangements (hypernatremia, hyperkalemia) requiring RRT  -Urine output improving, no urgent needs for HD today, further HD as per renal team  -Hyponatremia improving, which was iatrogenic in setting of D5W use for several days, d/c'ed D5W on 6/1    #Endo  -Uncontrolled T2DM (HgbA1c 10.3) with hyperglycemia peaking in the 400s on admission  -Intermittent hypoglycemia noted as some FS <100 noted, likely related to tube feeds temporarily being held due to residuals   -Started on hydrocortisone on 5/30 for adrenal insufficiency in setting of sepsis, weaning as above    #Heme  -Acute on chronic anemia without no active source of bleeding identified, s/p 1pRBC on 5/31 with Hgb response, currently table in 7 range  -Worsening thrombocytopenia (admission platelets 136, currently in 80s), sent panel to rule out HIT  -Suspect acute anemia and worsening thrombocytopenia related to myelosuppression from critical illness    #VTE ppx  -Heparin SQ, monitor platelets closely and hold if platelets <50    #Advanced care planning  -Initial GOC discussion with health care surrogate (daughter, Alexia Guevara) who wishes for aggressive measures and FULL CODE at this time. Refer to GOC note dated on 5/28 for more details  -Family between daughter and palliative care team today on 6/1, ongoing GOC based on pt clinical improvement       Nelly Guerrero MD, PGY3  Internal Medicine Resident  Pager #[phone] ([facility]), [phone] ([facility]) 83M hx mild dementia, uncontrolled T2DM, HTN presenting to the ED w/ declining functional status, subjective hypothermia, and rigors admitted to MICU for severe sepsis 2/2 Klebsiella PNA and Enterobacter UTI and ARF, course c/b septic shock with refractory metabolic acidosis and hyperkalemia requiring RRT    #Neuro  -Sedated with precedex since 6/2, but with some small improvement in mental status (opening eyes, turning head, but not following commands)  -Off fentanyl & propofol since 5/31    #CV  -Distributive shock in setting of sepsis and iatrogenic from sedation, now resolved, s/p levophed from 5/28 to 6/1  -Started on hydrocortisone on 5/30 for adrenal insufficiency in setting of sepsis, wean as tolerated    #Pulm  -Intubated on 5/28 respiratory distress/tachypnea, in respiratory compensation for severe metabolic acidosis and underlying PNA  -Undergoing daily CPAP trials since 6/1, often limited by tachypnea peaking into 30s  -Given metabolic acidosis, would like benefit from HD prior to extubation  -Pt at risk for re-intubation given chronic deconditioning and poor nutritional status, will re-address GOC with daughter     #ID  -Septic shock in setting Klebsiella PNA and Enterobacter UTI with resolution of shock state on 6/1  -s/p vanco from 5/28 to 5/31, on Zosyn from 5/28 to present with, plan to d/c on 6/3    #GI  -Enteral feeds started on 5/29 via OGT  -Feeds temporarily held on 5/31 2/2 residuals seen in ET tube, resumed on 6/2    #Renal  -Acute renal failure, likely hemodynamically mediated in setting of septic shock, with severe acidosis and multiple electrolyte derangements (hypernatremia, hyperkalemia) requiring RRT, last HD was on 5/30  -Urine output improving, but chronic metabolic acidosis, will discuss with renal team regarding next HD session  -Hyponatremia improving, which was iatrogenic in setting of D5W use for several days    #Endo  -Uncontrolled T2DM (HgbA1c 10.3) with hyperglycemia peaking in the 400s on admission  -Started on hydrocortisone on 5/30 for adrenal insufficiency in setting of sepsis, weaning as above    #Heme  -Acute on chronic anemia without no active source of bleeding identified, s/p 1pRBC on 5/31 with Hgb response, currently table in 7 range  -Worsening thrombocytopenia (admission platelets 136, currently in 80s), sent panel to rule out HIT  -Suspect acute anemia and worsening thrombocytopenia related to myelosuppression from critical illness    #VTE ppx  -Heparin SQ, monitor platelets closely and hold if platelets <50    #Advanced care planning  -Initial [facility] discussion with health care surrogate (daughter, Alexia Guevara) who wishes for aggressive measures and FULL CODE at this time. Refer to GOC note dated on 5/28 for more details  -Palliative care on board, last meeting revealed that further decision making would be based on pt clinical improvement  -Since anticipating extubation in next 24-48hr, will readdress code status with daughter.      Nelly Guerrero MD, PGY3  Internal Medicine Resident  Pager #[phone] ([facility]), [phone] ([facility]) 83M hx mild dementia, uncontrolled T2DM, HTN presenting to the ED w/ declining functional status, subjective hypothermia, and rigors admitted to MICU for severe sepsis 2/2 Klebsiella PNA and Enterobacter UTI and ARF, course c/b septic shock with refractory metabolic acidosis and hyperkalemia requiring RRT    #Neuro  -Sedated with precedex since 6/2, but with some small improvement in mental status (opening eyes, turning head, but not following commands)  -Off fentanyl & propofol since 5/31    #CV  -Distributive shock in setting of sepsis and iatrogenic from sedation, now resolved, s/p levophed from 5/28 to 6/1  -Started on hydrocortisone on 5/30 for adrenal insufficiency in setting of sepsis, wean as tolerated    #Pulm  -Intubated on 5/28 respiratory distress/tachypnea, in respiratory compensation for severe metabolic acidosis and underlying PNA  -Undergoing daily CPAP trials since 6/1, often limited by tachypnea peaking into 30s  -Given metabolic acidosis, discussed with the renal team if pt would benefit from HD prior to extubation, per renal fellow pt would only benefit from HD prior to extubation if he was volume overloaded  -Pt at risk for re-intubation given chronic deconditioning and poor nutritional status, will re-address GOC with daughter     #ID  -Septic shock in setting Klebsiella PNA and Enterobacter UTI with resolution of shock state on 6/1  -s/p vanco from 5/28 to 5/31, on Zosyn from 5/28 to present with, plan to d/c on 6/3    #GI  -Enteral feeds started on 5/29 via OGT  -Feeds temporarily held on 5/31 2/2 residuals seen in ET tube, resumed on 6/2    #Renal  -Acute renal failure, likely hemodynamically mediated in setting of septic shock, with severe acidosis and multiple electrolyte derangements (hypernatremia, hyperkalemia) requiring RRT, last HD was on 5/30  -Urine output improving, but chronic metabolic acidosis, will discuss with renal team regarding next HD session  -Hyponatremia improving, which was iatrogenic in setting of D5W use for several days    #Endo  -Uncontrolled T2DM (HgbA1c 10.3) with hyperglycemia peaking in the 400s on admission  -Started on hydrocortisone on 5/30 for adrenal insufficiency in setting of sepsis, weaning as above    #Heme  -Acute on chronic anemia without no active source of bleeding identified, s/p 1pRBC on 5/31 with Hgb response, currently table in 7 range  -Worsening thrombocytopenia (admission platelets 136, currently in 80s), sent panel to rule out HIT  -Suspect acute anemia and worsening thrombocytopenia related to myelosuppression from critical illness    #VTE ppx  -Heparin SQ, monitor platelets closely and hold if platelets <50    #Advanced care planning  -Initial GOC discussion with health care surrogate (daughter, Alexia Guevara) who wishes for aggressive measures and FULL CODE at this time. Refer to GOC note dated on 5/28 for more details  -Palliative care on board, last meeting revealed that further decision making would be based on pt clinical improvement  -Since anticipating extubation in next 24-48hr, will readdress code status with daughter      Nelly Guerrero MD, PGY3  Internal Medicine Resident  Pager #[phone] ([facility]), [phone] ([facility]) 83M hx mild dementia, uncontrolled T2DM, HTN presenting to the ED w/ declining functional status, subjective hypothermia, and rigors admitted to MICU for severe sepsis 2/2 Klebsiella PNA and Enterobacter UTI and ARF, course c/b septic shock with refractory metabolic acidosis and hyperkalemia requiring RRT    #Neuro  -Sedated with precedex since 6/2, but with some small improvement in mental status (opening eyes, turning head, but not following commands)  -Off fentanyl & propofol since 5/31    #CV  -Distributive shock in setting of sepsis and iatrogenic from sedation, now resolved, s/p levophed from 5/28 to 6/1  -Started on hydrocortisone on 5/30 for adrenal insufficiency in setting of sepsis, wean as tolerated    #Pulm  -Intubated on 5/28 respiratory distress/tachypnea, in respiratory compensation for severe metabolic acidosis and underlying PNA  -Undergoing daily CPAP trials since 6/1, often limited by tachypnea peaking into 30s  - Discussed with the renal team if pt would benefit from HD prior to extubation given metabolic acidosis,, per renal fellow pt would only benefit from HD prior to extubation if he was volume overloaded  -Pt at risk for re-intubation given chronic deconditioning and poor nutritional status, will re-address GOC with daughter     #ID  -Septic shock in setting Klebsiella PNA and Enterobacter UTI with resolution of shock state on 6/1  -s/p vanco from 5/28 to 5/31, on Zosyn from 5/28 to present with, plan to d/c on 6/3    #GI  -Enteral feeds started on 5/29 via OGT  -Feeds temporarily held on 5/31 2/2 residuals seen in ET tube, resumed on 6/2    #Renal  -Acute renal failure, likely hemodynamically mediated in setting of septic shock, with severe acidosis and multiple electrolyte derangements (hypernatremia, hyperkalemia) requiring RRT, last HD was on 5/30  -Urine output improving, possibly in recovery phase, no urgent need for HD today as per renal  -Hyponatremia improving, which was iatrogenic in setting of D5W use for several days    #Endo  -Uncontrolled T2DM (HgbA1c 10.3) with hyperglycemia peaking in the 400s on admission  -Started on hydrocortisone on 5/30 for adrenal insufficiency in setting of sepsis, weaning as above    #Heme  -Acute on chronic anemia without no active source of bleeding identified, s/p 1pRBC on 5/31 with Hgb response, currently table in 7 range  -Worsening thrombocytopenia (admission platelets 136, currently in 80s), sent panel to rule out HIT  -Suspect acute anemia and worsening thrombocytopenia related to myelosuppression from critical illness    #VTE ppx  -Heparin SQ, monitor platelets closely and hold if platelets <50    #Advanced care planning  -Initial GOC discussion with health care surrogate (daughter, Alexia Guevara) who wishes for aggressive measures and FULL CODE at this time. Refer to GOC note dated on 5/28 for more details  -Palliative care on board, last meeting revealed that further decision making would be based on pt clinical improvement  -Since anticipating extubation in next 24-48hr, will readdress code status with daughter      Nelly Guerrero MD, PGY3  Internal Medicine Resident  Pager #[phone] ([facility]), [phone] ([facility])

## 2018-06-03 NOTE — PROGRESS NOTE ADULT - PROBLEM SELECTOR PLAN 1
Hemodynamically mediated RUSH in the setting of septic shock. Pt. was on CVVHDF which was transitioned to HD on 5/30. Pt. last had HD on 5/30 tolerated well without complications. Scr elevated at 4.2 today. Pt. non-oliguric with improved urine output (~800 mL).  No plans for HD today, will reassess for HD tomorrow. Monitor BMP, urine output, I,Os. Avoid any potential nephrotoxins. Dose medications to Crcl.

## 2018-06-03 NOTE — CHART NOTE - NSCHARTNOTEFT_GEN_A_CORE
R3 Chart Update    Called daughter (Alexia Guevara) who is health care surrogate by next of kin to readdress [facility].  Explained that pt has currently been intubated for 7 days and the team has weaned sedation and has been doing daily CPAP trials with hope of extubation, however he is at high risk for re-intubation given his persistent tachypnea during CPAP trials and his poor functional and nutritional status.  I also explained that if he gets re-intubated, it is unlikely that he would be able to get be weaned from the ventilator and would likely require a tracheostomy.  Daughter verbalized understanding and believes that his wishes would have been for aggressive measures, however she would like to speak with her own daughter before any further decision making.  Pt remains FULL CODE at this time.     Nelly Guerrero MD, PGY3  Internal Medicine Resident  Pager #[phone] ([facility]), [phone] ([facility]) R3 Chart Update    Called daughter (Alexia Guevara) who is health care surrogate by next of kin to readdress [facility].  Explained that pt has currently been intubated for 7 days and the team has weaned sedation and has been doing daily CPAP trials with hope of extubation; however he is at high risk for re-intubation given his persistent tachypnea during CPAP trials and his poor functional and nutritional status.  I also explained that if he gets re-intubated, it is unlikely that he would be able to get be weaned from the ventilator and would likely require a tracheostomy.  Daughter verbalized understanding and believes that his wishes would have been for aggressive measures, however she would like to speak with her own daughter before any further decision making.  Pt remains FULL CODE at this time.     Nelly Guerrero MD, PGY3  Internal Medicine Resident  Pager #[phone] ([facility]), [phone] ([facility]) R3 Chart Update    Called daughter (Alexia Guevara) who is health care surrogate by next of kin to readdress [facility].  Explained that pt has currently been intubated for 7 days and the team has weaned sedation and has been doing daily CPAP trials with hope of extubation; however he is at high risk for re-intubation given his persistent tachypnea during CPAP trials and his poor functional and nutritional status.  I also explained that if he gets re-intubated, it is unlikely that he would be able to be weaned from the ventilator and would likely require a tracheostomy.  Daughter verbalized understanding and believes that his wishes would have been for aggressive measures, however she would like to speak with her own daughter before any further decision making.  Pt remains FULL CODE at this time.     Nelly Guerrero MD, PGY3  Internal Medicine Resident  Pager #[phone] ([facility]), [phone] ([facility])

## 2018-06-03 NOTE — PROGRESS NOTE ADULT - PROBLEM SELECTOR PLAN 2
Pt. with hyponatremia in the setting of hypovolemia from sepsis and IV D5W. Serum sodium improved to 130 today. Monitor serum sodium

## 2018-06-04 DIAGNOSIS — E87.2 ACIDOSIS: ICD-10-CM

## 2018-06-04 LAB
BASE EXCESS BLDA CALC-SCNC: -8.5 MMOL/L — SIGNIFICANT CHANGE UP
BASOPHILS # BLD AUTO: 0.01 K/UL — SIGNIFICANT CHANGE UP (ref 0–0.2)
BASOPHILS NFR BLD AUTO: 0.2 % — SIGNIFICANT CHANGE UP (ref 0–2)
BUN SERPL-MCNC: 83 MG/DL — HIGH (ref 7–23)
CALCIUM SERPL-MCNC: 8 MG/DL — LOW (ref 8.4–10.5)
CHLORIDE SERPL-SCNC: 95 MMOL/L — LOW (ref 98–107)
CO2 SERPL-SCNC: 14 MMOL/L — LOW (ref 22–31)
CREAT SERPL-MCNC: 4.48 MG/DL — HIGH (ref 0.5–1.3)
EOSINOPHIL # BLD AUTO: 0 K/UL — SIGNIFICANT CHANGE UP (ref 0–0.5)
EOSINOPHIL NFR BLD AUTO: 0 % — SIGNIFICANT CHANGE UP (ref 0–6)
GLUCOSE BLDC GLUCOMTR-MCNC: 134 MG/DL — HIGH (ref 70–99)
GLUCOSE BLDC GLUCOMTR-MCNC: 144 MG/DL — HIGH (ref 70–99)
GLUCOSE BLDC GLUCOMTR-MCNC: 220 MG/DL — HIGH (ref 70–99)
GLUCOSE BLDC GLUCOMTR-MCNC: 225 MG/DL — HIGH (ref 70–99)
GLUCOSE SERPL-MCNC: 180 MG/DL — HIGH (ref 70–99)
HCO3 BLDA-SCNC: 18 MMOL/L — LOW (ref 22–26)
HCT VFR BLD CALC: 22 % — LOW (ref 39–50)
HEPARIN CF II AG PPP-ACNC: 0.13 — SIGNIFICANT CHANGE UP (ref 0–0.39)
HGB BLD-MCNC: 7.4 G/DL — LOW (ref 13–17)
IMM GRANULOCYTES # BLD AUTO: 0.45 # — SIGNIFICANT CHANGE UP
IMM GRANULOCYTES NFR BLD AUTO: 7.1 % — HIGH (ref 0–1.5)
LYMPHOCYTES # BLD AUTO: 0.47 K/UL — LOW (ref 1–3.3)
LYMPHOCYTES # BLD AUTO: 7.4 % — LOW (ref 13–44)
MAGNESIUM SERPL-MCNC: 2.1 MG/DL — SIGNIFICANT CHANGE UP (ref 1.6–2.6)
MCHC RBC-ENTMCNC: 29 PG — SIGNIFICANT CHANGE UP (ref 27–34)
MCHC RBC-ENTMCNC: 33.6 % — SIGNIFICANT CHANGE UP (ref 32–36)
MCV RBC AUTO: 86.3 FL — SIGNIFICANT CHANGE UP (ref 80–100)
MONOCYTES # BLD AUTO: 0.44 K/UL — SIGNIFICANT CHANGE UP (ref 0–0.9)
MONOCYTES NFR BLD AUTO: 6.9 % — SIGNIFICANT CHANGE UP (ref 2–14)
NEUTROPHILS # BLD AUTO: 4.99 K/UL — SIGNIFICANT CHANGE UP (ref 1.8–7.4)
NEUTROPHILS NFR BLD AUTO: 78.4 % — HIGH (ref 43–77)
NRBC # FLD: 0 — SIGNIFICANT CHANGE UP
PCO2 BLDA: 24 MMHG — LOW (ref 35–48)
PH BLDA: 7.41 PH — SIGNIFICANT CHANGE UP (ref 7.35–7.45)
PHOSPHATE SERPL-MCNC: 7.7 MG/DL — HIGH (ref 2.5–4.5)
PLATELET # BLD AUTO: 95 K/UL — LOW (ref 150–400)
PMV BLD: 10.7 FL — SIGNIFICANT CHANGE UP (ref 7–13)
PO2 BLDA: 117 MMHG — HIGH (ref 83–108)
POTASSIUM SERPL-MCNC: 3.4 MMOL/L — LOW (ref 3.5–5.3)
POTASSIUM SERPL-SCNC: 3.4 MMOL/L — LOW (ref 3.5–5.3)
RBC # BLD: 2.55 M/UL — LOW (ref 4.2–5.8)
RBC # FLD: 15.5 % — HIGH (ref 10.3–14.5)
SAO2 % BLDA: 97.6 % — SIGNIFICANT CHANGE UP (ref 95–99)
SODIUM SERPL-SCNC: 133 MMOL/L — LOW (ref 135–145)
WBC # BLD: 6.36 K/UL — SIGNIFICANT CHANGE UP (ref 3.8–10.5)
WBC # FLD AUTO: 6.36 K/UL — SIGNIFICANT CHANGE UP (ref 3.8–10.5)

## 2018-06-04 PROCEDURE — 99291 CRITICAL CARE FIRST HOUR: CPT

## 2018-06-04 PROCEDURE — 99233 SBSQ HOSP IP/OBS HIGH 50: CPT | Mod: GC

## 2018-06-04 RX ORDER — POTASSIUM CHLORIDE 20 MEQ
10 PACKET (EA) ORAL
Qty: 0 | Refills: 0 | Status: COMPLETED | OUTPATIENT
Start: 2018-06-04 | End: 2018-06-04

## 2018-06-04 RX ORDER — HYDROCORTISONE 20 MG
25 TABLET ORAL DAILY
Qty: 0 | Refills: 0 | Status: DISCONTINUED | OUTPATIENT
Start: 2018-06-04 | End: 2018-06-05

## 2018-06-04 RX ADMIN — Medication 2: at 12:33

## 2018-06-04 RX ADMIN — CHLORHEXIDINE GLUCONATE 1 APPLICATION(S): 213 SOLUTION TOPICAL at 12:33

## 2018-06-04 RX ADMIN — Medication 2: at 05:12

## 2018-06-04 RX ADMIN — Medication 100 MILLIEQUIVALENT(S): at 05:07

## 2018-06-04 RX ADMIN — HEPARIN SODIUM 5000 UNIT(S): 5000 INJECTION INTRAVENOUS; SUBCUTANEOUS at 23:19

## 2018-06-04 RX ADMIN — HEPARIN SODIUM 5000 UNIT(S): 5000 INJECTION INTRAVENOUS; SUBCUTANEOUS at 15:32

## 2018-06-04 RX ADMIN — Medication 25 MILLIGRAM(S): at 05:08

## 2018-06-04 RX ADMIN — Medication 100 MILLIEQUIVALENT(S): at 04:02

## 2018-06-04 RX ADMIN — HEPARIN SODIUM 5000 UNIT(S): 5000 INJECTION INTRAVENOUS; SUBCUTANEOUS at 05:08

## 2018-06-04 NOTE — PROGRESS NOTE ADULT - PROBLEM SELECTOR PLAN 2
Pt. with hyponatremia in the setting of hypovolemia from sepsis and IV D5W. Serum sodium improved to 133 today. Monitor serum sodium

## 2018-06-04 NOTE — PROGRESS NOTE ADULT - SUBJECTIVE AND OBJECTIVE BOX
INTERVAL HPI/OVERNIGHT EVENTS:  No major overnight events.     SUBJECTIVE: Patient seen and examined at bedside.     VITAL SIGNS:  ICU Vital Signs Last 24 Hrs  T(C): 37.4 (04 Jun 2018 04:00), Max: 37.7 (03 Jun 2018 20:00)  T(F): 99.4 (04 Jun 2018 04:00), Max: 99.9 (03 Jun 2018 20:00)  HR: 80 (04 Jun 2018 07:00) (71 - 87)  BP: 144/66 (04 Jun 2018 07:00) (109/61 - 148/70)  BP(mean): 87 (04 Jun 2018 07:00) (75 - 93)  ABP: --  ABP(mean): --  RR: 29 (04 Jun 2018 07:00) (22 - 31)  SpO2: 100% (04 Jun 2018 07:00) (98% - 100%)    Mode: CPAP with PS, FiO2: 30, PEEP: 5, PS: 10, MAP: 9, PIP: 17    06-03 @ 07:01  -  06-04 @ 07:00  --------------------------------------------------------  IN: 1380 mL / OUT: 914 mL / NET: 466 mL      CAPILLARY BLOOD GLUCOSE  POCT Blood Glucose.: 220 mg/dL (04 Jun 2018 05:10)      PHYSICAL EXAM:  General:  Elderly, cachetic male, currently intubated; non-responsive to verbal or tactile stimuli  HEENT:  Normocephalic, atraumatic pupils equally round, minimally responsive to light, R-Shiley  Respiratory:  Non-labored breathing pattern, transmitted upper airway breath sounds; concordant with vent  Cardiovascular:  Regular rate and rhythm   Abdomen: Soft, non-distended  Genital: Bhakta present  Extremities: Thin, no edema; palpable pulses  Skin:  Warm and dry  Neurological: Unable to participate in exam      MEDICATIONS:  MEDICATIONS  (STANDING):  chlorhexidine 4% Liquid 1 Application(s) Topical <User Schedule>  dexmedetomidine Infusion 0.2 MICROgram(s)/kG/Hr (2.065 mL/Hr) IV Continuous <Continuous>  dextrose 5%. 1000 milliLiter(s) (50 mL/Hr) IV Continuous <Continuous>  dextrose 50% Injectable 12.5 Gram(s) IV Push once  dextrose 50% Injectable 25 Gram(s) IV Push once  dextrose 50% Injectable 25 Gram(s) IV Push once  heparin  Injectable 5000 Unit(s) SubCutaneous every 8 hours  hydrocortisone sodium succinate Injectable 25 milliGRAM(s) IV Push every 12 hours  insulin lispro (HumaLOG) corrective regimen sliding scale   SubCutaneous every 6 hours    MEDICATIONS  (PRN):  acetaminophen    Suspension 650 milliGRAM(s) Oral every 6 hours PRN For Temp greater than 38 C (100.4 F)  dextrose 40% Gel 15 Gram(s) Oral once PRN Blood Glucose LESS THAN 70 milliGRAM(s)/deciliter  glucagon  Injectable 1 milliGRAM(s) IntraMuscular once PRN Glucose LESS THAN 70 milligrams/deciliter    ALLERGIES:  No Known Allergies  Intolerances    LABS:                        7.4    6.36  )-----------( 95       ( 04 Jun 2018 03:00 )             22.0     06-04    133<L>  |  95<L>  |  83<H>  ----------------------------<  180<H>  3.4<L>   |  14<L>  |  4.48<H>    Ca    8.0<L>      04 Jun 2018 03:00  Phos  7.7     06-04  Mg     2.1     06-04    TPro  4.9<L>  /  Alb  1.5<L>  /  TBili  0.4  /  DBili  x   /  AST  30  /  ALT  22  /  AlkPhos  191<H>  06-03      RADIOLOGY & ADDITIONAL TESTS: Reviewed. INTERVAL HPI/OVERNIGHT EVENTS:  No major overnight events.     SUBJECTIVE: Patient seen and examined at bedside.     VITAL SIGNS:  ICU Vital Signs Last 24 Hrs  T(C): 37.4 (04 Jun 2018 04:00), Max: 37.7 (03 Jun 2018 20:00)  T(F): 99.4 (04 Jun 2018 04:00), Max: 99.9 (03 Jun 2018 20:00)  HR: 80 (04 Jun 2018 07:00) (71 - 87)  BP: 144/66 (04 Jun 2018 07:00) (109/61 - 148/70)  BP(mean): 87 (04 Jun 2018 07:00) (75 - 93)  ABP: --  ABP(mean): --  RR: 29 (04 Jun 2018 07:00) (22 - 31)  SpO2: 100% (04 Jun 2018 07:00) (98% - 100%)    Mode: CPAP with PS, FiO2: 30, PEEP: 5, PS: 10, MAP: 9, PIP: 17    06-03 @ 07:01  -  06-04 @ 07:00  --------------------------------------------------------  IN: 1380 mL / OUT: 914 mL / NET: 466 mL      CAPILLARY BLOOD GLUCOSE  POCT Blood Glucose.: 220 mg/dL (04 Jun 2018 05:10)      PHYSICAL EXAM:  General:  Elderly, cachetic male, currently intubated; sitting up awake, responsive to voice but not following commands completed  HEENT:  Normocephalic, atraumatic pupils equally round, minimally responsive to light, R-Shiley  Respiratory:  Mildly labored breathing pattern, transmitted upper airway breath sounds; concordant with vent  Cardiovascular:  Regular rate and rhythm   Abdomen: Soft, non-distended  Genital: Bhakta and rectal tube present  Extremities: Thin, no edema; palpable pulses  Skin:  Warm and dry  Neurological: Unable to participate in exam      MEDICATIONS:  MEDICATIONS  (STANDING):  chlorhexidine 4% Liquid 1 Application(s) Topical <User Schedule>  dexmedetomidine Infusion 0.2 MICROgram(s)/kG/Hr (2.065 mL/Hr) IV Continuous <Continuous>  dextrose 5%. 1000 milliLiter(s) (50 mL/Hr) IV Continuous <Continuous>  dextrose 50% Injectable 12.5 Gram(s) IV Push once  dextrose 50% Injectable 25 Gram(s) IV Push once  dextrose 50% Injectable 25 Gram(s) IV Push once  heparin  Injectable 5000 Unit(s) SubCutaneous every 8 hours  hydrocortisone sodium succinate Injectable 25 milliGRAM(s) IV Push every 12 hours  insulin lispro (HumaLOG) corrective regimen sliding scale   SubCutaneous every 6 hours    MEDICATIONS  (PRN):  acetaminophen    Suspension 650 milliGRAM(s) Oral every 6 hours PRN For Temp greater than 38 C (100.4 F)  dextrose 40% Gel 15 Gram(s) Oral once PRN Blood Glucose LESS THAN 70 milliGRAM(s)/deciliter  glucagon  Injectable 1 milliGRAM(s) IntraMuscular once PRN Glucose LESS THAN 70 milligrams/deciliter    ALLERGIES:  No Known Allergies  Intolerances    LABS:                        7.4    6.36  )-----------( 95       ( 04 Jun 2018 03:00 )             22.0     06-04    133<L>  |  95<L>  |  83<H>  ----------------------------<  180<H>  3.4<L>   |  14<L>  |  4.48<H>    Ca    8.0<L>      04 Jun 2018 03:00  Phos  7.7     06-04  Mg     2.1     06-04    TPro  4.9<L>  /  Alb  1.5<L>  /  TBili  0.4  /  DBili  x   /  AST  30  /  ALT  22  /  AlkPhos  191<H>  06-03      RADIOLOGY & ADDITIONAL TESTS: Reviewed.

## 2018-06-04 NOTE — PROGRESS NOTE ADULT - PROBLEM SELECTOR PLAN 1
Hemodynamically mediated RUSH in the setting of septic shock. Pt. was on CVVHDF which was transitioned to HD on 5/30. Pt. last had HD on 5/30. Scr elevated at 4.8 today. Pt. remains non-oliguric  (~800 mL).  No plan for HD today, will reassess for need for HD tomorrow. Monitor BMP, urine output, I,Os. Avoid any potential nephrotoxins. Dose medications to Crcl.

## 2018-06-04 NOTE — PROGRESS NOTE ADULT - ASSESSMENT
83M hx mild dementia, uncontrolled T2DM, HTN presenting to the ED w/ declining functional status, subjective hypothermia, and rigors admitted to MICU for severe sepsis 2/2 Klebsiella PNA and Enterobacter UTI and ARF, course c/b septic shock with refractory metabolic acidosis and hyperkalemia requiring RRT    #Neuro  -Sedated with precedex since 6/2, but with some small improvement in mental status (opening eyes, turning head, but not following commands)  -Off fentanyl & propofol since 5/31    #CV  -Distributive shock in setting of sepsis and iatrogenic from sedation, now resolved, s/p levophed from 5/28 to 6/1  -Started on hydrocortisone on 5/30 for adrenal insufficiency in setting of sepsis, wean as tolerated    #Pulm  -Intubated on 5/28 respiratory distress/tachypnea, in respiratory compensation for severe metabolic acidosis and underlying PNA  -Undergoing daily CPAP trials since 6/1, often limited by tachypnea peaking into 30s  -Discussed with the renal team if pt would benefit from HD prior to extubation given metabolic acidosis,, per renal fellow pt would only benefit from HD prior to extubation if he was volume overloaded  -Pt at risk for re-intubation given chronic deconditioning and poor nutritional status, readdressed GOC on 6/3 with daughter who says pt would want aggressive measures including re-intubation and tracheostomy     #ID  -Septic shock in setting Klebsiella PNA and Enterobacter UTI with resolution of shock state on 6/1  -s/p vanco from 5/28 to 5/31, on Zosyn from 5/28 to present with, plan to d/c on 6/3    #GI  -Enteral feeds started on 5/29 via OGT  -Feeds temporarily held on 5/31 2/2 residuals seen in ET tube, resumed on 6/2    #Renal  -Acute renal failure, likely hemodynamically mediated in setting of septic shock, with severe acidosis and multiple electrolyte derangements (hypernatremia, hyperkalemia) requiring RRT, last HD was on 5/30  -Urine output improving, possibly in recovery phase, no urgent need for HD today as per renal  -Hyponatremia improving, which was iatrogenic in setting of D5W use for several days    #Endo  -Uncontrolled T2DM (HgbA1c 10.3) with hyperglycemia peaking in the 400s on admission  -Started on hydrocortisone on 5/30 for adrenal insufficiency in setting of sepsis, weaning as above    #Heme  -Acute on chronic anemia without no active source of bleeding identified, s/p 1pRBC on 5/31 with Hgb response, currently table in 7 range  -Worsening thrombocytopenia (admission platelets 136, currently in 80s), sent panel to rule out HIT  -Suspect acute anemia and worsening thrombocytopenia related to myelosuppression from critical illness    #VTE ppx  -Heparin SQ, monitor platelets closely and hold if platelets <50    #Advanced care planning  -Initial GOC discussion with health care surrogate (daughter, Alexia Guevara) who wishes for aggressive measures and FULL CODE at this time. Readdressed GOC on 6/3 with daughter who says pt would want aggressive measures including re-intubation and tracheostomy         Nelly Guerrero MD, PGY3  Internal Medicine Resident  Pager #[phone] ([facility]), [phone] ([facility]) 83M hx mild dementia, uncontrolled T2DM, HTN presenting to the ED w/ declining functional status, subjective hypothermia, and rigors admitted to MICU for severe sepsis 2/2 Klebsiella PNA and Enterobacter UTI and ARF, course c/b septic shock with refractory metabolic acidosis and hyperkalemia requiring RRT    #Neuro  -Sedated with precedex since 6/2, but with some small improvement in mental status (opening eyes, turning head, but not following commands)  -Off fentanyl & propofol since 5/31    #CV  -Distributive shock in setting of sepsis and iatrogenic from sedation, now resolved, s/p levophed from 5/28 to 6/1  -Started on hydrocortisone on 5/30 for adrenal insufficiency in setting of sepsis, wean as tolerated    #Pulm  -Intubated on 5/28 respiratory distress/tachypnea, in respiratory compensation for severe metabolic acidosis and underlying PNA  -Undergoing daily CPAP trials since 6/1, often limited by tachypnea peaking into 30s  -Discussed with the renal team if pt would benefit from HD prior to extubation given metabolic acidosis,, per renal fellow pt would only benefit from HD prior to extubation if he was volume overloaded  -Pt at risk for re-intubation given chronic deconditioning and poor nutritional status, readdressed GOC on 6/3 with daughter who says pt would want aggressive measures including re-intubation and tracheostomy     #ID  -Septic shock in setting Klebsiella PNA and Enterobacter UTI with resolution of shock state on 6/1  -s/p vanco from 5/28 to 5/31, on Zosyn from 5/28 to present with, plan to d/c on 6/3    #GI  -Enteral feeds started on 5/29 via OGT  -Feeds temporarily held on 5/31 2/2 residuals seen in ET tube, resumed on 6/2    #Renal  -Acute renal failure, likely hemodynamically mediated in setting of septic shock, with severe acidosis and multiple electrolyte derangements (hypernatremia, hyperkalemia) requiring RRT, last HD was on 5/30  -Urine output improving, possibly in recovery phase, no urgent need for HD today as per renal  -Hyponatremia improving, which was iatrogenic in setting of D5W use for several days    #Endo  -Uncontrolled T2DM (HgbA1c 10.3) with hyperglycemia peaking in the 400s on admission  -Started on hydrocortisone on 5/30 for adrenal insufficiency in setting of sepsis, weaning as above    #Heme  -Acute on chronic anemia without no active source of bleeding identified, s/p 1pRBC on 5/31 with Hgb response, currently stable in 7 range  -Worsening thrombocytopenia (admission platelets 136, currently in 80s), sent panel to rule out HIT  -Suspect acute anemia and worsening thrombocytopenia related to myelosuppression from critical illness    #VTE ppx  -Heparin SQ, monitor platelets closely and hold if platelets <50    #Advanced care planning  -Initial GOC discussion with health care surrogate (daughter, Alexia Guevara) who wishes for aggressive measures and FULL CODE at this time. Readdressed GOC on 6/3 with daughter who says pt would want aggressive measures including re-intubation and tracheostomy         Nelly Guerrero MD, PGY3  Internal Medicine Resident  Pager #[phone] ([facility]), [phone] ([facility]) 83M hx mild dementia, uncontrolled T2DM, HTN presenting to the ED w/ declining functional status, subjective hypothermia, and rigors admitted to MICU for severe sepsis 2/2 Klebsiella PNA and Enterobacter UTI and ARF, course c/b septic shock with refractory metabolic acidosis and hyperkalemia requiring RRT    #Neuro  -Sedated with precedex since 6/2, but with some small improvement in mental status (opening eyes, turning head, but not following commands)  -Off fentanyl & propofol since 5/31    #CV  -Distributive shock in setting of sepsis and iatrogenic from sedation, now resolved, s/p levophed from 5/28 to 6/1  -Started on hydrocortisone on 5/30 for adrenal insufficiency in setting of sepsis, wean as tolerated    #Pulm  -Intubated on 5/28 respiratory distress/tachypnea, in respiratory compensation for severe metabolic acidosis and underlying PNA  -Undergoing daily CPAP trials since 6/1, often limited by tachypnea peaking into 30s  -Pt at risk for re-intubation given chronic deconditioning and poor nutritional status, readdressed GOC on 6/3 with daughter who says pt would want aggressive measures including re-intubation and tracheostomy     #ID  -Septic shock in setting Klebsiella PNA and Enterobacter UTI with resolution of shock state on 6/1  -s/p Zosyn from 5/28-6/3    #GI  -Enteral feeds started on 5/29 via OGT  -Feeds temporarily held on 5/31 2/2 residuals seen in ET tube, resumed on 6/2    #Renal  -Acute renal failure, likely hemodynamically mediated in setting of septic shock, with severe acidosis and multiple electrolyte derangements (hypernatremia, hyperkalemia) requiring RRT, last HD was on 5/30  -Urine output improving, possibly in recovery phase, no urgent need for HD today as per renal  -Hyponatremia improving, which was iatrogenic in setting of D5W use for several days    #Endo  -Uncontrolled T2DM (HgbA1c 10.3) with hyperglycemia peaking in the 400s on admission  -Started on hydrocortisone on 5/30 for adrenal insufficiency in setting of sepsis, weaning as above    #Heme  -Acute on chronic anemia without no active source of bleeding identified, s/p 1pRBC on 5/31 with Hgb response, currently stable in 7 range  -Worsening thrombocytopenia (admission platelets 136, currently in 80s), sent panel to rule out HIT  -Suspect acute anemia and worsening thrombocytopenia related to myelosuppression from critical illness    #VTE ppx  -Heparin SQ, monitor platelets closely and hold if platelets <50    #Advanced care planning  -Initial GOC discussion with health care surrogate (daughter, Alexia Guevara) who wishes for aggressive measures and FULL CODE at this time. Readdressed GOC on 6/3 with daughter who says pt would want aggressive measures including re-intubation and tracheostomy         Nelly Guerrero MD, PGY3  Internal Medicine Resident  Pager #[phone] ([facility]), [phone] ([facility]) 83M hx mild dementia, uncontrolled T2DM, HTN presenting to the ED w/ declining functional status, subjective hypothermia, and rigors admitted to MICU for severe sepsis 2/2 Klebsiella PNA and Enterobacter UTI and ARF, course c/b septic shock with refractory metabolic acidosis and hyperkalemia requiring RRT    #Neuro  -Sedated with precedex since 6/2, but with some small improvement in mental status (opening eyes, turning head, but not following commands)  -Off fentanyl & propofol since 5/31    #CV  -Distributive shock in setting of sepsis and iatrogenic from sedation, now resolved, s/p levophed from 5/28 to 6/1  -Started on hydrocortisone on 5/30 for adrenal insufficiency in setting of sepsis, wean as tolerated    #Pulm  -Intubated on 5/28 respiratory distress/tachypnea, in respiratory compensation for severe metabolic acidosis and underlying PNA  -Undergoing daily CPAP trials since 6/1, previously tachypnea to 30s, now with improvement in tachypnea to 20s  -Pt at risk for re-intubation given chronic deconditioning and poor nutritional status, readdressed GOC on 6/3 with daughter who says pt would want aggressive measures including re-intubation and tracheostomy     #ID  -Septic shock in setting Klebsiella PNA and Enterobacter UTI with resolution of shock state on 6/1  -s/p Zosyn from 5/28-6/3    #GI  -Enteral feeds started on 5/29 via OGT  -Feeds temporarily held on 5/31 2/2 residuals seen in ET tube, resumed on 6/2    #Renal  -Acute renal failure, likely hemodynamically mediated in setting of septic shock, with severe acidosis and multiple electrolyte derangements (hypernatremia, hyperkalemia) requiring RRT, last HD was on 5/30  -Urine output improving, possibly in recovery phase, no urgent need for HD today as per renal  -Hyponatremia improving, which was iatrogenic in setting of D5W use for several days    #Endo  -Uncontrolled T2DM (HgbA1c 10.3) with hyperglycemia peaking in the 400s on admission  -Started on hydrocortisone on 5/30 for adrenal insufficiency in setting of sepsis, weaning as above    #Heme  -Acute on chronic anemia without no active source of bleeding identified, s/p 1pRBC on 5/31 with Hgb response, currently stable in 7 range  -Worsening thrombocytopenia (admission platelets 136, currently in 80s), sent panel to rule out HIT  -Suspect acute anemia and worsening thrombocytopenia related to myelosuppression from critical illness    #VTE ppx  -Heparin SQ, monitor platelets closely and hold if platelets <50    #Advanced care planning  -Initial GOC discussion with health care surrogate (daughter, Alexia Guevara) who wishes for aggressive measures and FULL CODE at this time. Readdressed GOC on 6/3 with daughter who says pt would want aggressive measures including re-intubation and tracheostomy         Nelly Guerrero MD, PGY3  Internal Medicine Resident  Pager #[phone] ([facility]), [phone] ([facility]) 83M hx mild dementia, uncontrolled T2DM, HTN presenting to the ED w/ declining functional status, subjective hypothermia, and rigors admitted to MICU for severe sepsis 2/2 Klebsiella PNA and Enterobacter UTI and ARF, course c/b septic shock with refractory metabolic acidosis and hyperkalemia requiring RRT    #Neuro  -Sedated with precedex since 6/2, but with some improvement in mental status (more awake today and responsive, but not completely following commands)  -Off fentanyl & propofol since 5/31    #CV  -Distributive shock in setting of sepsis and iatrogenic from sedation, now resolved, s/p levophed from 5/28 to 6/1  -Started on hydrocortisone on 5/30 for adrenal insufficiency in setting of sepsis, wean as tolerated    #Pulm  -Intubated on 5/28 respiratory distress/tachypnea, in respiratory compensation for severe metabolic acidosis and underlying PNA  -Undergoing daily CPAP trials since 6/1, previously tachypnea to 30s, now with improvement in tachypnea to 20s  -Pt at risk for re-intubation given chronic deconditioning and poor nutritional status, readdressed GOC on 6/3 with daughter who says pt would want aggressive measures including re-intubation and tracheostomy     #ID  -Septic shock in setting Klebsiella PNA and Enterobacter UTI with resolution of shock state on 6/1  -s/p Zosyn from 5/28-6/3    #GI  -Enteral feeds started on 5/29 via OGT  -Feeds temporarily held on 5/31 2/2 residuals seen in ET tube, resumed on 6/2    #Renal  -Acute renal failure, likely hemodynamically mediated in setting of septic shock, with severe acidosis and multiple electrolyte derangements (hypernatremia, hyperkalemia) requiring RRT, last HD was on 5/30  -Respiratory alkalosis on last ABG likely related to tachypnea, however has persistent low HCO3 will repeat ABG this AM to clarify acid-base disturbance  -Urine output improving, possibly in recovery phase, no urgent need for HD today as per renal  -Hyponatremia still improving, which was iatrogenic in setting of D5W use for several days    #Endo  -Uncontrolled T2DM (HgbA1c 10.3) with hyperglycemia peaking in the 400s on admission  -Started on hydrocortisone on 5/30 for adrenal insufficiency in setting of sepsis, weaning as above    #Heme  -Acute on chronic anemia without no active source of bleeding identified, s/p 1pRBC on 5/31 with Hgb response, currently stable in 7 range  -Worsening thrombocytopenia (admission platelets 136, currently in 80s), sent panel to rule out HIT  -Suspect acute anemia and worsening thrombocytopenia related to myelosuppression from critical illness    #VTE ppx  -Heparin SQ, monitor platelets closely and hold if platelets <50    #Advanced care planning  -Initial GOC discussion with health care surrogate (daughter, Alexia Guevara) who wishes for aggressive measures and FULL CODE at this time. Readdressed GOC on 6/3 with daughter who says pt would want aggressive measures including re-intubation and tracheostomy         Nelly Guerrero MD, PGY3  Internal Medicine Resident  Pager #[phone] ([facility]), [phone] ([facility])

## 2018-06-04 NOTE — PROGRESS NOTE ADULT - ASSESSMENT
Patient is an 84 y/o man with history of dementia, DM, HTN who presents with a chief complaint of abdominal pain and chills found to be in septic shock with oliguric RUSH requiring dialysis.

## 2018-06-04 NOTE — PROGRESS NOTE ADULT - SUBJECTIVE AND OBJECTIVE BOX
[facility] Division of Kidney Diseases & Hypertension  FOLLOW UP NOTE  [phone]--------------------------------------------------------------------------------    HPI:  This is a 84 y/o man with history of dementia, DM, HTN who presents with abdominal pain with chills and was found to be in septic shock from pneumonia started on IV ABX. Pt. with no history of kidney disease in the past. Pt. was found to have oligiuric RUSH and was initiated on CVVHDF. CVVHDF was held on 5/30 and pt. was transitioned of IHD. Pt. last had HD on 5/30 and has been on hold since then. Currently pt. remains intubated and sedated.    PAST HISTORY  --------------------------------------------------------------------------------  No significant changes to PMH, PSH, FHx, SHx, unless otherwise noted    ALLERGIES & MEDICATIONS  --------------------------------------------------------------------------------  Allergies    No Known Allergies    Intolerances      Standing Inpatient Medications  chlorhexidine 4% Liquid 1 Application(s) Topical <User Schedule>  dexmedetomidine Infusion 0.2 MICROgram(s)/kG/Hr IV Continuous <Continuous>  dextrose 5%. 1000 milliLiter(s) IV Continuous <Continuous>  dextrose 50% Injectable 12.5 Gram(s) IV Push once  dextrose 50% Injectable 25 Gram(s) IV Push once  dextrose 50% Injectable 25 Gram(s) IV Push once  heparin  Injectable 5000 Unit(s) SubCutaneous every 8 hours  hydrocortisone sodium succinate Injectable 25 milliGRAM(s) IV Push every 12 hours  insulin lispro (HumaLOG) corrective regimen sliding scale   SubCutaneous every 6 hours    PRN Inpatient Medications  acetaminophen    Suspension 650 milliGRAM(s) Oral every 6 hours PRN  dextrose 40% Gel 15 Gram(s) Oral once PRN  glucagon  Injectable 1 milliGRAM(s) IntraMuscular once PRN      REVIEW OF SYSTEMS  --------------------------------------------------------------------------------  Unable to obtain as patient is intubated.     VITALS/PHYSICAL EXAM  --------------------------------------------------------------------------------  T(C): 37.4 (06-04-18 @ 04:00), Max: 37.7 (06-03-18 @ 20:00)  HR: 80 (06-04-18 @ 07:00) (71 - 87)  BP: 144/66 (06-04-18 @ 07:00) (109/61 - 148/70)  RR: 29 (06-04-18 @ 07:00) (22 - 31)  SpO2: 100% (06-04-18 @ 07:00) (98% - 100%)  Wt(kg): --        06-03-18 @ 07:01  -  06-04-18 @ 07:00  --------------------------------------------------------  IN: 1380 mL / OUT: 914 mL / NET: 466 mL      Physical Exam:  	  Gen: NAD  	HEENT:  Intubated   	Pulm: CTA B/L  	CV: RRR,  	Abd:  soft  	LE: Warm, no edema  	Neuro:  sedated   	Skin: Warm, without rashes  	Vascular access: RIJ HD non-tunneled catheter intact/without bleeding    LABS/STUDIES  --------------------------------------------------------------------------------              7.4    6.36  >-----------<  95       [06-04-18 @ 03:00]              22.0     133  |  95  |  83  ----------------------------<  180      [06-04-18 @ 03:00]  3.4   |  14  |  4.48        Ca     8.0     [06-04-18 @ 03:00]      Mg     2.1     [06-04-18 @ 03:00]      Phos  7.7     [06-04-18 @ 03:00]    TPro  4.9  /  Alb  1.5  /  TBili  0.4  /  DBili  x   /  AST  30  /  ALT  22  /  AlkPhos  191  [06-03-18 @ 02:50]          Creatinine Trend:  SCr 4.48 [06-04 @ 03:00]  SCr 4.20 [06-03 @ 02:50]  SCr 3.98 [06-02 @ 19:21]  SCr 3.70 [06-02 @ 03:00]  SCr 3.20 [06-01 @ 02:40]    Urinalysis - [05-28-18 @ 17:30]      Color YELLOW / Appearance TURBID / SG 1.016 / pH 6.0      Gluc 300 / Ketone NEGATIVE  / Bili NEGATIVE / Urobili NORMAL       Blood LARGE / Protein 150 / Leuk Est LARGE / Nitrite NEGATIVE      RBC >50 / WBC >50 / Hyaline  / Gran  / Sq Epi OCC / Non Sq Epi  / Bacteria     Urine Sodium 38      [06-02-18 @ 06:16]  Urine Chloride 21      [06-02-18 @ 06:16]  Urine Osmolality 283      [06-02-18 @ 06:16]    HbA1c 10.3      [05-28-18 @ 17:30]    HBsAb <3.0      [05-30-18 @ 13:16]  HBsAg NEGATIVE      [05-30-18 @ 13:45]  HBcAb Nonreactive      [05-30-18 @ 13:16]  HCV 0.12, Nonreactive Hepatitis C AB  S/CO Ratio                        Interpretation  < 1.0                                     Non-Reactive  1.0 - 4.9                           Weakly-Reactive  > 5.0                                 Reactive  Non-Reactive: Aperson with a non-reactive HCV antibody  result is considered uninfected.  No further action is  needed unless recent infection is suspected.  In these  cases, consider repeat testing later to detect  seroconversion..  Weakly-Reactive: HCV antibody test is abnormal, HCV RNA  Qualitative test will follow.  Reactive: HCV antibody test is abnormal, HCV RNA  Qualitative test will follow.  Note: HCV antibody testing is performed on the [employer]   system.      [05-30-18 @ 13:16]

## 2018-06-04 NOTE — PROGRESS NOTE ADULT - PROBLEM SELECTOR PLAN 3
In setting of respiratory alkalosis: Serum CO2 noted to be low at 14. ABG done on 6/2 shows respiratory alkalosis with metabolic acidosis. Recommend to repeat ABG today. Monitor serum CO2 level.

## 2018-06-05 LAB
BASE EXCESS BLDA CALC-SCNC: -10.4 MMOL/L — SIGNIFICANT CHANGE UP
BASOPHILS # BLD AUTO: 0.01 K/UL — SIGNIFICANT CHANGE UP (ref 0–0.2)
BASOPHILS NFR BLD AUTO: 0.1 % — SIGNIFICANT CHANGE UP (ref 0–2)
BLD GP AB SCN SERPL QL: NEGATIVE — SIGNIFICANT CHANGE UP
BUN SERPL-MCNC: 91 MG/DL — HIGH (ref 7–23)
CALCIUM SERPL-MCNC: 8.4 MG/DL — SIGNIFICANT CHANGE UP (ref 8.4–10.5)
CHLORIDE SERPL-SCNC: 98 MMOL/L — SIGNIFICANT CHANGE UP (ref 98–107)
CO2 SERPL-SCNC: 13 MMOL/L — LOW (ref 22–31)
CREAT SERPL-MCNC: 4.79 MG/DL — HIGH (ref 0.5–1.3)
EOSINOPHIL # BLD AUTO: 0.01 K/UL — SIGNIFICANT CHANGE UP (ref 0–0.5)
EOSINOPHIL NFR BLD AUTO: 0.1 % — SIGNIFICANT CHANGE UP (ref 0–6)
GLUCOSE BLDC GLUCOMTR-MCNC: 157 MG/DL — HIGH (ref 70–99)
GLUCOSE BLDC GLUCOMTR-MCNC: 162 MG/DL — HIGH (ref 70–99)
GLUCOSE BLDC GLUCOMTR-MCNC: 170 MG/DL — HIGH (ref 70–99)
GLUCOSE BLDC GLUCOMTR-MCNC: 174 MG/DL — HIGH (ref 70–99)
GLUCOSE SERPL-MCNC: 148 MG/DL — HIGH (ref 70–99)
HCO3 BLDA-SCNC: 17 MMOL/L — LOW (ref 22–26)
HCT VFR BLD CALC: 28 % — LOW (ref 39–50)
HGB BLD-MCNC: 9.5 G/DL — LOW (ref 13–17)
IMM GRANULOCYTES # BLD AUTO: 0.48 # — SIGNIFICANT CHANGE UP
IMM GRANULOCYTES NFR BLD AUTO: 5.2 % — HIGH (ref 0–1.5)
LYMPHOCYTES # BLD AUTO: 0.61 K/UL — LOW (ref 1–3.3)
LYMPHOCYTES # BLD AUTO: 6.6 % — LOW (ref 13–44)
MAGNESIUM SERPL-MCNC: 2.4 MG/DL — SIGNIFICANT CHANGE UP (ref 1.6–2.6)
MCHC RBC-ENTMCNC: 28.7 PG — SIGNIFICANT CHANGE UP (ref 27–34)
MCHC RBC-ENTMCNC: 33.9 % — SIGNIFICANT CHANGE UP (ref 32–36)
MCV RBC AUTO: 84.6 FL — SIGNIFICANT CHANGE UP (ref 80–100)
MONOCYTES # BLD AUTO: 0.64 K/UL — SIGNIFICANT CHANGE UP (ref 0–0.9)
MONOCYTES NFR BLD AUTO: 6.9 % — SIGNIFICANT CHANGE UP (ref 2–14)
NEUTROPHILS # BLD AUTO: 7.49 K/UL — HIGH (ref 1.8–7.4)
NEUTROPHILS NFR BLD AUTO: 81.1 % — HIGH (ref 43–77)
NRBC # FLD: 0 — SIGNIFICANT CHANGE UP
PCO2 BLDA: 24 MMHG — LOW (ref 35–48)
PH BLDA: 7.38 PH — SIGNIFICANT CHANGE UP (ref 7.35–7.45)
PHOSPHATE SERPL-MCNC: 7.6 MG/DL — HIGH (ref 2.5–4.5)
PLATELET # BLD AUTO: 139 K/UL — LOW (ref 150–400)
PMV BLD: 11.1 FL — SIGNIFICANT CHANGE UP (ref 7–13)
PO2 BLDA: 114 MMHG — HIGH (ref 83–108)
POTASSIUM SERPL-MCNC: 4.5 MMOL/L — SIGNIFICANT CHANGE UP (ref 3.5–5.3)
POTASSIUM SERPL-SCNC: 4.5 MMOL/L — SIGNIFICANT CHANGE UP (ref 3.5–5.3)
RBC # BLD: 3.31 M/UL — LOW (ref 4.2–5.8)
RBC # FLD: 15.7 % — HIGH (ref 10.3–14.5)
RH IG SCN BLD-IMP: POSITIVE — SIGNIFICANT CHANGE UP
SAO2 % BLDA: 97.9 % — SIGNIFICANT CHANGE UP (ref 95–99)
SODIUM SERPL-SCNC: 136 MMOL/L — SIGNIFICANT CHANGE UP (ref 135–145)
WBC # BLD: 9.24 K/UL — SIGNIFICANT CHANGE UP (ref 3.8–10.5)
WBC # FLD AUTO: 9.24 K/UL — SIGNIFICANT CHANGE UP (ref 3.8–10.5)

## 2018-06-05 PROCEDURE — 99233 SBSQ HOSP IP/OBS HIGH 50: CPT | Mod: GC

## 2018-06-05 PROCEDURE — 71045 X-RAY EXAM CHEST 1 VIEW: CPT | Mod: 26

## 2018-06-05 PROCEDURE — 99291 CRITICAL CARE FIRST HOUR: CPT

## 2018-06-05 RX ORDER — SODIUM BICARBONATE 1 MEQ/ML
650 SYRINGE (ML) INTRAVENOUS
Qty: 0 | Refills: 0 | Status: DISCONTINUED | OUTPATIENT
Start: 2018-06-05 | End: 2018-06-06

## 2018-06-05 RX ORDER — AMLODIPINE BESYLATE 2.5 MG/1
5 TABLET ORAL DAILY
Qty: 0 | Refills: 0 | Status: DISCONTINUED | OUTPATIENT
Start: 2018-06-05 | End: 2018-06-06

## 2018-06-05 RX ADMIN — Medication 650 MILLIGRAM(S): at 18:34

## 2018-06-05 RX ADMIN — Medication 1: at 12:24

## 2018-06-05 RX ADMIN — HEPARIN SODIUM 5000 UNIT(S): 5000 INJECTION INTRAVENOUS; SUBCUTANEOUS at 15:00

## 2018-06-05 RX ADMIN — AMLODIPINE BESYLATE 5 MILLIGRAM(S): 2.5 TABLET ORAL at 18:34

## 2018-06-05 RX ADMIN — HEPARIN SODIUM 5000 UNIT(S): 5000 INJECTION INTRAVENOUS; SUBCUTANEOUS at 08:32

## 2018-06-05 RX ADMIN — CHLORHEXIDINE GLUCONATE 1 APPLICATION(S): 213 SOLUTION TOPICAL at 11:01

## 2018-06-05 RX ADMIN — Medication 1: at 18:15

## 2018-06-05 RX ADMIN — Medication 25 MILLIGRAM(S): at 05:44

## 2018-06-05 NOTE — CHART NOTE - NSCHARTNOTEFT_GEN_A_CORE
MICU transfer note (to [facility])    MICU course:  84 yo M w/ hx mild dementia, uncontrolled T2DM (HgbA1c 10.3%), HTN, presenting to ED w/ decreased functional status, subjective hypothermia, rigors, admitted to MICU w/ severe sepsis 2/2 Klebsiella PNA and Enterobacter UTI and ARF; course complicated by septic shock and refractory acidosis w/ hyperkalemia, requiring RRT (CVVHD --> HD). Patient presented on 5/28/28, found to have gross electrolyte abnormalities (hyperkalemia 6.2 , hypernatremia 161, metabolic acidosis HCO3 11, hyperglycemic 400s), hypothermic to 92F; hypotensive, requiring pressor support for septic shock, bandemia 51%. Intubated on admission for respiratory distress, started on broad spectrum w/ vancomycin + Zosyn. Underwent CVVHD on 5/28 with gradual improvement in electrolyte abnormalities. Started on solucortef for adrenal insufficiency (AM cortisol 9 on 5/30), titrated down. Eventually able to be transitioned off of HD; still making urine despite rising Cr. On 6/4, right-sided IJ Shiley removed, hydrocortisone titrated down to 25 mg QD; extubated on 6/4/18.    Mental status continued to be poor prior to extubation; more awake, but not completely following commands consistently; off of sedation (fentanyl and propofol) since 5/31. Pressor support stopped on 6/1; continuing to taper hydrocortisone that was started on 5/30 for adrenal insufficiency in setting of resolving sepsis. s/p extubation, not requiring oxygen, satting well. Given risk of re-intubation from chronic deconditioning, GOC readress w/ daughter on 6/3, who stated that patient would want aggressive measures, including re-intubation and tracheostomy. Septic shock in setting of Klebsiella and Enterobactor UTI w/ resolution of shock state on 6/1, s/p Zosyn from 5/28 to 6/3 (7 day course).  Speech and swallow evaluation pending. Urine output improving, continuing to monitor renal function. Continuing to wean off hydrocortisone.  HIT panel sent to r/o HIT in setting of worsening thrombocytopenia, though suspect more likely 2/2 myelosupression 2/2 critical illness.    Initial GOC discussion with health care surrogate (daughter, Alexia Guevara) who wishes for aggressive measures and FULL CODE at this time. Readdressed GOC on 6/3 with daughter who says pt would want aggressive measures including re-intubation and tracheostomy.    Transferred from MICU to RCU (room 645B) for continued care on 6/5/18.    ICU Vital Signs Last 24 Hrs  T(C): 36.7 (05 Jun 2018 04:00), Max: 37.4 (04 Jun 2018 08:00)  T(F): 98 (05 Jun 2018 04:00), Max: 99.4 (04 Jun 2018 08:00)  HR: 92 (05 Jun 2018 06:00) (77 - 92)  BP: 155/136 (05 Jun 2018 06:00) (130/75 - 166/84)  BP(mean): 145 (05 Jun 2018 06:00) (87 - 145)  RR: 30 (05 Jun 2018 06:00) (22 - 31)  SpO2: 100% (05 Jun 2018 06:00) (94% - 100%)      06-03 @ 07:01  -  06-04 @ 07:00  --------------------------------------------------------  IN: 1380 mL / OUT: 914 mL / NET: 466 mL    06-04 @ 07:01  -  06-05 @ 06:29  --------------------------------------------------------  IN: 15 mL / OUT: 981 mL / NET: -966 mL      CAPILLARY BLOOD GLUCOSE: POCT Blood Glucose.: 157 mg/dL (05 Jun 2018 05:39)    HOSPITAL MEDICATIONS:  Standing Meds:  chlorhexidine 4% Liquid 1 Application(s) Topical <User Schedule>  dextrose 5%. 1000 milliLiter(s) IV Continuous <Continuous>  dextrose 50% Injectable 12.5 Gram(s) IV Push once  dextrose 50% Injectable 25 Gram(s) IV Push once  dextrose 50% Injectable 25 Gram(s) IV Push once  heparin  Injectable 5000 Unit(s) SubCutaneous every 8 hours  hydrocortisone sodium succinate Injectable 25 milliGRAM(s) IV Push daily  insulin lispro (HumaLOG) corrective regimen sliding scale   SubCutaneous every 6 hours    PRN Meds:  acetaminophen    Suspension 650 milliGRAM(s) Oral every 6 hours PRN  dextrose 40% Gel 15 Gram(s) Oral once PRN  glucagon  Injectable 1 milliGRAM(s) IntraMuscular once PRN      LABS:                        9.5    9.24  )-----------( 139      ( 05 Jun 2018 02:35 )             28.0     Hgb Trend: 9.5<--, 7.4<--, 7.4<--, 7.5<--, 7.2<--  06-05    136  |  98  |  91<H>  ----------------------------<  148<H>  4.5   |  13<L>  |  4.79<H>    Ca    8.4      05 Jun 2018 02:35  Phos  7.6     06-05  Mg     2.4     06-05      Creatinine Trend: 4.79<--, 4.48<--, 4.20<--, 3.98<--, 3.70<--, 3.20<--    Arterial Blood Gas:  06-04 @ 07:53  7.41/24/117/18/97.6/-8.5  ABG lactate: --          Km Desai MD  MICU Resident PGY3  spectra: [phone]

## 2018-06-05 NOTE — PROGRESS NOTE ADULT - PROBLEM SELECTOR PLAN 3
In setting of respiratory alkalosis: Serum CO2 noted to be low at 14. ABG done on 6/4 shows respiratory alkalosis with compensatory metabolic acidosis. Monitor serum CO2 level.

## 2018-06-05 NOTE — PHYSICAL THERAPY INITIAL EVALUATION ADULT - DIAGNOSIS, PT EVAL
Deconditioning, decreased strength, decreased balance, decreased endurance, decreased postural control all limiting pts. ability to perform functional mobility

## 2018-06-05 NOTE — PROGRESS NOTE ADULT - ATTENDING COMMENTS
s/p extubation/s/p septic shock with RUSH/still with tachypnea due to acidosis/overall will be poor prognosis/

## 2018-06-05 NOTE — PHYSICAL THERAPY INITIAL EVALUATION ADULT - REHAB POTENTIAL, PT EVAL
pt. placed on PT trial, please assess for 1-2 more sessions to see if pt. can actively participate in therapy session./fair, will monitor progress closely

## 2018-06-05 NOTE — PROGRESS NOTE ADULT - SUBJECTIVE AND OBJECTIVE BOX
[facility] Division of Kidney Diseases & Hypertension  FOLLOW UP NOTE  [phone]--------------------------------------------------------------------------------  Chief Complaint:Sepsis      24 hour events/subjective: Patient was extubated yesterday. HD catheter was removed yesterday as well. No acute events were noted overnight. Patient still has adequate UOP of ~800 cc over 24 hours.         PAST HISTORY  --------------------------------------------------------------------------------  No significant changes to PMH, PSH, FHx, SHx, unless otherwise noted    ALLERGIES & MEDICATIONS  --------------------------------------------------------------------------------  Allergies    No Known Allergies    Intolerances      Standing Inpatient Medications  chlorhexidine 4% Liquid 1 Application(s) Topical <User Schedule>  dextrose 5%. 1000 milliLiter(s) IV Continuous <Continuous>  dextrose 50% Injectable 12.5 Gram(s) IV Push once  dextrose 50% Injectable 25 Gram(s) IV Push once  dextrose 50% Injectable 25 Gram(s) IV Push once  heparin  Injectable 5000 Unit(s) SubCutaneous every 8 hours  hydrocortisone sodium succinate Injectable 25 milliGRAM(s) IV Push daily  insulin lispro (HumaLOG) corrective regimen sliding scale   SubCutaneous every 6 hours    PRN Inpatient Medications  acetaminophen    Suspension 650 milliGRAM(s) Oral every 6 hours PRN  dextrose 40% Gel 15 Gram(s) Oral once PRN  glucagon  Injectable 1 milliGRAM(s) IntraMuscular once PRN      REVIEW OF SYSTEMS  --------------------------------------------------------------------------------  Unable to obtain.     VITALS/PHYSICAL EXAM  --------------------------------------------------------------------------------  T(C): 36.7 (06-05-18 @ 04:00), Max: 37.4 (06-04-18 @ 08:00)  HR: 92 (06-05-18 @ 06:00) (77 - 92)  BP: 155/136 (06-05-18 @ 06:00) (130/75 - 166/84)  RR: 30 (06-05-18 @ 06:00) (22 - 31)  SpO2: 100% (06-05-18 @ 06:00) (94% - 100%)  Wt(kg): --        06-04-18 @ 07:01  -  06-05-18 @ 07:00  --------------------------------------------------------  IN: 15 mL / OUT: 1081 mL / NET: -1066 mL      Physical exam:                Gen: NAD  	Pulm: CTA B/L  	CV: RRR,  	Abd:  soft  	LE: Warm, no edema  	Neuro:  confused   	Skin: Warm, without rashes  	Vascular access: none       LABS/STUDIES  --------------------------------------------------------------------------------              9.5    9.24  >-----------<  139      [06-05-18 @ 02:35]              28.0     136  |  98  |  91  ----------------------------<  148      [06-05-18 @ 02:35]  4.5   |  13  |  4.79        Ca     8.4     [06-05-18 @ 02:35]      Mg     2.4     [06-05-18 @ 02:35]      Phos  7.6     [06-05-18 @ 02:35]            Creatinine Trend:  SCr 4.79 [06-05 @ 02:35]  SCr 4.48 [06-04 @ 03:00]  SCr 4.20 [06-03 @ 02:50]  SCr 3.98 [06-02 @ 19:21]  SCr 3.70 [06-02 @ 03:00]      Urine Sodium 38      [06-02-18 @ 06:16]  Urine Chloride 21      [06-02-18 @ 06:16]  Urine Osmolality 283      [06-02-18 @ 06:16]      HBsAb <3.0      [05-30-18 @ 13:16]  HBsAg NEGATIVE      [05-30-18 @ 13:45]  HBcAb Nonreactive      [05-30-18 @ 13:16]  HCV 0.12, Nonreactive Hepatitis C AB  S/CO Ratio                        Interpretation  < 1.0                                     Non-Reactive  1.0 - 4.9                           Weakly-Reactive  > 5.0                                 Reactive  Non-Reactive: Aperson with a non-reactive HCV antibody  result is considered uninfected.  No further action is  needed unless recent infection is suspected.  In these  cases, consider repeat testing later to detect  seroconversion..  Weakly-Reactive: HCV antibody test is abnormal, HCV RNA  Qualitative test will follow.  Reactive: HCV antibody test is abnormal, HCV RNA  Qualitative test will follow.  Note: HCV antibody testing is performed on the [employer] system.      [05-30-18 @ 13:16]

## 2018-06-05 NOTE — DISCHARGE NOTE ADULT - SECONDARY DIAGNOSIS.
Acute renal failure, unspecified acute renal failure type Alzheimer's dementia without behavioral disturbance, unspecified timing of dementia onset Essential hypertension DM (diabetes mellitus) Atrial fibrillation, unspecified type Gastric ulcer

## 2018-06-05 NOTE — PROGRESS NOTE ADULT - ASSESSMENT
83M hx mild dementia, uncontrolled T2DM, HTN presenting to the ED w/ declining functional status, subjective hypothermia, and rigors admitted to MICU for severe sepsis 2/2 Klebsiella PNA and Enterobacter UTI and ARF, course c/b septic shock with refractory metabolic acidosis and hyperkalemia requiring RRT    #Neuro  -Sedated with precedex since 6/2, but with some improvement in mental status (more awake today and responsive, but not completely following commands)  -Off fentanyl & propofol since 5/31    #CV  -Distributive shock in setting of sepsis and iatrogenic from sedation, now resolved, s/p levophed from 5/28 to 6/1  -Started on hydrocortisone on 5/30 for adrenal insufficiency in setting of sepsis, wean as tolerated    #Pulm  -Intubated on 5/28 respiratory distress/tachypnea, in respiratory compensation for severe metabolic acidosis and underlying PNA  -Undergoing daily CPAP trials since 6/1, previously tachypnea to 30s, now with improvement in tachypnea to 20s  -Pt at risk for re-intubation given chronic deconditioning and poor nutritional status, readdressed GOC on 6/3 with daughter who says pt would want aggressive measures including re-intubation and tracheostomy     #ID  -Septic shock in setting Klebsiella PNA and Enterobacter UTI with resolution of shock state on 6/1  -s/p Zosyn from 5/28-6/3    #GI  -Enteral feeds started on 5/29 via OGT  -Feeds temporarily held on 5/31 2/2 residuals seen in ET tube, resumed on 6/2    #Renal  -Acute renal failure, likely hemodynamically mediated in setting of septic shock, with severe acidosis and multiple electrolyte derangements (hypernatremia, hyperkalemia) requiring RRT, last HD was on 5/30  -Respiratory alkalosis on last ABG likely related to tachypnea, however has persistent low HCO3 will repeat ABG this AM to clarify acid-base disturbance  -Urine output improving, possibly in recovery phase, no urgent need for HD today as per renal  -Hyponatremia still improving, which was iatrogenic in setting of D5W use for several days    #Endo  -Uncontrolled T2DM (HgbA1c 10.3) with hyperglycemia peaking in the 400s on admission  -Started on hydrocortisone on 5/30 for adrenal insufficiency in setting of sepsis, weaning as above    #Heme  -Acute on chronic anemia without no active source of bleeding identified, s/p 1pRBC on 5/31 with Hgb response, currently stable in 7 range  -Worsening thrombocytopenia (admission platelets 136, currently in 80s), sent panel to rule out HIT  -Suspect acute anemia and worsening thrombocytopenia related to myelosuppression from critical illness    #VTE ppx  -Heparin SQ, monitor platelets closely and hold if platelets <50    #Advanced care planning  -Initial GOC discussion with health care surrogate (daughter, Alexia Guevara) who wishes for aggressive measures and FULL CODE at this time. Readdressed GOC on 6/3 with daughter who says pt would want aggressive measures including re-intubation and tracheostomy 83M hx mild dementia, uncontrolled T2DM, HTN presented to the ED w/ declining functional status, subjective hypothermia, and rigors, admitted to MICU for severe sepsis 2/2 Klebsiella PNA and Enterobacter UTI and ARF, course c/b septic shock, refractory metabolic acidosis and hyperkalemia requiring RRT, and increased work of breathing requiring intubation.    #Neuro  -Off of all sedation, now with some improvement in mental status (more awake today and responsive, but not completely following commands)  -Off fentanyl & propofol since 5/31, D/C'd precedex yesterday    #CV  -Distributive shock in setting of sepsis and iatrogenic from sedation, now resolved, s/p levophed from 5/28 to 6/1  -Started on hydrocortisone on 5/30 for adrenal insufficiency in setting of sepsis and weaned to hydrocortisone 25mg daily yesterday. Will D/C hydrocortisone today.    #Pulm  -Intubated on 5/28 for respiratory distress/tachypnea, component of compensation for severe metabolic acidosis and underlying PNA, extubated on 6/4  -Pt at risk for re-intubation given chronic deconditioning and poor nutritional status, readdressed GOC on 6/3 with daughter who says pt would want aggressive measures including re-intubation and tracheostomy     #ID  -Septic shock in setting of Klebsiella PNA and Enterobacter UTI with resolution of shock state on 6/1  -S/p Zosyn from 5/28-6/3    #GI  -Tube feeds discontinued post-extubation. Pt failed bedside S&S. Currently NPO except for meds.    #Renal  -Acute renal failure, likely hemodynamically mediated in setting of septic shock, with severe acidosis and multiple electrolyte derangements (hypernatremia, hyperkalemia) requiring RRT, last HD was on 6/2  -Urine output improving, possibly in recovery phase, no further plan for HD as per Renal. Sharonda D/C'd.  -Hyponatremia resolved, likely iatrogenic in setting of D5W use for several days    #Endo  -Uncontrolled T2DM (HgbA1c 10.3), with hyperglycemia peaking in the 400s on admission  -Started on hydrocortisone on 5/30 for adrenal insufficiency in setting of sepsis, now discontinued  - C/w HISS    #Heme  -Acute on chronic anemia without no active source of bleeding identified, s/p 1pRBC on 5/31 with Hgb response, currently stable in 7 range  -Thrombocytopenia improving now  -Suspect acute anemia and thrombocytopenia related to myelosuppression from critical illness    #VTE ppx  -Heparin SQ, monitor platelets closely and hold if platelets <50    #Advanced care planning  -Initial GOC discussion with health care surrogate (daughter, Alexia Guevara) who wishes for aggressive measures and FULL CODE at this time. Readdressed GOC on 6/3 with daughter who says pt would want aggressive measures including re-intubation and tracheostomy.    Liudmila Sharpe PGY-3  Spectra [phone]  Pager [phone]

## 2018-06-05 NOTE — SWALLOW BEDSIDE ASSESSMENT ADULT - ASR SWALLOW ASPIRATION MONITOR
gurgly voice/pneumonia/upper respiratory infection/position upright (90Y)/fever/throat clearing/cough/oral hygiene/change of breathing pattern

## 2018-06-05 NOTE — PHYSICAL THERAPY INITIAL EVALUATION ADULT - PERTINENT HX OF CURRENT PROBLEM, REHAB EVAL
Pt. is a 83 year old male admitted to [facility] secondary to sepsis. PMH: dementia, HTN, diabetes

## 2018-06-05 NOTE — PHYSICAL THERAPY INITIAL EVALUATION ADULT - ADDITIONAL COMMENTS
pt. unable to provide social history, Social history provided through medical documents, please consult with social work for more accurate history. as per documents, Pt. lives with his daughter in a pvt house. Pt. was previously ambulating with a cane. Pt. returned supine in bed with all tubes/lines intact, call bell in reach and in NAD.

## 2018-06-05 NOTE — SWALLOW BEDSIDE ASSESSMENT ADULT - COMMENTS
Patient is an 83 year old male with PMHx of mild dementia, uncontrolled T2DM, HTN, who presented to the ED with declining functional status, subjective hypothermia, and rigors, admitted to MICU for severe sepsis 2/2 Klebsiella PNA and Enterobacter UTI and ARF, course c/b septic shock, refractory metabolic acidosis and hyperkalemia requiring RRT, and increased work of breathing requiring intubation 5/28/18. Patient is s/p extubation 6/4/18.    Patient was received awake and alert with open mouth posture though breathing comfortably on room air. Patient was non-verbal and unable to follow low-level directives despite clinician models. Findings/recommendations discussed with RN and MD on unit.

## 2018-06-05 NOTE — DISCHARGE NOTE ADULT - CARE PLAN
Principal Discharge DX:	Acute respiratory failure, unspecified whether with hypoxia or hypercapnia  Secondary Diagnosis:	Acute renal failure, unspecified acute renal failure type  Secondary Diagnosis:	Alzheimer's dementia without behavioral disturbance, unspecified timing of dementia onset  Secondary Diagnosis:	Essential hypertension  Secondary Diagnosis:	DM (diabetes mellitus) Principal Discharge DX:	Acute respiratory failure, unspecified whether with hypoxia or hypercapnia  Secondary Diagnosis:	Acute renal failure, unspecified acute renal failure type  Secondary Diagnosis:	Alzheimer's dementia without behavioral disturbance, unspecified timing of dementia onset  Secondary Diagnosis:	Essential hypertension  Secondary Diagnosis:	DM (diabetes mellitus)  Secondary Diagnosis:	Atrial fibrillation, unspecified type  Secondary Diagnosis:	Gastric ulcer

## 2018-06-05 NOTE — SWALLOW BEDSIDE ASSESSMENT ADULT - SWALLOW EVAL: DIAGNOSIS
Patient demonstrates a Severe Oral Dysphagia exacerbated by decreased cognitive state. For trials of puree x2, patient demonstrated poor recognition of utensil presentation with poor labial stripping of the bolus from teaspoon, and absent lingual motion resulting in bolus spilling/pooling in the anterior and lateral sulci. Subsequently, Yankauer suctioning was then successfully applied to remove bolus from oral cavity. Unable to assess pharyngeal function 2/2 severity of oral stage deficits.

## 2018-06-05 NOTE — PROGRESS NOTE ADULT - SUBJECTIVE AND OBJECTIVE BOX
INTERVAL HPI/OVERNIGHT EVENTS:  No acute events overnight.    SUBJECTIVE: Patient seen and examined at bedside.     CONSTITUTIONAL: No weakness, fevers or chills  EYES/ENT: No visual changes;  No vertigo or throat pain   NECK: No pain or stiffness  RESPIRATORY: No cough, wheezing, hemoptysis; No shortness of breath  CARDIOVASCULAR: No chest pain or palpitations  GASTROINTESTINAL: No abdominal or epigastric pain. No nausea, vomiting, or hematemesis; No diarrhea or constipation. No melena or hematochezia.  GENITOURINARY: No dysuria, frequency or hematuria  NEUROLOGICAL: No numbness or weakness  SKIN: No itching, rashes    OBJECTIVE:    VITAL SIGNS:  ICU Vital Signs Last 24 Hrs  T(C): 36.7 (05 Jun 2018 04:00), Max: 37.4 (04 Jun 2018 08:00)  T(F): 98 (05 Jun 2018 04:00), Max: 99.4 (04 Jun 2018 08:00)  HR: 92 (05 Jun 2018 06:00) (77 - 92)  BP: 155/136 (05 Jun 2018 06:00) (130/75 - 166/84)  BP(mean): 145 (05 Jun 2018 06:00) (87 - 145)  ABP: --  ABP(mean): --  RR: 30 (05 Jun 2018 06:00) (22 - 31)  SpO2: 100% (05 Jun 2018 06:00) (94% - 100%)    Mode: CPAP with PS, FiO2: 30, PEEP: 5, PS: 5, MAP: 8    06-04 @ 07:01  -  06-05 @ 07:00  --------------------------------------------------------  IN: 15 mL / OUT: 1081 mL / NET: -1066 mL      CAPILLARY BLOOD GLUCOSE      POCT Blood Glucose.: 157 mg/dL (05 Jun 2018 05:39)      PHYSICAL EXAM:    General: NAD  HEENT: NC/AT; PERRL, clear conjunctiva  Neck: supple  Respiratory: CTA b/l  Cardiovascular: +S1/S2; RRR  Abdomen: soft, NT/ND; +BS x4  Extremities: WWP, 2+ peripheral pulses b/l; no LE edema  Skin: normal color and turgor; no rash  Neurological:    MEDICATIONS:  MEDICATIONS  (STANDING):  chlorhexidine 4% Liquid 1 Application(s) Topical <User Schedule>  dextrose 5%. 1000 milliLiter(s) (50 mL/Hr) IV Continuous <Continuous>  dextrose 50% Injectable 12.5 Gram(s) IV Push once  dextrose 50% Injectable 25 Gram(s) IV Push once  dextrose 50% Injectable 25 Gram(s) IV Push once  heparin  Injectable 5000 Unit(s) SubCutaneous every 8 hours  hydrocortisone sodium succinate Injectable 25 milliGRAM(s) IV Push daily  insulin lispro (HumaLOG) corrective regimen sliding scale   SubCutaneous every 6 hours    MEDICATIONS  (PRN):  acetaminophen    Suspension 650 milliGRAM(s) Oral every 6 hours PRN For Temp greater than 38 C (100.4 F)  dextrose 40% Gel 15 Gram(s) Oral once PRN Blood Glucose LESS THAN 70 milliGRAM(s)/deciliter  glucagon  Injectable 1 milliGRAM(s) IntraMuscular once PRN Glucose LESS THAN 70 milligrams/deciliter      ALLERGIES:  Allergies    No Known Allergies    Intolerances        LABS:                        9.5    9.24  )-----------( 139      ( 05 Jun 2018 02:35 )             28.0     06-05    136  |  98  |  91<H>  ----------------------------<  148<H>  4.5   |  13<L>  |  4.79<H>    Ca    8.4      05 Jun 2018 02:35  Phos  7.6     06-05  Mg     2.4     06-05            RADIOLOGY & ADDITIONAL TESTS: Reviewed. INTERVAL HPI/OVERNIGHT EVENTS:  No acute events overnight.    SUBJECTIVE: Patient seen and examined at bedside.     ROS difficult to obtain, as pt has h/o dementia with dysarthria as well.    OBJECTIVE:    VITAL SIGNS:  ICU Vital Signs Last 24 Hrs  T(C): 36.7 (05 Jun 2018 04:00), Max: 37.4 (04 Jun 2018 08:00)  T(F): 98 (05 Jun 2018 04:00), Max: 99.4 (04 Jun 2018 08:00)  HR: 92 (05 Jun 2018 06:00) (77 - 92)  BP: 155/136 (05 Jun 2018 06:00) (130/75 - 166/84)  BP(mean): 145 (05 Jun 2018 06:00) (87 - 145)  ABP: --  ABP(mean): --  RR: 30 (05 Jun 2018 06:00) (22 - 31)  SpO2: 100% (05 Jun 2018 06:00) (94% - 100%)    Mode: CPAP with PS, FiO2: 30, PEEP: 5, PS: 5, MAP: 8    06-04 @ 07:01  -  06-05 @ 07:00  --------------------------------------------------------  IN: 15 mL / OUT: 1081 mL / NET: -1066 mL      CAPILLARY BLOOD GLUCOSE      POCT Blood Glucose.: 157 mg/dL (05 Jun 2018 05:39)      PHYSICAL EXAM:    General: NAD, elderly, cachetic male  HEENT: NC/AT; PERRL, clear conjunctiva  Respiratory: Slight crackles in b/l anterior lung fields  Cardiovascular: +S1/S2; RRR  Abdomen: BS+, soft, NT/ND  Extremities: 2+ peripheral pulses b/l; no LE edema b/l  Skin: Warm and dry, no rash  Genitourinary: +Flexiseal with minimal output; +condom cath  Neurological: Unable to fully assess; moving all extremities spontaneously    MEDICATIONS:  MEDICATIONS  (STANDING):  chlorhexidine 4% Liquid 1 Application(s) Topical <User Schedule>  dextrose 5%. 1000 milliLiter(s) (50 mL/Hr) IV Continuous <Continuous>  dextrose 50% Injectable 12.5 Gram(s) IV Push once  dextrose 50% Injectable 25 Gram(s) IV Push once  dextrose 50% Injectable 25 Gram(s) IV Push once  heparin  Injectable 5000 Unit(s) SubCutaneous every 8 hours  hydrocortisone sodium succinate Injectable 25 milliGRAM(s) IV Push daily  insulin lispro (HumaLOG) corrective regimen sliding scale   SubCutaneous every 6 hours    MEDICATIONS  (PRN):  acetaminophen    Suspension 650 milliGRAM(s) Oral every 6 hours PRN For Temp greater than 38 C (100.4 F)  dextrose 40% Gel 15 Gram(s) Oral once PRN Blood Glucose LESS THAN 70 milliGRAM(s)/deciliter  glucagon  Injectable 1 milliGRAM(s) IntraMuscular once PRN Glucose LESS THAN 70 milligrams/deciliter      ALLERGIES:  Allergies    No Known Allergies    Intolerances        LABS:                        9.5    9.24  )-----------( 139      ( 05 Jun 2018 02:35 )             28.0     06-05    136  |  98  |  91<H>  ----------------------------<  148<H>  4.5   |  13<L>  |  4.79<H>    Ca    8.4      05 Jun 2018 02:35  Phos  7.6     06-05  Mg     2.4     06-05            RADIOLOGY & ADDITIONAL TESTS: Reviewed.

## 2018-06-05 NOTE — PHYSICAL THERAPY INITIAL EVALUATION ADULT - RANGE OF MOTION EXAMINATION, REHAB EVAL
bilateral upper extremity ROM was WFL (within functional limits)/passive ROM/bilateral lower extremity ROM was WFL (within functional limits)

## 2018-06-05 NOTE — PROGRESS NOTE ADULT - PROBLEM SELECTOR PLAN 2
Pt. with hyponatremia in the setting of hypovolemia from sepsis and IV D5W. Resolved. Serum sodium improved to 136 today. Monitor serum sodium

## 2018-06-05 NOTE — SWALLOW BEDSIDE ASSESSMENT ADULT - SWALLOW EVAL: CRITERIA FOR SKILLED INTERVENTION MET
MD to re-consult this department pending improvement in overall mentation/not appropriate for swallowing intervention

## 2018-06-05 NOTE — PROGRESS NOTE ADULT - PROBLEM SELECTOR PLAN 1
Hemodynamically mediated RUSH in the setting of septic shock. Pt. was on CVVHDF which was transitioned to HD on 5/30. Pt. last had HD on 5/30. Scr elevated at 4.79 today. Pt. remains non-oliguric  (~800 mL).  No plan for HD today, will reassess for need for HD tomorrow. Monitor BMP, urine output, I,Os. Avoid any potential nephrotoxins. Dose medications to Crcl.

## 2018-06-05 NOTE — DISCHARGE NOTE ADULT - PATIENT PORTAL LINK FT
You can access the [facility] Patient Portal, offered by [facility], by registering with the following website: [email or web address]

## 2018-06-05 NOTE — DISCHARGE NOTE ADULT - HOSPITAL COURSE
84 yo M w/ hx mild dementia, uncontrolled T2DM (HgbA1c 10.3%), HTN, presenting to ED w/ decreased functional status, subjective hypothermia, rigors, admitted to MICU w/ severe sepsis 2/2 Klebsiella PNA and Enterobacter UTI and ARF; course complicated by septic shock and refractory acidosis w/ hyperkalemia, requiring RRT (CVVHD --> HD). Patient presented on 5/28/28, found to have gross electrolyte abnormalities (hyperkalemia 6.2 , hypernatremia 161, metabolic acidosis HCO3 11, hyperglycemic 400s), hypothermic to 92F; hypotensive, requiring pressor support for septic shock, bandemia 51%. Intubated on admission for respiratory distress, started on broad spectrum w/ vancomycin + Zosyn. Underwent CVVHD on 5/28 with gradual improvement in electrolyte abnormalities. Started on solucortef for adrenal insufficiency (AM cortisol 9 on 5/30), titrated down. Eventually able to be transitioned off of HD; still making urine despite rising Cr. On 6/4, right-sided IJ Shiley removed, hydrocortisone titrated down to 25 mg QD; extubated on 6/4/18.    Mental status continued to be poor prior to extubation; more awake, but not completely following commands consistently; off of sedation (fentanyl and propofol) since 5/31. Pressor support stopped on 6/1; continuing to taper hydrocortisone that was started on 5/30 for adrenal insufficiency in setting of resolving sepsis. s/p extubation, not requiring oxygen, satting well. Given risk of re-intubation from chronic deconditioning, GOC readress w/ daughter on 6/3, who stated that patient would want aggressive measures, including re-intubation and tracheostomy. Septic shock in setting of Klebsiella and Enterobactor UTI w/ resolution of shock state on 6/1, s/p Zosyn from 5/28 to 6/3 (7 day course).  Speech and swallow evaluation pending. Urine output improving, continuing to monitor renal function. Continuing to wean off hydrocortisone.  HIT panel sent to r/o HIT in setting of worsening thrombocytopenia, though suspect more likely 2/2 myelosupression 2/2 critical illness.    Initial GOC discussion with health care surrogate (daughter, Alexia Guevara) who wishes for aggressive measures and FULL CODE at this time. Readdressed GOC on 6/3 with daughter who says pt would want aggressive measures including re-intubation and tracheostomy.    Transferred from MICU to RCU (room 645B) for continued care on 6/5/18. 84 yo M w/ hx mild dementia, uncontrolled T2DM (HgbA1c 10.3%), HTN, presenting to ED w/ decreased functional status, subjective hypothermia, rigors, admitted to MICU w/ severe sepsis 2/2 Klebsiella PNA and Enterobacter UTI and ARF; course complicated by septic shock and refractory acidosis w/ hyperkalemia, requiring RRT (CVVHD --> HD). Patient presented on 5/28/28, found to have gross electrolyte abnormalities (hyperkalemia 6.2 , hypernatremia 161, metabolic acidosis HCO3 11, hyperglycemic 400s), hypothermic to 92F; hypotensive, requiring pressor support for septic shock, bandemia 51%. Intubated on admission for respiratory distress, started on broad spectrum w/ vancomycin + Zosyn. Underwent CVVHD on 5/28 with gradual improvement in electrolyte abnormalities. Started on solucortef for adrenal insufficiency (AM cortisol 9 on 5/30), titrated down. Eventually able to be transitioned off of HD; still making urine despite rising Cr. On 6/4, right-sided IJ Shiley removed, hydrocortisone titrated down to 25 mg QD; extubated on 6/4/18.    Mental status continued to be poor prior to extubation; more awake, but not completely following commands consistently; off of sedation (fentanyl and propofol) since 5/31. Pressor support stopped on 6/1; continuing to taper hydrocortisone that was started on 5/30 for adrenal insufficiency in setting of resolving sepsis. s/p extubation, not requiring oxygen, satting well. Given risk of re-intubation from chronic deconditioning, GOC readress w/ daughter on 6/3, who stated that patient would want aggressive measures, including re-intubation and tracheostomy. Septic shock in setting of Klebsiella and Enterobactor UTI w/ resolution of shock state on 6/1, s/p Zosyn from 5/28 to 6/3 (7 day course).  Speech and swallow evaluation pending. Urine output improving, continuing to monitor renal function. Continuing to wean off hydrocortisone.  HIT panel sent to r/o HIT in setting of worsening thrombocytopenia, though suspect more likely 2/2 myelosupression 2/2 critical illness.    Initial GOC discussion with health care surrogate (daughter, Alexia Guevara) who wishes for aggressive measures and FULL CODE at this time. Readdressed GOC on 6/3 with daughter who says pt would want aggressive measures including re-intubation and tracheostomy.    Transferred from MICU to RCU (room 645B) for continued care on 6/5/18.  In the RCU pt had another aspiration event and became hypoxic. Started on zosyn. Family decided on DNR/DNI and comfort measures. Pt eventually improved and was weaned off NRB to nasal cannula. Mental status also improved. ---------- 82 yo M w/ hx mild dementia, uncontrolled T2DM (HgbA1c 10.3%), HTN, presenting to ED w/ decreased functional status, subjective hypothermia, rigors, admitted to MICU w/ severe sepsis 2/2 Klebsiella PNA and Enterobacter UTI and ARF; course complicated by septic shock and refractory acidosis w/ hyperkalemia, requiring RRT (CVVHD --> HD). Patient presented on 5/28/28, found to have gross electrolyte abnormalities (hyperkalemia 6.2 , hypernatremia 161, metabolic acidosis HCO3 11, hyperglycemic 400s), hypothermic to 92F; hypotensive, requiring pressor support for septic shock, bandemia 51%. Intubated on admission for respiratory distress, started on broad spectrum w/ vancomycin + Zosyn. Underwent CVVHD on 5/28 with gradual improvement in electrolyte abnormalities. Started on solucortef for adrenal insufficiency (AM cortisol 9 on 5/30), titrated down. Eventually able to be transitioned off of HD; still making urine despite rising Cr. On 6/4, right-sided IJ Shiley removed, hydrocortisone titrated down to 25 mg QD; extubated on 6/4/18.    Mental status continued to be poor prior to extubation; more awake, but not completely following commands consistently; off of sedation (fentanyl and propofol) since 5/31. Pressor support stopped on 6/1; continuing to taper hydrocortisone that was started on 5/30 for adrenal insufficiency in setting of resolving sepsis. s/p extubation, not requiring oxygen, satting well. Given risk of re-intubation from chronic deconditioning, GOC readress w/ daughter on 6/3, who stated that patient would want aggressive measures, including re-intubation and tracheostomy. Septic shock in setting of Klebsiella and Enterobactor UTI w/ resolution of shock state on 6/1, s/p Zosyn from 5/28 to 6/3 (7 day course).  Speech and swallow evaluation pending. Urine output improving, continuing to monitor renal function. Continuing to wean off hydrocortisone.  HIT panel sent to r/o HIT in setting of worsening thrombocytopenia, though suspect more likely 2/2 myelosupression 2/2 critical illness.    Initial GOC discussion with health care surrogate (daughter, Alexia Guevara) who wishes for aggressive measures and FULL CODE at this time. Readdressed GOC on 6/3 with daughter who says pt would want aggressive measures including re-intubation and tracheostomy.    Transferred from MICU to RCU (room 645B) for continued care on 6/5/18.  In the RCU pt had another aspiration event and became hypoxic. Started on zosyn. Family decided on DNR/DNI and comfort measures. Pt eventually improved and was weaned off NRB to nasal cannula. Mental status also improved. Dtr requesting feeding tube be placed and pt was scheduled for feeding tube 6/18 or 19.  Pt developed melena 6/17 and hgb dropped to 4.9 repeated .  2 units PRBC given and pt scoped and found to have large gastric ulcer -6clips applied and epi /cauti done Pt developed new onset afib and dtr requested transfer to telemetry to monitor afib Dtr aware pt is not a candidate for AC and pt is at risk for cva   Hospice to meet with dtr to discuss hospice care with dtr 6/19 . 82 yo M w/ hx mild dementia, uncontrolled T2DM (HgbA1c 10.3%), HTN, presenting to ED w/ decreased functional status, subjective hypothermia, rigors, admitted to MICU w/ severe sepsis 2/2 Klebsiella PNA and Enterobacter UTI and ARF; course complicated by septic shock and refractory acidosis w/ hyperkalemia, requiring RRT (CVVHD --> HD). Patient presented on 5/28/28, found to have gross electrolyte abnormalities (hyperkalemia 6.2 , hypernatremia 161, metabolic acidosis HCO3 11, hyperglycemic 400s), hypothermic to 92F; hypotensive, requiring pressor support for septic shock, bandemia 51%. Intubated on admission for respiratory distress, started on broad spectrum w/ vancomycin + Zosyn. Underwent CVVHD on 5/28 with gradual improvement in electrolyte abnormalities. Started on solucortef for adrenal insufficiency (AM cortisol 9 on 5/30), titrated down. Eventually able to be transitioned off of HD; still making urine despite rising Cr. On 6/4, right-sided IJ Shiley removed, hydrocortisone titrated down to 25 mg QD; extubated on 6/4/18.    Mental status continued to be poor prior to extubation; more awake, but not completely following commands consistently; off of sedation (fentanyl and propofol) since 5/31. Pressor support stopped on 6/1; continuing to taper hydrocortisone that was started on 5/30 for adrenal insufficiency in setting of resolving sepsis. s/p extubation, not requiring oxygen, satting well. Given risk of re-intubation from chronic deconditioning, GOC readress w/ daughter on 6/3, who stated that patient would want aggressive measures, including re-intubation and tracheostomy. Septic shock in setting of Klebsiella and Enterobactor UTI w/ resolution of shock state on 6/1, s/p Zosyn from 5/28 to 6/3 (7 day course).  Speech and swallow evaluation pending. Urine output improving, continuing to monitor renal function. Continuing to wean off hydrocortisone.  HIT panel sent to r/o HIT in setting of worsening thrombocytopenia, though suspect more likely 2/2 myelosupression 2/2 critical illness.    Initial GOC discussion with health care surrogate (daughter, Alexia Guevara) who wishes for aggressive measures and FULL CODE at this time. Readdressed GOC on 6/3 with daughter who says pt would want aggressive measures including re-intubation and tracheostomy.    Transferred from MICU to RCU (room 645B) for continued care on 6/5/18.  In the RCU pt had another aspiration event and became hypoxic. Started on zosyn. Family decided on DNR/DNI and comfort measures. Pt eventually improved and was weaned off NRB to nasal cannula. Mental status also improved. Dtr requesting feeding tube be placed and pt was scheduled for feeding tube 6/18 or 19.  Pt developed melena 6/17 and hgb dropped to 4.9 repeated .  2 units PRBC given and pt scoped and found to have large gastric ulcer -6clips applied and epi /cauti done Pt developed new onset afib and dtr requested transfer to telemetry to monitor afib Dtr aware pt is not a candidate for AC and pt is at risk for cva   Hospice to meet with dtr to discuss hospice care with dtr 6/19 .   While on telemetry patient remained rate controlled. Xray was performed to verify NG tube placement with results as follows:  6/23/18 - CXR - Feeding tube again noted. On the earlier study the tip overlies the lower right hemithorax aberrantly positioned. On the later follow-up study the position has been readjusted with distal end extending below diaphragm into the left upper quadrant in satisfactory position.  Developed complete right hemithorax opacification which could be on the basis of complete right lung atelectasis, CT and/or bronchoscopy would be helpful to exclude an endobronchial obstructing etiology.  Ill-defined hazy patchy opacity in left midlung could be compatible with   infection/pneumonia in the proper clinical context.Blunted left CP angle compatible small left pleural effusion. No  pneumothorax.Right heart and mediastinum obscured by the right hemithorax  opacification. Several endoclips noted in left upper quadrant and gastric bubble region.  Patient became short of breath and tachypneic, mildly tacycardic and low oxygen saturatin levels requiring transfer to RCU unit for pulmonary management 82 yo M w/ hx mild dementia, uncontrolled T2DM (HgbA1c 10.3%), HTN, presenting to ED w/ decreased functional status, subjective hypothermia, rigors, admitted to MICU w/ severe sepsis 2/2 Klebsiella PNA and Enterobacter UTI and ARF; course complicated by septic shock and refractory acidosis w/ hyperkalemia, requiring RRT (CVVHD --> HD). Patient presented on 28, found to have gross electrolyte abnormalities (hyperkalemia 6.2 , hypernatremia 161, metabolic acidosis HCO3 11, hyperglycemic 400s), hypothermic to 92F; hypotensive, requiring pressor support for septic shock, bandemia 51%. Intubated on admission for respiratory distress, started on broad spectrum w/ vancomycin + Zosyn. Underwent CVVHD on  with gradual improvement in electrolyte abnormalities. Started on solucortef for adrenal insufficiency (AM cortisol 9 on ), titrated down. Eventually able to be transitioned off of HD; still making urine despite rising Cr. On , right-sided IJ Shiley removed, hydrocortisone titrated down to 25 mg QD; extubated on 18.    Mental status continued to be poor prior to extubation; more awake, but not completely following commands consistently; off of sedation (fentanyl and propofol) since . Pressor support stopped on ; continuing to taper hydrocortisone that was started on  for adrenal insufficiency in setting of resolving sepsis. s/p extubation, not requiring oxygen, satting well. Given risk of re-intubation from chronic deconditioning, GOC readress w/ daughter on 6/3, who stated that patient would want aggressive measures, including re-intubation and tracheostomy. Septic shock in setting of Klebsiella and Enterobactor UTI w/ resolution of shock state on , s/p Zosyn from  to 6/3 (7 day course).  Speech and swallow evaluation pending. Urine output improving, continuing to monitor renal function. Continuing to wean off hydrocortisone.  HIT panel sent to r/o HIT in setting of worsening thrombocytopenia, though suspect more likely 2/2 myelosupression 2/2 critical illness.    Initial GOC discussion with health care surrogate (daughter, Alexia Guevara) who wishes for aggressive measures and FULL CODE at this time. Readdressed GOC on 6/3 with daughter who says pt would want aggressive measures including re-intubation and tracheostomy.    Transferred from MICU to RCU (room 645B) for continued care on 18.  In the RCU pt had another aspiration event and became hypoxic. Started on zosyn. Family decided on DNR/DNI and comfort measures. Pt eventually improved and was weaned off NRB to nasal cannula. Mental status also improved. Dtr requesting feeding tube be placed and pt was scheduled for feeding tube  or .  Pt developed melena  and hgb dropped to 4.9 repeated .  2 units PRBC given and pt scoped and found to have large gastric ulcer -6clips applied and epi /cauti done Pt developed new onset afib and dtr requested transfer to telemetry to monitor afib Dtr aware pt is not a candidate for AC and pt is at risk for cva   Hospice to meet with dtr to discuss hospice care with dtr  .   While on telemetry patient remained rate controlled. Xray was performed to verify NG tube placement with results as follows:  18 - CXR - Feeding tube again noted. On the earlier study the tip overlies the lower right hemithorax aberrantly positioned. On the later follow-up study the position has been readjusted with distal end extending below diaphragm into the left upper quadrant in satisfactory position.  Developed complete right hemithorax opacification which could be on the basis of complete right lung atelectasis, CT and/or bronchoscopy would be helpful to exclude an endobronchial obstructing etiology.  Ill-defined hazy patchy opacity in left midlung could be compatible with   infection/pneumonia in the proper clinical context.Blunted left CP angle compatible small left pleural effusion. No  pneumothorax.Right heart and mediastinum obscured by the right hemithorax  opacification. Several endoclips noted in left upper quadrant and gastric bubble region.  Patient became short of breath and tachypneic, mildly tacycardic and low oxygen saturatin levels requiring transfer to RCU unit for pulmonary management  Events of  pm discussed with Barb Casey NP, pt DNR /DNI and  at 7:07pm  Family notified by DMITRI Casey.

## 2018-06-06 DIAGNOSIS — Z29.9 ENCOUNTER FOR PROPHYLACTIC MEASURES, UNSPECIFIED: ICD-10-CM

## 2018-06-06 DIAGNOSIS — N17.9 ACUTE KIDNEY FAILURE, UNSPECIFIED: ICD-10-CM

## 2018-06-06 DIAGNOSIS — R13.10 DYSPHAGIA, UNSPECIFIED: ICD-10-CM

## 2018-06-06 DIAGNOSIS — I10 ESSENTIAL (PRIMARY) HYPERTENSION: ICD-10-CM

## 2018-06-06 DIAGNOSIS — E11.9 TYPE 2 DIABETES MELLITUS WITHOUT COMPLICATIONS: ICD-10-CM

## 2018-06-06 LAB
BUN SERPL-MCNC: 108 MG/DL — HIGH (ref 7–23)
CALCIUM SERPL-MCNC: 9 MG/DL — SIGNIFICANT CHANGE UP (ref 8.4–10.5)
CHLORIDE SERPL-SCNC: 103 MMOL/L — SIGNIFICANT CHANGE UP (ref 98–107)
CO2 SERPL-SCNC: 15 MMOL/L — LOW (ref 22–31)
CREAT SERPL-MCNC: 5.04 MG/DL — HIGH (ref 0.5–1.3)
GLUCOSE BLDC GLUCOMTR-MCNC: 192 MG/DL — HIGH (ref 70–99)
GLUCOSE BLDC GLUCOMTR-MCNC: 230 MG/DL — HIGH (ref 70–99)
GLUCOSE BLDC GLUCOMTR-MCNC: 277 MG/DL — HIGH (ref 70–99)
GLUCOSE BLDC GLUCOMTR-MCNC: 283 MG/DL — HIGH (ref 70–99)
GLUCOSE SERPL-MCNC: 202 MG/DL — HIGH (ref 70–99)
HCT VFR BLD CALC: 28 % — LOW (ref 39–50)
HGB BLD-MCNC: 9.1 G/DL — LOW (ref 13–17)
MCHC RBC-ENTMCNC: 29.4 PG — SIGNIFICANT CHANGE UP (ref 27–34)
MCHC RBC-ENTMCNC: 32.5 % — SIGNIFICANT CHANGE UP (ref 32–36)
MCV RBC AUTO: 90.3 FL — SIGNIFICANT CHANGE UP (ref 80–100)
NRBC # FLD: 0 — SIGNIFICANT CHANGE UP
PHOSPHATE SERPL-MCNC: 8 MG/DL — HIGH (ref 2.5–4.5)
PLATELET # BLD AUTO: 223 K/UL — SIGNIFICANT CHANGE UP (ref 150–400)
PMV BLD: 11.1 FL — SIGNIFICANT CHANGE UP (ref 7–13)
POTASSIUM SERPL-MCNC: 3.4 MMOL/L — LOW (ref 3.5–5.3)
POTASSIUM SERPL-SCNC: 3.4 MMOL/L — LOW (ref 3.5–5.3)
RBC # BLD: 3.1 M/UL — LOW (ref 4.2–5.8)
RBC # FLD: 16 % — HIGH (ref 10.3–14.5)
SODIUM SERPL-SCNC: 143 MMOL/L — SIGNIFICANT CHANGE UP (ref 135–145)
WBC # BLD: 7.11 K/UL — SIGNIFICANT CHANGE UP (ref 3.8–10.5)
WBC # FLD AUTO: 7.11 K/UL — SIGNIFICANT CHANGE UP (ref 3.8–10.5)

## 2018-06-06 PROCEDURE — 99233 SBSQ HOSP IP/OBS HIGH 50: CPT | Mod: GC

## 2018-06-06 RX ORDER — POTASSIUM CHLORIDE 20 MEQ
20 PACKET (EA) ORAL ONCE
Qty: 0 | Refills: 0 | Status: COMPLETED | OUTPATIENT
Start: 2018-06-06 | End: 2018-06-06

## 2018-06-06 RX ORDER — SEVELAMER CARBONATE 2400 MG/1
800 POWDER, FOR SUSPENSION ORAL
Qty: 0 | Refills: 0 | Status: DISCONTINUED | OUTPATIENT
Start: 2018-06-06 | End: 2018-06-08

## 2018-06-06 RX ORDER — HEPARIN SODIUM 5000 [USP'U]/ML
5000 INJECTION INTRAVENOUS; SUBCUTANEOUS EVERY 12 HOURS
Qty: 0 | Refills: 0 | Status: DISCONTINUED | OUTPATIENT
Start: 2018-06-06 | End: 2018-06-08

## 2018-06-06 RX ORDER — SEVELAMER CARBONATE 2400 MG/1
800 POWDER, FOR SUSPENSION ORAL THREE TIMES A DAY
Qty: 0 | Refills: 0 | Status: DISCONTINUED | OUTPATIENT
Start: 2018-06-06 | End: 2018-06-06

## 2018-06-06 RX ORDER — SODIUM BICARBONATE 1 MEQ/ML
1300 SYRINGE (ML) INTRAVENOUS
Qty: 0 | Refills: 0 | Status: DISCONTINUED | OUTPATIENT
Start: 2018-06-06 | End: 2018-06-08

## 2018-06-06 RX ORDER — SEVELAMER CARBONATE 2400 MG/1
800 POWDER, FOR SUSPENSION ORAL EVERY 8 HOURS
Qty: 0 | Refills: 0 | Status: DISCONTINUED | OUTPATIENT
Start: 2018-06-06 | End: 2018-06-06

## 2018-06-06 RX ORDER — AMLODIPINE BESYLATE 2.5 MG/1
10 TABLET ORAL DAILY
Qty: 0 | Refills: 0 | Status: DISCONTINUED | OUTPATIENT
Start: 2018-06-07 | End: 2018-06-08

## 2018-06-06 RX ADMIN — Medication 1300 MILLIGRAM(S): at 17:24

## 2018-06-06 RX ADMIN — HEPARIN SODIUM 5000 UNIT(S): 5000 INJECTION INTRAVENOUS; SUBCUTANEOUS at 21:01

## 2018-06-06 RX ADMIN — HEPARIN SODIUM 5000 UNIT(S): 5000 INJECTION INTRAVENOUS; SUBCUTANEOUS at 00:04

## 2018-06-06 RX ADMIN — AMLODIPINE BESYLATE 5 MILLIGRAM(S): 2.5 TABLET ORAL at 06:20

## 2018-06-06 RX ADMIN — Medication 650 MILLIGRAM(S): at 06:21

## 2018-06-06 RX ADMIN — Medication 1: at 06:21

## 2018-06-06 RX ADMIN — HEPARIN SODIUM 5000 UNIT(S): 5000 INJECTION INTRAVENOUS; SUBCUTANEOUS at 10:06

## 2018-06-06 RX ADMIN — Medication 3: at 17:31

## 2018-06-06 RX ADMIN — SEVELAMER CARBONATE 800 MILLIGRAM(S): 2400 POWDER, FOR SUSPENSION ORAL at 15:28

## 2018-06-06 RX ADMIN — CHLORHEXIDINE GLUCONATE 1 APPLICATION(S): 213 SOLUTION TOPICAL at 10:06

## 2018-06-06 RX ADMIN — Medication 1300 MILLIGRAM(S): at 10:06

## 2018-06-06 RX ADMIN — Medication 3: at 12:57

## 2018-06-06 RX ADMIN — Medication 2: at 23:49

## 2018-06-06 RX ADMIN — Medication 1: at 00:04

## 2018-06-06 RX ADMIN — Medication 20 MILLIEQUIVALENT(S): at 10:06

## 2018-06-06 NOTE — PROGRESS NOTE ADULT - PROBLEM SELECTOR PLAN 5
overall prognosis is poor  family meeting to be scheduled on Friday by family as son from [place] is coming in and they will conference another son in from [place]  Emotional support provided

## 2018-06-06 NOTE — PROGRESS NOTE ADULT - ATTENDING COMMENTS
RUSH, sepsis secondary to aspiratory pna, klebsiella, and UTI  underlying dementia  dysphagia. currently with kft.  completed abx  pt moans/yells to pain, otherwise not oriented, does not answer appropriately to simple questions.  abg with met acidosis, good resp compenstation, hco3 increased.    continued d/w palliative care regarding goals of care -- PEG? HD? overall poor prognosis

## 2018-06-06 NOTE — PROGRESS NOTE ADULT - PROBLEM SELECTOR PLAN 3
pt may require long term HD  Started conversation with son in law about the feasibility of long term HD  He defers to his wife, and brother in laws-

## 2018-06-06 NOTE — PROGRESS NOTE ADULT - ASSESSMENT
83M hx mild dementia, uncontrolled T2DM, HTN presented to the ED w/ declining functional status, subjective hypothermia, and rigors, admitted to MICU for severe sepsis 2/2 Klebsiella PNA and Enterobacter UTI and ARF, course c/b septic shock, refractory metabolic acidosis and hyperkalemia requiring RRT, and increased work of breathing requiring intubation.    #Neuro  -Off of all sedation, now with some improvement in mental status (more awake today and responsive, but not completely following commands)  -Off fentanyl & propofol since 5/31, D/C'd precedex yesterday    #CV  -Distributive shock in setting of sepsis and iatrogenic from sedation, now resolved, s/p levophed from 5/28 to 6/1  -Started on hydrocortisone on 5/30 for adrenal insufficiency in setting of sepsis and weaned to hydrocortisone 25mg daily yesterday. Will D/C hydrocortisone today.    #Pulm  -Intubated on 5/28 for respiratory distress/tachypnea, component of compensation for severe metabolic acidosis and underlying PNA, extubated on 6/4  -Pt at risk for re-intubation given chronic deconditioning and poor nutritional status, readdressed GOC on 6/3 with daughter who says pt would want aggressive measures including re-intubation and tracheostomy     #ID  -Septic shock in setting of Klebsiella PNA and Enterobacter UTI with resolution of shock state on 6/1  -S/p Zosyn from 5/28-6/3    #GI  -Tube feeds discontinued post-extubation. Pt failed bedside S&S. Currently NPO except for meds.    #Renal  -Acute renal failure, likely hemodynamically mediated in setting of septic shock, with severe acidosis and multiple electrolyte derangements (hypernatremia, hyperkalemia) requiring RRT, last HD was on 6/2  -Urine output improving, possibly in recovery phase, no further plan for HD as per Renal. Sharonda D/C'd.  -Hyponatremia resolved, likely iatrogenic in setting of D5W use for several days    #Endo  -Uncontrolled T2DM (HgbA1c 10.3), with hyperglycemia peaking in the 400s on admission  -Started on hydrocortisone on 5/30 for adrenal insufficiency in setting of sepsis, now discontinued  - C/w HISS    #Heme  -Acute on chronic anemia without no active source of bleeding identified, s/p 1pRBC on 5/31 with Hgb response, currently stable in 7 range  -Thrombocytopenia improving now  -Suspect acute anemia and thrombocytopenia related to myelosuppression from critical illness    #VTE ppx  -Heparin SQ, monitor platelets closely and hold if platelets <50    #Advanced care planning  -Initial GOC discussion with health care surrogate (daughter, Alexia Guevara) who wishes for aggressive measures and FULL CODE at this time. Readdressed GOC on 6/3 with daughter who says pt would want aggressive measures including re-intubation and tracheostomy.    Liudmila Sharpe PGY-3  Spectra [phone]  Pager [phone]

## 2018-06-06 NOTE — PROGRESS NOTE ADULT - PROBLEM SELECTOR PLAN 1
- likely 2/2 aspiration pneumonia  - sputum culture with klebsiella  - s/p completion of abx  - afebrile  - extubated and doing well on room air  - monitor

## 2018-06-06 NOTE — PROGRESS NOTE ADULT - ATTENDING COMMENTS
Alert, not conversant or with purposeful action  1.  Renal failure--decisons regarding HD need to be c/w GOC discussion (family meeting Fri)

## 2018-06-06 NOTE — PROGRESS NOTE ADULT - SUBJECTIVE AND OBJECTIVE BOX
CHIEF COMPLAINT:    Interval Events:      REVIEW OF SYSTEMS:  Constitutional:   Eyes:  ENT:  CV:  Resp:  GI:  :  MSK:  Integumentary:  Neurological:  Psychiatric:  Endocrine:  Hematologic/Lymphatic:  Allergic/Immunologic:  [ ] All other systems negative  [ ] Unable to assess ROS because ________      OBJECTIVE:  ICU Vital Signs Last 24 Hrs  T(C): 36.3 (06 Jun 2018 06:04), Max: 36.7 (05 Jun 2018 11:30)  T(F): 97.4 (06 Jun 2018 06:04), Max: 98 (05 Jun 2018 11:30)  HR: 94 (06 Jun 2018 06:04) (84 - 97)  BP: 152/84 (06 Jun 2018 06:04) (145/95 - 176/92)  BP(mean): 116 (05 Jun 2018 10:00) (109 - 119)  ABP: --  ABP(mean): --  RR: 24 (06 Jun 2018 06:04) (23 - 34)  SpO2: 95% (06 Jun 2018 06:04) (95% - 100%)    06-05 @ 07:01  -  06-06 @ 07:00  --------------------------------------------------------  IN: 0 mL / OUT: 300 mL / NET: -300 mL    POCT Blood Glucose.: 192 mg/dL (06 Jun 2018 05:41)    HOSPITAL MEDICATIONS:  MEDICATIONS  (STANDING):  amLODIPine   Tablet 5 milliGRAM(s) Oral daily  chlorhexidine 4% Liquid 1 Application(s) Topical <User Schedule>  dextrose 5%. 1000 milliLiter(s) (50 mL/Hr) IV Continuous <Continuous>  dextrose 50% Injectable 12.5 Gram(s) IV Push once  dextrose 50% Injectable 25 Gram(s) IV Push once  dextrose 50% Injectable 25 Gram(s) IV Push once  heparin  Injectable 5000 Unit(s) SubCutaneous every 8 hours  insulin lispro (HumaLOG) corrective regimen sliding scale   SubCutaneous every 6 hours  sodium bicarbonate 650 milliGRAM(s) Oral two times a day    MEDICATIONS  (PRN):  acetaminophen    Suspension 650 milliGRAM(s) Oral every 6 hours PRN For Temp greater than 38 C (100.4 F)  dextrose 40% Gel 15 Gram(s) Oral once PRN Blood Glucose LESS THAN 70 milliGRAM(s)/deciliter  glucagon  Injectable 1 milliGRAM(s) IntraMuscular once PRN Glucose LESS THAN 70 milligrams/deciliter      LABS:                        9.1    7.11  )-----------( 223      ( 06 Jun 2018 05:45 )             28.0     06-06    143  |  103  |  108<H>  ----------------------------<  202<H>  3.4<L>   |  15<L>  |  5.04<H>    Ca    9.0      06 Jun 2018 05:45  Phos  7.6     06-05  Mg     2.4     06-05          Arterial Blood Gas:  06-05 @ 15:17  7.38/24/114/17/97.9/-10.4  ABG lactate: --  Arterial Blood Gas:  06-04 @ 07:53  7.41/24/117/18/97.6/-8.5  ABG lactate: --        MICROBIOLOGY:     RADIOLOGY:  [ ] Reviewed and interpreted by me    PULMONARY FUNCTION TESTS:    EKG: CHIEF COMPLAINT: Patient is a 83y old  Male who presents with a chief complaint of AMS (05 Jun 2018 17:26)    Interval Events: none overnight      REVIEW OF SYSTEMS:  [ ] All other systems negative  [x] Unable to assess ROS because: dementia      OBJECTIVE:  ICU Vital Signs Last 24 Hrs  T(C): 36.3 (06 Jun 2018 06:04), Max: 36.7 (05 Jun 2018 11:30)  T(F): 97.4 (06 Jun 2018 06:04), Max: 98 (05 Jun 2018 11:30)  HR: 94 (06 Jun 2018 06:04) (84 - 97)  BP: 152/84 (06 Jun 2018 06:04) (145/95 - 176/92)  BP(mean): 116 (05 Jun 2018 10:00) (109 - 119)  ABP: --  ABP(mean): --  RR: 24 (06 Jun 2018 06:04) (23 - 34)  SpO2: 95% (06 Jun 2018 06:04) (95% - 100%)    06-05 @ 07:01  -  06-06 @ 07:00  --------------------------------------------------------  IN: 0 mL / OUT: 300 mL / NET: -300 mL    POCT Blood Glucose.: 192 mg/dL (06 Jun 2018 05:41)    HOSPITAL MEDICATIONS:  MEDICATIONS  (STANDING):  amLODIPine   Tablet 5 milliGRAM(s) Oral daily  chlorhexidine 4% Liquid 1 Application(s) Topical <User Schedule>  dextrose 5%. 1000 milliLiter(s) (50 mL/Hr) IV Continuous <Continuous>  dextrose 50% Injectable 12.5 Gram(s) IV Push once  dextrose 50% Injectable 25 Gram(s) IV Push once  dextrose 50% Injectable 25 Gram(s) IV Push once  heparin  Injectable 5000 Unit(s) SubCutaneous every 8 hours  insulin lispro (HumaLOG) corrective regimen sliding scale   SubCutaneous every 6 hours  sodium bicarbonate 650 milliGRAM(s) Oral two times a day    MEDICATIONS  (PRN):  acetaminophen    Suspension 650 milliGRAM(s) Oral every 6 hours PRN For Temp greater than 38 C (100.4 F)  dextrose 40% Gel 15 Gram(s) Oral once PRN Blood Glucose LESS THAN 70 milliGRAM(s)/deciliter  glucagon  Injectable 1 milliGRAM(s) IntraMuscular once PRN Glucose LESS THAN 70 milligrams/deciliter      LABS:                        9.1    7.11  )-----------( 223      ( 06 Jun 2018 05:45 )             28.0     06-06    143  |  103  |  108<H>  ----------------------------<  202<H>  3.4<L>   |  15<L>  |  5.04<H>    Ca    9.0      06 Jun 2018 05:45

## 2018-06-06 NOTE — PROGRESS NOTE ADULT - PROBLEM SELECTOR PLAN 2
In setting of respiratory alkalosis: Serum CO2 noted to be low at 15. ABG done on 6/4 shows respiratory alkalosis with compensatory metabolic acidosis. Monitor serum CO2 level.

## 2018-06-06 NOTE — CHART NOTE - NSCHARTNOTEFT_GEN_A_CORE
Source:  nursing & EMR     Diet : NPO with tube feed - Nepro carb steady 40 ml/hr 24hrs     Patient tolerating EN , pt. with pressure ulcers, noted wt. increasing gradually to the IBW , will continue EN .     Enteral : above EN provides 1728 kcal & 78 gm protein/d        Current Weight: - 53.6 kg on 6/4/18; 52 kg on 5/31/18; IBW - 56.2 KG     Pertinent Medications: MEDICATIONS  (STANDING):  amLODIPine   Tablet 5 milliGRAM(s) Oral daily  chlorhexidine 4% Liquid 1 Application(s) Topical <User Schedule>  dextrose 5%. 1000 milliLiter(s) (50 mL/Hr) IV Continuous <Continuous>  dextrose 50% Injectable 12.5 Gram(s) IV Push once  dextrose 50% Injectable 25 Gram(s) IV Push once  dextrose 50% Injectable 25 Gram(s) IV Push once  heparin  Injectable 5000 Unit(s) SubCutaneous every 8 hours  insulin lispro (HumaLOG) corrective regimen sliding scale   SubCutaneous every 6 hours  sevelamer carbonate 800 milliGRAM(s) Oral every 8 hours  sodium bicarbonate 1300 milliGRAM(s) Oral two times a day    MEDICATIONS  (PRN):  acetaminophen    Suspension 650 milliGRAM(s) Oral every 6 hours PRN For Temp greater than 38 C (100.4 F)  dextrose 40% Gel 15 Gram(s) Oral once PRN Blood Glucose LESS THAN 70 milliGRAM(s)/deciliter  glucagon  Injectable 1 milliGRAM(s) IntraMuscular once PRN Glucose LESS THAN 70 milligrams/deciliter    Pertinent Labs:  06-06 Na143 mmol/L Glu 202 mg/dL<H> K+ 3.4 mmol/L<L> Cr  5.04 mg/dL<H>  mg/dL<H> 06-06 Phos 8.0 mg/dL<H> 06-03 Alb 1.5 g/dL<L> 05-28 [id number] 10.3 %<H>      Estimated Needs: no change since previous assessment. Pt. receiving 32 kcal/kg & 1.45 gm protein/kg        Recommend to continue EN     Monitoring and Evaluation: Tolerance to diet prescription , weights , wound healing and  follow up per protocol

## 2018-06-06 NOTE — PROGRESS NOTE ADULT - PROBLEM SELECTOR PLAN 1
Hemodynamically mediated RUSH in the setting of septic shock. Pt. was on CVVHDF which was transitioned to HD on 5/30. Pt. last had HD on 5/30. Scr elevated at 5.04 today with elevated BUN. Discussed with primary team, will discuss with family regarding GOC and plans for long term dialysis.  No absolute indication for HD at this time. Monitor BMP, urine output, I,Os. Avoid any potential nephrotoxins. Dose medications to Crcl.

## 2018-06-06 NOTE — PROGRESS NOTE ADULT - SUBJECTIVE AND OBJECTIVE BOX
INTERVAL HPI/OVERNIGHT EVENTS:  pt unresponsive.  son in law at bedside.  ngt with tube feeds    Allergies    No Known Allergies    Intolerances        Code Status:          PRESENT SYMPTOMS:   SOURCE:  [ ] Patient   [x ] Family   [ ] Team     Pain: none  Onset:      Location:          Duration:        Character:         Aggravating factors:          Relieving Factors:             Timing:         Severity:      Dyspnea [ ] YES [x ] NO - Mild [ ]  Moderate [ ]  Severe [ ]   Anxiety:  [ ] YES [x ] NO  Fatigue: [ x] YES [ ] NO  Nausea: [ ] YES [ x] NO  Loss of Appetite: [ x] YES [ ] NO  Constipation [ ] YES   [x ] No     OTHER SYMPTOMS:  [ ] All other ROS negative     [x ] Unable to obtain due to poor mentation    Does the patient meet criteria for Severe Protein Calorie Malnutrition?  Yes [x ]  No [ ]   PPSV less than <30% [x ]  Anasarca [ ]  Albumin <2 [ ]  Catabolic State [ ]  Poor nutritional intake [x ]  Significant weight loss [x ]     MEDICATIONS  (STANDING):  amLODIPine   Tablet 5 milliGRAM(s) Oral daily  chlorhexidine 4% Liquid 1 Application(s) Topical <User Schedule>  dextrose 5%. 1000 milliLiter(s) (50 mL/Hr) IV Continuous <Continuous>  dextrose 50% Injectable 12.5 Gram(s) IV Push once  dextrose 50% Injectable 25 Gram(s) IV Push once  dextrose 50% Injectable 25 Gram(s) IV Push once  heparin  Injectable 5000 Unit(s) SubCutaneous every 8 hours  insulin lispro (HumaLOG) corrective regimen sliding scale   SubCutaneous every 6 hours  sevelamer carbonate 800 milliGRAM(s) Oral every 8 hours  sodium bicarbonate 1300 milliGRAM(s) Oral two times a day    MEDICATIONS  (PRN):  acetaminophen    Suspension 650 milliGRAM(s) Oral every 6 hours PRN For Temp greater than 38 C (100.4 F)  dextrose 40% Gel 15 Gram(s) Oral once PRN Blood Glucose LESS THAN 70 milliGRAM(s)/deciliter  glucagon  Injectable 1 milliGRAM(s) IntraMuscular once PRN Glucose LESS THAN 70 milligrams/deciliter      Palliative Performance Status Version 2:    20     %  ECOG -        Physical Exam:    General: [ ] Alert,  A&O x     [ x] lethargic   [ ] Agitated   [ ] Cachexia   HEENT: [ ] Normal   [ ] Dry mouth   [ ] ET Tube    [ ] Trach +NGT  Lungs: [x ] Clear [ ] Rhonchi  [ ] Crackles [ ] Wheezing [ ] Tachypnea  [ ] Audible excessive secretions   Cardiovascular:  [x ] Regular rate and rhythm  [ ] Irregular [ ] Tachycardia   [ ] Bradycardia   Abdomen: [x ] Soft  [ ] Distended  [ ]  [ ] +BS  [ ] Non tender [ ] Tender  [ ]PEG   [ x] NGT   Last BM:     Genitourinary:  [ ] Normal [ ] Incontinent   [ ] Oliguria/Anuria   [x ] Bhakta  Musculoskeletal:  [ ] Normal   [ ] Generalized weakness  [x ] Bedbound  [ ] Edema   Neurological: [ ] No focal deficits  [ x] Cognitive impairment     Skin: [x ] Normal   [ ] Pressure ulcers     Vital Signs Last 24 Hrs  T(C): 36.2 (06 Jun 2018 12:00), Max: 36.3 (05 Jun 2018 22:27)  T(F): 97.1 (06 Jun 2018 12:00), Max: 97.4 (05 Jun 2018 22:27)  HR: 92 (06 Jun 2018 12:00) (84 - 94)  BP: 163/90 (06 Jun 2018 12:00) (152/84 - 176/92)  BP(mean): --  RR: 18 (06 Jun 2018 12:00) (18 - 24)  SpO2: 100% (06 Jun 2018 12:00) (95% - 100%)    LABS:                        9.1    7.11  )-----------( 223      ( 06 Jun 2018 05:45 )             28.0     06-06    143  |  103  |  108<H>  ----------------------------<  202<H>  3.4<L>   |  15<L>  |  5.04<H>    Ca    9.0      06 Jun 2018 05:45  Phos  8.0     06-06  Mg     2.4     06-05          I&O's Summary    05 Jun 2018 07:01  -  06 Jun 2018 07:00  --------------------------------------------------------  IN: 0 mL / OUT: 300 mL / NET: -300 mL    06 Jun 2018 07:01 - 06 Jun 2018 13:28  --------------------------------------------------------  IN: 0 mL / OUT: 300 mL / NET: -300 mL        RADIOLOGY & ADDITIONAL STUDIES:

## 2018-06-06 NOTE — PROGRESS NOTE ADULT - PROBLEM SELECTOR PLAN 3
- s/p CVVHDF and HD  - last HD 5/30  - renal note appreciated  - for possible HD pending GOC with family

## 2018-06-06 NOTE — PROGRESS NOTE ADULT - PROBLEM SELECTOR PLAN 4
continue ngt for now  also started discussion about artificial nutrition  concern by family was pulling it out

## 2018-06-07 DIAGNOSIS — N17.9 ACUTE KIDNEY FAILURE, UNSPECIFIED: ICD-10-CM

## 2018-06-07 LAB
BUN SERPL-MCNC: 105 MG/DL — HIGH (ref 7–23)
CALCIUM SERPL-MCNC: 8.7 MG/DL — SIGNIFICANT CHANGE UP (ref 8.4–10.5)
CHLORIDE SERPL-SCNC: 105 MMOL/L — SIGNIFICANT CHANGE UP (ref 98–107)
CO2 SERPL-SCNC: 19 MMOL/L — LOW (ref 22–31)
CREAT SERPL-MCNC: 5 MG/DL — HIGH (ref 0.5–1.3)
GLUCOSE BLDC GLUCOMTR-MCNC: 139 MG/DL — HIGH (ref 70–99)
GLUCOSE BLDC GLUCOMTR-MCNC: 174 MG/DL — HIGH (ref 70–99)
GLUCOSE BLDC GLUCOMTR-MCNC: 271 MG/DL — HIGH (ref 70–99)
GLUCOSE BLDC GLUCOMTR-MCNC: 303 MG/DL — HIGH (ref 70–99)
GLUCOSE SERPL-MCNC: 274 MG/DL — HIGH (ref 70–99)
MAGNESIUM SERPL-MCNC: 2.4 MG/DL — SIGNIFICANT CHANGE UP (ref 1.6–2.6)
PHOSPHATE SERPL-MCNC: 6.2 MG/DL — HIGH (ref 2.5–4.5)
POTASSIUM SERPL-MCNC: 3.4 MMOL/L — LOW (ref 3.5–5.3)
POTASSIUM SERPL-SCNC: 3.4 MMOL/L — LOW (ref 3.5–5.3)
SODIUM SERPL-SCNC: 144 MMOL/L — SIGNIFICANT CHANGE UP (ref 135–145)

## 2018-06-07 PROCEDURE — 99233 SBSQ HOSP IP/OBS HIGH 50: CPT | Mod: GC

## 2018-06-07 PROCEDURE — 99232 SBSQ HOSP IP/OBS MODERATE 35: CPT | Mod: GC

## 2018-06-07 RX ORDER — HYDROMORPHONE HYDROCHLORIDE 2 MG/ML
1 INJECTION INTRAMUSCULAR; INTRAVENOUS; SUBCUTANEOUS EVERY 4 HOURS
Qty: 0 | Refills: 0 | Status: DISCONTINUED | OUTPATIENT
Start: 2018-06-07 | End: 2018-06-08

## 2018-06-07 RX ORDER — PIPERACILLIN AND TAZOBACTAM 4; .5 G/20ML; G/20ML
3.38 INJECTION, POWDER, LYOPHILIZED, FOR SOLUTION INTRAVENOUS ONCE
Qty: 0 | Refills: 0 | Status: COMPLETED | OUTPATIENT
Start: 2018-06-07 | End: 2018-06-07

## 2018-06-07 RX ORDER — FUROSEMIDE 40 MG
80 TABLET ORAL ONCE
Qty: 0 | Refills: 0 | Status: COMPLETED | OUTPATIENT
Start: 2018-06-07 | End: 2018-06-07

## 2018-06-07 RX ORDER — HYDROMORPHONE HYDROCHLORIDE 2 MG/ML
1 INJECTION INTRAMUSCULAR; INTRAVENOUS; SUBCUTANEOUS ONCE
Qty: 0 | Refills: 0 | Status: DISCONTINUED | OUTPATIENT
Start: 2018-06-07 | End: 2018-06-07

## 2018-06-07 RX ORDER — IPRATROPIUM/ALBUTEROL SULFATE 18-103MCG
3 AEROSOL WITH ADAPTER (GRAM) INHALATION EVERY 8 HOURS
Qty: 0 | Refills: 0 | Status: DISCONTINUED | OUTPATIENT
Start: 2018-06-07 | End: 2018-06-07

## 2018-06-07 RX ORDER — PIPERACILLIN AND TAZOBACTAM 4; .5 G/20ML; G/20ML
3.38 INJECTION, POWDER, LYOPHILIZED, FOR SOLUTION INTRAVENOUS EVERY 12 HOURS
Qty: 0 | Refills: 0 | Status: DISCONTINUED | OUTPATIENT
Start: 2018-06-07 | End: 2018-06-14

## 2018-06-07 RX ORDER — POTASSIUM CHLORIDE 20 MEQ
40 PACKET (EA) ORAL ONCE
Qty: 0 | Refills: 0 | Status: COMPLETED | OUTPATIENT
Start: 2018-06-07 | End: 2018-06-07

## 2018-06-07 RX ADMIN — Medication 40 MILLIEQUIVALENT(S): at 09:19

## 2018-06-07 RX ADMIN — HYDROMORPHONE HYDROCHLORIDE 1 MILLIGRAM(S): 2 INJECTION INTRAMUSCULAR; INTRAVENOUS; SUBCUTANEOUS at 23:39

## 2018-06-07 RX ADMIN — Medication 1: at 18:24

## 2018-06-07 RX ADMIN — SEVELAMER CARBONATE 800 MILLIGRAM(S): 2400 POWDER, FOR SUSPENSION ORAL at 12:23

## 2018-06-07 RX ADMIN — CHLORHEXIDINE GLUCONATE 1 APPLICATION(S): 213 SOLUTION TOPICAL at 09:19

## 2018-06-07 RX ADMIN — AMLODIPINE BESYLATE 10 MILLIGRAM(S): 2.5 TABLET ORAL at 05:10

## 2018-06-07 RX ADMIN — HEPARIN SODIUM 5000 UNIT(S): 5000 INJECTION INTRAVENOUS; SUBCUTANEOUS at 18:55

## 2018-06-07 RX ADMIN — PIPERACILLIN AND TAZOBACTAM 200 GRAM(S): 4; .5 INJECTION, POWDER, LYOPHILIZED, FOR SOLUTION INTRAVENOUS at 18:24

## 2018-06-07 RX ADMIN — HEPARIN SODIUM 5000 UNIT(S): 5000 INJECTION INTRAVENOUS; SUBCUTANEOUS at 05:08

## 2018-06-07 RX ADMIN — HYDROMORPHONE HYDROCHLORIDE 1 MILLIGRAM(S): 2 INJECTION INTRAMUSCULAR; INTRAVENOUS; SUBCUTANEOUS at 11:44

## 2018-06-07 RX ADMIN — Medication 3: at 05:54

## 2018-06-07 RX ADMIN — SEVELAMER CARBONATE 800 MILLIGRAM(S): 2400 POWDER, FOR SUSPENSION ORAL at 09:16

## 2018-06-07 RX ADMIN — Medication 0.5 MILLIGRAM(S): at 12:23

## 2018-06-07 RX ADMIN — Medication 4: at 12:24

## 2018-06-07 RX ADMIN — Medication 1300 MILLIGRAM(S): at 05:08

## 2018-06-07 RX ADMIN — Medication 1300 MILLIGRAM(S): at 18:24

## 2018-06-07 RX ADMIN — Medication 80 MILLIGRAM(S): at 11:44

## 2018-06-07 NOTE — PROGRESS NOTE ADULT - PROBLEM SELECTOR PLAN 1
s/p extubation now with presumed aspiration pna  iv abx as tolerated  o2 support  hold tube feeds  consider dilaudid 0.5mg iv Q 2 hours prn  family does not want to reintubate pt

## 2018-06-07 NOTE — GOALS OF CARE CONVERSATION - PERSONAL ADVANCE DIRECTIVE - CONVERSATION DETAILS
Pt is actively dying due to respiratory failure from aspiration 2/2 dementia. This was explained to the daughter and she nderstands that intubation would only be a temporary solution.  That he would only be more weak and deconditioned pot intubation. and while it would save his life at this moment it would not change the reason for his dying.  He is choking on his saliva and other secretions.     The daughter was extremely distraught and crying.  When informed that we would not intubate but rather make him comfortable with medication she sounded accepting of this.  And Said she is coming and to wait until she gets here.

## 2018-06-07 NOTE — PROGRESS NOTE ADULT - PROBLEM SELECTOR PLAN 1
- likely 2/2 aspiration pneumonia  - sputum culture with klebsiella  - s/p completion of abx  - afebrile  - requiring oxygen today - likely aspirated again last night, more lethargic and tachypneic today  - see GOC note

## 2018-06-07 NOTE — PROGRESS NOTE ADULT - ASSESSMENT
83M hx mild dementia, uncontrolled T2DM, HTN presented to the ED w/ declining functional status, subjective hypothermia, and rigors, admitted to MICU for severe sepsis 2/2 Klebsiella PNA and Enterobacter UTI and ARF, course c/b septic shock, refractory metabolic acidosis and hyperkalemia requiring RRT, and increased work of breathing requiring intubation.

## 2018-06-07 NOTE — PROGRESS NOTE ADULT - ATTENDING COMMENTS
83 year old male, dementia but functional at baseline, admitted with septic shock secondary to asp pna, UTI, hypotension, requiring multiple pressors, RUSH/ATN and likley anoxic injury due to hypotension  Minimally responsive, not speaking  on room air  hemodyn stable, afebrile  more sob, increased secretions, cont to aspirate  non-oliguric, good urine outpt. k 3.4, hco3 19.  no acute need for HD today.  palliative follow up appreciated.  need to delineate overall GOC, feeding, HD, placement in NH, etc

## 2018-06-07 NOTE — GOALS OF CARE CONVERSATION - PERSONAL ADVANCE DIRECTIVE - CONVERSATION DETAILS
Discussed with daughter Alexia at bedside. Daughter states that she is waiting for her mother and brother to arrive bedside to discuss and "confer." However, daughter states that pt is DNR and DNI. She states that both she and the patient would not want chronic vent or PEG. Would like to continue antibiotics while waiting for other family to arrive. Will also continue to keep patient comfortable with IV dilaudid if necessary. Discussed with daughter Alexia at bedside. Daughter states that she is waiting for her mother and brother to arrive bedside to discuss and "confer." However, daughter states that pt is DNR and DNI. She states that both she and the patient would not want chronic vent or PEG. Would like to continue antibiotics while waiting for other family to arrive. Will also continue to keep patient comfortable with IV dilaudid if necessary. Daughter understands poor prognosis and that patient may not wake up given current condition.

## 2018-06-07 NOTE — PROGRESS NOTE ADULT - SUBJECTIVE AND OBJECTIVE BOX
[facility] Division of Kidney Diseases & Hypertension  FOLLOW UP NOTE  [phone]--------------------------------------------------------------------------------  Chief Complaint:Sepsis      24 hour events/subjective: Patient has worsening respiratory distress and was emergently intubated. Currently being transferred to MICU for further management.       PAST HISTORY  --------------------------------------------------------------------------------  No significant changes to PMH, PSH, FHx, SHx, unless otherwise noted    ALLERGIES & MEDICATIONS  --------------------------------------------------------------------------------  Allergies    No Known Allergies    Intolerances      Standing Inpatient Medications  ALBUTerol/ipratropium for Nebulization 3 milliLiter(s) Nebulizer every 8 hours  amLODIPine   Tablet 10 milliGRAM(s) Oral daily  chlorhexidine 4% Liquid 1 Application(s) Topical <User Schedule>  dextrose 5%. 1000 milliLiter(s) IV Continuous <Continuous>  dextrose 50% Injectable 12.5 Gram(s) IV Push once  dextrose 50% Injectable 25 Gram(s) IV Push once  dextrose 50% Injectable 25 Gram(s) IV Push once  heparin  Injectable 5000 Unit(s) SubCutaneous every 12 hours  insulin lispro (HumaLOG) corrective regimen sliding scale   SubCutaneous every 6 hours  sevelamer carbonate Powder 800 milliGRAM(s) Oral three times a day with meals  sodium bicarbonate 1300 milliGRAM(s) Oral two times a day    PRN Inpatient Medications  acetaminophen    Suspension 650 milliGRAM(s) Oral every 6 hours PRN  dextrose 40% Gel 15 Gram(s) Oral once PRN  glucagon  Injectable 1 milliGRAM(s) IntraMuscular once PRN      REVIEW OF SYSTEMS  --------------------------------------------------------------------------------    Unable to obtain.     VITALS/PHYSICAL EXAM  --------------------------------------------------------------------------------  T(C): 36.7 (06-07-18 @ 11:35), Max: 36.9 (06-07-18 @ 05:06)  HR: 122 (06-07-18 @ 11:35) (92 - 122)  BP: 144/87 (06-07-18 @ 11:35) (144/87 - 159/84)  RR: 40 (06-07-18 @ 11:35) (18 - 40)  SpO2: 92% (06-07-18 @ 11:35) (92% - 100%)  Wt(kg): --        06-06-18 @ 07:01  -  06-07-18 @ 07:00  --------------------------------------------------------  IN: 0 mL / OUT: 1512 mL / NET: -1512 mL    06-07-18 @ 07:01  -  06-07-18 @ 12:03  --------------------------------------------------------  IN: 0 mL / OUT: 300 mL / NET: -300 mL      Physical Exam:  	Gen: Intubated, on mechanical ventilator   	HEENT: PERRL, supple neck, clear oropharynx  	Pulm: CTA B/L  	CV: RRR, S1S2;  	Abd: +BS, soft, nontender/nondistended               Extremities: no bilateral LE edema noted.  	Skin: Warm, without rashes    LABS/STUDIES  --------------------------------------------------------------------------------              9.1    7.11  >-----------<  223      [06-06-18 @ 05:45]              28.0     144  |  105  |  105  ----------------------------<  274      [06-07-18 @ 05:45]  3.4   |  19  |  5.00        Ca     8.7     [06-07-18 @ 05:45]      Mg     2.4     [06-07-18 @ 05:45]      Phos  6.2     [06-07-18 @ 05:45]            Creatinine Trend:  SCr 5.00 [06-07 @ 05:45]  SCr 5.04 [06-06 @ 05:45]  SCr 4.79 [06-05 @ 02:35]  SCr 4.48 [06-04 @ 03:00]  SCr 4.20 [06-03 @ 02:50]      Urine Sodium 38      [06-02-18 @ 06:16]  Urine Chloride 21      [06-02-18 @ 06:16]  Urine Osmolality 283      [06-02-18 @ 06:16] [facility] Division of Kidney Diseases & Hypertension  FOLLOW UP NOTE  [phone]--------------------------------------------------------------------------------  Chief Complaint:Sepsis      24 hour events/subjective:     PAST HISTORY  --------------------------------------------------------------------------------  No significant changes to PMH, PSH, FHx, SHx, unless otherwise noted    ALLERGIES & MEDICATIONS  --------------------------------------------------------------------------------  Allergies    No Known Allergies    Intolerances      Standing Inpatient Medications  ALBUTerol/ipratropium for Nebulization 3 milliLiter(s) Nebulizer every 8 hours  amLODIPine   Tablet 10 milliGRAM(s) Oral daily  chlorhexidine 4% Liquid 1 Application(s) Topical <User Schedule>  dextrose 5%. 1000 milliLiter(s) IV Continuous <Continuous>  dextrose 50% Injectable 12.5 Gram(s) IV Push once  dextrose 50% Injectable 25 Gram(s) IV Push once  dextrose 50% Injectable 25 Gram(s) IV Push once  heparin  Injectable 5000 Unit(s) SubCutaneous every 12 hours  insulin lispro (HumaLOG) corrective regimen sliding scale   SubCutaneous every 6 hours  sevelamer carbonate Powder 800 milliGRAM(s) Oral three times a day with meals  sodium bicarbonate 1300 milliGRAM(s) Oral two times a day    PRN Inpatient Medications  acetaminophen    Suspension 650 milliGRAM(s) Oral every 6 hours PRN  dextrose 40% Gel 15 Gram(s) Oral once PRN  glucagon  Injectable 1 milliGRAM(s) IntraMuscular once PRN      REVIEW OF SYSTEMS  --------------------------------------------------------------------------------    Unable to obtain.     VITALS/PHYSICAL EXAM  --------------------------------------------------------------------------------  T(C): 36.7 (06-07-18 @ 11:35), Max: 36.9 (06-07-18 @ 05:06)  HR: 122 (06-07-18 @ 11:35) (92 - 122)  BP: 144/87 (06-07-18 @ 11:35) (144/87 - 159/84)  RR: 40 (06-07-18 @ 11:35) (18 - 40)  SpO2: 92% (06-07-18 @ 11:35) (92% - 100%)  Wt(kg): --        06-06-18 @ 07:01  -  06-07-18 @ 07:00  --------------------------------------------------------  IN: 0 mL / OUT: 1512 mL / NET: -1512 mL    06-07-18 @ 07:01  -  06-07-18 @ 12:03  --------------------------------------------------------  IN: 0 mL / OUT: 300 mL / NET: -300 mL      Physical Exam:  	Gen: Intubated, on mechanical ventilator   	HEENT: PERRL, supple neck, clear oropharynx  	Pulm: CTA B/L  	CV: RRR, S1S2;  	Abd: +BS, soft, nontender/nondistended               Extremities: no bilateral LE edema noted.  	Skin: Warm, without rashes    LABS/STUDIES  --------------------------------------------------------------------------------              9.1    7.11  >-----------<  223      [06-06-18 @ 05:45]              28.0     144  |  105  |  105  ----------------------------<  274      [06-07-18 @ 05:45]  3.4   |  19  |  5.00        Ca     8.7     [06-07-18 @ 05:45]      Mg     2.4     [06-07-18 @ 05:45]      Phos  6.2     [06-07-18 @ 05:45]            Creatinine Trend:  SCr 5.00 [06-07 @ 05:45]  SCr 5.04 [06-06 @ 05:45]  SCr 4.79 [06-05 @ 02:35]  SCr 4.48 [06-04 @ 03:00]  SCr 4.20 [06-03 @ 02:50]      Urine Sodium 38      [06-02-18 @ 06:16]  Urine Chloride 21      [06-02-18 @ 06:16]  Urine Osmolality 283      [06-02-18 @ 06:16] [facility] Division of Kidney Diseases & Hypertension  FOLLOW UP NOTE  [phone]--------------------------------------------------------------------------------  Chief Complaint:Sepsis      24 hour events/subjective:     PAST HISTORY  --------------------------------------------------------------------------------  No significant changes to PMH, PSH, FHx, SHx, unless otherwise noted    ALLERGIES & MEDICATIONS  --------------------------------------------------------------------------------  Allergies    No Known Allergies    Intolerances      Standing Inpatient Medications  ALBUTerol/ipratropium for Nebulization 3 milliLiter(s) Nebulizer every 8 hours  amLODIPine   Tablet 10 milliGRAM(s) Oral daily  chlorhexidine 4% Liquid 1 Application(s) Topical <User Schedule>  dextrose 5%. 1000 milliLiter(s) IV Continuous <Continuous>  dextrose 50% Injectable 12.5 Gram(s) IV Push once  dextrose 50% Injectable 25 Gram(s) IV Push once  dextrose 50% Injectable 25 Gram(s) IV Push once  heparin  Injectable 5000 Unit(s) SubCutaneous every 12 hours  insulin lispro (HumaLOG) corrective regimen sliding scale   SubCutaneous every 6 hours  sevelamer carbonate Powder 800 milliGRAM(s) Oral three times a day with meals  sodium bicarbonate 1300 milliGRAM(s) Oral two times a day    PRN Inpatient Medications  acetaminophen    Suspension 650 milliGRAM(s) Oral every 6 hours PRN  dextrose 40% Gel 15 Gram(s) Oral once PRN  glucagon  Injectable 1 milliGRAM(s) IntraMuscular once PRN      REVIEW OF SYSTEMS  --------------------------------------------------------------------------------    Unable to obtain.     VITALS/PHYSICAL EXAM  --------------------------------------------------------------------------------  T(C): 36.7 (06-07-18 @ 11:35), Max: 36.9 (06-07-18 @ 05:06)  HR: 122 (06-07-18 @ 11:35) (92 - 122)  BP: 144/87 (06-07-18 @ 11:35) (144/87 - 159/84)  RR: 40 (06-07-18 @ 11:35) (18 - 40)  SpO2: 92% (06-07-18 @ 11:35) (92% - 100%)  Wt(kg): --        06-06-18 @ 07:01  -  06-07-18 @ 07:00  --------------------------------------------------------  IN: 0 mL / OUT: 1512 mL / NET: -1512 mL    06-07-18 @ 07:01  -  06-07-18 @ 12:03  --------------------------------------------------------  IN: 0 mL / OUT: 300 mL / NET: -300 mL      Physical Exam:  	Gen: Elderly male, in mild respiratory distress    	HEENT: PERRL, supple neck, clear oropharynx  	Pulm: B/L crackles present.   	CV: RRR, S1S2;  	Abd: +BS, soft, nontender/nondistended               Extremities: no bilateral LE edema noted.  	Skin: Warm, without rashes    LABS/STUDIES  --------------------------------------------------------------------------------              9.1    7.11  >-----------<  223      [06-06-18 @ 05:45]              28.0     144  |  105  |  105  ----------------------------<  274      [06-07-18 @ 05:45]  3.4   |  19  |  5.00        Ca     8.7     [06-07-18 @ 05:45]      Mg     2.4     [06-07-18 @ 05:45]      Phos  6.2     [06-07-18 @ 05:45]            Creatinine Trend:  SCr 5.00 [06-07 @ 05:45]  SCr 5.04 [06-06 @ 05:45]  SCr 4.79 [06-05 @ 02:35]  SCr 4.48 [06-04 @ 03:00]  SCr 4.20 [06-03 @ 02:50]      Urine Sodium 38      [06-02-18 @ 06:16]  Urine Chloride 21      [06-02-18 @ 06:16]  Urine Osmolality 283      [06-02-18 @ 06:16]

## 2018-06-07 NOTE — PROGRESS NOTE ADULT - PROBLEM SELECTOR PLAN 4
much support provided to pt's daughter, arik and grand daughter  overall prognosis is poor and they are aware  offered - declined  will continue to follow for symptom management

## 2018-06-07 NOTE — PROGRESS NOTE ADULT - SUBJECTIVE AND OBJECTIVE BOX
CHIEF COMPLAINT: Patient is a 83y old  Male who presents with a chief complaint of AMS (05 Jun 2018 17:26)    Interval Events:      REVIEW OF SYSTEMS:  Constitutional:   Eyes:  ENT:  CV:  Resp:  GI:  :  MSK:  Integumentary:  Neurological:  Psychiatric:  Endocrine:  Hematologic/Lymphatic:  Allergic/Immunologic:  [ ] All other systems negative  [ ] Unable to assess ROS because ________      OBJECTIVE:  ICU Vital Signs Last 24 Hrs  T(C): 36.9 (07 Jun 2018 05:06), Max: 36.9 (07 Jun 2018 05:06)  T(F): 98.5 (07 Jun 2018 05:06), Max: 98.5 (07 Jun 2018 05:06)  HR: 101 (07 Jun 2018 05:06) (92 - 101)  BP: 153/80 (07 Jun 2018 05:06) (153/80 - 163/90)  BP(mean): --  ABP: --  ABP(mean): --  RR: 20 (07 Jun 2018 05:06) (18 - 20)  SpO2: 98% (07 Jun 2018 05:06) (98% - 100%)    06-06 @ 07:01  -  06-07 @ 07:00  --------------------------------------------------------  IN: 0 mL / OUT: 1512 mL / NET: -1512 mL      POCT Blood Glucose.: 271 mg/dL (07 Jun 2018 05:39)    HOSPITAL MEDICATIONS:  MEDICATIONS  (STANDING):  amLODIPine   Tablet 10 milliGRAM(s) Oral daily  chlorhexidine 4% Liquid 1 Application(s) Topical <User Schedule>  dextrose 5%. 1000 milliLiter(s) (50 mL/Hr) IV Continuous <Continuous>  dextrose 50% Injectable 12.5 Gram(s) IV Push once  dextrose 50% Injectable 25 Gram(s) IV Push once  dextrose 50% Injectable 25 Gram(s) IV Push once  heparin  Injectable 5000 Unit(s) SubCutaneous every 12 hours  insulin lispro (HumaLOG) corrective regimen sliding scale   SubCutaneous every 6 hours  potassium chloride   Powder 40 milliEquivalent(s) Oral once  sevelamer carbonate Powder 800 milliGRAM(s) Oral three times a day with meals  sodium bicarbonate 1300 milliGRAM(s) Oral two times a day    MEDICATIONS  (PRN):  acetaminophen    Suspension 650 milliGRAM(s) Oral every 6 hours PRN For Temp greater than 38 C (100.4 F)  dextrose 40% Gel 15 Gram(s) Oral once PRN Blood Glucose LESS THAN 70 milliGRAM(s)/deciliter  glucagon  Injectable 1 milliGRAM(s) IntraMuscular once PRN Glucose LESS THAN 70 milligrams/deciliter      LABS:                        9.1    7.11  )-----------( 223      ( 06 Jun 2018 05:45 )             28.0     06-07    144  |  105  |  105<H>  ----------------------------<  274<H>  3.4<L>   |  19<L>  |  5.00<H>    Ca    8.7      07 Jun 2018 05:45  Phos  6.2     06-07  Mg     2.4     06-07          Arterial Blood Gas:  06-05 @ 15:17  7.38/24/114/17/97.9/-10.4  ABG lactate: --        MICROBIOLOGY:     RADIOLOGY:  [ ] Reviewed and interpreted by me    PULMONARY FUNCTION TESTS:    EKG: CHIEF COMPLAINT: Patient is a 83y old  Male who presents with a chief complaint of AMS (05 Jun 2018 17:26)    Interval Events: more lethargic this morning, with hypoxia, likely aspirated again      REVIEW OF SYSTEMS:  [ ] All other systems negative  [x] Unable to assess ROS because: lethargic, nonverbal      OBJECTIVE:  ICU Vital Signs Last 24 Hrs  T(C): 36.9 (07 Jun 2018 05:06), Max: 36.9 (07 Jun 2018 05:06)  T(F): 98.5 (07 Jun 2018 05:06), Max: 98.5 (07 Jun 2018 05:06)  HR: 101 (07 Jun 2018 05:06) (92 - 101)  BP: 153/80 (07 Jun 2018 05:06) (153/80 - 163/90)  BP(mean): --  ABP: --  ABP(mean): --  RR: 20 (07 Jun 2018 05:06) (18 - 20)  SpO2: 98% (07 Jun 2018 05:06) (98% - 100%)    06-06 @ 07:01  -  06-07 @ 07:00  --------------------------------------------------------  IN: 0 mL / OUT: 1512 mL / NET: -1512 mL      POCT Blood Glucose.: 271 mg/dL (07 Jun 2018 05:39)    HOSPITAL MEDICATIONS:  MEDICATIONS  (STANDING):  amLODIPine   Tablet 10 milliGRAM(s) Oral daily  chlorhexidine 4% Liquid 1 Application(s) Topical <User Schedule>  dextrose 5%. 1000 milliLiter(s) (50 mL/Hr) IV Continuous <Continuous>  dextrose 50% Injectable 12.5 Gram(s) IV Push once  dextrose 50% Injectable 25 Gram(s) IV Push once  dextrose 50% Injectable 25 Gram(s) IV Push once  heparin  Injectable 5000 Unit(s) SubCutaneous every 12 hours  insulin lispro (HumaLOG) corrective regimen sliding scale   SubCutaneous every 6 hours  potassium chloride   Powder 40 milliEquivalent(s) Oral once  sevelamer carbonate Powder 800 milliGRAM(s) Oral three times a day with meals  sodium bicarbonate 1300 milliGRAM(s) Oral two times a day    MEDICATIONS  (PRN):  acetaminophen    Suspension 650 milliGRAM(s) Oral every 6 hours PRN For Temp greater than 38 C (100.4 F)  dextrose 40% Gel 15 Gram(s) Oral once PRN Blood Glucose LESS THAN 70 milliGRAM(s)/deciliter  glucagon  Injectable 1 milliGRAM(s) IntraMuscular once PRN Glucose LESS THAN 70 milligrams/deciliter

## 2018-06-07 NOTE — PROGRESS NOTE ADULT - PROBLEM SELECTOR PLAN 1
Hemodynamically mediated RUSH in the setting of septic shock. Pt. was on CVVHDF which was transitioned to HD on 5/30. Pt. last had HD on 5/30. Scr elevated at 5 today with elevated BUN. No absolute indication for HD at this time. Monitor BMP, urine output, I,Os. Avoid any potential nephrotoxins. Dose medications to Crcl. Hemodynamically mediated RUSH in the setting of septic shock. Pt. was on CVVHDF which was transitioned to HD on 5/30. Pt. last had HD on 5/30. Scr elevated at 5 today with elevated BUN. No  indication for HD at this time. Monitor BMP, urine output, I,Os. Avoid any potential nephrotoxins. Dose medications to Crcl.

## 2018-06-07 NOTE — PROGRESS NOTE ADULT - ASSESSMENT
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

## 2018-06-07 NOTE — PROGRESS NOTE ADULT - PROBLEM SELECTOR PLAN 2
In setting of respiratory alkalosis: Serum CO2 noted to be low at 15. ABG done on 6/4 shows respiratory alkalosis with compensatory metabolic acidosis. Patient intubated today AM. Monitor serum CO2 level.

## 2018-06-08 LAB
GLUCOSE BLDC GLUCOMTR-MCNC: 143 MG/DL — HIGH (ref 70–99)
GLUCOSE BLDC GLUCOMTR-MCNC: 176 MG/DL — HIGH (ref 70–99)

## 2018-06-08 PROCEDURE — 99232 SBSQ HOSP IP/OBS MODERATE 35: CPT | Mod: GC

## 2018-06-08 PROCEDURE — 99233 SBSQ HOSP IP/OBS HIGH 50: CPT | Mod: GC

## 2018-06-08 RX ORDER — HYDROMORPHONE HYDROCHLORIDE 2 MG/ML
1.5 INJECTION INTRAMUSCULAR; INTRAVENOUS; SUBCUTANEOUS EVERY 4 HOURS
Qty: 0 | Refills: 0 | Status: DISCONTINUED | OUTPATIENT
Start: 2018-06-08 | End: 2018-06-11

## 2018-06-08 RX ORDER — HYDROMORPHONE HYDROCHLORIDE 2 MG/ML
1 INJECTION INTRAMUSCULAR; INTRAVENOUS; SUBCUTANEOUS ONCE
Qty: 0 | Refills: 0 | Status: DISCONTINUED | OUTPATIENT
Start: 2018-06-08 | End: 2018-06-08

## 2018-06-08 RX ORDER — SODIUM BICARBONATE 1 MEQ/ML
0.27 SYRINGE (ML) INTRAVENOUS
Qty: 150 | Refills: 0 | Status: DISCONTINUED | OUTPATIENT
Start: 2018-06-08 | End: 2018-06-11

## 2018-06-08 RX ADMIN — Medication 75 MEQ/KG/HR: at 14:38

## 2018-06-08 RX ADMIN — HYDROMORPHONE HYDROCHLORIDE 1 MILLIGRAM(S): 2 INJECTION INTRAMUSCULAR; INTRAVENOUS; SUBCUTANEOUS at 05:08

## 2018-06-08 RX ADMIN — PIPERACILLIN AND TAZOBACTAM 25 GRAM(S): 4; .5 INJECTION, POWDER, LYOPHILIZED, FOR SOLUTION INTRAVENOUS at 05:08

## 2018-06-08 RX ADMIN — AMLODIPINE BESYLATE 10 MILLIGRAM(S): 2.5 TABLET ORAL at 05:08

## 2018-06-08 RX ADMIN — Medication 1: at 05:53

## 2018-06-08 RX ADMIN — HYDROMORPHONE HYDROCHLORIDE 1.5 MILLIGRAM(S): 2 INJECTION INTRAMUSCULAR; INTRAVENOUS; SUBCUTANEOUS at 16:33

## 2018-06-08 RX ADMIN — PIPERACILLIN AND TAZOBACTAM 25 GRAM(S): 4; .5 INJECTION, POWDER, LYOPHILIZED, FOR SOLUTION INTRAVENOUS at 17:17

## 2018-06-08 RX ADMIN — HEPARIN SODIUM 5000 UNIT(S): 5000 INJECTION INTRAVENOUS; SUBCUTANEOUS at 05:08

## 2018-06-08 RX ADMIN — HYDROMORPHONE HYDROCHLORIDE 1.5 MILLIGRAM(S): 2 INJECTION INTRAMUSCULAR; INTRAVENOUS; SUBCUTANEOUS at 20:41

## 2018-06-08 RX ADMIN — HYDROMORPHONE HYDROCHLORIDE 1 MILLIGRAM(S): 2 INJECTION INTRAMUSCULAR; INTRAVENOUS; SUBCUTANEOUS at 09:20

## 2018-06-08 RX ADMIN — HYDROMORPHONE HYDROCHLORIDE 1 MILLIGRAM(S): 2 INJECTION INTRAMUSCULAR; INTRAVENOUS; SUBCUTANEOUS at 10:11

## 2018-06-08 RX ADMIN — CHLORHEXIDINE GLUCONATE 1 APPLICATION(S): 213 SOLUTION TOPICAL at 10:12

## 2018-06-08 RX ADMIN — Medication 75 MEQ/KG/HR: at 20:40

## 2018-06-08 RX ADMIN — HYDROMORPHONE HYDROCHLORIDE 1.5 MILLIGRAM(S): 2 INJECTION INTRAMUSCULAR; INTRAVENOUS; SUBCUTANEOUS at 08:56

## 2018-06-08 RX ADMIN — Medication 1300 MILLIGRAM(S): at 05:08

## 2018-06-08 NOTE — PROGRESS NOTE ADULT - SUBJECTIVE AND OBJECTIVE BOX
CHIEF COMPLAINT:    Interval Events:      REVIEW OF SYSTEMS:  Constitutional:   Eyes:  ENT:  CV:  Resp:  GI:  :  MSK:  Integumentary:  Neurological:  Psychiatric:  Endocrine:  Hematologic/Lymphatic:  Allergic/Immunologic:  [ ] All other systems negative  [ ] Unable to assess ROS because ________      OBJECTIVE:  ICU Vital Signs Last 24 Hrs  T(C): 36.4 (07 Jun 2018 23:20), Max: 36.7 (07 Jun 2018 11:35)  T(F): 97.6 (07 Jun 2018 23:20), Max: 98.1 (07 Jun 2018 11:35)  HR: 101 (08 Jun 2018 05:07) (100 - 122)  BP: 140/76 (08 Jun 2018 05:07) (136/68 - 144/87)  BP(mean): --  ABP: --  ABP(mean): --  RR: 22 (07 Jun 2018 23:20) (22 - 40)  SpO2: 98% (07 Jun 2018 23:20) (92% - 98%)    06-07 @ 07:01  -  06-08 @ 07:00  --------------------------------------------------------  IN: 0 mL / OUT: 300 mL / NET: -300 mL    POCT Blood Glucose.: 176 mg/dL (08 Jun 2018 05:46)    HOSPITAL MEDICATIONS:  MEDICATIONS  (STANDING):  amLODIPine   Tablet 10 milliGRAM(s) Oral daily  chlorhexidine 4% Liquid 1 Application(s) Topical <User Schedule>  dextrose 5%. 1000 milliLiter(s) (50 mL/Hr) IV Continuous <Continuous>  dextrose 50% Injectable 12.5 Gram(s) IV Push once  dextrose 50% Injectable 25 Gram(s) IV Push once  dextrose 50% Injectable 25 Gram(s) IV Push once  heparin  Injectable 5000 Unit(s) SubCutaneous every 12 hours  insulin lispro (HumaLOG) corrective regimen sliding scale   SubCutaneous every 6 hours  piperacillin/tazobactam IVPB. 3.375 Gram(s) IV Intermittent every 12 hours  sevelamer carbonate Powder 800 milliGRAM(s) Oral three times a day with meals  sodium bicarbonate 1300 milliGRAM(s) Oral two times a day    MEDICATIONS  (PRN):  acetaminophen    Suspension 650 milliGRAM(s) Oral every 6 hours PRN For Temp greater than 38 C (100.4 F)  dextrose 40% Gel 15 Gram(s) Oral once PRN Blood Glucose LESS THAN 70 milliGRAM(s)/deciliter  glucagon  Injectable 1 milliGRAM(s) IntraMuscular once PRN Glucose LESS THAN 70 milligrams/deciliter  HYDROmorphone  Injectable 1 milliGRAM(s) IV Push every 4 hours PRN dyspnea      LABS:    06-07    144  |  105  |  105<H>  ----------------------------<  274<H>  3.4<L>   |  19<L>  |  5.00<H>    Ca    8.7      07 Jun 2018 05:45  Phos  6.2     06-07  Mg     2.4     06-07                MICROBIOLOGY:     RADIOLOGY:  [ ] Reviewed and interpreted by me    PULMONARY FUNCTION TESTS:    EKG: CHIEF COMPLAINT: Patient is a 83y old  Male who presents with a chief complaint of AMS (05 Jun 2018 17:26)    Interval Events: none overnight, requiring dilaudid      REVIEW OF SYSTEMS:  [ ] All other systems negative  [x] Unable to assess ROS because: unresponsive      OBJECTIVE:  ICU Vital Signs Last 24 Hrs  T(C): 36.4 (07 Jun 2018 23:20), Max: 36.7 (07 Jun 2018 11:35)  T(F): 97.6 (07 Jun 2018 23:20), Max: 98.1 (07 Jun 2018 11:35)  HR: 101 (08 Jun 2018 05:07) (100 - 122)  BP: 140/76 (08 Jun 2018 05:07) (136/68 - 144/87)  BP(mean): --  ABP: --  ABP(mean): --  RR: 22 (07 Jun 2018 23:20) (22 - 40)  SpO2: 98% (07 Jun 2018 23:20) (92% - 98%)    06-07 @ 07:01  -  06-08 @ 07:00  --------------------------------------------------------  IN: 0 mL / OUT: 300 mL / NET: -300 mL    POCT Blood Glucose.: 176 mg/dL (08 Jun 2018 05:46)    HOSPITAL MEDICATIONS:  MEDICATIONS  (STANDING):  amLODIPine   Tablet 10 milliGRAM(s) Oral daily  chlorhexidine 4% Liquid 1 Application(s) Topical <User Schedule>  dextrose 5%. 1000 milliLiter(s) (50 mL/Hr) IV Continuous <Continuous>  dextrose 50% Injectable 12.5 Gram(s) IV Push once  dextrose 50% Injectable 25 Gram(s) IV Push once  dextrose 50% Injectable 25 Gram(s) IV Push once  heparin  Injectable 5000 Unit(s) SubCutaneous every 12 hours  insulin lispro (HumaLOG) corrective regimen sliding scale   SubCutaneous every 6 hours  piperacillin/tazobactam IVPB. 3.375 Gram(s) IV Intermittent every 12 hours  sevelamer carbonate Powder 800 milliGRAM(s) Oral three times a day with meals  sodium bicarbonate 1300 milliGRAM(s) Oral two times a day    MEDICATIONS  (PRN):  acetaminophen    Suspension 650 milliGRAM(s) Oral every 6 hours PRN For Temp greater than 38 C (100.4 F)  dextrose 40% Gel 15 Gram(s) Oral once PRN Blood Glucose LESS THAN 70 milliGRAM(s)/deciliter  glucagon  Injectable 1 milliGRAM(s) IntraMuscular once PRN Glucose LESS THAN 70 milligrams/deciliter  HYDROmorphone  Injectable 1 milliGRAM(s) IV Push every 4 hours PRN dyspnea

## 2018-06-08 NOTE — PROGRESS NOTE ADULT - SUBJECTIVE AND OBJECTIVE BOX
INTERVAL HPI/OVERNIGHT EVENTS: Pt unresponsive.  +100% NRB  Family at bedside    Allergies    No Known Allergies    Intolerances        Code Status:          PRESENT SYMPTOMS:   SOURCE:  [ ] Patient   [x ] Family   [ ] Team     Pain: none  Onset:      Location:          Duration:        Character:         Aggravating factors:          Relieving Factors:             Timing:         Severity:      Dyspnea [x ] YES [ ] NO - Mild [ ]  Moderate [ x]  Severe [ ]   Anxiety:  [ ] YES [x ] NO  Fatigue: [x ] YES [ ] NO  Nausea: [ ] YES [ x] NO  Loss of Appetite: [x ] YES [ ] NO  Constipation [ ] YES   [ x] No     OTHER SYMPTOMS:  [ ] All other ROS negative     [x ] Unable to obtain due to poor mentation    Does the patient meet criteria for Severe Protein Calorie Malnutrition?  Yes [ ]  No [ ]   PPSV less than <30% [ ]  Anasarca [ ]  Albumin <2 [ ]  Catabolic State [ ]  Poor nutritional intake [ ]  Significant weight loss [ ]     MEDICATIONS  (STANDING):  chlorhexidine 4% Liquid 1 Application(s) Topical <User Schedule>  dextrose 5%. 1000 milliLiter(s) (50 mL/Hr) IV Continuous <Continuous>  dextrose 50% Injectable 12.5 Gram(s) IV Push once  dextrose 50% Injectable 25 Gram(s) IV Push once  dextrose 50% Injectable 25 Gram(s) IV Push once  piperacillin/tazobactam IVPB. 3.375 Gram(s) IV Intermittent every 12 hours  sodium bicarbonate  Infusion 0.272 mEq/kG/Hr (75 mL/Hr) IV Continuous <Continuous>    MEDICATIONS  (PRN):  dextrose 40% Gel 15 Gram(s) Oral once PRN Blood Glucose LESS THAN 70 milliGRAM(s)/deciliter  glucagon  Injectable 1 milliGRAM(s) IntraMuscular once PRN Glucose LESS THAN 70 milligrams/deciliter  HYDROmorphone  Injectable 1.5 milliGRAM(s) IV Push every 4 hours PRN dyspnea      Palliative Performance Status Version 2:  10       %  ECOG -        Physical Exam:    General: [ ] Alert,  A&O x     x] lethargic   [ ] Agitated   [ ] Cachexia   HEENT: [ ] Normal   [ ] Dry mouth   [ ] ET Tube    [ ] Trach +NRB  Lungs: [ ] Clear [ ] Rhonchi  [ ] Crackles [ ] Wheezing [x ] Tachypnea  [ ] Audible excessive secretions   Cardiovascular:  [ ] Regular rate and rhythm  [ ] Irregular [x ] Tachycardia   [ ] Bradycardia   Abdomen: [x ] Soft  [ ] Distended  [ ]  [ ] +BS  [ ] Non tender [ ] Tender  [ ]PEG   [ ] NGT   Last BM:     Genitourinary:  [ ] Normal [ ] Incontinent   [ ] Oliguria/Anuria   [ x] Bhakta  Musculoskeletal:  [ ] Normal   [ ] Generalized weakness  [ x] Bedbound  [ ] Edema   Neurological: [ ] No focal deficits  [x ] Cognitive impairment     Skin: [x ] Normal   [ ] Pressure ulcers     Vital Signs Last 24 Hrs  T(C): 36.4 (07 Jun 2018 23:20), Max: 36.4 (07 Jun 2018 23:20)  T(F): 97.6 (07 Jun 2018 23:20), Max: 97.6 (07 Jun 2018 23:20)  HR: 100 (08 Jun 2018 09:21) (100 - 101)  BP: 135/88 (08 Jun 2018 09:21) (135/88 - 140/76)  BP(mean): --  RR: 26 (08 Jun 2018 09:21) (22 - 26)  SpO2: 98% (08 Jun 2018 09:21) (98% - 98%)    LABS:    06-07    144  |  105  |  105<H>  ----------------------------<  274<H>  3.4<L>   |  19<L>  |  5.00<H>    Ca    8.7      07 Jun 2018 05:45  Phos  6.2     06-07  Mg     2.4     06-07          I&O's Summary    07 Jun 2018 07:01  -  08 Jun 2018 07:00  --------------------------------------------------------  IN: 0 mL / OUT: 300 mL / NET: -300 mL        RADIOLOGY & ADDITIONAL STUDIES:

## 2018-06-08 NOTE — PROGRESS NOTE ADULT - PROBLEM SELECTOR PLAN 1
s/p extubation now with presumed aspiration pna  iv abx as tolerated  o2 support  hold tube feeds  family does not want to reintubate pt  low threshold for low dose drip of dilaudid

## 2018-06-08 NOTE — PROGRESS NOTE ADULT - ATTENDING COMMENTS
extensive d/w family yesterday by pulm fellow, NP, and palliative care team appreciated.  pt is DNR/DNI  trial of abx for asp pna for now, though not feeding him due to asp risk, family understands  no labs today  cont palliative/GOC

## 2018-06-08 NOTE — PROGRESS NOTE ADULT - PROBLEM SELECTOR PLAN 1
- likely with another aspiration event  - comfort measures as per family  - will cont IV abx  - IV dilaudid PRN dyspnea  - supplemental NRB as needed

## 2018-06-08 NOTE — PROGRESS NOTE ADULT - PROBLEM SELECTOR PLAN 4
much support provided to pt's daughter, arik and grand daughter  overall prognosis is poor and they are aware  offered - declined  will continue to follow for symptom management and emotional support

## 2018-06-09 PROCEDURE — 99232 SBSQ HOSP IP/OBS MODERATE 35: CPT

## 2018-06-09 RX ORDER — ROBINUL 0.2 MG/ML
0.2 INJECTION INTRAMUSCULAR; INTRAVENOUS EVERY 6 HOURS
Qty: 0 | Refills: 0 | Status: DISCONTINUED | OUTPATIENT
Start: 2018-06-09 | End: 2018-06-11

## 2018-06-09 RX ADMIN — HYDROMORPHONE HYDROCHLORIDE 1.5 MILLIGRAM(S): 2 INJECTION INTRAMUSCULAR; INTRAVENOUS; SUBCUTANEOUS at 21:35

## 2018-06-09 RX ADMIN — HYDROMORPHONE HYDROCHLORIDE 1.5 MILLIGRAM(S): 2 INJECTION INTRAMUSCULAR; INTRAVENOUS; SUBCUTANEOUS at 08:18

## 2018-06-09 RX ADMIN — Medication 75 MEQ/KG/HR: at 23:29

## 2018-06-09 RX ADMIN — CHLORHEXIDINE GLUCONATE 1 APPLICATION(S): 213 SOLUTION TOPICAL at 10:34

## 2018-06-09 RX ADMIN — HYDROMORPHONE HYDROCHLORIDE 1.5 MILLIGRAM(S): 2 INJECTION INTRAMUSCULAR; INTRAVENOUS; SUBCUTANEOUS at 02:26

## 2018-06-09 RX ADMIN — HYDROMORPHONE HYDROCHLORIDE 1.5 MILLIGRAM(S): 2 INJECTION INTRAMUSCULAR; INTRAVENOUS; SUBCUTANEOUS at 21:19

## 2018-06-09 RX ADMIN — Medication 75 MEQ/KG/HR: at 05:01

## 2018-06-09 RX ADMIN — ROBINUL 0.2 MILLIGRAM(S): 0.2 INJECTION INTRAMUSCULAR; INTRAVENOUS at 10:34

## 2018-06-09 RX ADMIN — HYDROMORPHONE HYDROCHLORIDE 1.5 MILLIGRAM(S): 2 INJECTION INTRAMUSCULAR; INTRAVENOUS; SUBCUTANEOUS at 02:06

## 2018-06-09 RX ADMIN — Medication 75 MEQ/KG/HR: at 21:20

## 2018-06-09 RX ADMIN — PIPERACILLIN AND TAZOBACTAM 25 GRAM(S): 4; .5 INJECTION, POWDER, LYOPHILIZED, FOR SOLUTION INTRAVENOUS at 17:48

## 2018-06-09 RX ADMIN — ROBINUL 0.2 MILLIGRAM(S): 0.2 INJECTION INTRAMUSCULAR; INTRAVENOUS at 23:29

## 2018-06-09 RX ADMIN — ROBINUL 0.2 MILLIGRAM(S): 0.2 INJECTION INTRAMUSCULAR; INTRAVENOUS at 17:48

## 2018-06-09 RX ADMIN — PIPERACILLIN AND TAZOBACTAM 25 GRAM(S): 4; .5 INJECTION, POWDER, LYOPHILIZED, FOR SOLUTION INTRAVENOUS at 05:02

## 2018-06-09 NOTE — PROGRESS NOTE ADULT - SUBJECTIVE AND OBJECTIVE BOX
CHIEF COMPLAINT:    Interval Events:    REVIEW OF SYSTEMS:  Constitutional:   Eyes:  ENT:  CV:  Resp:  GI:  :  MSK:  Integumentary:  Neurological:  Psychiatric:  Endocrine:  Hematologic/Lymphatic:  Allergic/Immunologic:  [ ] All other systems negative  [ ] Unable to assess ROS because ________    OBJECTIVE:  ICU Vital Signs Last 24 Hrs  T(C): 36.1 (08 Jun 2018 21:00), Max: 36.3 (08 Jun 2018 09:21)  T(F): 96.9 (08 Jun 2018 21:00), Max: 97.3 (08 Jun 2018 09:21)  HR: 95 (08 Jun 2018 21:00) (95 - 100)  BP: 115/61 (08 Jun 2018 21:00) (115/61 - 135/88)  BP(mean): --  ABP: --  ABP(mean): --  RR: 24 (08 Jun 2018 21:00) (24 - 26)  SpO2: 90% (08 Jun 2018 21:00) (90% - 98%)        CAPILLARY BLOOD GLUCOSE      POCT Blood Glucose.: 176 mg/dL (08 Jun 2018 05:46)      PHYSICAL EXAM:  General:   HEENT:   Lymph Nodes:  Neck:   Respiratory:   Cardiovascular:   Abdomen:   Extremities:   Skin:   Neurological:  Psychiatry:    HOSPITAL MEDICATIONS:  MEDICATIONS  (STANDING):  chlorhexidine 4% Liquid 1 Application(s) Topical <User Schedule>  dextrose 5%. 1000 milliLiter(s) (50 mL/Hr) IV Continuous <Continuous>  dextrose 50% Injectable 12.5 Gram(s) IV Push once  dextrose 50% Injectable 25 Gram(s) IV Push once  dextrose 50% Injectable 25 Gram(s) IV Push once  piperacillin/tazobactam IVPB. 3.375 Gram(s) IV Intermittent every 12 hours  sodium bicarbonate  Infusion 0.272 mEq/kG/Hr (75 mL/Hr) IV Continuous <Continuous>    MEDICATIONS  (PRN):  dextrose 40% Gel 15 Gram(s) Oral once PRN Blood Glucose LESS THAN 70 milliGRAM(s)/deciliter  glucagon  Injectable 1 milliGRAM(s) IntraMuscular once PRN Glucose LESS THAN 70 milligrams/deciliter  HYDROmorphone  Injectable 1.5 milliGRAM(s) IV Push every 4 hours PRN dyspnea      LABS:                    MICROBIOLOGY:     RADIOLOGY:  [ ] Reviewed and interpreted by me    PULMONARY FUNCTION TESTS:    EKG: CHIEF COMPLAINT: Patient is a 83y old  Male who presents with a chief complaint of AMS (05 Jun 2018 17:26)      Interval Events:      REVIEW OF SYSTEMS:  [x ] Unable to assess ROS because AMS     OBJECTIVE:  ICU Vital Signs Last 24 Hrs  T(C): 36.1 (08 Jun 2018 21:00), Max: 36.3 (08 Jun 2018 09:21)  T(F): 96.9 (08 Jun 2018 21:00), Max: 97.3 (08 Jun 2018 09:21)  HR: 95 (08 Jun 2018 21:00) (95 - 100)  BP: 115/61 (08 Jun 2018 21:00) (115/61 - 135/88)  BP(mean): --  ABP: --  ABP(mean): --  RR: 24 (08 Jun 2018 21:00) (24 - 26)  SpO2: 90% (08 Jun 2018 21:00) (90% - 98%)        CAPILLARY BLOOD GLUCOSE      POCT Blood Glucose.: 176 mg/dL (08 Jun 2018 05:46)      HOSPITAL MEDICATIONS:  MEDICATIONS  (STANDING):  chlorhexidine 4% Liquid 1 Application(s) Topical <User Schedule>  dextrose 5%. 1000 milliLiter(s) (50 mL/Hr) IV Continuous <Continuous>  dextrose 50% Injectable 12.5 Gram(s) IV Push once  dextrose 50% Injectable 25 Gram(s) IV Push once  dextrose 50% Injectable 25 Gram(s) IV Push once  piperacillin/tazobactam IVPB. 3.375 Gram(s) IV Intermittent every 12 hours  sodium bicarbonate  Infusion 0.272 mEq/kG/Hr (75 mL/Hr) IV Continuous <Continuous>    MEDICATIONS  (PRN):  dextrose 40% Gel 15 Gram(s) Oral once PRN Blood Glucose LESS THAN 70 milliGRAM(s)/deciliter  glucagon  Injectable 1 milliGRAM(s) IntraMuscular once PRN Glucose LESS THAN 70 milligrams/deciliter  HYDROmorphone  Injectable 1.5 milliGRAM(s) IV Push every 4 hours PRN dyspnea      LABS:                    MICROBIOLOGY:     RADIOLOGY:  [ ] Reviewed and interpreted by me    PULMONARY FUNCTION TESTS:    EKG:

## 2018-06-09 NOTE — PROGRESS NOTE ADULT - ATTENDING COMMENTS
I have seen and examined the patient. I agree with the above history, physical exam, and plan of care except for as detailed below.    Continue abx  Start glycopyrrolate  Dilaudid PRN  No lab draws

## 2018-06-09 NOTE — PROGRESS NOTE ADULT - PROBLEM SELECTOR PLAN 1
- likely with another aspiration event  - comfort measures as per family  - will cont IV abx  - IV dilaudid PRN dyspnea  - supplemental NRB as needed  - Robinol for secretions

## 2018-06-10 PROCEDURE — 99232 SBSQ HOSP IP/OBS MODERATE 35: CPT

## 2018-06-10 RX ADMIN — Medication 75 MEQ/KG/HR: at 23:05

## 2018-06-10 RX ADMIN — HYDROMORPHONE HYDROCHLORIDE 1.5 MILLIGRAM(S): 2 INJECTION INTRAMUSCULAR; INTRAVENOUS; SUBCUTANEOUS at 12:42

## 2018-06-10 RX ADMIN — CHLORHEXIDINE GLUCONATE 1 APPLICATION(S): 213 SOLUTION TOPICAL at 09:06

## 2018-06-10 RX ADMIN — ROBINUL 0.2 MILLIGRAM(S): 0.2 INJECTION INTRAMUSCULAR; INTRAVENOUS at 17:43

## 2018-06-10 RX ADMIN — Medication 75 MEQ/KG/HR: at 11:05

## 2018-06-10 RX ADMIN — ROBINUL 0.2 MILLIGRAM(S): 0.2 INJECTION INTRAMUSCULAR; INTRAVENOUS at 05:07

## 2018-06-10 RX ADMIN — ROBINUL 0.2 MILLIGRAM(S): 0.2 INJECTION INTRAMUSCULAR; INTRAVENOUS at 23:05

## 2018-06-10 RX ADMIN — HYDROMORPHONE HYDROCHLORIDE 1.5 MILLIGRAM(S): 2 INJECTION INTRAMUSCULAR; INTRAVENOUS; SUBCUTANEOUS at 18:46

## 2018-06-10 RX ADMIN — ROBINUL 0.2 MILLIGRAM(S): 0.2 INJECTION INTRAMUSCULAR; INTRAVENOUS at 11:34

## 2018-06-10 RX ADMIN — PIPERACILLIN AND TAZOBACTAM 25 GRAM(S): 4; .5 INJECTION, POWDER, LYOPHILIZED, FOR SOLUTION INTRAVENOUS at 05:07

## 2018-06-10 RX ADMIN — PIPERACILLIN AND TAZOBACTAM 25 GRAM(S): 4; .5 INJECTION, POWDER, LYOPHILIZED, FOR SOLUTION INTRAVENOUS at 17:43

## 2018-06-10 NOTE — PROGRESS NOTE ADULT - ATTENDING COMMENTS
I have seen and examined the patient. I agree with the above history, physical exam, and plan of care except for as detailed below.    Continue abx  Continue glycopyrrolate  Dilaudid PRN  No lab draws  Family discussing whether to become more aggressive in care as they feel patient is improving. Will await family decision

## 2018-06-10 NOTE — PROGRESS NOTE ADULT - PROBLEM SELECTOR PROBLEM 7
Alzheimer's dementia without behavioral disturbance, unspecified timing of dementia onset Need for prophylactic measure

## 2018-06-10 NOTE — PROGRESS NOTE ADULT - PROBLEM SELECTOR PLAN 3
- s/p CVVHDF and HD  - last HD 5/30  - renal note appreciated  - no further HD - failed S&S  - NGT removed  - NPO

## 2018-06-10 NOTE — PROGRESS NOTE ADULT - SUBJECTIVE AND OBJECTIVE BOX
CHIEF COMPLAINT:    Interval Events:    REVIEW OF SYSTEMS:  Constitutional:   Eyes:  ENT:  CV:  Resp:  GI:  :  MSK:  Integumentary:  Neurological:  Psychiatric:  Endocrine:  Hematologic/Lymphatic:  Allergic/Immunologic:  [ ] All other systems negative  [ ] Unable to assess ROS because ________    OBJECTIVE:  ICU Vital Signs Last 24 Hrs  T(C): 36.6 (09 Jun 2018 21:02), Max: 37.1 (09 Jun 2018 09:00)  T(F): 97.9 (09 Jun 2018 21:02), Max: 98.8 (09 Jun 2018 09:00)  HR: 113 (09 Jun 2018 21:02) (81 - 113)  BP: 130/84 (09 Jun 2018 21:02) (130/84 - 133/87)  BP(mean): --  ABP: --  ABP(mean): --  RR: 24 (09 Jun 2018 21:02) (20 - 24)  SpO2: 91% (09 Jun 2018 21:02) (90% - 91%)        CAPILLARY BLOOD GLUCOSE            HOSPITAL MEDICATIONS:  MEDICATIONS  (STANDING):  chlorhexidine 4% Liquid 1 Application(s) Topical <User Schedule>  dextrose 5%. 1000 milliLiter(s) (50 mL/Hr) IV Continuous <Continuous>  dextrose 50% Injectable 12.5 Gram(s) IV Push once  dextrose 50% Injectable 25 Gram(s) IV Push once  dextrose 50% Injectable 25 Gram(s) IV Push once  glycopyrrolate Injectable 0.2 milliGRAM(s) IV Push every 6 hours  piperacillin/tazobactam IVPB. 3.375 Gram(s) IV Intermittent every 12 hours  sodium bicarbonate  Infusion 0.272 mEq/kG/Hr (75 mL/Hr) IV Continuous <Continuous>    MEDICATIONS  (PRN):  dextrose 40% Gel 15 Gram(s) Oral once PRN Blood Glucose LESS THAN 70 milliGRAM(s)/deciliter  glucagon  Injectable 1 milliGRAM(s) IntraMuscular once PRN Glucose LESS THAN 70 milligrams/deciliter  HYDROmorphone  Injectable 1.5 milliGRAM(s) IV Push every 4 hours PRN dyspnea      LABS:                    MICROBIOLOGY:     RADIOLOGY:  [ ] Reviewed and interpreted by me    PULMONARY FUNCTION TESTS:    EKG: CHIEF COMPLAINT: Patient is a 83y old  Male who presents with a chief complaint of AMS (05 Jun 2018 17:26)      Interval Events: maintained comfort overnight    REVIEW OF SYSTEMS:  [x] Unable to assess ROS because pt is minimally verbal    OBJECTIVE:  ICU Vital Signs Last 24 Hrs  T(C): 36.6 (09 Jun 2018 21:02), Max: 37.1 (09 Jun 2018 09:00)  T(F): 97.9 (09 Jun 2018 21:02), Max: 98.8 (09 Jun 2018 09:00)  HR: 113 (09 Jun 2018 21:02) (81 - 113)  BP: 130/84 (09 Jun 2018 21:02) (130/84 - 133/87)  RR: 24 (09 Jun 2018 21:02) (20 - 24)  SpO2: 91% (09 Jun 2018 21:02) (90% - 91%)        HOSPITAL MEDICATIONS:  MEDICATIONS  (STANDING):  chlorhexidine 4% Liquid 1 Application(s) Topical <User Schedule>  dextrose 5%. 1000 milliLiter(s) (50 mL/Hr) IV Continuous <Continuous>  dextrose 50% Injectable 12.5 Gram(s) IV Push once  dextrose 50% Injectable 25 Gram(s) IV Push once  dextrose 50% Injectable 25 Gram(s) IV Push once  glycopyrrolate Injectable 0.2 milliGRAM(s) IV Push every 6 hours  piperacillin/tazobactam IVPB. 3.375 Gram(s) IV Intermittent every 12 hours  sodium bicarbonate  Infusion 0.272 mEq/kG/Hr (75 mL/Hr) IV Continuous <Continuous>    MEDICATIONS  (PRN):  dextrose 40% Gel 15 Gram(s) Oral once PRN Blood Glucose LESS THAN 70 milliGRAM(s)/deciliter  glucagon  Injectable 1 milliGRAM(s) IntraMuscular once PRN Glucose LESS THAN 70 milligrams/deciliter  HYDROmorphone  Injectable 1.5 milliGRAM(s) IV Push every 4 hours PRN dyspnea      LABS:

## 2018-06-10 NOTE — PROGRESS NOTE ADULT - PROBLEM SELECTOR PROBLEM 6
HTN (hypertension) Alzheimer's dementia without behavioral disturbance, unspecified timing of dementia onset

## 2018-06-10 NOTE — PROGRESS NOTE ADULT - PROBLEM SELECTOR PLAN 2
- persistent  - NGT removed  - will start sodium bicarbonate gtt for low rate - s/p CVVHDF and HD  - last HD 5/30  - renal note appreciated  - no further HD  - Continues on Bicarb gtt

## 2018-06-11 LAB
BLD GP AB SCN SERPL QL: NEGATIVE — SIGNIFICANT CHANGE UP
BUN SERPL-MCNC: 100 MG/DL — HIGH (ref 7–23)
CALCIUM SERPL-MCNC: 7.8 MG/DL — LOW (ref 8.4–10.5)
CHLORIDE SERPL-SCNC: 106 MMOL/L — SIGNIFICANT CHANGE UP (ref 98–107)
CO2 SERPL-SCNC: 41 MMOL/L — HIGH (ref 22–31)
CREAT SERPL-MCNC: 5.76 MG/DL — HIGH (ref 0.5–1.3)
GLUCOSE SERPL-MCNC: 272 MG/DL — HIGH (ref 70–99)
HCT VFR BLD CALC: 26.2 % — LOW (ref 39–50)
HGB BLD-MCNC: 8 G/DL — LOW (ref 13–17)
MAGNESIUM SERPL-MCNC: 2.4 MG/DL — SIGNIFICANT CHANGE UP (ref 1.6–2.6)
MCHC RBC-ENTMCNC: 29.1 PG — SIGNIFICANT CHANGE UP (ref 27–34)
MCHC RBC-ENTMCNC: 30.5 % — LOW (ref 32–36)
MCV RBC AUTO: 95.3 FL — SIGNIFICANT CHANGE UP (ref 80–100)
NRBC # FLD: 0 — SIGNIFICANT CHANGE UP
PHOSPHATE SERPL-MCNC: 6.7 MG/DL — HIGH (ref 2.5–4.5)
PLATELET # BLD AUTO: 447 K/UL — HIGH (ref 150–400)
PMV BLD: 9.7 FL — SIGNIFICANT CHANGE UP (ref 7–13)
POTASSIUM SERPL-MCNC: 4.5 MMOL/L — SIGNIFICANT CHANGE UP (ref 3.5–5.3)
POTASSIUM SERPL-SCNC: 4.5 MMOL/L — SIGNIFICANT CHANGE UP (ref 3.5–5.3)
RBC # BLD: 2.75 M/UL — LOW (ref 4.2–5.8)
RBC # FLD: 16.5 % — HIGH (ref 10.3–14.5)
RH IG SCN BLD-IMP: POSITIVE — SIGNIFICANT CHANGE UP
SODIUM SERPL-SCNC: 161 MMOL/L — CRITICAL HIGH (ref 135–145)
WBC # BLD: 15.78 K/UL — HIGH (ref 3.8–10.5)
WBC # FLD AUTO: 15.78 K/UL — HIGH (ref 3.8–10.5)

## 2018-06-11 PROCEDURE — 99233 SBSQ HOSP IP/OBS HIGH 50: CPT | Mod: GC

## 2018-06-11 PROCEDURE — 99233 SBSQ HOSP IP/OBS HIGH 50: CPT

## 2018-06-11 RX ORDER — SODIUM CHLORIDE 9 MG/ML
1000 INJECTION, SOLUTION INTRAVENOUS
Qty: 0 | Refills: 0 | Status: DISCONTINUED | OUTPATIENT
Start: 2018-06-11 | End: 2018-06-19

## 2018-06-11 RX ORDER — HYDROMORPHONE HYDROCHLORIDE 2 MG/ML
1 INJECTION INTRAMUSCULAR; INTRAVENOUS; SUBCUTANEOUS EVERY 6 HOURS
Qty: 0 | Refills: 0 | Status: DISCONTINUED | OUTPATIENT
Start: 2018-06-11 | End: 2018-06-15

## 2018-06-11 RX ORDER — HYDROMORPHONE HYDROCHLORIDE 2 MG/ML
1.25 INJECTION INTRAMUSCULAR; INTRAVENOUS; SUBCUTANEOUS EVERY 6 HOURS
Qty: 0 | Refills: 0 | Status: DISCONTINUED | OUTPATIENT
Start: 2018-06-11 | End: 2018-06-11

## 2018-06-11 RX ADMIN — HYDROMORPHONE HYDROCHLORIDE 1.5 MILLIGRAM(S): 2 INJECTION INTRAMUSCULAR; INTRAVENOUS; SUBCUTANEOUS at 00:50

## 2018-06-11 RX ADMIN — PIPERACILLIN AND TAZOBACTAM 25 GRAM(S): 4; .5 INJECTION, POWDER, LYOPHILIZED, FOR SOLUTION INTRAVENOUS at 17:17

## 2018-06-11 RX ADMIN — ROBINUL 0.2 MILLIGRAM(S): 0.2 INJECTION INTRAMUSCULAR; INTRAVENOUS at 06:17

## 2018-06-11 RX ADMIN — HYDROMORPHONE HYDROCHLORIDE 1 MILLIGRAM(S): 2 INJECTION INTRAMUSCULAR; INTRAVENOUS; SUBCUTANEOUS at 11:50

## 2018-06-11 RX ADMIN — Medication 75 MEQ/KG/HR: at 10:18

## 2018-06-11 RX ADMIN — PIPERACILLIN AND TAZOBACTAM 25 GRAM(S): 4; .5 INJECTION, POWDER, LYOPHILIZED, FOR SOLUTION INTRAVENOUS at 06:18

## 2018-06-11 RX ADMIN — SODIUM CHLORIDE 75 MILLILITER(S): 9 INJECTION, SOLUTION INTRAVENOUS at 12:55

## 2018-06-11 RX ADMIN — CHLORHEXIDINE GLUCONATE 1 APPLICATION(S): 213 SOLUTION TOPICAL at 09:22

## 2018-06-11 RX ADMIN — HYDROMORPHONE HYDROCHLORIDE 1 MILLIGRAM(S): 2 INJECTION INTRAMUSCULAR; INTRAVENOUS; SUBCUTANEOUS at 22:00

## 2018-06-11 NOTE — PROGRESS NOTE ADULT - PROBLEM SELECTOR PLAN 2
- s/p CVVHDF and HD  - last HD 5/30  - renal note appreciated  - no further HD  - Bicarb gtt stopped. Changed to  D5W.

## 2018-06-11 NOTE — PROGRESS NOTE ADULT - SUBJECTIVE AND OBJECTIVE BOX
INTERVAL HPI/OVERNIGHT EVENTS:   Pt unresponsive on 100% nrb.  Pt unable to be tapered off without desating.  Pt appears sob at rest.  Daughter and arik at bedside  Allergies    No Known Allergies    Intolerances        Code Status:          PRESENT SYMPTOMS:   SOURCE:  [ ] Patient   [x ] Family   [ ] Team     Pain: none  Onset:      Location:          Duration:        Character:         Aggravating factors:          Relieving Factors:             Timing:         Severity:      Dyspnea [x ] YES [ ] NO - Mild [ ]  Moderate [ ]  Severe [x ]   Anxiety:  [ ] YES [x ] NO  Fatigue: [x ] YES [ ] NO  Nausea: [ ] YES [x ] NO  Loss of Appetite: [x ] YES [ ] NO  Constipation [ ] YES   [x ] No     OTHER SYMPTOMS:  [ ] All other ROS negative     [x ] Unable to obtain due to poor mentation    Does the patient meet criteria for Severe Protein Calorie Malnutrition?  Yes [x ]  No [ ]   PPSV less than <30% [x ]  Anasarca [ ]  Albumin <2 [x ]  Catabolic State [ ]  Poor nutritional intake x[ ]  Significant weight loss [ ]     MEDICATIONS  (STANDING):  chlorhexidine 4% Liquid 1 Application(s) Topical <User Schedule>  dextrose 5%. 1000 milliLiter(s) (75 mL/Hr) IV Continuous <Continuous>  dextrose 5%. 1000 milliLiter(s) (50 mL/Hr) IV Continuous <Continuous>  dextrose 50% Injectable 12.5 Gram(s) IV Push once  dextrose 50% Injectable 25 Gram(s) IV Push once  dextrose 50% Injectable 25 Gram(s) IV Push once  piperacillin/tazobactam IVPB. 3.375 Gram(s) IV Intermittent every 12 hours    MEDICATIONS  (PRN):  dextrose 40% Gel 15 Gram(s) Oral once PRN Blood Glucose LESS THAN 70 milliGRAM(s)/deciliter  glucagon  Injectable 1 milliGRAM(s) IntraMuscular once PRN Glucose LESS THAN 70 milligrams/deciliter  HYDROmorphone  Injectable 1 milliGRAM(s) IV Push every 6 hours PRN pain/dyspnea      Palliative Performance Status Version 2:  10       %  ECOG -        Physical Exam:    General: [ ] Alert,  A&O x     [ ] lethargic   [ ] Agitated   [ ] Cachexia +unresponsible  HEENT: [ ] Normal   [ ] Dry mouth   [ ] ET Tube    [ ] Trach   +NRB  Lungs: [ ] Clear [ ] Rhonchi  [ ] Crackles [ ] Wheezing [x ] Tachypnea  [ ] Audible excessive secretions   Cardiovascular:  [ ] Regular rate and rhythm  [ ] Irregular [x ] Tachycardia   [ ] Bradycardia   Abdomen: [ ] Soft  [ x] Distended  [ ]  [ ] +BS  [ ] Non tender [ ] Tender  [ ]PEG   [ ] NGT   Last BM:     Genitourinary:  [ ] Normal [ ] Incontinent   [ ] Oliguria/Anuria   [x ] Bhakta  Musculoskeletal:  [ ] Normal   [ ] Generalized weakness  [x ] Bedbound  [ ] Edema   Neurological: [ ] No focal deficits  [x ] Cognitive impairment     Skin: [ x] Normal   [ ] Pressure ulcers     Vital Signs Last 24 Hrs  T(C): 36.7 (11 Jun 2018 09:17), Max: 36.7 (10 Clive 2018 22:50)  T(F): 98 (11 Jun 2018 09:17), Max: 98 (10 Clive 2018 22:50)  HR: 100 (11 Jun 2018 09:17) (100 - 104)  BP: 129/72 (11 Jun 2018 09:17) (129/66 - 129/72)  BP(mean): --  RR: 19 (11 Jun 2018 09:17) (18 - 19)  SpO2: 100% (11 Jun 2018 09:17) (98% - 100%)    LABS:                        8.0    15.78 )-----------( 447      ( 11 Jun 2018 11:37 )             26.2     06-11    161<HH>  |  106  |  100<H>  ----------------------------<  272<H>  4.5   |  41<H>  |  5.76<H>    Ca    7.8<L>      11 Jun 2018 11:37  Phos  6.7     06-11  Mg     2.4     06-11          I&O's Summary      RADIOLOGY & ADDITIONAL STUDIES:

## 2018-06-11 NOTE — PROGRESS NOTE ADULT - PROBLEM SELECTOR PLAN 1
s/p extubation now with presumed aspiration pna  iv abx as tolerated  o2 support  hold tube feeds  give dilaudid for dyspnea now and would consider atc  family does not want to reintubate pt

## 2018-06-11 NOTE — PROGRESS NOTE ADULT - PROBLEM SELECTOR PLAN 1
- off tube feeds, intermittent periods of respiratory distress  - comfort measures as per family, Dilaudid and Robinul PRN  - will cont IV abx  - Unable to wean off NRB

## 2018-06-11 NOTE — PROGRESS NOTE ADULT - SUBJECTIVE AND OBJECTIVE BOX
CHIEF COMPLAINT:    Interval Events:    REVIEW OF SYSTEMS:  Constitutional:   Eyes:  ENT:  CV:  Resp:  GI:  :  MSK:  Integumentary:  Neurological:  Psychiatric:  Endocrine:  Hematologic/Lymphatic:  Allergic/Immunologic:  [ ] All other systems negative  [ ] Unable to assess ROS because ________    OBJECTIVE:  ICU Vital Signs Last 24 Hrs  T(C): 36.7 (10 Clive 2018 22:50), Max: 36.7 (10 Clive 2018 22:50)  T(F): 98 (10 Clive 2018 22:50), Max: 98 (10 Clive 2018 22:50)  HR: 104 (10 Clive 2018 22:50) (104 - 104)  BP: 129/66 (10 Clive 2018 22:50) (129/66 - 129/66)  BP(mean): --  ABP: --  ABP(mean): --  RR: 18 (10 Clive 2018 22:50) (18 - 18)  SpO2: 98% (10 Clive 2018 22:50) (98% - 98%)        CAPILLARY BLOOD GLUCOSE          PHYSICAL EXAM:  General:   HEENT:   Lymph Nodes:  Neck:   Respiratory:   Cardiovascular:   Abdomen:   Extremities:   Skin:   Neurological:  Psychiatry:    HOSPITAL MEDICATIONS:  MEDICATIONS  (STANDING):  chlorhexidine 4% Liquid 1 Application(s) Topical <User Schedule>  dextrose 5%. 1000 milliLiter(s) (50 mL/Hr) IV Continuous <Continuous>  dextrose 50% Injectable 12.5 Gram(s) IV Push once  dextrose 50% Injectable 25 Gram(s) IV Push once  dextrose 50% Injectable 25 Gram(s) IV Push once  glycopyrrolate Injectable 0.2 milliGRAM(s) IV Push every 6 hours  piperacillin/tazobactam IVPB. 3.375 Gram(s) IV Intermittent every 12 hours  sodium bicarbonate  Infusion 0.272 mEq/kG/Hr (75 mL/Hr) IV Continuous <Continuous>    MEDICATIONS  (PRN):  dextrose 40% Gel 15 Gram(s) Oral once PRN Blood Glucose LESS THAN 70 milliGRAM(s)/deciliter  glucagon  Injectable 1 milliGRAM(s) IntraMuscular once PRN Glucose LESS THAN 70 milligrams/deciliter  HYDROmorphone  Injectable 1.5 milliGRAM(s) IV Push every 4 hours PRN dyspnea      LABS:                    MICROBIOLOGY:     RADIOLOGY:  [ ] Reviewed and interpreted by me    PULMONARY FUNCTION TESTS:    EKG: CHIEF COMPLAINT: Patient is a 83y old  Male who presents with a chief complaint of AMS (05 Jun 2018 17:26)    Interval Events: No acute events overnight    [x] Unable to assess ROS because variably responsive    OBJECTIVE:  ICU Vital Signs Last 24 Hrs  T(C): 36.7 (10 Clive 2018 22:50), Max: 36.7 (10 Clive 2018 22:50)  T(F): 98 (10 Clive 2018 22:50), Max: 98 (10 Clive 2018 22:50)  HR: 104 (10 Clive 2018 22:50) (104 - 104)  BP: 129/66 (10 Clive 2018 22:50) (129/66 - 129/66)  BP(mean): --  ABP: --  ABP(mean): --  RR: 18 (10 Clive 2018 22:50) (18 - 18)  SpO2: 98% (10 Clive 2018 22:50) (98% - 98%)    HOSPITAL MEDICATIONS:  MEDICATIONS  (STANDING):  chlorhexidine 4% Liquid 1 Application(s) Topical <User Schedule>  dextrose 5%. 1000 milliLiter(s) (50 mL/Hr) IV Continuous <Continuous>  dextrose 50% Injectable 12.5 Gram(s) IV Push once  dextrose 50% Injectable 25 Gram(s) IV Push once  dextrose 50% Injectable 25 Gram(s) IV Push once  glycopyrrolate Injectable 0.2 milliGRAM(s) IV Push every 6 hours  piperacillin/tazobactam IVPB. 3.375 Gram(s) IV Intermittent every 12 hours  sodium bicarbonate  Infusion 0.272 mEq/kG/Hr (75 mL/Hr) IV Continuous <Continuous>    MEDICATIONS  (PRN):  dextrose 40% Gel 15 Gram(s) Oral once PRN Blood Glucose LESS THAN 70 milliGRAM(s)/deciliter  glucagon  Injectable 1 milliGRAM(s) IntraMuscular once PRN Glucose LESS THAN 70 milligrams/deciliter  HYDROmorphone  Injectable 1.5 milliGRAM(s) IV Push every 4 hours PRN dyspnea      LABS:                    MICROBIOLOGY:     RADIOLOGY:  [ ] Reviewed and interpreted by me    PULMONARY FUNCTION TESTS:    EKG:

## 2018-06-11 NOTE — PROGRESS NOTE ADULT - ASSESSMENT
83M hx mild dementia, uncontrolled T2DM, HTN presented to the ED w/ declining functional status, subjective hypothermia, and rigors, admitted to MICU for severe sepsis 2/2 Klebsiella PNA and Enterobacter UTI and ARF, course c/b septic shock, refractory metabolic acidosis and hyperkalemia requiring RRT, and increased work of breathing requiring intubation. Now extubated on 100%NRB. Not tolerating weaning. Likely having aspiration events.

## 2018-06-11 NOTE — PROGRESS NOTE ADULT - PROBLEM SELECTOR PLAN 2
supportive care  pt with decline in the last few years  family aware of the progression of disease  family struggling with pt not eating  don't think it is possible to have pt eval for peg as pt is so hypoxic and would not benefit from an ngt   above discussed with family

## 2018-06-12 LAB
BUN SERPL-MCNC: 94 MG/DL — HIGH (ref 7–23)
CALCIUM SERPL-MCNC: 7.9 MG/DL — LOW (ref 8.4–10.5)
CHLORIDE SERPL-SCNC: 105 MMOL/L — SIGNIFICANT CHANGE UP (ref 98–107)
CO2 SERPL-SCNC: 40 MMOL/L — HIGH (ref 22–31)
CREAT SERPL-MCNC: 6.1 MG/DL — HIGH (ref 0.5–1.3)
GLUCOSE SERPL-MCNC: 299 MG/DL — HIGH (ref 70–99)
HCT VFR BLD CALC: 24.4 % — LOW (ref 39–50)
HGB BLD-MCNC: 7.5 G/DL — LOW (ref 13–17)
MAGNESIUM SERPL-MCNC: 2.4 MG/DL — SIGNIFICANT CHANGE UP (ref 1.6–2.6)
MCHC RBC-ENTMCNC: 28.7 PG — SIGNIFICANT CHANGE UP (ref 27–34)
MCHC RBC-ENTMCNC: 30.7 % — LOW (ref 32–36)
MCV RBC AUTO: 93.5 FL — SIGNIFICANT CHANGE UP (ref 80–100)
NRBC # FLD: 0 — SIGNIFICANT CHANGE UP
PHOSPHATE SERPL-MCNC: 7.1 MG/DL — HIGH (ref 2.5–4.5)
PLATELET # BLD AUTO: 400 K/UL — SIGNIFICANT CHANGE UP (ref 150–400)
PMV BLD: 9.4 FL — SIGNIFICANT CHANGE UP (ref 7–13)
POTASSIUM SERPL-MCNC: 4.4 MMOL/L — SIGNIFICANT CHANGE UP (ref 3.5–5.3)
POTASSIUM SERPL-SCNC: 4.4 MMOL/L — SIGNIFICANT CHANGE UP (ref 3.5–5.3)
RBC # BLD: 2.61 M/UL — LOW (ref 4.2–5.8)
RBC # FLD: 16.1 % — HIGH (ref 10.3–14.5)
SODIUM SERPL-SCNC: 157 MMOL/L — HIGH (ref 135–145)
WBC # BLD: 13.18 K/UL — HIGH (ref 3.8–10.5)
WBC # FLD AUTO: 13.18 K/UL — HIGH (ref 3.8–10.5)

## 2018-06-12 PROCEDURE — 99232 SBSQ HOSP IP/OBS MODERATE 35: CPT | Mod: GC

## 2018-06-12 PROCEDURE — 99233 SBSQ HOSP IP/OBS HIGH 50: CPT

## 2018-06-12 RX ADMIN — HYDROMORPHONE HYDROCHLORIDE 1 MILLIGRAM(S): 2 INJECTION INTRAMUSCULAR; INTRAVENOUS; SUBCUTANEOUS at 21:38

## 2018-06-12 RX ADMIN — HYDROMORPHONE HYDROCHLORIDE 1 MILLIGRAM(S): 2 INJECTION INTRAMUSCULAR; INTRAVENOUS; SUBCUTANEOUS at 21:53

## 2018-06-12 RX ADMIN — PIPERACILLIN AND TAZOBACTAM 25 GRAM(S): 4; .5 INJECTION, POWDER, LYOPHILIZED, FOR SOLUTION INTRAVENOUS at 17:40

## 2018-06-12 RX ADMIN — CHLORHEXIDINE GLUCONATE 1 APPLICATION(S): 213 SOLUTION TOPICAL at 06:55

## 2018-06-12 RX ADMIN — SODIUM CHLORIDE 75 MILLILITER(S): 9 INJECTION, SOLUTION INTRAVENOUS at 21:37

## 2018-06-12 RX ADMIN — SODIUM CHLORIDE 75 MILLILITER(S): 9 INJECTION, SOLUTION INTRAVENOUS at 01:05

## 2018-06-12 RX ADMIN — PIPERACILLIN AND TAZOBACTAM 25 GRAM(S): 4; .5 INJECTION, POWDER, LYOPHILIZED, FOR SOLUTION INTRAVENOUS at 06:05

## 2018-06-12 NOTE — PROGRESS NOTE ADULT - ATTENDING COMMENTS
I have seen and examined the patient. I agree with the above history, physical exam, and plan of care except for as detailed below.    82 y/o M w/dementia admitted for declining functional status found to have septic shock secondary to enterobacter UTI c/b acute hypoxemic respiratory failure and acute renal failure. Patient was made comfort measures only, but has been slowly improving so now decision made to resume curative measures. Patient with repeat aspiration episode yesterday so will hold off on NG tube placement. Labs now showing hypernatremia and worsening RUSH. Currently unable to wean from 100% NRB    - Continue supplemental O2 wean as tolerated  - D5 gtt for hypernatremia  - d/c bicarb  - GI consult for PEG placement once metabolic derangements improved  - Continue Zosyn  - Continue dilaudid PRN

## 2018-06-12 NOTE — PROGRESS NOTE ADULT - PROBLEM SELECTOR PLAN 2
In setting of impaired water intake: Serum sodium noted to be elevated at 157. Currently on hypotonic IV fluids. Monitor serum sodium level.

## 2018-06-12 NOTE — PROGRESS NOTE ADULT - PROBLEM SELECTOR PLAN 1
Hemodynamically mediated RUSH in the setting of septic shock. Pt. was on CVVHDF which was transitioned to HD on 5/30. Pt. last had HD on 5/30. Scr elevated at 6.10 today with elevated BUN. Patient is non oliguric. Continue with IV hydration. No acute indication for HD at this time. Palliative care on board regarding goals of care. Monitor BMP, urine output, I,Os. Avoid any potential nephrotoxins. Dose medications to Crcl.

## 2018-06-12 NOTE — PROGRESS NOTE ADULT - ASSESSMENT
Patient is an 82 y/o man with history of dementia, DM, HTN who presents with a chief complaint of abdominal pain and chills found to be in septic shock with oliguric RUSH requiring dialysis; last HD was on 5/30 and has been on hold since then.

## 2018-06-12 NOTE — PROGRESS NOTE ADULT - SUBJECTIVE AND OBJECTIVE BOX
CHIEF COMPLAINT:    Interval Events:    REVIEW OF SYSTEMS:  Constitutional:   Eyes:  ENT:  CV:  Resp:  GI:  :  MSK:  Integumentary:  Neurological:  Psychiatric:  Endocrine:  Hematologic/Lymphatic:  Allergic/Immunologic:  [ ] All other systems negative  [ ] Unable to assess ROS because ________    OBJECTIVE:  ICU Vital Signs Last 24 Hrs  T(C): 36.9 (12 Jun 2018 06:52), Max: 37 (11 Jun 2018 23:00)  T(F): 98.5 (12 Jun 2018 06:52), Max: 98.6 (11 Jun 2018 23:00)  HR: 91 (12 Jun 2018 06:52) (91 - 110)  BP: 118/55 (12 Jun 2018 06:52) (116/69 - 129/72)  BP(mean): --  ABP: --  ABP(mean): --  RR: 20 (12 Jun 2018 06:52) (19 - 22)  SpO2: 97% (12 Jun 2018 06:52) (97% - 100%)        CAPILLARY BLOOD GLUCOSE          PHYSICAL EXAM:  General:   HEENT:   Lymph Nodes:  Neck:   Respiratory:   Cardiovascular:   Abdomen:   Extremities:   Skin:   Neurological:  Psychiatry:    HOSPITAL MEDICATIONS:  MEDICATIONS  (STANDING):  chlorhexidine 4% Liquid 1 Application(s) Topical <User Schedule>  dextrose 5%. 1000 milliLiter(s) (75 mL/Hr) IV Continuous <Continuous>  dextrose 5%. 1000 milliLiter(s) (50 mL/Hr) IV Continuous <Continuous>  dextrose 50% Injectable 12.5 Gram(s) IV Push once  dextrose 50% Injectable 25 Gram(s) IV Push once  dextrose 50% Injectable 25 Gram(s) IV Push once  piperacillin/tazobactam IVPB. 3.375 Gram(s) IV Intermittent every 12 hours    MEDICATIONS  (PRN):  dextrose 40% Gel 15 Gram(s) Oral once PRN Blood Glucose LESS THAN 70 milliGRAM(s)/deciliter  glucagon  Injectable 1 milliGRAM(s) IntraMuscular once PRN Glucose LESS THAN 70 milligrams/deciliter  HYDROmorphone  Injectable 1 milliGRAM(s) IV Push every 6 hours PRN pain/dyspnea      LABS:                        8.0    15.78 )-----------( 447      ( 11 Jun 2018 11:37 )             26.2     06-11    161<HH>  |  106  |  100<H>  ----------------------------<  272<H>  4.5   |  41<H>  |  5.76<H>    Ca    7.8<L>      11 Jun 2018 11:37  Phos  6.7     06-11  Mg     2.4     06-11                MICROBIOLOGY:     RADIOLOGY:  [ ] Reviewed and interpreted by me    PULMONARY FUNCTION TESTS:    EKG: CHIEF COMPLAINT: Patient is a 83y old  Male who presents with a chief complaint of AMS (05 Jun 2018 17:26)      Interval Events: episodic sob     REVIEW OF SYSTEMS:  [x ] Unable to assess ROS because AMS    OBJECTIVE:  ICU Vital Signs Last 24 Hrs  T(C): 36.9 (12 Jun 2018 06:52), Max: 37 (11 Jun 2018 23:00)  T(F): 98.5 (12 Jun 2018 06:52), Max: 98.6 (11 Jun 2018 23:00)  HR: 91 (12 Jun 2018 06:52) (91 - 110)  BP: 118/55 (12 Jun 2018 06:52) (116/69 - 129/72)  BP(mean): --  ABP: --  ABP(mean): --  RR: 20 (12 Jun 2018 06:52) (19 - 22)  SpO2: 97% (12 Jun 2018 06:52) (97% - 100%)        CAPILLARY BLOOD GLUCOSE    :    HOSPITAL MEDICATIONS:  MEDICATIONS  (STANDING):  chlorhexidine 4% Liquid 1 Application(s) Topical <User Schedule>  dextrose 5%. 1000 milliLiter(s) (75 mL/Hr) IV Continuous <Continuous>  dextrose 5%. 1000 milliLiter(s) (50 mL/Hr) IV Continuous <Continuous>  dextrose 50% Injectable 12.5 Gram(s) IV Push once  dextrose 50% Injectable 25 Gram(s) IV Push once  dextrose 50% Injectable 25 Gram(s) IV Push once  piperacillin/tazobactam IVPB. 3.375 Gram(s) IV Intermittent every 12 hours    MEDICATIONS  (PRN):  dextrose 40% Gel 15 Gram(s) Oral once PRN Blood Glucose LESS THAN 70 milliGRAM(s)/deciliter  glucagon  Injectable 1 milliGRAM(s) IntraMuscular once PRN Glucose LESS THAN 70 milligrams/deciliter  HYDROmorphone  Injectable 1 milliGRAM(s) IV Push every 6 hours PRN pain/dyspnea      LABS:                        8.0    15.78 )-----------( 447      ( 11 Jun 2018 11:37 )             26.2     06-11    161<HH>  |  106  |  100<H>  ----------------------------<  272<H>  4.5   |  41<H>  |  5.76<H>    Ca    7.8<L>      11 Jun 2018 11:37  Phos  6.7     06-11  Mg     2.4     06-11                MICROBIOLOGY:     RADIOLOGY:  [ ] Reviewed and interpreted by me    PULMONARY FUNCTION TESTS:    EKG:

## 2018-06-13 LAB
BUN SERPL-MCNC: 87 MG/DL — HIGH (ref 7–23)
CALCIUM SERPL-MCNC: 7.9 MG/DL — LOW (ref 8.4–10.5)
CHLORIDE SERPL-SCNC: 100 MMOL/L — SIGNIFICANT CHANGE UP (ref 98–107)
CO2 SERPL-SCNC: 35 MMOL/L — HIGH (ref 22–31)
CREAT SERPL-MCNC: 5.97 MG/DL — HIGH (ref 0.5–1.3)
GLUCOSE SERPL-MCNC: 307 MG/DL — HIGH (ref 70–99)
HCT VFR BLD CALC: 27.9 % — LOW (ref 39–50)
HGB BLD-MCNC: 8.6 G/DL — LOW (ref 13–17)
MAGNESIUM SERPL-MCNC: 2.3 MG/DL — SIGNIFICANT CHANGE UP (ref 1.6–2.6)
MCHC RBC-ENTMCNC: 28.6 PG — SIGNIFICANT CHANGE UP (ref 27–34)
MCHC RBC-ENTMCNC: 30.8 % — LOW (ref 32–36)
MCV RBC AUTO: 92.7 FL — SIGNIFICANT CHANGE UP (ref 80–100)
NRBC # FLD: 0 — SIGNIFICANT CHANGE UP
PHOSPHATE SERPL-MCNC: 7.6 MG/DL — HIGH (ref 2.5–4.5)
PLATELET # BLD AUTO: 397 K/UL — SIGNIFICANT CHANGE UP (ref 150–400)
PMV BLD: 9.9 FL — SIGNIFICANT CHANGE UP (ref 7–13)
POTASSIUM SERPL-MCNC: 4.3 MMOL/L — SIGNIFICANT CHANGE UP (ref 3.5–5.3)
POTASSIUM SERPL-SCNC: 4.3 MMOL/L — SIGNIFICANT CHANGE UP (ref 3.5–5.3)
RBC # BLD: 3.01 M/UL — LOW (ref 4.2–5.8)
RBC # FLD: 15.5 % — HIGH (ref 10.3–14.5)
SODIUM SERPL-SCNC: 149 MMOL/L — HIGH (ref 135–145)
WBC # BLD: 9.2 K/UL — SIGNIFICANT CHANGE UP (ref 3.8–10.5)
WBC # FLD AUTO: 9.2 K/UL — SIGNIFICANT CHANGE UP (ref 3.8–10.5)

## 2018-06-13 PROCEDURE — 99233 SBSQ HOSP IP/OBS HIGH 50: CPT

## 2018-06-13 RX ADMIN — SODIUM CHLORIDE 75 MILLILITER(S): 9 INJECTION, SOLUTION INTRAVENOUS at 02:23

## 2018-06-13 RX ADMIN — CHLORHEXIDINE GLUCONATE 1 APPLICATION(S): 213 SOLUTION TOPICAL at 06:52

## 2018-06-13 RX ADMIN — PIPERACILLIN AND TAZOBACTAM 25 GRAM(S): 4; .5 INJECTION, POWDER, LYOPHILIZED, FOR SOLUTION INTRAVENOUS at 05:29

## 2018-06-13 RX ADMIN — PIPERACILLIN AND TAZOBACTAM 25 GRAM(S): 4; .5 INJECTION, POWDER, LYOPHILIZED, FOR SOLUTION INTRAVENOUS at 17:01

## 2018-06-13 NOTE — PROGRESS NOTE ADULT - SUBJECTIVE AND OBJECTIVE BOX
CHIEF COMPLAINT:    Interval Events:    REVIEW OF SYSTEMS:  Constitutional:   Eyes:  ENT:  CV:  Resp:  GI:  :  MSK:  Integumentary:  Neurological:  Psychiatric:  Endocrine:  Hematologic/Lymphatic:  Allergic/Immunologic:  [ ] All other systems negative  [ ] Unable to assess ROS because ________    OBJECTIVE:  ICU Vital Signs Last 24 Hrs  T(C): 36.3 (13 Jun 2018 05:27), Max: 36.3 (12 Jun 2018 21:30)  T(F): 97.4 (13 Jun 2018 05:27), Max: 97.4 (12 Jun 2018 21:30)  HR: 82 (13 Jun 2018 05:27) (82 - 100)  BP: 138/83 (13 Jun 2018 05:27) (130/75 - 140/79)  BP(mean): --  ABP: --  ABP(mean): --  RR: 18 (13 Jun 2018 07:43) (16 - 24)  SpO2: 100% (13 Jun 2018 05:27) (99% - 100%)        CAPILLARY BLOOD GLUCOSE          PHYSICAL EXAM:  General:   HEENT:   Lymph Nodes:  Neck:   Respiratory:   Cardiovascular:   Abdomen:   Extremities:   Skin:   Neurological:  Psychiatry:    HOSPITAL MEDICATIONS:  MEDICATIONS  (STANDING):  chlorhexidine 4% Liquid 1 Application(s) Topical <User Schedule>  dextrose 5%. 1000 milliLiter(s) (75 mL/Hr) IV Continuous <Continuous>  dextrose 5%. 1000 milliLiter(s) (50 mL/Hr) IV Continuous <Continuous>  dextrose 50% Injectable 12.5 Gram(s) IV Push once  dextrose 50% Injectable 25 Gram(s) IV Push once  dextrose 50% Injectable 25 Gram(s) IV Push once  piperacillin/tazobactam IVPB. 3.375 Gram(s) IV Intermittent every 12 hours    MEDICATIONS  (PRN):  dextrose 40% Gel 15 Gram(s) Oral once PRN Blood Glucose LESS THAN 70 milliGRAM(s)/deciliter  glucagon  Injectable 1 milliGRAM(s) IntraMuscular once PRN Glucose LESS THAN 70 milligrams/deciliter  HYDROmorphone  Injectable 1 milliGRAM(s) IV Push every 6 hours PRN pain/dyspnea      LABS:                        8.6    9.20  )-----------( 397      ( 13 Jun 2018 05:35 )             27.9     06-13    149<H>  |  100  |  87<H>  ----------------------------<  307<H>  4.3   |  35<H>  |  5.97<H>    Ca    7.9<L>      13 Jun 2018 05:35  Phos  7.6     06-13  Mg     2.3     06-13                MICROBIOLOGY:     RADIOLOGY:  [ ] Reviewed and interpreted by me    PULMONARY FUNCTION TESTS:    EKG: CHIEF COMPLAINT: Patient is a 83y old  Male who presents with a chief complaint of AMS (05 Jun 2018 17:26)    Interval Events: No acute change overnight    REVIEW OF SYSTEMS:  [x] Unable to assess ROS because ________patient mental status waxes and wanes. Non-verbal    OBJECTIVE:  ICU Vital Signs Last 24 Hrs  T(C): 36.3 (13 Jun 2018 05:27), Max: 36.3 (12 Jun 2018 21:30)  T(F): 97.4 (13 Jun 2018 05:27), Max: 97.4 (12 Jun 2018 21:30)  HR: 82 (13 Jun 2018 05:27) (82 - 100)  BP: 138/83 (13 Jun 2018 05:27) (130/75 - 140/79)  BP(mean): --  ABP: --  ABP(mean): --  RR: 18 (13 Jun 2018 07:43) (16 - 24)  SpO2: 100% (13 Jun 2018 05:27) (99% - 100%)        CAPILLARY BLOOD GLUCOSE    HOSPITAL MEDICATIONS:  MEDICATIONS  (STANDING):  chlorhexidine 4% Liquid 1 Application(s) Topical <User Schedule>  dextrose 5%. 1000 milliLiter(s) (75 mL/Hr) IV Continuous <Continuous>  dextrose 5%. 1000 milliLiter(s) (50 mL/Hr) IV Continuous <Continuous>  dextrose 50% Injectable 12.5 Gram(s) IV Push once  dextrose 50% Injectable 25 Gram(s) IV Push once  dextrose 50% Injectable 25 Gram(s) IV Push once  piperacillin/tazobactam IVPB. 3.375 Gram(s) IV Intermittent every 12 hours    MEDICATIONS  (PRN):  dextrose 40% Gel 15 Gram(s) Oral once PRN Blood Glucose LESS THAN 70 milliGRAM(s)/deciliter  glucagon  Injectable 1 milliGRAM(s) IntraMuscular once PRN Glucose LESS THAN 70 milligrams/deciliter  HYDROmorphone  Injectable 1 milliGRAM(s) IV Push every 6 hours PRN pain/dyspnea      LABS:                        8.6    9.20  )-----------( 397      ( 13 Jun 2018 05:35 )             27.9     06-13    149<H>  |  100  |  87<H>  ----------------------------<  307<H>  4.3   |  35<H>  |  5.97<H>    Ca    7.9<L>      13 Jun 2018 05:35  Phos  7.6     06-13  Mg     2.3     06-13                MICROBIOLOGY:     RADIOLOGY:  [ ] Reviewed and interpreted by me    PULMONARY FUNCTION TESTS:    EKG:

## 2018-06-13 NOTE — PROGRESS NOTE ADULT - ATTENDING COMMENTS
I have seen and examined the patient. I agree with the above history, physical exam, and plan of care except for as detailed below.    84 y/o M w/dementia admitted for declining functional status found to have septic shock secondary to enterobacter UTI c/b acute hypoxemic respiratory failure and acute renal failure. Patient was made comfort measures only, but has been slowly improving so now decision made to resume curative measures. Patient with repeat aspiration episode yesterday so will hold off on NG tube placement. Labs now showing hypernatremia and worsening RUSH. Currently unable to wean from 100% NRB    - Continue supplemental O2 wean as tolerated  - Chest PT  - D5 gtt for hypernatremia  - GI consult for PEG placement once able to wean off 100% NRB  - Complete 7 day course of zosyn  - Continue dilaudid PRN

## 2018-06-13 NOTE — PROGRESS NOTE ADULT - PROBLEM SELECTOR PLAN 1
- off tube feeds, intermittent periods of respiratory distress  - comfort measures as per family, Dilaudid  PRN  - will cont IV abx through 6/13  - Unable to wean off NRB

## 2018-06-13 NOTE — PROGRESS NOTE ADULT - PROBLEM SELECTOR PLAN 2
- s/p CVVHDF and HD  - last HD 5/30  - renal note appreciated  - no further HD  - Bicarb gtt stopped. Changed to  D5W. 2/2 hypernatremia

## 2018-06-14 LAB
BUN SERPL-MCNC: 86 MG/DL — HIGH (ref 7–23)
CALCIUM SERPL-MCNC: 8.1 MG/DL — LOW (ref 8.4–10.5)
CHLORIDE SERPL-SCNC: 96 MMOL/L — LOW (ref 98–107)
CO2 SERPL-SCNC: 32 MMOL/L — HIGH (ref 22–31)
CREAT SERPL-MCNC: 5.48 MG/DL — HIGH (ref 0.5–1.3)
GLUCOSE SERPL-MCNC: 374 MG/DL — HIGH (ref 70–99)
HCT VFR BLD CALC: 28.5 % — LOW (ref 39–50)
HGB BLD-MCNC: 8.6 G/DL — LOW (ref 13–17)
MCHC RBC-ENTMCNC: 28.4 PG — SIGNIFICANT CHANGE UP (ref 27–34)
MCHC RBC-ENTMCNC: 30.2 % — LOW (ref 32–36)
MCV RBC AUTO: 94.1 FL — SIGNIFICANT CHANGE UP (ref 80–100)
NRBC # FLD: 0 — SIGNIFICANT CHANGE UP
PLATELET # BLD AUTO: 407 K/UL — HIGH (ref 150–400)
PMV BLD: 9.7 FL — SIGNIFICANT CHANGE UP (ref 7–13)
POTASSIUM SERPL-MCNC: 3.6 MMOL/L — SIGNIFICANT CHANGE UP (ref 3.5–5.3)
POTASSIUM SERPL-SCNC: 3.6 MMOL/L — SIGNIFICANT CHANGE UP (ref 3.5–5.3)
RBC # BLD: 3.03 M/UL — LOW (ref 4.2–5.8)
RBC # FLD: 15 % — HIGH (ref 10.3–14.5)
SODIUM SERPL-SCNC: 145 MMOL/L — SIGNIFICANT CHANGE UP (ref 135–145)
WBC # BLD: 7.6 K/UL — SIGNIFICANT CHANGE UP (ref 3.8–10.5)
WBC # FLD AUTO: 7.6 K/UL — SIGNIFICANT CHANGE UP (ref 3.8–10.5)

## 2018-06-14 PROCEDURE — 99232 SBSQ HOSP IP/OBS MODERATE 35: CPT

## 2018-06-14 RX ORDER — HEPARIN SODIUM 5000 [USP'U]/ML
5000 INJECTION INTRAVENOUS; SUBCUTANEOUS EVERY 12 HOURS
Qty: 0 | Refills: 0 | Status: DISCONTINUED | OUTPATIENT
Start: 2018-06-14 | End: 2018-06-17

## 2018-06-14 RX ADMIN — PIPERACILLIN AND TAZOBACTAM 25 GRAM(S): 4; .5 INJECTION, POWDER, LYOPHILIZED, FOR SOLUTION INTRAVENOUS at 05:09

## 2018-06-14 RX ADMIN — SODIUM CHLORIDE 50 MILLILITER(S): 9 INJECTION, SOLUTION INTRAVENOUS at 22:02

## 2018-06-14 RX ADMIN — SODIUM CHLORIDE 75 MILLILITER(S): 9 INJECTION, SOLUTION INTRAVENOUS at 04:07

## 2018-06-14 RX ADMIN — CHLORHEXIDINE GLUCONATE 1 APPLICATION(S): 213 SOLUTION TOPICAL at 12:13

## 2018-06-14 RX ADMIN — HEPARIN SODIUM 5000 UNIT(S): 5000 INJECTION INTRAVENOUS; SUBCUTANEOUS at 17:23

## 2018-06-14 NOTE — CONSULT NOTE ADULT - SUBJECTIVE AND OBJECTIVE BOX
Chief Complaint:  Patient is a 83y old  Male who presents with a chief complaint of AMS (2018 17:26)    Dementia  Essential hypertension  HTN (hypertension)  DM (diabetes mellitus)  Diabetes  History of hip replacement  No significant past surgical history     HPI: *information obtained from the chart as pt with dementia/minimally to non-verbal    83M hx mild dementia, DM, HTN who was brought to ED by his daughter for functional decline over the past week.  Hx provided by daughter, Alexia Guevara ([phone]) as pt unable to participate in interview due to altered mental status.  At baseline, pt is awake, alert, conversant with intermittent periods of confusion and can ambulate with a cane.   This week, he became lethargic and required full person assistance to get around.  Pt also stopped feeding himself and he had to be fed by his daughter, which is unusual for him.  Pt eats regular meals, has no known hx of dysphagia and does not cough after he eats.  On day of admission, pt complained of some chest and abdominal pain and had a shivering episode  At home, he had a low oral temperature of 92 and had a home finger stick of about 400.  Daughter gave the pt 2 glipizide tablets and then took him to the ED for further eval. ED course:  T 95.1, HR 40 (repeat 92), /67, O2 90% RA.  Labs remarkable for WBC 20.96, Na 161, K 6.2, HCO3 11, BUN/Cr 104/5.59.  VB.17/39/36/13.  CXR showed mid R-lung opacity.  UA pending, but appearance of urine was pink and frothy. Received vanco, zosyn, lasix, Ca2+ gluconate in ED.      While admitted was cared for in MICU for severe sepsis 2/2 Klebsiella PNA and Enterobacter UTI and ARF, course c/b septic shock, refractory metabolic acidosis and hyperkalemia requiring RRT, and increased work of breathing requiring intubation. Now extubated. Palliative following to establish goals of care with the family give overall poor prognosis. GI consulted for peg eval given dysphagia/FTT 22/ mental status decline.       No Known Allergies      chlorhexidine 4% Liquid 1 Application(s) Topical <User Schedule>  dextrose 40% Gel 15 Gram(s) Oral once PRN  dextrose 5%. 1000 milliLiter(s) IV Continuous <Continuous>  dextrose 5%. 1000 milliLiter(s) IV Continuous <Continuous>  dextrose 50% Injectable 12.5 Gram(s) IV Push once  dextrose 50% Injectable 25 Gram(s) IV Push once  dextrose 50% Injectable 25 Gram(s) IV Push once  glucagon  Injectable 1 milliGRAM(s) IntraMuscular once PRN  heparin  Injectable 5000 Unit(s) SubCutaneous every 12 hours  HYDROmorphone  Injectable 1 milliGRAM(s) IV Push every 6 hours PRN        FAMILY HISTORY:  No pertinent family history in first degree relatives        Review of Systems: *pt minimally verbal to nonverbal, unable to obtain ROS / Alzheimers        Relevant Family History:   n/c    Relevant Social History: n/c      Physical Exam:    Vital Signs:  Vital Signs Last 24 Hrs  T(C): 36.2 (2018 12:12), Max: 36.3 (2018 13:15)  T(F): 97.2 (2018 12:12), Max: 97.4 (2018 13:15)  HR: 66 (2018 12:12) (66 - 93)  BP: 117/60 (2018 12:12) (117/60 - 139/80)  BP(mean): --  RR: 20 (2018 12:12) (18 - 24)  SpO2: 97% (2018 12:12) (94% - 99%)  Daily     Daily     General:  Appears stated age, NAD, lethargic, frail  HEENT:  NC/AT,  conjunctivae clear and pink, no thyromegaly, nodules, adenopathy, no JVD  Chest:  Full & symmetric excursion, no increased effort, breath sounds clear  Cardiovascular:  Regular rhythm, S1, S2, no murmur/rub/S3/S4, no abdominal bruit, no edema  Abdomen:  Soft, non-tender, non-distended, normoactive bowel sounds,  no masses ,no hepatosplenomeagaly, no signs of chronic liver disease  Extremities:  no cyanosis,clubbing or edema  Skin:  No rash/erythema/ecchymoses/petechiae/wounds/abscess/warm/dry  Neuro/Psych:  A&O x0 , no asterixis, no tremor, no encephalopathy    Laboratory:                            8.6    7.60  )-----------( 407      ( 2018 03:25 )             28.5     06-14    145  |  96<L>  |  86<H>  ----------------------------<  374<H>  3.6   |  32<H>  |  5.48<H>    Ca    8.1<L>      2018 03:25  Phos  7.6     06-13  Mg     2.3     06-13              Imaging:
[facility] DIVISION OF KIDNEY DISEASES AND HYPERTENSION -- INITIAL CONSULT NOTE  --------------------------------------------------------------------------------  HPI:  Patient is an 84 y/o man with history of dementia, DM, HTN who presents with a chief complaint of abdominal pain and chills.  Patient is currently intubated, sedated, nonverbal and unable to provide history.  History obtained from medical record.  Patient is able to carry out meaningful conversations at baseline.  Reportedly for the past week he has become increasingly lethargic with increased assistance required for ambulation.  He noted abdominal pain and shivering at home and was brought to the hospital.  In the ED, patient was found to be hypothermic, hyperglycemic.  CXR revealed a lesion in right lung.  Patient grew increasingly tachypneic and was intubated for respiratory failure.  Following intubation patient became significantly hypotensive and is currently on large dose levophed 0.7, vasopressor support.  Nephrology was consulted for RUSH on admission with worsening hyperkalemia and metabolic acidosis.      PAST HISTORY  --------------------------------------------------------------------------------  PAST MEDICAL & SURGICAL HISTORY:  Dementia  Essential hypertension  HTN (hypertension)  DM (diabetes mellitus)  Diabetes  History of hip replacement    FAMILY HISTORY:  Patient unable to provide history    PAST SOCIAL HISTORY:  Sedated, intubated and unable to provide history    ALLERGIES & MEDICATIONS  --------------------------------------------------------------------------------  Allergies    No Known Allergies    Intolerances      Standing Inpatient Medications  chlorhexidine 4% Liquid 1 Application(s) Topical <User Schedule>  dextrose 5%. 1000 milliLiter(s) IV Continuous <Continuous>  dextrose 5%. 1000 milliLiter(s) IV Continuous <Continuous>  dextrose 50% Injectable 12.5 Gram(s) IV Push once  dextrose 50% Injectable 25 Gram(s) IV Push once  dextrose 50% Injectable 25 Gram(s) IV Push once  fentaNYL   Infusion. 2 MICROgram(s)/kG/Hr IV Continuous <Continuous>  heparin  Injectable 5000 Unit(s) SubCutaneous every 8 hours  insulin lispro (HumaLOG) corrective regimen sliding scale   SubCutaneous every 6 hours  norepinephrine Infusion 0.05 MICROgram(s)/kG/Min IV Continuous <Continuous>  piperacillin/tazobactam IVPB. 3.375 Gram(s) IV Intermittent every 12 hours  propofol Infusion 5 MICROgram(s)/kG/Min IV Continuous <Continuous>    PRN Inpatient Medications  dextrose 40% Gel 15 Gram(s) Oral once PRN  glucagon  Injectable 1 milliGRAM(s) IntraMuscular once PRN      REVIEW OF SYSTEMS  --------------------------------------------------------------------------------  Patient is intubated, sedated, and unable to provide history.    All other systems were reviewed and are negative, except as noted.    VITALS/PHYSICAL EXAM  --------------------------------------------------------------------------------  T(C): 36.7 (05-29-18 @ 08:00), Max: 36.7 (05-29-18 @ 08:00)  HR: 84 (05-29-18 @ 11:04) (40 - 103)  BP: 100/55 (05-29-18 @ 10:00) (77/48 - 155/67)  RR: 28 (05-29-18 @ 10:00) (16 - 52)  SpO2: 99% (05-29-18 @ 11:04) (85% - 100%)  Wt(kg): --  Height (cm): 160.02 (05-28-18 @ 19:31)  Weight (kg): 41.3 (05-28-18 @ 19:31)  BMI (kg/m2): 16.1 (05-28-18 @ 19:31)  BSA (m2): 1.38 (05-28-18 @ 19:31)      05-28-18 @ 07:01  -  05-29-18 @ 07:00  --------------------------------------------------------  IN: 1859.8 mL / OUT: 1300 mL / NET: 559.8 mL    05-29-18 @ 07:01  -  05-29-18 @ 11:43  --------------------------------------------------------  IN: 1861.2 mL / OUT: 50 mL / NET: 1811.2 mL      Physical Exam:  	Gen: NAD, cachectic  	HEENT: MMM  	Pulm: +bilateral coarse ventilator sounds audible  	CV: RRR, S1S2; no rub  	Abd: soft, nontender/nondistended  	: No suprapubic tenderness  	UE: Warm, no pitting edema  	LE: Warm, no pitting edema  	Neuro: nonverbal  	Psych: sedated  	Skin: Warm              Vasc:  no dialysis access at this time    LABS/STUDIES  --------------------------------------------------------------------------------              9.8    8.51  >-----------<  174      [05-29-18 @ 04:40]              33.7     157  |  129  |  100  ----------------------------<  146      [05-29-18 @ 10:30]  5.8   |  11  |  5.23        Ca     8.0     [05-29-18 @ 10:30]      Mg     2.2     [05-29-18 @ 10:30]      Phos  4.4     [05-29-18 @ 10:30]    TPro  5.2  /  Alb  1.8  /  TBili  0.3  /  DBili  x   /  AST  30  /  ALT  27  /  AlkPhos  146  [05-29-18 @ 10:30]    PT/INR: PT 11.7 , INR 1.05       [05-29-18 @ 04:40]  PTT: 32.2       [05-29-18 @ 04:40]    Troponin < 0.06      [05-28-18 @ 16:20]    Creatinine Trend:  SCr 5.23 [05-29 @ 10:30]  SCr 5.34 [05-29 @ 04:40]  SCr 5.36 [05-29 @ 00:43]  SCr 5.22 [05-28 @ 21:00]  SCr 5.51 [05-28 @ 17:30]    Urinalysis - [05-28-18 @ 17:30]      Color YELLOW / Appearance TURBID / SG 1.016 / pH 6.0      Gluc 300 / Ketone NEGATIVE  / Bili NEGATIVE / Urobili NORMAL       Blood LARGE / Protein 150 / Leuk Est LARGE / Nitrite NEGATIVE      RBC >50 / WBC >50 / Hyaline  / Gran  / Sq Epi OCC / Non Sq Epi  / Bacteria
HPI:  83M hx mild dementia, DM, HTN who was brought to ED by his daughter for functional decline over the past week.  Hx provided by daughter, Alexia Guevara ([phone]) as pt unable to participate in interview due to altered mental status.  At baseline, pt is awake, alert, conversant with intermittent periods of confusion and can ambulate with a cane.   This week, he became lethargic and required full person assistance to get around.  Pt also stopped feeding himself and he had to be fed by his daughter, which is unusual for him.  Pt eats regular meals, has no known hx of dysphagia and does not cough after he eats.  On day of admission, pt complained of some chest and abdominal pain and had a shivering episode  At home, he had a low oral temperature of 92 and had a home finger stick of about 400.  Daughter gave the pt 2 glipizide tablets and then took him to the ED for further eval.     ED course:  T 95.1, HR 40 (repeat 92), /67, O2 90% RA.  Labs remarkable for WBC 20.96, Na 161, K 6.2, HCO3 11, BUN/Cr 104/5.59.  VB.17/39/36/13.  CXR showed mid R-lung opacity.  UA pending, but appearance of urine was pink and frothy. Received vanco, zosyn, lasix, Ca2+ gluconate in ED. (28 May 2018 18:10)    Pt seen in MICU. Pt now intubated off sedation.  On HD.  Daughter and granddaughter at bedside.  Pt unresponsive.  +CPAP trial in place      PERTINENT PMH REVIEWED:  [ x] YES [ ] NO           SOCIAL HISTORY:  Significant other/partner:  [ ] YES  [ x] NO -           Children:  [x ] YES  [ ] NO                   Hinduism/Spirituality:  Substance hx:  [ ] YES   [ ] NO           Tobacco hx:  [ ] YES  [ ] NO             Alcohol hx: [ ] YES  [ ] NO        Home Opioid hx:  [ ] YES  [ ] NO   Living Situation: [x ] Home  [ ] Long term care  [ ] Rehab    FAMILY HISTORY:  No pertinent family history in first degree relatives    [ ] Family history non contributory     BASELINE ADLs (prior to admission):  Independent [ ] moderately [ ] fully   Dependent   [x ] moderately [ ] fully    Code Status:                      [facility]  [ ] YES [ ] NO    Living Will  [ ] YES [ ] NO    Health Care Proxy [ ] YES  [ ] NO      [ ] Surrogate  [ ] HCP  [ ] Guardian:      Daughter                                                            Phone#:    Allergies    No Known Allergies    Intolerances        MEDICATIONS  (STANDING):  chlorhexidine 4% Liquid 1 Application(s) Topical <User Schedule>  dextrose 5%. 1000 milliLiter(s) (50 mL/Hr) IV Continuous <Continuous>  dextrose 50% Injectable 12.5 Gram(s) IV Push once  dextrose 50% Injectable 25 Gram(s) IV Push once  dextrose 50% Injectable 25 Gram(s) IV Push once  heparin  Injectable 5000 Unit(s) SubCutaneous every 8 hours  hydrocortisone sodium succinate Injectable 25 milliGRAM(s) IV Push every 8 hours  insulin lispro (HumaLOG) corrective regimen sliding scale   SubCutaneous every 6 hours  piperacillin/tazobactam IVPB. 3.375 Gram(s) IV Intermittent every 12 hours    MEDICATIONS  (PRN):  dextrose 40% Gel 15 Gram(s) Oral once PRN Blood Glucose LESS THAN 70 milliGRAM(s)/deciliter  glucagon  Injectable 1 milliGRAM(s) IntraMuscular once PRN Glucose LESS THAN 70 milligrams/deciliter      PRESENT SYMPTOMS:  Source: [ ] Patient   [x ] Family   [ ] Team     Pain: [ ] YES [ x] NO  Onset:                    Location:                          Duration:                     Aggravating factors:                        Relieving factors:    Radiation:              Timing:                             Severity:                      Character:    Dyspnea: [ ] YES [x ] NO - Mild [ ]  Moderate [ ]  Severe [ ]    Anxiety: [ ] YES [x ] NO  Fatigue: [x ] YES [ ] NO   Nausea: [ ] YES [x ] NO  Loss of appetite: [ ] YES [x ] NO   Constipation: [ ] YES [x ] NO     Other Symptoms:  [ ] All other review of systems negative   [ x] Unable to obtain due to poor mentation     Does patient meet criteria for Severe Protein Calorie Malnutrition?  Yes [ ]  No [ ]  PPSV 30% or below [ ]  Anasarca [ ]  Albumin < 2 [ ] Catabolic State [ ] Poor nutritional intake [ ] Significant weight loss [ ]      Palliative Performance Status Version 2:      10   %  ECOG -        Vital Signs Last 24 Hrs  T(C): 35.4 (2018 12:00), Max: 36.9 (31 May 2018 20:00)  T(F): 95.8 (2018 12:00), Max: 98.4 (31 May 2018 20:00)  HR: 76 (2018 12:00) (62 - 78)  BP: 131/83 (2018 12:00) (113/70 - 137/75)  BP(mean): 95 (2018 12:00) (80 - 102)  RR: 24 (2018 12:00) (21 - 29)  SpO2: 100% (2018 12:00) (96% - 100%)    Physical Exam:    General: [ ] Alert,  A&O x     [ x] lethargic   [ ] Agitated   [ ] Cachexia   HEENT: [ ] Normal   [ ] Dry mouth   [ x] ET Tube    [ ] Trach   Lungs: [ x] Clear [ ] Rhonchi  [ ] Crackles [ ] Wheezing [ ] Tachypnea  [ ] Audible excessive secretions    Cardiovascular:  [ x] Regular rate and rhythm  [ ] Irregular [ ] Tachycardia   [ ] Bradycardia   Abdomen: [x ] Soft  [ ] Distended  [x ] +BS  [ ] Non tender [ ] Tender  [ ]PEG   [ ] NGT   Last BM:     Genitourinary: [ ] Normal [ ] Incontinent   [ ] Oliguria/Anuria   [x ] Bhakta  Musculoskeletal:  [ ] Normal   [x ] Generalized weakness  [ ] Bedbound  [ ] Edema   Neurological: [ ] No focal deficits  [x ] Cognitive impairment     Skin: [ x] Normal   [ ] Pressure ulcers     LABS:                        7.2    8.63  )-----------( 80       ( 2018 02:40 )             20.8     06-01    128<L>  |  92<L>  |  58<H>  ----------------------------<  85  4.0   |  16<L>  |  3.20<H>    Ca    7.4<L>      2018 02:40  Phos  6.2     06-  Mg     1.8     06    TPro  4.9<L>  /  Alb  1.5<L>  /  TBili  0.4  /  DBili  x   /  AST  22  /  ALT  21  /  AlkPhos  153<H>  06        I&O's Summary    31 May 2018 07:01  -  2018 07:00  --------------------------------------------------------  IN: 2428.5 mL / OUT: 266 mL / NET: 2162.5 mL    2018 07:  -  2018 12:42  --------------------------------------------------------  IN: 0 mL / OUT: 31 mL / NET: -31 mL        RADIOLOGY & ADDITIONAL STUDIES:    Referrals:  Hospice [ ]   Chaplaincy [ ]    Child Life [ ]   Social Work [ ]   Case Management [ ]   Holistic Therapy [ ]

## 2018-06-14 NOTE — PROGRESS NOTE ADULT - PROBLEM SELECTOR PLAN 1
- 2/2 another aspirtion event  - intermittent periods of respiratory distress  - completed zosyn x 7 days  - weaned to 6L NC  - cont to wean as tolerated

## 2018-06-14 NOTE — CHART NOTE - NSCHARTNOTEFT_GEN_A_CORE
Spoke to son-in-law Richy via phone. Family would like to proceed with PEG placement. Informed him that NGT placement was unsuccessful today, and will likely try again in am. Pt is on IVF in the meantime. Family understands that PEG placement does not change prognosis - also that pt is still likely continually aspirating on own secretions due to his severe dementia. Family would still like to proceed. Family understands that after PEG placement they will need to learn PEG and PEG feeds, as they will be taking him home (pt does not have medial insurance for LTC or rehab). Richy states that pt's daughter Alexia is in the process of applying for health insurance at this time. Family understands that in the meantime it is possible they will have to pay for PEG supplies and feeds if insurance is not set up prior to optimization for discharge.  GI consult noted - will reassess for PEG after weekend. Will attempt NGT again in am.

## 2018-06-14 NOTE — CONSULT NOTE ADULT - PROBLEM SELECTOR PROBLEM 2
Acute respiratory failure, unspecified whether with hypoxia or hypercapnia
Alzheimer's dementia without behavioral disturbance, unspecified timing of dementia onset
Hyperkalemia

## 2018-06-14 NOTE — CONSULT NOTE ADULT - PROBLEM SELECTOR PROBLEM 1
Dysphagia, unspecified type
RUSH (acute kidney injury)
Alzheimer's dementia without behavioral disturbance, unspecified timing of dementia onset

## 2018-06-14 NOTE — PROGRESS NOTE ADULT - ATTENDING COMMENTS
I have seen and examined the patient. I agree with the above history, physical exam, and plan of care except for as detailed below.    82 y/o M w/dementia admitted for declining functional status found to have septic shock secondary to enterobacter UTI c/b acute hypoxemic respiratory failure and acute renal failure. Patient was made comfort measures only, but has been slowly improving so now decision made to resume curative measures. Patient with repeat aspiration episode yesterday so will hold off on NG tube placement. RUSH stable. Supplemental O2 weaned to NC.    - Continue supplemental O2 wean as tolerated  - Chest PT  - GI consult for PEG placement  - Complete 7 day course of zosyn  - Continue dilaudid PRN

## 2018-06-14 NOTE — PROGRESS NOTE ADULT - SUBJECTIVE AND OBJECTIVE BOX
CHIEF COMPLAINT:    Interval Events:    REVIEW OF SYSTEMS:  Constitutional:   Eyes:  ENT:  CV:  Resp:  GI:  :  MSK:  Integumentary:  Neurological:  Psychiatric:  Endocrine:  Hematologic/Lymphatic:  Allergic/Immunologic:  [ ] All other systems negative  [ ] Unable to assess ROS because ________    OBJECTIVE:  ICU Vital Signs Last 24 Hrs  T(C): 36.2 (14 Jun 2018 05:00), Max: 36.3 (13 Jun 2018 13:15)  T(F): 97.2 (14 Jun 2018 05:00), Max: 97.4 (13 Jun 2018 13:15)  HR: 79 (14 Jun 2018 05:00) (79 - 93)  BP: 139/80 (14 Jun 2018 05:00) (120/80 - 139/80)  BP(mean): --  ABP: --  ABP(mean): --  RR: 18 (14 Jun 2018 05:00) (18 - 18)  SpO2: 94% (14 Jun 2018 05:00) (94% - 96%)        06-13 @ 07:01  -  06-14 @ 07:00  --------------------------------------------------------  IN: 900 mL / OUT: 720 mL / NET: 180 mL    HOSPITAL MEDICATIONS:  MEDICATIONS  (STANDING):  chlorhexidine 4% Liquid 1 Application(s) Topical <User Schedule>  dextrose 5%. 1000 milliLiter(s) (75 mL/Hr) IV Continuous <Continuous>  dextrose 5%. 1000 milliLiter(s) (50 mL/Hr) IV Continuous <Continuous>  dextrose 50% Injectable 12.5 Gram(s) IV Push once  dextrose 50% Injectable 25 Gram(s) IV Push once  dextrose 50% Injectable 25 Gram(s) IV Push once    MEDICATIONS  (PRN):  dextrose 40% Gel 15 Gram(s) Oral once PRN Blood Glucose LESS THAN 70 milliGRAM(s)/deciliter  glucagon  Injectable 1 milliGRAM(s) IntraMuscular once PRN Glucose LESS THAN 70 milligrams/deciliter  HYDROmorphone  Injectable 1 milliGRAM(s) IV Push every 6 hours PRN pain/dyspnea      LABS:                        8.6    7.60  )-----------( 407      ( 14 Jun 2018 03:25 )             28.5     06-14    145  |  96<L>  |  86<H>  ----------------------------<  374<H>  3.6   |  32<H>  |  5.48<H>    Ca    8.1<L>      14 Jun 2018 03:25  Phos  7.6     06-13  Mg     2.3     06-13                MICROBIOLOGY:     RADIOLOGY:  [ ] Reviewed and interpreted by me    PULMONARY FUNCTION TESTS:    EKG: CHIEF COMPLAINT: Patient is a 83y old  Male who presents with a chief complaint of AMS (05 Jun 2018 17:26)    Interval Events: none overnight, more alert today, weaned to 6L NC      REVIEW OF SYSTEMS:  [ ] All other systems negative  [x] Unable to assess ROS because:       OBJECTIVE:  ICU Vital Signs Last 24 Hrs  T(C): 36.2 (14 Jun 2018 05:00), Max: 36.3 (13 Jun 2018 13:15)  T(F): 97.2 (14 Jun 2018 05:00), Max: 97.4 (13 Jun 2018 13:15)  HR: 79 (14 Jun 2018 05:00) (79 - 93)  BP: 139/80 (14 Jun 2018 05:00) (120/80 - 139/80)  BP(mean): --  ABP: --  ABP(mean): --  RR: 18 (14 Jun 2018 05:00) (18 - 18)  SpO2: 94% (14 Jun 2018 05:00) (94% - 96%)        06-13 @ 07:01  -  06-14 @ 07:00  --------------------------------------------------------  IN: 900 mL / OUT: 720 mL / NET: 180 mL    HOSPITAL MEDICATIONS:  MEDICATIONS  (STANDING):  chlorhexidine 4% Liquid 1 Application(s) Topical <User Schedule>  dextrose 5%. 1000 milliLiter(s) (75 mL/Hr) IV Continuous <Continuous>  dextrose 5%. 1000 milliLiter(s) (50 mL/Hr) IV Continuous <Continuous>  dextrose 50% Injectable 12.5 Gram(s) IV Push once  dextrose 50% Injectable 25 Gram(s) IV Push once  dextrose 50% Injectable 25 Gram(s) IV Push once    MEDICATIONS  (PRN):  dextrose 40% Gel 15 Gram(s) Oral once PRN Blood Glucose LESS THAN 70 milliGRAM(s)/deciliter  glucagon  Injectable 1 milliGRAM(s) IntraMuscular once PRN Glucose LESS THAN 70 milligrams/deciliter  HYDROmorphone  Injectable 1 milliGRAM(s) IV Push every 6 hours PRN pain/dyspnea      LABS:                        8.6    7.60  )-----------( 407      ( 14 Jun 2018 03:25 )             28.5     06-14    145  |  96<L>  |  86<H>  ----------------------------<  374<H>  3.6   |  32<H>  |  5.48<H>    Ca    8.1<L>      14 Jun 2018 03:25

## 2018-06-14 NOTE — CONSULT NOTE ADULT - PROBLEM SELECTOR RECOMMENDATION 9
- 2/2 mental state/dementia/poor functional status  - swallow eval from 6/5 noted with oral PO intake contraindicated  - aspiration precautions   - palliative has been following re: goc and family concerned over decreased po intake. Palliative expressed that peg is not life prolonging with family with understanding, however, family is still considering   - recommend NGT placement at this time for nutrition support   - will reassess over the weekend and continue to discuss with the family

## 2018-06-14 NOTE — PROGRESS NOTE ADULT - PROBLEM SELECTOR PLAN 3
- failed S&S  - NPO  - family requesting to feed again  - will placed NGT  - GI consulted for likely PEG placement after weekend

## 2018-06-15 PROCEDURE — 99232 SBSQ HOSP IP/OBS MODERATE 35: CPT

## 2018-06-15 RX ADMIN — HYDROMORPHONE HYDROCHLORIDE 1 MILLIGRAM(S): 2 INJECTION INTRAMUSCULAR; INTRAVENOUS; SUBCUTANEOUS at 22:46

## 2018-06-15 RX ADMIN — HEPARIN SODIUM 5000 UNIT(S): 5000 INJECTION INTRAVENOUS; SUBCUTANEOUS at 17:37

## 2018-06-15 RX ADMIN — CHLORHEXIDINE GLUCONATE 1 APPLICATION(S): 213 SOLUTION TOPICAL at 09:21

## 2018-06-15 RX ADMIN — HEPARIN SODIUM 5000 UNIT(S): 5000 INJECTION INTRAVENOUS; SUBCUTANEOUS at 06:26

## 2018-06-15 NOTE — GOALS OF CARE CONVERSATION - PERSONAL ADVANCE DIRECTIVE - CONVERSATION DETAILS
[facility]    Meeting held with pt's daughter, Alexia Guevara ([phone]). Hospice care, services, eligibility and consents explained. Folder of information provided. Ms Guevara stated she wishes to discuss a possible home hospice plan of care with her family before making any decisions or signing consent for hospice care. She also wishes to consider feeding tube placement and will discuss this with family as well. Hospice RN remains available plan for pt's care. Ms Guevara was provided with [facility] 24/7 tel number. [facility] RN will contact Ms Guevara 6/18/18. There is no consent for hospice at this time.

## 2018-06-15 NOTE — PROGRESS NOTE ADULT - PROBLEM SELECTOR PLAN 3
- failed S&S  - NPO  - family requesting to feed again  - unable to place NGT  - GI note appreciated, family to think about PEG

## 2018-06-15 NOTE — PROGRESS NOTE ADULT - ATTENDING COMMENTS
I have seen and examined the patient. I agree with the above history, physical exam, and plan of care except for as detailed below.    84 y/o M w/dementia admitted for declining functional status found to have septic shock secondary to enterobacter UTI c/b acute hypoxemic respiratory failure and acute renal failure. Patient was made comfort measures only, but has been slowly improving so now decision made to resume curative measures. Patient with repeat aspiration episode yesterday so will hold off on NG tube placement. RUSH stable. Supplemental O2 weaned to NC.    - Continue supplemental O2 wean as tolerated  - Chest PT  - Awaiting PEG placement  - Complete 7 day course of zosyn  - Continue dilaudid PRN

## 2018-06-15 NOTE — PROGRESS NOTE ADULT - PROBLEM SELECTOR PLAN 1
- 2/2 mental state/dementia/declining functional status  - swallow eval from 6/5 noted with PO intake contraindicated  - aspiration precautions   - palliative following re: goc and expressed that peg is not life prolonging with family with understanding, however, family is still considering   - recommend NGT placement at this time for nutrition support; difficult placement  - PEG and anesthesia risks discussed at great lengths with the patients daughter who reports she is unsure whether to proceed with PEG placement or not and will discuss it with the family over the weekend

## 2018-06-15 NOTE — PROGRESS NOTE ADULT - SUBJECTIVE AND OBJECTIVE BOX
INTERVAL HPI/OVERNIGHT EVENTS:    seen with daughter bedside  pt in NAD   NPO      MEDICATIONS  (STANDING):  chlorhexidine 4% Liquid 1 Application(s) Topical <User Schedule>  dextrose 5%. 1000 milliLiter(s) (50 mL/Hr) IV Continuous <Continuous>  dextrose 5%. 1000 milliLiter(s) (50 mL/Hr) IV Continuous <Continuous>  dextrose 50% Injectable 12.5 Gram(s) IV Push once  dextrose 50% Injectable 25 Gram(s) IV Push once  dextrose 50% Injectable 25 Gram(s) IV Push once  heparin  Injectable 5000 Unit(s) SubCutaneous every 12 hours    MEDICATIONS  (PRN):  dextrose 40% Gel 15 Gram(s) Oral once PRN Blood Glucose LESS THAN 70 milliGRAM(s)/deciliter  glucagon  Injectable 1 milliGRAM(s) IntraMuscular once PRN Glucose LESS THAN 70 milligrams/deciliter  HYDROmorphone  Injectable 1 milliGRAM(s) IV Push every 6 hours PRN pain/dyspnea      Allergies    No Known Allergies    Intolerances        Review of Systems: *pt minimally verbal to nonverbal, unable to obtain ROS         Vital Signs Last 24 Hrs  T(C): 36.3 (15 Clive 2018 06:00), Max: 36.3 (14 Jun 2018 22:00)  T(F): 97.4 (15 Clive 2018 06:00), Max: 97.4 (14 Jun 2018 22:00)  HR: 82 (15 Clive 2018 06:00) (66 - 83)  BP: 148/87 (15 Clive 2018 06:00) (117/60 - 148/87)  BP(mean): --  RR: 18 (15 Clive 2018 06:00) (18 - 20)  SpO2: 94% (15 Clive 2018 06:00) (94% - 99%)    PHYSICAL EXAM:    Constitutional: NAD, frail  HEENT: EOMI, throat clear  Neck: No LAD, supple  Respiratory: CTA and P  Cardiovascular: S1 and S2, RRR, no M  Gastrointestinal: BS+, soft, NT/ND, neg HSM,  Extremities: No peripheral edema, neg clubbing, cyanosis  Vascular: 2+ peripheral pulses  Neurological: A/O x 0-1, nonverbal  Psychiatric: Normal mood, normal affect  Skin: No rashes      LABS:                        8.6    7.60  )-----------( 407      ( 14 Jun 2018 03:25 )             28.5     06-14    145  |  96<L>  |  86<H>  ----------------------------<  374<H>  3.6   |  32<H>  |  5.48<H>    Ca    8.1<L>      14 Jun 2018 03:25            RADIOLOGY & ADDITIONAL TESTS:

## 2018-06-15 NOTE — PROGRESS NOTE ADULT - SUBJECTIVE AND OBJECTIVE BOX
CHIEF COMPLAINT:    Interval Events:    REVIEW OF SYSTEMS:  Constitutional:   Eyes:  ENT:  CV:  Resp:  GI:  :  MSK:  Integumentary:  Neurological:  Psychiatric:  Endocrine:  Hematologic/Lymphatic:  Allergic/Immunologic:  [ ] All other systems negative  [ ] Unable to assess ROS because ________    OBJECTIVE:  ICU Vital Signs Last 24 Hrs  T(C): 36.3 (15 Clive 2018 06:00), Max: 36.3 (14 Jun 2018 22:00)  T(F): 97.4 (15 Clive 2018 06:00), Max: 97.4 (14 Jun 2018 22:00)  HR: 82 (15 Clive 2018 06:00) (66 - 83)  BP: 148/87 (15 Clive 2018 06:00) (117/60 - 148/87)  BP(mean): --  ABP: --  ABP(mean): --  RR: 18 (15 Clive 2018 06:00) (18 - 20)  SpO2: 94% (15 Clive 2018 06:00) (94% - 99%)        06-14 @ 07:01  -  06-15 @ 07:00  --------------------------------------------------------  IN: 600 mL / OUT: 700 mL / NET: -100 mL      HOSPITAL MEDICATIONS:  MEDICATIONS  (STANDING):  chlorhexidine 4% Liquid 1 Application(s) Topical <User Schedule>  dextrose 5%. 1000 milliLiter(s) (50 mL/Hr) IV Continuous <Continuous>  dextrose 5%. 1000 milliLiter(s) (50 mL/Hr) IV Continuous <Continuous>  dextrose 50% Injectable 12.5 Gram(s) IV Push once  dextrose 50% Injectable 25 Gram(s) IV Push once  dextrose 50% Injectable 25 Gram(s) IV Push once  heparin  Injectable 5000 Unit(s) SubCutaneous every 12 hours    MEDICATIONS  (PRN):  dextrose 40% Gel 15 Gram(s) Oral once PRN Blood Glucose LESS THAN 70 milliGRAM(s)/deciliter  glucagon  Injectable 1 milliGRAM(s) IntraMuscular once PRN Glucose LESS THAN 70 milligrams/deciliter  HYDROmorphone  Injectable 1 milliGRAM(s) IV Push every 6 hours PRN pain/dyspnea      LABS:                        8.6    7.60  )-----------( 407      ( 14 Jun 2018 03:25 )             28.5     06-14    145  |  96<L>  |  86<H>  ----------------------------<  374<H>  3.6   |  32<H>  |  5.48<H>    Ca    8.1<L>      14 Jun 2018 03:25                MICROBIOLOGY:     RADIOLOGY:  [ ] Reviewed and interpreted by me    PULMONARY FUNCTION TESTS:    EKG: CHIEF COMPLAINT: Patient is a 83y old  Male who presents with a chief complaint of AMS (05 Jun 2018 17:26)    Interval Events: none overnight      REVIEW OF SYSTEMS:  [ ] All other systems negative  [x] Unable to assess ROS because: AMS, dementia, nonverbal at this time      OBJECTIVE:  ICU Vital Signs Last 24 Hrs  T(C): 36.3 (15 Clive 2018 06:00), Max: 36.3 (14 Jun 2018 22:00)  T(F): 97.4 (15 Clive 2018 06:00), Max: 97.4 (14 Jun 2018 22:00)  HR: 82 (15 Clive 2018 06:00) (66 - 83)  BP: 148/87 (15 Clive 2018 06:00) (117/60 - 148/87)  BP(mean): --  ABP: --  ABP(mean): --  RR: 18 (15 Clive 2018 06:00) (18 - 20)  SpO2: 94% (15 Clive 2018 06:00) (94% - 99%)    06-14 @ 07:01  -  06-15 @ 07:00  --------------------------------------------------------  IN: 600 mL / OUT: 700 mL / NET: -100 mL      HOSPITAL MEDICATIONS:  MEDICATIONS  (STANDING):  chlorhexidine 4% Liquid 1 Application(s) Topical <User Schedule>  dextrose 5%. 1000 milliLiter(s) (50 mL/Hr) IV Continuous <Continuous>  dextrose 5%. 1000 milliLiter(s) (50 mL/Hr) IV Continuous <Continuous>  dextrose 50% Injectable 12.5 Gram(s) IV Push once  dextrose 50% Injectable 25 Gram(s) IV Push once  dextrose 50% Injectable 25 Gram(s) IV Push once  heparin  Injectable 5000 Unit(s) SubCutaneous every 12 hours    MEDICATIONS  (PRN):  dextrose 40% Gel 15 Gram(s) Oral once PRN Blood Glucose LESS THAN 70 milliGRAM(s)/deciliter  glucagon  Injectable 1 milliGRAM(s) IntraMuscular once PRN Glucose LESS THAN 70 milligrams/deciliter  HYDROmorphone  Injectable 1 milliGRAM(s) IV Push every 6 hours PRN pain/dyspnea

## 2018-06-15 NOTE — PROGRESS NOTE ADULT - PROBLEM SELECTOR PLAN 1
- 2/2 another aspiration event  - now improved, appears more alert today  - completed zosyn x 7 days  - weaned to 6L NC  - cont to wean as tolerated

## 2018-06-16 PROCEDURE — 99233 SBSQ HOSP IP/OBS HIGH 50: CPT

## 2018-06-16 RX ADMIN — HEPARIN SODIUM 5000 UNIT(S): 5000 INJECTION INTRAVENOUS; SUBCUTANEOUS at 17:36

## 2018-06-16 RX ADMIN — HEPARIN SODIUM 5000 UNIT(S): 5000 INJECTION INTRAVENOUS; SUBCUTANEOUS at 06:13

## 2018-06-16 RX ADMIN — CHLORHEXIDINE GLUCONATE 1 APPLICATION(S): 213 SOLUTION TOPICAL at 12:18

## 2018-06-16 RX ADMIN — SODIUM CHLORIDE 50 MILLILITER(S): 9 INJECTION, SOLUTION INTRAVENOUS at 06:14

## 2018-06-16 NOTE — PROGRESS NOTE ADULT - ATTENDING COMMENTS
Advanced dementia with recent aspiration pna s/p zosyn with improvement in mental status  Family considering PEG tube. Multiple conversations with family, ongoing discussions regarding goals of care

## 2018-06-16 NOTE — PROGRESS NOTE ADULT - ATTENDING COMMENTS
Advanced care planning was discussed with patient and family.  Advanced care planning forms were reviewed and discussed.  Risks, benefits and alternatives of gastroenterologic procedures were discussed in detail and all questions were answered.    30 minutes spent.

## 2018-06-16 NOTE — PROGRESS NOTE ADULT - PROBLEM SELECTOR PLAN 3
- failed S&S  - NPO  - family requesting to feed again  - unable to place NGT  - GI note appreciated  - as per son-in-law aron Lockhart has decided to proceed with PEG

## 2018-06-16 NOTE — PROGRESS NOTE ADULT - PROBLEM SELECTOR PLAN 1
- now improved, appears more alert today  - completed zosyn x 7 days  - weaned to 5L NC  - cont to wean as tolerated

## 2018-06-16 NOTE — PROGRESS NOTE ADULT - SUBJECTIVE AND OBJECTIVE BOX
CHIEF COMPLAINT:    Interval Events:      REVIEW OF SYSTEMS:  Constitutional:   Eyes:  ENT:  CV:  Resp:  GI:  :  MSK:  Integumentary:  Neurological:  Psychiatric:  Endocrine:  Hematologic/Lymphatic:  Allergic/Immunologic:  [ ] All other systems negative  [ ] Unable to assess ROS because ________      OBJECTIVE:  ICU Vital Signs Last 24 Hrs  T(C): 36.2 (16 Jun 2018 06:23), Max: 36.3 (15 Clive 2018 12:21)  T(F): 97.1 (16 Jun 2018 06:23), Max: 97.3 (15 Clive 2018 12:21)  HR: 71 (16 Jun 2018 06:23) (71 - 91)  BP: 128/62 (16 Jun 2018 06:23) (109/70 - 142/67)  BP(mean): --  ABP: --  ABP(mean): --  RR: 20 (16 Jun 2018 06:23) (18 - 26)  SpO2: 98% (16 Jun 2018 06:23) (96% - 99%)    06-15 @ 07:01  -  06-16 @ 07:00  --------------------------------------------------------  IN: 600 mL / OUT: 300 mL / NET: 300 mL    HOSPITAL MEDICATIONS:  MEDICATIONS  (STANDING):  chlorhexidine 4% Liquid 1 Application(s) Topical <User Schedule>  dextrose 5%. 1000 milliLiter(s) (50 mL/Hr) IV Continuous <Continuous>  dextrose 5%. 1000 milliLiter(s) (50 mL/Hr) IV Continuous <Continuous>  dextrose 50% Injectable 12.5 Gram(s) IV Push once  dextrose 50% Injectable 25 Gram(s) IV Push once  dextrose 50% Injectable 25 Gram(s) IV Push once  heparin  Injectable 5000 Unit(s) SubCutaneous every 12 hours    MEDICATIONS  (PRN):  dextrose 40% Gel 15 Gram(s) Oral once PRN Blood Glucose LESS THAN 70 milliGRAM(s)/deciliter  glucagon  Injectable 1 milliGRAM(s) IntraMuscular once PRN Glucose LESS THAN 70 milligrams/deciliter  HYDROmorphone  Injectable 1 milliGRAM(s) IV Push every 6 hours PRN pain/dyspnea      LABS:                    MICROBIOLOGY:     RADIOLOGY:  [ ] Reviewed and interpreted by me    PULMONARY FUNCTION TESTS:    EKG: CHIEF COMPLAINT: Patient is a 83y old  Male who presents with a chief complaint of AMS (05 Jun 2018 17:26)    Interval Events: none overnight      REVIEW OF SYSTEMS:  [ ] All other systems negative  [x] Unable to assess ROS because: dementia      OBJECTIVE:  ICU Vital Signs Last 24 Hrs  T(C): 36.2 (16 Jun 2018 06:23), Max: 36.3 (15 Clive 2018 12:21)  T(F): 97.1 (16 Jun 2018 06:23), Max: 97.3 (15 Clive 2018 12:21)  HR: 71 (16 Jun 2018 06:23) (71 - 91)  BP: 128/62 (16 Jun 2018 06:23) (109/70 - 142/67)  BP(mean): --  ABP: --  ABP(mean): --  RR: 20 (16 Jun 2018 06:23) (18 - 26)  SpO2: 98% (16 Jun 2018 06:23) (96% - 99%)    06-15 @ 07:01  -  06-16 @ 07:00  --------------------------------------------------------  IN: 600 mL / OUT: 300 mL / NET: 300 mL    HOSPITAL MEDICATIONS:  MEDICATIONS  (STANDING):  chlorhexidine 4% Liquid 1 Application(s) Topical <User Schedule>  dextrose 5%. 1000 milliLiter(s) (50 mL/Hr) IV Continuous <Continuous>  dextrose 5%. 1000 milliLiter(s) (50 mL/Hr) IV Continuous <Continuous>  dextrose 50% Injectable 12.5 Gram(s) IV Push once  dextrose 50% Injectable 25 Gram(s) IV Push once  dextrose 50% Injectable 25 Gram(s) IV Push once  heparin  Injectable 5000 Unit(s) SubCutaneous every 12 hours    MEDICATIONS  (PRN):  dextrose 40% Gel 15 Gram(s) Oral once PRN Blood Glucose LESS THAN 70 milliGRAM(s)/deciliter  glucagon  Injectable 1 milliGRAM(s) IntraMuscular once PRN Glucose LESS THAN 70 milligrams/deciliter  HYDROmorphone  Injectable 1 milliGRAM(s) IV Push every 6 hours PRN pain/dyspnea

## 2018-06-16 NOTE — PROGRESS NOTE ADULT - SUBJECTIVE AND OBJECTIVE BOX
INTERVAL HPI/OVERNIGHT EVENTS:  HPI:  83M hx mild dementia, DM, HTN who was brought to ED by his daughter for functional decline over the past week.  Hx provided by daughter, Alexia Guevara ([phone]) as pt unable to participate in interview due to altered mental status.  At baseline, pt is awake, alert, conversant with intermittent periods of confusion and can ambulate with a cane.   This week, he became lethargic and required full person assistance to get around.  Pt also stopped feeding himself and he had to be fed by his daughter, which is unusual for him.  Pt eats regular meals, has no known hx of dysphagia and does not cough after he eats.  On day of admission, pt complained of some chest and abdominal pain and had a shivering episode  At home, he had a low oral temperature of 92 and had a home finger stick of about 400.  Daughter gave the pt 2 glipizide tablets and then took him to the ED for further eval.   No new overnight event.  No N/V/D.       MEDICATIONS  (STANDING):  chlorhexidine 4% Liquid 1 Application(s) Topical <User Schedule>  dextrose 5%. 1000 milliLiter(s) (50 mL/Hr) IV Continuous <Continuous>  dextrose 5%. 1000 milliLiter(s) (50 mL/Hr) IV Continuous <Continuous>  dextrose 50% Injectable 12.5 Gram(s) IV Push once  dextrose 50% Injectable 25 Gram(s) IV Push once  dextrose 50% Injectable 25 Gram(s) IV Push once  heparin  Injectable 5000 Unit(s) SubCutaneous every 12 hours    MEDICATIONS  (PRN):  dextrose 40% Gel 15 Gram(s) Oral once PRN Blood Glucose LESS THAN 70 milliGRAM(s)/deciliter  glucagon  Injectable 1 milliGRAM(s) IntraMuscular once PRN Glucose LESS THAN 70 milligrams/deciliter  HYDROmorphone  Injectable 1 milliGRAM(s) IV Push every 6 hours PRN pain/dyspnea      Allergies    No Known Allergies    Intolerances          General:  No wt loss, fevers, chills, night sweats, fatigue,   Eyes:  Good vision, no reported pain  ENT:  No sore throat, pain, runny nose, dysphagia  CV:  No pain, palpitations, hypo/hypertension  Resp:  No dyspnea, cough, tachypnea, wheezing  GI:  No pain, No nausea, No vomiting, No diarrhea, No constipation, No weight loss, No fever, No pruritis, No rectal bleeding, No tarry stools, No dysphagia,  :  No pain, bleeding, incontinence, nocturia  Muscle:  No pain, weakness  Neuro:  No weakness, tingling, memory problems  Psych:  No fatigue, insomnia, mood problems, depression  Endocrine:  No polyuria, polydipsia, cold/heat intolerance  Heme:  No petechiae, ecchymosis, easy bruisability  Skin:  No rash, tattoos, scars, edema      PHYSICAL EXAM:   Vital Signs:  Vital Signs Last 24 Hrs  T(C): 36.1 (16 Jun 2018 13:44), Max: 36.2 (16 Jun 2018 06:23)  T(F): 97 (16 Jun 2018 13:44), Max: 97.1 (16 Jun 2018 06:23)  HR: 84 (16 Jun 2018 13:44) (71 - 84)  BP: 107/74 (16 Jun 2018 13:44) (107/74 - 142/67)  BP(mean): --  RR: 19 (16 Jun 2018 13:44) (18 - 26)  SpO2: 93% (16 Jun 2018 13:44) (93% - 98%)  Daily     Daily I&O's Summary    15 Clive 2018 07:01  -  16 Jun 2018 07:00  --------------------------------------------------------  IN: 600 mL / OUT: 300 mL / NET: 300 mL        GENERAL:  Appears stated age, well-groomed, well-nourished, no distress  HEENT:  NC/AT,  conjunctivae clear and pink, no thyromegaly, nodules, adenopathy, no JVD, sclera -anicteric  CHEST:  Full & symmetric excursion, no increased effort, breath sounds clear  HEART:  Regular rhythm, S1, S2, no murmur/rub/S3/S4, no abdominal bruit, no edema  ABDOMEN:  Soft, non-tender, non-distended, normoactive bowel sounds,  no masses ,no hepato-splenomegaly, no signs of chronic liver disease  EXTEREMITIES:  no cyanosis,clubbing or edema  SKIN:  No rash/erythema/ecchymoses/petechiae/wounds/abscess/warm/dry  NEURO:  Alert, oriented, no asterixis, no tremor, no encephalopathy      LABS:              amylase   lipase  RADIOLOGY & ADDITIONAL TESTS:

## 2018-06-17 DIAGNOSIS — D50.0 IRON DEFICIENCY ANEMIA SECONDARY TO BLOOD LOSS (CHRONIC): ICD-10-CM

## 2018-06-17 LAB
BLD GP AB SCN SERPL QL: NEGATIVE — SIGNIFICANT CHANGE UP
BUN SERPL-MCNC: 98 MG/DL — HIGH (ref 7–23)
BUN SERPL-MCNC: 98 MG/DL — HIGH (ref 7–23)
CALCIUM SERPL-MCNC: 8 MG/DL — LOW (ref 8.4–10.5)
CALCIUM SERPL-MCNC: 8.1 MG/DL — LOW (ref 8.4–10.5)
CHLORIDE SERPL-SCNC: 92 MMOL/L — LOW (ref 98–107)
CHLORIDE SERPL-SCNC: 97 MMOL/L — LOW (ref 98–107)
CO2 SERPL-SCNC: 30 MMOL/L — SIGNIFICANT CHANGE UP (ref 22–31)
CO2 SERPL-SCNC: 30 MMOL/L — SIGNIFICANT CHANGE UP (ref 22–31)
CREAT SERPL-MCNC: 4.69 MG/DL — HIGH (ref 0.5–1.3)
CREAT SERPL-MCNC: 4.7 MG/DL — HIGH (ref 0.5–1.3)
GLUCOSE SERPL-MCNC: 336 MG/DL — HIGH (ref 70–99)
GLUCOSE SERPL-MCNC: 349 MG/DL — HIGH (ref 70–99)
HCT VFR BLD CALC: 16.3 % — CRITICAL LOW (ref 39–50)
HCT VFR BLD CALC: 16.4 % — CRITICAL LOW (ref 39–50)
HGB BLD-MCNC: 4.9 G/DL — CRITICAL LOW (ref 13–17)
HGB BLD-MCNC: 5.1 G/DL — CRITICAL LOW (ref 13–17)
MCHC RBC-ENTMCNC: 28.2 PG — SIGNIFICANT CHANGE UP (ref 27–34)
MCHC RBC-ENTMCNC: 28.5 PG — SIGNIFICANT CHANGE UP (ref 27–34)
MCHC RBC-ENTMCNC: 30.1 % — LOW (ref 32–36)
MCHC RBC-ENTMCNC: 31.1 % — LOW (ref 32–36)
MCV RBC AUTO: 90.6 FL — SIGNIFICANT CHANGE UP (ref 80–100)
MCV RBC AUTO: 94.8 FL — SIGNIFICANT CHANGE UP (ref 80–100)
NRBC # FLD: 0.02 — SIGNIFICANT CHANGE UP
NRBC # FLD: 0.04 — SIGNIFICANT CHANGE UP
PLATELET # BLD AUTO: 275 K/UL — SIGNIFICANT CHANGE UP (ref 150–400)
PLATELET # BLD AUTO: 275 K/UL — SIGNIFICANT CHANGE UP (ref 150–400)
PMV BLD: 10.2 FL — SIGNIFICANT CHANGE UP (ref 7–13)
PMV BLD: 10.5 FL — SIGNIFICANT CHANGE UP (ref 7–13)
POTASSIUM SERPL-MCNC: 3.3 MMOL/L — LOW (ref 3.5–5.3)
POTASSIUM SERPL-MCNC: 5.2 MMOL/L — SIGNIFICANT CHANGE UP (ref 3.5–5.3)
POTASSIUM SERPL-SCNC: 3.3 MMOL/L — LOW (ref 3.5–5.3)
POTASSIUM SERPL-SCNC: 5.2 MMOL/L — SIGNIFICANT CHANGE UP (ref 3.5–5.3)
RBC # BLD: 1.72 M/UL — LOW (ref 4.2–5.8)
RBC # BLD: 1.81 M/UL — LOW (ref 4.2–5.8)
RBC # FLD: 14.2 % — SIGNIFICANT CHANGE UP (ref 10.3–14.5)
RBC # FLD: 14.4 % — SIGNIFICANT CHANGE UP (ref 10.3–14.5)
RH IG SCN BLD-IMP: POSITIVE — SIGNIFICANT CHANGE UP
SODIUM SERPL-SCNC: 142 MMOL/L — SIGNIFICANT CHANGE UP (ref 135–145)
SODIUM SERPL-SCNC: 143 MMOL/L — SIGNIFICANT CHANGE UP (ref 135–145)
WBC # BLD: 7.78 K/UL — SIGNIFICANT CHANGE UP (ref 3.8–10.5)
WBC # BLD: 8.58 K/UL — SIGNIFICANT CHANGE UP (ref 3.8–10.5)
WBC # FLD AUTO: 7.78 K/UL — SIGNIFICANT CHANGE UP (ref 3.8–10.5)
WBC # FLD AUTO: 8.58 K/UL — SIGNIFICANT CHANGE UP (ref 3.8–10.5)

## 2018-06-17 PROCEDURE — 99233 SBSQ HOSP IP/OBS HIGH 50: CPT

## 2018-06-17 RX ORDER — PANTOPRAZOLE SODIUM 20 MG/1
40 TABLET, DELAYED RELEASE ORAL
Qty: 0 | Refills: 0 | Status: DISCONTINUED | OUTPATIENT
Start: 2018-06-17 | End: 2018-06-18

## 2018-06-17 RX ADMIN — SODIUM CHLORIDE 50 MILLILITER(S): 9 INJECTION, SOLUTION INTRAVENOUS at 22:00

## 2018-06-17 RX ADMIN — CHLORHEXIDINE GLUCONATE 1 APPLICATION(S): 213 SOLUTION TOPICAL at 08:56

## 2018-06-17 RX ADMIN — SODIUM CHLORIDE 50 MILLILITER(S): 9 INJECTION, SOLUTION INTRAVENOUS at 00:03

## 2018-06-17 RX ADMIN — HEPARIN SODIUM 5000 UNIT(S): 5000 INJECTION INTRAVENOUS; SUBCUTANEOUS at 06:15

## 2018-06-17 RX ADMIN — PANTOPRAZOLE SODIUM 40 MILLIGRAM(S): 20 TABLET, DELAYED RELEASE ORAL at 22:00

## 2018-06-17 RX ADMIN — PANTOPRAZOLE SODIUM 40 MILLIGRAM(S): 20 TABLET, DELAYED RELEASE ORAL at 09:46

## 2018-06-17 NOTE — PROGRESS NOTE ADULT - PROBLEM SELECTOR PLAN 3
- failed S&S  - NPO  - family requesting to feed again - GI following for possible PEG tube   - unable to place NGT  - GI note appreciated  - as per son-in-law aron Lockhart has decided to proceed with PEG

## 2018-06-17 NOTE — PROGRESS NOTE ADULT - SUBJECTIVE AND OBJECTIVE BOX
CHIEF COMPLAINT:  Patient is a 83y old  Male who presents with a chief complaint of   Interval Events:    REVIEW OF SYSTEMS:  Constitutional:   Eyes:  ENT:  CV:  Resp:  GI:  :  MSK:  Integumentary:  Neurological:  Psychiatric:  Endocrine:  Hematologic/Lymphatic:  Allergic/Immunologic:  [ ] All other systems negative  [ ] Unable to assess ROS because ________    OBJECTIVE:  ICU Vital Signs Last 24 Hrs  T(C): 36.4 (17 Jun 2018 06:18), Max: 36.4 (17 Jun 2018 06:18)  T(F): 97.5 (17 Jun 2018 06:18), Max: 97.5 (17 Jun 2018 06:18)  HR: 95 (17 Jun 2018 06:18) (84 - 95)  BP: 107/64 (17 Jun 2018 06:18) (107/64 - 116/67)  BP(mean): --  ABP: --  ABP(mean): --  RR: 24 (17 Jun 2018 06:18) (19 - 24)  SpO2: 100% (17 Jun 2018 06:18) (93% - 100%)        CAPILLARY BLOOD GLUCOSE          PHYSICAL EXAM:  General:   HEENT:   Lymph Nodes:  Neck:   Respiratory:   Cardiovascular:   Abdomen:   Extremities:   Skin:   Neurological:  Psychiatry:    HOSPITAL MEDICATIONS:  MEDICATIONS  (STANDING):  chlorhexidine 4% Liquid 1 Application(s) Topical <User Schedule>  dextrose 5%. 1000 milliLiter(s) (50 mL/Hr) IV Continuous <Continuous>  dextrose 5%. 1000 milliLiter(s) (50 mL/Hr) IV Continuous <Continuous>  dextrose 50% Injectable 12.5 Gram(s) IV Push once  dextrose 50% Injectable 25 Gram(s) IV Push once  dextrose 50% Injectable 25 Gram(s) IV Push once  heparin  Injectable 5000 Unit(s) SubCutaneous every 12 hours    MEDICATIONS  (PRN):  dextrose 40% Gel 15 Gram(s) Oral once PRN Blood Glucose LESS THAN 70 milliGRAM(s)/deciliter  glucagon  Injectable 1 milliGRAM(s) IntraMuscular once PRN Glucose LESS THAN 70 milligrams/deciliter  HYDROmorphone  Injectable 1 milliGRAM(s) IV Push every 6 hours PRN pain/dyspnea      LABS:                        5.1    8.58  )-----------( 275      ( 17 Jun 2018 05:35 )             16.4     06-17    142  |  97<L>  |  98<H>  ----------------------------<  349<H>  3.3<L>   |  30  |  4.69<H>    Ca    8.0<L>      17 Jun 2018 05:35                MICROBIOLOGY:     RADIOLOGY:  [ ] Reviewed and interpreted by me    PULMONARY FUNCTION TESTS:    EKG:    I&O's Summary CHIEF COMPLAINT:  Patient is a 83y old  Male who presents with a chief complaint of respiratory distress    REVIEW OF SYSTEMS:  [X ] Unable to assess ROS because non verbal, dementia    OBJECTIVE:  ICU Vital Signs Last 24 Hrs  T(C): 36.4 (17 Jun 2018 06:18), Max: 36.4 (17 Jun 2018 06:18)  T(F): 97.5 (17 Jun 2018 06:18), Max: 97.5 (17 Jun 2018 06:18)  HR: 95 (17 Jun 2018 06:18) (84 - 95)  BP: 107/64 (17 Jun 2018 06:18) (107/64 - 116/67)  BP(mean): --  ABP: --  ABP(mean): --  RR: 24 (17 Jun 2018 06:18) (19 - 24)  SpO2: 100% (17 Jun 2018 06:18) (93% - 100%)    HOSPITAL MEDICATIONS:  MEDICATIONS  (STANDING):  chlorhexidine 4% Liquid 1 Application(s) Topical <User Schedule>  dextrose 5%. 1000 milliLiter(s) (50 mL/Hr) IV Continuous <Continuous>  dextrose 5%. 1000 milliLiter(s) (50 mL/Hr) IV Continuous <Continuous>  dextrose 50% Injectable 12.5 Gram(s) IV Push once  dextrose 50% Injectable 25 Gram(s) IV Push once  dextrose 50% Injectable 25 Gram(s) IV Push once  heparin  Injectable 5000 Unit(s) SubCutaneous every 12 hours    MEDICATIONS  (PRN):  dextrose 40% Gel 15 Gram(s) Oral once PRN Blood Glucose LESS THAN 70 milliGRAM(s)/deciliter  glucagon  Injectable 1 milliGRAM(s) IntraMuscular once PRN Glucose LESS THAN 70 milligrams/deciliter  HYDROmorphone  Injectable 1 milliGRAM(s) IV Push every 6 hours PRN pain/dyspnea      LABS:                        5.1    8.58  )-----------( 275      ( 17 Jun 2018 05:35 )             16.4     06-17    142  |  97<L>  |  98<H>  ----------------------------<  349<H>  3.3<L>   |  30  |  4.69<H>    Ca    8.0<L>      17 Jun 2018 05:35      MICROBIOLOGY:     RADIOLOGY:  [ ] Reviewed and interpreted by me    PULMONARY FUNCTION TESTS:    EKG:    I&O's Summary

## 2018-06-17 NOTE — PROGRESS NOTE ADULT - ATTENDING COMMENTS
Advanced dementia, nonverbal  Low hgb at 4.9, will transfuse 2 units and recheck  Rectal tube output black, melena -- will start PPI BID. GI following Advanced dementia, nonverbal  Low hgb at 4.9, will transfuse 2 units and recheck  Rectal tube output black, melena -- will start PPI BID. GI following  Status communicated to the family

## 2018-06-17 NOTE — PROGRESS NOTE ADULT - PROBLEM SELECTOR PLAN 8
Hemaglobin 4.9 this am, melena stool noted  Will follow up with GI today  Will transfuse 2 units of PRBC's  Protonic IV BID

## 2018-06-17 NOTE — PROGRESS NOTE ADULT - PROBLEM SELECTOR PLAN 3
- DNR/DNI  - GOC in progress  - palliative re-eval to further discuss role of PEG likely for tuesday if cleared

## 2018-06-17 NOTE — PROGRESS NOTE ADULT - NEUROLOGICAL DETAILS
responds to pain
disoriented/strength decreased
responds to pain
no spontaneous movement
responds to pain

## 2018-06-17 NOTE — PROGRESS NOTE ADULT - SUBJECTIVE AND OBJECTIVE BOX
INTERVAL HPI/OVERNIGHT EVENTS:  No new overnight event.  No N/V/D. d/w family awaiting peg      MEDICATIONS  (STANDING):  chlorhexidine 4% Liquid 1 Application(s) Topical <User Schedule>  dextrose 5%. 1000 milliLiter(s) (50 mL/Hr) IV Continuous <Continuous>  dextrose 5%. 1000 milliLiter(s) (50 mL/Hr) IV Continuous <Continuous>  dextrose 50% Injectable 12.5 Gram(s) IV Push once  dextrose 50% Injectable 25 Gram(s) IV Push once  dextrose 50% Injectable 25 Gram(s) IV Push once  pantoprazole  Injectable 40 milliGRAM(s) IV Push two times a day    MEDICATIONS  (PRN):  dextrose 40% Gel 15 Gram(s) Oral once PRN Blood Glucose LESS THAN 70 milliGRAM(s)/deciliter  glucagon  Injectable 1 milliGRAM(s) IntraMuscular once PRN Glucose LESS THAN 70 milligrams/deciliter  HYDROmorphone  Injectable 1 milliGRAM(s) IV Push every 6 hours PRN pain/dyspnea      Allergies    No Known Allergies    Intolerances          General:  No wt loss, fevers, chills, night sweats, fatigue,   Eyes:  Good vision, no reported pain  ENT:  No sore throat, pain, runny nose, dysphagia  CV:  No pain, palpitations, hypo/hypertension  Resp:  No dyspnea, cough, tachypnea, wheezing  GI:  No pain, No nausea, No vomiting, No diarrhea, No constipation, No weight loss, No fever, No pruritis, No rectal bleeding, No tarry stools, No dysphagia,  :  No pain, bleeding, incontinence, nocturia  Muscle:  No pain, weakness  Neuro:  No weakness, tingling, memory problems  Psych:  No fatigue, insomnia, mood problems, depression  Endocrine:  No polyuria, polydipsia, cold/heat intolerance  Heme:  No petechiae, ecchymosis, easy bruisability  Skin:  No rash, tattoos, scars, edema      PHYSICAL EXAM:   Vital Signs:  Vital Signs Last 24 Hrs  T(C): 36.3 (17 Jun 2018 09:45), Max: 36.4 (17 Jun 2018 06:18)  T(F): 97.4 (17 Jun 2018 09:45), Max: 97.5 (17 Jun 2018 06:18)  HR: 92 (17 Jun 2018 09:45) (84 - 95)  BP: 108/58 (17 Jun 2018 09:45) (107/64 - 116/67)  BP(mean): --  RR: 18 (17 Jun 2018 09:45) (18 - 24)  SpO2: 100% (17 Jun 2018 09:45) (93% - 100%)  Daily     Daily I&O's Summary      GENERAL:  Appears stated age, well-groomed, well-nourished, no distress  HEENT:  NC/AT,  conjunctivae clear and pink, no thyromegaly, nodules, adenopathy, no JVD, sclera -anicteric  CHEST:  Full & symmetric excursion, no increased effort, breath sounds clear  HEART:  Regular rhythm, S1, S2, no murmur/rub/S3/S4, no abdominal bruit, no edema  ABDOMEN:  Soft, non-tender, non-distended, normoactive bowel sounds,  no masses ,no hepato-splenomegaly, no signs of chronic liver disease  EXTEREMITIES:  no cyanosis,clubbing or edema  SKIN:  No rash/erythema/ecchymoses/petechiae/wounds/abscess/warm/dry  NEURO:  Alert, oriented, no asterixis, no tremor, no encephalopathy      LABS:                        4.9    7.78  )-----------( 275      ( 17 Jun 2018 06:53 )             16.3     06-17    143  |  92<L>  |  98<H>  ----------------------------<  336<H>  5.2   |  30  |  4.70<H>    Ca    8.1<L>      17 Jun 2018 06:53          amylase   lipase  RADIOLOGY & ADDITIONAL TESTS:

## 2018-06-18 ENCOUNTER — RESULT REVIEW (OUTPATIENT)
Age: 83
End: 2018-06-18

## 2018-06-18 DIAGNOSIS — E87.6 HYPOKALEMIA: ICD-10-CM

## 2018-06-18 LAB
BUN SERPL-MCNC: 86 MG/DL — HIGH (ref 7–23)
BUN SERPL-MCNC: 91 MG/DL — HIGH (ref 7–23)
CALCIUM SERPL-MCNC: 8 MG/DL — LOW (ref 8.4–10.5)
CALCIUM SERPL-MCNC: 8.2 MG/DL — LOW (ref 8.4–10.5)
CHLORIDE SERPL-SCNC: 103 MMOL/L — SIGNIFICANT CHANGE UP (ref 98–107)
CHLORIDE SERPL-SCNC: 103 MMOL/L — SIGNIFICANT CHANGE UP (ref 98–107)
CO2 SERPL-SCNC: 29 MMOL/L — SIGNIFICANT CHANGE UP (ref 22–31)
CO2 SERPL-SCNC: 31 MMOL/L — SIGNIFICANT CHANGE UP (ref 22–31)
CREAT SERPL-MCNC: 4.53 MG/DL — HIGH (ref 0.5–1.3)
CREAT SERPL-MCNC: 4.59 MG/DL — HIGH (ref 0.5–1.3)
GLUCOSE BLDC GLUCOMTR-MCNC: 192 MG/DL — HIGH (ref 70–99)
GLUCOSE BLDC GLUCOMTR-MCNC: 205 MG/DL — HIGH (ref 70–99)
GLUCOSE SERPL-MCNC: 213 MG/DL — HIGH (ref 70–99)
GLUCOSE SERPL-MCNC: 218 MG/DL — HIGH (ref 70–99)
HCT VFR BLD CALC: 26.5 % — LOW (ref 39–50)
HCT VFR BLD CALC: 27.5 % — LOW (ref 39–50)
HCT VFR BLD CALC: 28.7 % — LOW (ref 39–50)
HGB BLD-MCNC: 8.5 G/DL — LOW (ref 13–17)
HGB BLD-MCNC: 8.8 G/DL — LOW (ref 13–17)
HGB BLD-MCNC: 9.2 G/DL — LOW (ref 13–17)
MCHC RBC-ENTMCNC: 28.9 PG — SIGNIFICANT CHANGE UP (ref 27–34)
MCHC RBC-ENTMCNC: 29.2 PG — SIGNIFICANT CHANGE UP (ref 27–34)
MCHC RBC-ENTMCNC: 29.3 PG — SIGNIFICANT CHANGE UP (ref 27–34)
MCHC RBC-ENTMCNC: 32 % — SIGNIFICANT CHANGE UP (ref 32–36)
MCHC RBC-ENTMCNC: 32.1 % — SIGNIFICANT CHANGE UP (ref 32–36)
MCHC RBC-ENTMCNC: 32.1 % — SIGNIFICANT CHANGE UP (ref 32–36)
MCV RBC AUTO: 90.5 FL — SIGNIFICANT CHANGE UP (ref 80–100)
MCV RBC AUTO: 91.1 FL — SIGNIFICANT CHANGE UP (ref 80–100)
MCV RBC AUTO: 91.4 FL — SIGNIFICANT CHANGE UP (ref 80–100)
NRBC # FLD: 0.02 — SIGNIFICANT CHANGE UP
NRBC # FLD: 0.03 — SIGNIFICANT CHANGE UP
NRBC # FLD: 0.04 — SIGNIFICANT CHANGE UP
PLATELET # BLD AUTO: 190 K/UL — SIGNIFICANT CHANGE UP (ref 150–400)
PLATELET # BLD AUTO: 218 K/UL — SIGNIFICANT CHANGE UP (ref 150–400)
PLATELET # BLD AUTO: 235 K/UL — SIGNIFICANT CHANGE UP (ref 150–400)
PMV BLD: 10.3 FL — SIGNIFICANT CHANGE UP (ref 7–13)
PMV BLD: 10.4 FL — SIGNIFICANT CHANGE UP (ref 7–13)
PMV BLD: 9.9 FL — SIGNIFICANT CHANGE UP (ref 7–13)
POTASSIUM SERPL-MCNC: 3.3 MMOL/L — LOW (ref 3.5–5.3)
POTASSIUM SERPL-MCNC: 3.4 MMOL/L — LOW (ref 3.5–5.3)
POTASSIUM SERPL-SCNC: 3.3 MMOL/L — LOW (ref 3.5–5.3)
POTASSIUM SERPL-SCNC: 3.4 MMOL/L — LOW (ref 3.5–5.3)
RBC # BLD: 2.91 M/UL — LOW (ref 4.2–5.8)
RBC # BLD: 3.04 M/UL — LOW (ref 4.2–5.8)
RBC # BLD: 3.14 M/UL — LOW (ref 4.2–5.8)
RBC # FLD: 14.8 % — HIGH (ref 10.3–14.5)
RBC # FLD: 14.9 % — HIGH (ref 10.3–14.5)
RBC # FLD: 15.1 % — HIGH (ref 10.3–14.5)
SODIUM SERPL-SCNC: 145 MMOL/L — SIGNIFICANT CHANGE UP (ref 135–145)
SODIUM SERPL-SCNC: 145 MMOL/L — SIGNIFICANT CHANGE UP (ref 135–145)
WBC # BLD: 7.71 K/UL — SIGNIFICANT CHANGE UP (ref 3.8–10.5)
WBC # BLD: 9.07 K/UL — SIGNIFICANT CHANGE UP (ref 3.8–10.5)
WBC # BLD: 9.69 K/UL — SIGNIFICANT CHANGE UP (ref 3.8–10.5)
WBC # FLD AUTO: 7.71 K/UL — SIGNIFICANT CHANGE UP (ref 3.8–10.5)
WBC # FLD AUTO: 9.07 K/UL — SIGNIFICANT CHANGE UP (ref 3.8–10.5)
WBC # FLD AUTO: 9.69 K/UL — SIGNIFICANT CHANGE UP (ref 3.8–10.5)

## 2018-06-18 PROCEDURE — 99233 SBSQ HOSP IP/OBS HIGH 50: CPT | Mod: GC

## 2018-06-18 PROCEDURE — 88305 TISSUE EXAM BY PATHOLOGIST: CPT | Mod: 26

## 2018-06-18 PROCEDURE — 88312 SPECIAL STAINS GROUP 1: CPT | Mod: 26

## 2018-06-18 PROCEDURE — 99232 SBSQ HOSP IP/OBS MODERATE 35: CPT | Mod: GC

## 2018-06-18 RX ORDER — PANTOPRAZOLE SODIUM 20 MG/1
8 TABLET, DELAYED RELEASE ORAL
Qty: 80 | Refills: 0 | Status: DISCONTINUED | OUTPATIENT
Start: 2018-06-18 | End: 2018-06-25

## 2018-06-18 RX ORDER — SUCRALFATE 1 G
1 TABLET ORAL EVERY 6 HOURS
Qty: 0 | Refills: 0 | Status: DISCONTINUED | OUTPATIENT
Start: 2018-06-18 | End: 2018-06-28

## 2018-06-18 RX ORDER — POTASSIUM CHLORIDE 20 MEQ
10 PACKET (EA) ORAL ONCE
Qty: 0 | Refills: 0 | Status: COMPLETED | OUTPATIENT
Start: 2018-06-18 | End: 2018-06-18

## 2018-06-18 RX ADMIN — SODIUM CHLORIDE 50 MILLILITER(S): 9 INJECTION, SOLUTION INTRAVENOUS at 22:10

## 2018-06-18 RX ADMIN — SODIUM CHLORIDE 50 MILLILITER(S): 9 INJECTION, SOLUTION INTRAVENOUS at 02:41

## 2018-06-18 RX ADMIN — PANTOPRAZOLE SODIUM 40 MILLIGRAM(S): 20 TABLET, DELAYED RELEASE ORAL at 09:13

## 2018-06-18 RX ADMIN — CHLORHEXIDINE GLUCONATE 1 APPLICATION(S): 213 SOLUTION TOPICAL at 09:13

## 2018-06-18 RX ADMIN — PANTOPRAZOLE SODIUM 10 MG/HR: 20 TABLET, DELAYED RELEASE ORAL at 22:10

## 2018-06-18 RX ADMIN — Medication 100 MILLIEQUIVALENT(S): at 09:13

## 2018-06-18 NOTE — PROGRESS NOTE ADULT - PROBLEM SELECTOR PLAN 1
Hemodynamically mediated RUSH in the setting of septic shock and decreased PO intake. Pt. was on CVVHDF which was transitioned to HD on 5/30. Pt. last had HD on 5/30. Scr has improved today to 4.53. Patient is non oliguric. Continue with IV hydration. No acute indication for HD at this time. Palliative care on board regarding goals of care. Monitor BMP, urine output, I,Os. Avoid any potential nephrotoxins. Dose medications to Crcl. Hemodynamically mediated RUSH in the setting of septic shock and decreased PO intake. Pt. was on CVVHDF which was transitioned to HD on 5/30. Pt. last had HD on 5/30. Scr has improved today to 4.53. Patient is non oliguric. Continue with IV hydration. No acute indication for HD at this time. Palliative care on board regarding goals of care. Monitor BMP, urine output, I,Os. Avoid any potential nephrotoxins.

## 2018-06-18 NOTE — GOALS OF CARE CONVERSATION - PERSONAL ADVANCE DIRECTIVE - CONVERSATION DETAILS
[facility]    Follow up conversation held with pt's daughter, Alexia Guevara regarding a possible hospice plan of care for pt.  Ms Guevara stated she is agreeable to a home hospice plan of care for pt. She also stated she and family members have decided to seek G-Tube placement for pt. [facility] RN will meet with Ms Guevara on Tues 6/19/18 at 12 noon to plan for pt's care at home.

## 2018-06-18 NOTE — CHART NOTE - NSCHARTNOTEFT_GEN_A_CORE
Source:  nursing and EMR     Diet : NPO except medications    Patient remains NPO since 6/7/18, noted the plan for PEG placement and initiate EN support . Await PEG placement . PT. APPEARS LETHARGIC, PT. UNABLE TO SPEAK , NOTED PRESSURE ULCERS - COCCYX, r 5TH DIGIT      Current Weight: Weight (kg): 41.3 on 6/18 ?; was 48.6 kg on 6/11/18 ; 51 kg on 5/30/18 ; IBW - 56.2 KG     Pertinent Medications: MEDICATIONS  (STANDING):  chlorhexidine 4% Liquid 1 Application(s) Topical <User Schedule>  dextrose 5%. 1000 milliLiter(s) (50 mL/Hr) IV Continuous <Continuous>  dextrose 5%. 1000 milliLiter(s) (50 mL/Hr) IV Continuous <Continuous>  dextrose 50% Injectable 12.5 Gram(s) IV Push once  dextrose 50% Injectable 25 Gram(s) IV Push once  dextrose 50% Injectable 25 Gram(s) IV Push once  pantoprazole  Injectable 40 milliGRAM(s) IV Push two times a day    MEDICATIONS  (PRN):  dextrose 40% Gel 15 Gram(s) Oral once PRN Blood Glucose LESS THAN 70 milliGRAM(s)/deciliter  glucagon  Injectable 1 milliGRAM(s) IntraMuscular once PRN Glucose LESS THAN 70 milligrams/deciliter  HYDROmorphone  Injectable 1 milliGRAM(s) IV Push every 6 hours PRN pain/dyspnea    Pertinent Labs:  06-18 Na145 mmol/L Glu 218 mg/dL<H> K+ 3.3 mmol/L<L> Cr  4.59 mg/dL<H> BUN 91 mg/dL<H> 06-13 Phos 7.6 mg/dL<H> 05-28 [id number] 10.3 %<H>      Recommend to initiate EN support s/p PEG placement , consistent w/ GOC . Suggest Nepro carb steady initiate @ 15 ml/hr and monitor electrolytes , supplement as indicated 2/2 severe malnutrition and possibility of re feeding . Increase feeds 15 ml/hr every 24 hrs to the  goal   of 52 ml/hr 15 hrs daily .    Monitoring and Evaluation:   Tolerance to diet prescription , weights and follow up per protocol

## 2018-06-18 NOTE — PROGRESS NOTE ADULT - SUBJECTIVE AND OBJECTIVE BOX
[facility] Division of Kidney Diseases & Hypertension  FOLLOW UP NOTE  [phone]--------------------------------------------------------------------------------    HPI:  This is a 84 y/o man with history of dementia, DM, HTN who presents with abdominal pain with chills and was found to be in septic shock from pneumonia started on IV ABX. Pt. with no history of kidney disease in the past. Pt. was found to have oligiuric RUSH and was initiated on CVVHDF. CVVHDF was held on 5/30 and pt. was transitioned of IHD. Pt. last had HD on 5/30 and has been on hold since then. Patient seen and examined today; denies any complaints at this time.     PAST HISTORY  --------------------------------------------------------------------------------  No significant changes to PMH, PSH, FHx, SHx, unless otherwise noted    ALLERGIES & MEDICATIONS  --------------------------------------------------------------------------------  Allergies    No Known Allergies    Intolerances      Standing Inpatient Medications  chlorhexidine 4% Liquid 1 Application(s) Topical <User Schedule>  dextrose 5%. 1000 milliLiter(s) IV Continuous <Continuous>  dextrose 5%. 1000 milliLiter(s) IV Continuous <Continuous>  dextrose 50% Injectable 12.5 Gram(s) IV Push once  dextrose 50% Injectable 25 Gram(s) IV Push once  dextrose 50% Injectable 25 Gram(s) IV Push once  pantoprazole  Injectable 40 milliGRAM(s) IV Push two times a day    PRN Inpatient Medications  dextrose 40% Gel 15 Gram(s) Oral once PRN  glucagon  Injectable 1 milliGRAM(s) IntraMuscular once PRN  HYDROmorphone  Injectable 1 milliGRAM(s) IV Push every 6 hours PRN      REVIEW OF SYSTEMS  --------------------------------------------------------------------------------    Unable to obtain.    VITALS/PHYSICAL EXAM  --------------------------------------------------------------------------------  T(C): 36.3 (06-18-18 @ 14:36), Max: 36.4 (06-17-18 @ 21:32)  HR: 83 (06-18-18 @ 14:36) (83 - 96)  BP: 143/72 (06-18-18 @ 14:36) (127/69 - 143/72)  RR: 19 (06-18-18 @ 14:36) (18 - 20)  SpO2: 99% (06-18-18 @ 14:36) (96% - 100%)  Wt(kg): --  Height (cm): 160.02 (06-18-18 @ 14:36)  Weight (kg): 41.3 (06-18-18 @ 14:36)  BMI (kg/m2): 16.1 (06-18-18 @ 14:36)  BSA (m2): 1.38 (06-18-18 @ 14:36)      06-17-18 @ 07:01  -  06-18-18 @ 07:00  --------------------------------------------------------  IN: 0 mL / OUT: 516 mL / NET: -516 mL      Physical Exam:  	  Gen: Elderly male, on face mask   	HEENT: no JVD  	Pulm: B/L crackles present.   	CV: RRR, S1S2;  	Abd: +BS, soft, nontender/nondistended               Extremities: no bilateral LE edema noted.  	Skin: Warm, without rashes              : nuzhat present.     LABS/STUDIES  --------------------------------------------------------------------------------              9.2    9.07  >-----------<  190      [06-18-18 @ 14:11]              28.7     145  |  103  |  86  ----------------------------<  213      [06-18-18 @ 14:11]  3.4   |  31  |  4.53        Ca     8.2     [06-18-18 @ 14:11]            Creatinine Trend:  SCr 4.53 [06-18 @ 14:11]  SCr 4.59 [06-18 @ 05:44]  SCr 4.70 [06-17 @ 06:53]  SCr 4.69 [06-17 @ 05:35]  SCr 5.48 [06-14 @ 03:25] [facility] Division of Kidney Diseases & Hypertension  FOLLOW UP NOTE  [phone]--------------------------------------------------------------------------------    HPI:  This is a 84 y/o man with history of dementia, DM, HTN who presents with abdominal pain with chills and was found to be in septic shock from pneumonia started on IV ABX. Pt. with no history of kidney disease in the past. Pt. was found to have oligiuric RUSH and was initiated on CVVHDF. CVVHDF was held on 5/30 and pt. was transitioned of IHD. Pt. last had HD on 5/30 and has been on hold since then. Patient seen and examined today non verbal.    PAST HISTORY  --------------------------------------------------------------------------------  No significant changes to PMH, PSH, FHx, SHx, unless otherwise noted    ALLERGIES & MEDICATIONS  --------------------------------------------------------------------------------  Allergies    No Known Allergies    Intolerances      Standing Inpatient Medications  chlorhexidine 4% Liquid 1 Application(s) Topical <User Schedule>  dextrose 5%. 1000 milliLiter(s) IV Continuous <Continuous>  dextrose 5%. 1000 milliLiter(s) IV Continuous <Continuous>  dextrose 50% Injectable 12.5 Gram(s) IV Push once  dextrose 50% Injectable 25 Gram(s) IV Push once  dextrose 50% Injectable 25 Gram(s) IV Push once  pantoprazole  Injectable 40 milliGRAM(s) IV Push two times a day    PRN Inpatient Medications  dextrose 40% Gel 15 Gram(s) Oral once PRN  glucagon  Injectable 1 milliGRAM(s) IntraMuscular once PRN  HYDROmorphone  Injectable 1 milliGRAM(s) IV Push every 6 hours PRN      REVIEW OF SYSTEMS  --------------------------------------------------------------------------------    Unable to obtain.    VITALS/PHYSICAL EXAM  --------------------------------------------------------------------------------  T(C): 36.3 (06-18-18 @ 14:36), Max: 36.4 (06-17-18 @ 21:32)  HR: 83 (06-18-18 @ 14:36) (83 - 96)  BP: 143/72 (06-18-18 @ 14:36) (127/69 - 143/72)  RR: 19 (06-18-18 @ 14:36) (18 - 20)  SpO2: 99% (06-18-18 @ 14:36) (96% - 100%)  Wt(kg): --  Height (cm): 160.02 (06-18-18 @ 14:36)  Weight (kg): 41.3 (06-18-18 @ 14:36)  BMI (kg/m2): 16.1 (06-18-18 @ 14:36)  BSA (m2): 1.38 (06-18-18 @ 14:36)      06-17-18 @ 07:01  -  06-18-18 @ 07:00  --------------------------------------------------------  IN: 0 mL / OUT: 516 mL / NET: -516 mL      Physical Exam:  	  Gen: Elderly male, on nasal canula  	HEENT: no JVD  	Pulm: B/L crackles present.   	CV: RRR, S1S2;  	Abd: +BS, soft, nontender/nondistended               Extremities: no bilateral LE edema noted.  	Skin: Warm, without rashes              : nuzhat present.     LABS/STUDIES  --------------------------------------------------------------------------------              9.2    9.07  >-----------<  190      [06-18-18 @ 14:11]              28.7     145  |  103  |  86  ----------------------------<  213      [06-18-18 @ 14:11]  3.4   |  31  |  4.53        Ca     8.2     [06-18-18 @ 14:11]            Creatinine Trend:  SCr 4.53 [06-18 @ 14:11]  SCr 4.59 [06-18 @ 05:44]  SCr 4.70 [06-17 @ 06:53]  SCr 4.69 [06-17 @ 05:35]  SCr 5.48 [06-14 @ 03:25]

## 2018-06-18 NOTE — PROGRESS NOTE ADULT - ASSESSMENT
Patient is an 84 y/o man with history of dementia, DM, HTN who presents with a chief complaint of abdominal pain and chills found to be in septic shock with oliguric RUSH requiring dialysis; last HD was on 5/30 and has been on hold since then.

## 2018-06-18 NOTE — PROGRESS NOTE ADULT - SUBJECTIVE AND OBJECTIVE BOX
CHIEF COMPLAINT:    Interval Events:    REVIEW OF SYSTEMS:  Constitutional:   Eyes:  ENT:  CV:  Resp:  GI:  :  MSK:  Integumentary:  Neurological:  Psychiatric:  Endocrine:  Hematologic/Lymphatic:  Allergic/Immunologic:  [ ] All other systems negative  [ ] Unable to assess ROS because ________    OBJECTIVE:  ICU Vital Signs Last 24 Hrs  T(C): 36.4 (18 Jun 2018 05:47), Max: 36.4 (17 Jun 2018 21:32)  T(F): 97.6 (18 Jun 2018 05:47), Max: 97.6 (17 Jun 2018 21:32)  HR: 83 (18 Jun 2018 05:47) (83 - 96)  BP: 139/70 (18 Jun 2018 05:47) (108/58 - 139/70)  BP(mean): --  ABP: --  ABP(mean): --  RR: 18 (18 Jun 2018 05:47) (18 - 20)  SpO2: 100% (18 Jun 2018 05:47) (96% - 100%)        06-17 @ 07:01  -  06-18 @ 07:00  --------------------------------------------------------  IN: 0 mL / OUT: 516 mL / NET: -516 mL      CAPILLARY BLOOD GLUCOSE          PHYSICAL EXAM:  General:   HEENT:   Lymph Nodes:  Neck:   Respiratory:   Cardiovascular:   Abdomen:   Extremities:   Skin:   Neurological:  Psychiatry:    HOSPITAL MEDICATIONS:  MEDICATIONS  (STANDING):  chlorhexidine 4% Liquid 1 Application(s) Topical <User Schedule>  dextrose 5%. 1000 milliLiter(s) (50 mL/Hr) IV Continuous <Continuous>  dextrose 5%. 1000 milliLiter(s) (50 mL/Hr) IV Continuous <Continuous>  dextrose 50% Injectable 12.5 Gram(s) IV Push once  dextrose 50% Injectable 25 Gram(s) IV Push once  dextrose 50% Injectable 25 Gram(s) IV Push once  pantoprazole  Injectable 40 milliGRAM(s) IV Push two times a day    MEDICATIONS  (PRN):  dextrose 40% Gel 15 Gram(s) Oral once PRN Blood Glucose LESS THAN 70 milliGRAM(s)/deciliter  glucagon  Injectable 1 milliGRAM(s) IntraMuscular once PRN Glucose LESS THAN 70 milligrams/deciliter  HYDROmorphone  Injectable 1 milliGRAM(s) IV Push every 6 hours PRN pain/dyspnea      LABS:                        8.8    7.71  )-----------( 218      ( 18 Jun 2018 05:44 )             27.5     06-18    145  |  103  |  91<H>  ----------------------------<  218<H>  3.3<L>   |  29  |  4.59<H>    Ca    8.0<L>      18 Jun 2018 05:44                MICROBIOLOGY:     RADIOLOGY:  [ ] Reviewed and interpreted by me    PULMONARY FUNCTION TESTS:    EKG: CHIEF COMPLAINT: Patient is a 83y old  Male who presents with a chief complaint of AMS (05 Jun 2018 17:26)      Interval Events: Melena with drop in H/H pt transfused 2 units PRBC    REVIEW OF SYSTEMS:    [ x] Unable to assess ROS because     OBJECTIVE:  ICU Vital Signs Last 24 Hrs  T(C): 36.4 (18 Jun 2018 05:47), Max: 36.4 (17 Jun 2018 21:32)  T(F): 97.6 (18 Jun 2018 05:47), Max: 97.6 (17 Jun 2018 21:32)  HR: 83 (18 Jun 2018 05:47) (83 - 96)  BP: 139/70 (18 Jun 2018 05:47) (108/58 - 139/70)  BP(mean): --  ABP: --  ABP(mean): --  RR: 18 (18 Jun 2018 05:47) (18 - 20)  SpO2: 100% (18 Jun 2018 05:47) (96% - 100%)        06-17 @ 07:01  -  06-18 @ 07:00  --------------------------------------------------------  IN: 0 mL / OUT: 516 mL / NET: -516 mL      CAPILLARY BLOOD GLUCOSE            HOSPITAL MEDICATIONS:  MEDICATIONS  (STANDING):  chlorhexidine 4% Liquid 1 Application(s) Topical <User Schedule>  dextrose 5%. 1000 milliLiter(s) (50 mL/Hr) IV Continuous <Continuous>  dextrose 5%. 1000 milliLiter(s) (50 mL/Hr) IV Continuous <Continuous>  dextrose 50% Injectable 12.5 Gram(s) IV Push once  dextrose 50% Injectable 25 Gram(s) IV Push once  dextrose 50% Injectable 25 Gram(s) IV Push once  pantoprazole  Injectable 40 milliGRAM(s) IV Push two times a day    MEDICATIONS  (PRN):  dextrose 40% Gel 15 Gram(s) Oral once PRN Blood Glucose LESS THAN 70 milliGRAM(s)/deciliter  glucagon  Injectable 1 milliGRAM(s) IntraMuscular once PRN Glucose LESS THAN 70 milligrams/deciliter  HYDROmorphone  Injectable 1 milliGRAM(s) IV Push every 6 hours PRN pain/dyspnea      LABS:                        8.8    7.71  )-----------( 218      ( 18 Jun 2018 05:44 )             27.5     06-18    145  |  103  |  91<H>  ----------------------------<  218<H>  3.3<L>   |  29  |  4.59<H>    Ca    8.0<L>      18 Jun 2018 05:44                MICROBIOLOGY:     RADIOLOGY:  [ ] Reviewed and interpreted by me    PULMONARY FUNCTION TESTS:    EKG:

## 2018-06-18 NOTE — PROGRESS NOTE ADULT - PROBLEM SELECTOR PLAN 2
- s/p CVVHDF and HD  - last HD 5/30  - renal note appreciated  - no further HD as per family wishes - s/p CVVHDF and HD  - last HD 5/30  - renal note appreciated  - no further HD as per family wishes  - IV hydration

## 2018-06-18 NOTE — PROGRESS NOTE ADULT - PROBLEM SELECTOR PLAN 2
In setting of resolving ATN: Serum K noted to be low at 3.4; received IV KCl supplementation. Continue to monitor K and supplement as needed.

## 2018-06-18 NOTE — CHART NOTE - NSCHARTNOTEFT_GEN_A_CORE
83M hx dementia, T2DM, HTN presenting to the ED w/ declining functional status, subjective hypothermia, and rigors admitted to MICU in 5/28 for severe sepsis progessing to shock 2/2 Klebsiella PNA & Enterobacter UTI, intubated for respiratory distress 5/28, extubated on 6/4, also ARF with severe acidosis, requiring RRT, last HD on 5/30  Pt scheduled for endoscopy today s/p melena last pm with drop in H/H hgb repeat 4.9. Pt given 2 units of PRBC with rise in hgb to 8.8 and taken to endoscopy where pt developed new onset afib   S. Eyes open. Non verbal   O.Vital Signs Last 24 Hrs  T(C): 36.3 (18 Jun 2018 14:36), Max: 36.4 (17 Jun 2018 21:32)  T(F): 97.4 (18 Jun 2018 14:36), Max: 97.6 (17 Jun 2018 21:32)  HR: 83 (18 Jun 2018 14:36) (83 - 96)  BP: 143/72 (18 Jun 2018 14:36) (127/69 - 143/72)  BP(mean): --  RR: 19 (18 Jun 2018 14:36) (18 - 20)  SpO2: 99% (18 Jun 2018 14:36) (96% - 100%)  frail cachectic elderly M seen lying supine in endoscopy recovery room using 100% NRB   HEENT MM dry   Neck supple   chest clear, diminished breath sides lower lobes   abd bs+ soft nt nd   Le no edema   skin wm and dry   a/p New onset afib   Spoke with dtr who is aware of endoscopy results and new onset of afib .  She was given option of returning pt to RCU with comfort care or transfer to telemetry to monitor heart rate.  Explained that pt is not a candidate for AC at this time sec to finding of large gastric ulcer seen on endoscopy and is at risk for stroke.  Dtr requests that pt be transferred to telemetry for HR monitoring   No need for rate control at this time bp/HR stable   GIB  Pt had endoscopy today recommendations are protonix drip, carafate   Monitor h/h   NPO   RUSH   No indication for HD at this time   IV hydration   Dtr to meet with hospice in am to discuss goals of care and hospice 83M hx dementia, T2DM, HTN presenting to the ED w/ declining functional status, subjective hypothermia, and rigors admitted to MICU in 5/28 for severe sepsis progessing to shock 2/2 Klebsiella PNA & Enterobacter UTI, intubated for respiratory distress 5/28, extubated on 6/4, also ARF with severe acidosis, requiring RRT, last HD on 5/30  Pt scheduled for endoscopy today s/p melena last pm with drop in H/H hgb repeat 4.9. Pt given 2 units of PRBC with rise in hgb to 8.8 and taken to endoscopy where pt developed new onset afib   S. Eyes open. Non verbal   O.Vital Signs Last 24 Hrs  T(C): 36.3 (18 Jun 2018 14:36), Max: 36.4 (17 Jun 2018 21:32)  T(F): 97.4 (18 Jun 2018 14:36), Max: 97.6 (17 Jun 2018 21:32)  HR: 83 (18 Jun 2018 14:36) (83 - 96)  BP: 143/72 (18 Jun 2018 14:36) (127/69 - 143/72)  BP(mean): --  RR: 19 (18 Jun 2018 14:36) (18 - 20)  SpO2: 99% (18 Jun 2018 14:36) (96% - 100%)  frail cachectic elderly M seen lying supine in endoscopy recovery room using 100% NRB   HEENT MM dry   Neck supple   chest clear, diminished breath sides lower lobes   abd bs+ soft nt nd   Le no edema   skin wm and dry   a/p New onset afib   Spoke with dtr who is aware of endoscopy results and new onset of afib .  She was given option of returning pt to RCU with comfort care or transfer to telemetry to monitor heart rate.  Explained that pt is not a candidate for AC at this time sec to finding of large gastric ulcer seen on endoscopy and is at risk for stroke.  Dtr requests that pt be transferred to telemetry for HR monitoring   No need for rate control at this time bp/HR stable   GIB  Pt had endoscopy today recommendations are protonix drip, carafate   Monitor h/h   NPO Unable to place feeding tube secondary to gastric ulcer   Acute resp failure   Pt titrated to 5L n/c on floor   Using 100% NRB at this time post endoscopy pulse ox 94% wean as tolerated   RUSH   No indication for HD at this time   IV hydration   DM  fingersticks d.c for patient comfort   Restart if pt has feeding tube /nutrition     Dtr to meet with hospice in am to discuss goals of care and possible transfer to inpatient /home hospice  report given to Nevin miller at 7:25pm Pt to be transferred to [facility] telemetry floor

## 2018-06-18 NOTE — PROGRESS NOTE ADULT - PROBLEM SELECTOR PLAN 3
- failed S&S  - NPO  - family requesting to feed again - GI following for possible PEG tube   - unable to place NGT  - GI note appreciated  - as per son-in-law aron Lockhart has decided to proceed with PEG - failed S&S  - NPO  - family requesting to feed again - GI following for possible PEG tube   - unable to place NGT  - as per son-in-law aron Lockhart has decided to proceed with PEG  Pt with melena yesterday dropped hgb to 4.9 2units PRBC given with hgb 8.9 today   - GI will do endoscopy if no bleeding will also put in PEG tube at that time

## 2018-06-18 NOTE — PROGRESS NOTE ADULT - ATTENDING COMMENTS
Advanced dementia, nonverbal  Low hgb at 4.9 yesterday, now 8.8  PPI BID. GI following, to scope today with possible PEG tube placement  Status communicated to the family  Will continue to address GOC with the family and present home hospice as an option

## 2018-06-19 DIAGNOSIS — L89.95 PRESSURE ULCER OF UNSPECIFIED SITE, UNSTAGEABLE: ICD-10-CM

## 2018-06-19 DIAGNOSIS — K92.1 MELENA: ICD-10-CM

## 2018-06-19 DIAGNOSIS — I48.91 UNSPECIFIED ATRIAL FIBRILLATION: ICD-10-CM

## 2018-06-19 LAB
GLUCOSE BLDC GLUCOMTR-MCNC: 201 MG/DL — HIGH (ref 70–99)
GLUCOSE BLDC GLUCOMTR-MCNC: 209 MG/DL — HIGH (ref 70–99)

## 2018-06-19 PROCEDURE — 99233 SBSQ HOSP IP/OBS HIGH 50: CPT

## 2018-06-19 RX ORDER — CHLORHEXIDINE GLUCONATE 213 G/1000ML
1 SOLUTION TOPICAL
Qty: 0 | Refills: 0 | Status: DISCONTINUED | OUTPATIENT
Start: 2018-06-19 | End: 2018-06-28

## 2018-06-19 RX ORDER — SODIUM CHLORIDE 9 MG/ML
1000 INJECTION, SOLUTION INTRAVENOUS
Qty: 0 | Refills: 0 | Status: DISCONTINUED | OUTPATIENT
Start: 2018-06-19 | End: 2018-06-20

## 2018-06-19 RX ADMIN — SODIUM CHLORIDE 75 MILLILITER(S): 9 INJECTION, SOLUTION INTRAVENOUS at 17:04

## 2018-06-19 RX ADMIN — PANTOPRAZOLE SODIUM 10 MG/HR: 20 TABLET, DELAYED RELEASE ORAL at 13:35

## 2018-06-19 RX ADMIN — SODIUM CHLORIDE 50 MILLILITER(S): 9 INJECTION, SOLUTION INTRAVENOUS at 13:35

## 2018-06-19 RX ADMIN — CHLORHEXIDINE GLUCONATE 1 APPLICATION(S): 213 SOLUTION TOPICAL at 13:35

## 2018-06-19 NOTE — PROGRESS NOTE ADULT - PROBLEM SELECTOR PROBLEM 7
Alzheimer's dementia without behavioral disturbance, unspecified timing of dementia onset DM (diabetes mellitus)

## 2018-06-19 NOTE — PROGRESS NOTE ADULT - SUBJECTIVE AND OBJECTIVE BOX
INTERVAL HPI/OVERNIGHT EVENTS:    s/p EGD with large gastric ulcer yesterday evening   non-verbal/pleasantly demented; no new gi events per covering team  hypothermic with warming blanket on    MEDICATIONS  (STANDING):  chlorhexidine 4% Liquid 1 Application(s) Topical <User Schedule>  dextrose 5%. 1000 milliLiter(s) (50 mL/Hr) IV Continuous <Continuous>  dextrose 5%. 1000 milliLiter(s) (50 mL/Hr) IV Continuous <Continuous>  dextrose 50% Injectable 12.5 Gram(s) IV Push once  dextrose 50% Injectable 25 Gram(s) IV Push once  dextrose 50% Injectable 25 Gram(s) IV Push once  pantoprazole Infusion 8 mG/Hr (10 mL/Hr) IV Continuous <Continuous>  sucralfate suspension 1 Gram(s) Oral every 6 hours    MEDICATIONS  (PRN):  dextrose 40% Gel 15 Gram(s) Oral once PRN Blood Glucose LESS THAN 70 milliGRAM(s)/deciliter  glucagon  Injectable 1 milliGRAM(s) IntraMuscular once PRN Glucose LESS THAN 70 milligrams/deciliter      Allergies    No Known Allergies    Intolerances        Review of Systems: *nonverbal/dementia          Vital Signs Last 24 Hrs  T(C): 34.8 (19 Jun 2018 04:49), Max: 36.3 (18 Jun 2018 14:16)  T(F): 94.7 (19 Jun 2018 04:49), Max: 97.4 (18 Jun 2018 14:16)  HR: 84 (19 Jun 2018 04:49) (83 - 85)  BP: 141/89 (19 Jun 2018 04:49) (107/64 - 143/72)  BP(mean): --  RR: 20 (19 Jun 2018 04:49) (18 - 20)  SpO2: 100% (19 Jun 2018 04:49) (99% - 100%)    PHYSICAL EXAM:    Constitutional: NAD  HEENT: EOMI, throat clear  Neck: No LAD, supple  Respiratory: CTA and P  Cardiovascular: S1 and S2, RRR, no M  Gastrointestinal: BS+, soft, NT/ND, neg HSM,  Extremities: No peripheral edema, neg clubbing, cyanosis  Vascular: 2+ peripheral pulses  Neurological: A/O x 1  Psychiatric: Normal mood, normal affect  Skin: No rashes      LABS:                        9.2    9.07  )-----------( 190      ( 18 Jun 2018 14:11 )             28.7     06-18    145  |  103  |  86<H>  ----------------------------<  213<H>  3.4<L>   |  31  |  4.53<H>    Ca    8.2<L>      18 Jun 2018 14:11            RADIOLOGY & ADDITIONAL TESTS:      < from: Upper Endoscopy (06.18.18 @ 15:38) >    [facility]  _______________________________________________________________________________  Patient Name: Nathaniel Guevara           Procedure Date: 6/18/2018 3:38 PM  MRN: [id number]                     Account Number: [id number]  YOB: 1935              Admit Type: Inpatient  Room: [place] [address]                         Gender: Male  Attending MD: JAJA REYES DO     _______________________________________________________________________________     Procedure:           Upper GI endoscopy  Indications:         Acute post hemorrhagic anemia, Dysphagia  Providers:           JAJA REYES DO  Medicines:           Monitored Anesthesia Care  Complications:       No immediate complications.  Procedure:     Pre-Anesthesia Assessment:                       - Prior to the procedure, a History and Physical was                        performed, and patient medications and allergies were                        reviewed. The risks and benefits of the procedure and                        the sedation options and risks were discussed with the                        patient. All questions were answered and informed                        consent was obtained. Patient identification and        proposed procedure were verified. After reviewing the                        risks and benefits, the patient was deemed in                        satisfactory condition to undergo the procedure. The                        anesthesia plan was to use monitored anesthesia care                        (MAC). Immediately prior to administration of                        medications, the patient was re-assessed for adequacy to                        receive sedatives. The heart rate, respiratory rate,                        oxygen saturations, blood pressure, adequacy of                        pulmonary ventilation, and response to care were                        monitored throughout the procedure. The physical status                        ofthe patient was re-assessed after the procedure.                       After obtaining informed consent, the endoscope was                        passed under direct vision. Throughout the procedure,                        the patient's blood pressure, pulse, and oxygen                        saturations were monitored continuously. The Endoscope                        was introduced through the mouth, and advanced to the                        second part of duodenum. The upper GI endoscopy was                        accomplished without difficulty. The patient tolerated                        the procedure well.                                                                                   Findings:       The upper third of the esophagus, middle third of the esophagus and        lower third of the esophagus were normal.       One large, cratered gastric ulcer with adherent clot was found at the        incisura/lesser curvature. Area was successfully injected with 10 mL of        a 1:10,000 solution of epinephrine for hemostasis. Six hemostatic clips        were placed, which narrowed the opening of the gastric ulcer.        Coagulation for hemostasis using bipolar probe (20 W) was successful.        Estimated blood loss was minimal.       Biopsies were taken with a cold forceps in the gastric antrum for        histology. Estimated blood loss was minimal.                                                                                   Impression:          - Gastric ulcer with adherent clot. Injected. Clips were                        placed. Treated with bipolar cautery.                       - Biopsies were taken with a cold forceps for histology                        in the gastric antrum.  Recommendation:      - Return patient to [facility] for [facility] care.                       - Use a proton pump inhibitor IV drip.                       - Await pathology results.                       - Monitor hemoglobin, transfuse as appropriate.                                                         Attending Participation:       I was present and participated during the entire procedure, including        non-key portions.                ____________________  JAJA REYES DO  6/18/2018 5:07:08 PM  This report has been signed electronically.  Number of Addenda: 0    Note Initiated On: 6/18/2018 3:38 PM    < end of copied text >

## 2018-06-19 NOTE — PROGRESS NOTE ADULT - PROBLEM SELECTOR PLAN 1
- cont to trend cbc and transfuse prn   - s/p EGD w/large gastric ulcer requiring bipolar cautery and clip  - pathology testing   - monitor stools for further episodes of melena  - protonix gtt through tomorrow evening

## 2018-06-19 NOTE — PROGRESS NOTE ADULT - PROBLEM SELECTOR PLAN 3
- completed zosyn x 7 days  - weaned to 5L NC  - cont to wean as tolerated -local wound care frequent turning and off loading.

## 2018-06-19 NOTE — PROGRESS NOTE ADULT - PROBLEM SELECTOR PLAN 1
s/p EGD reviewed large gastric ulcer with adherent clot   s/p 2 u prbc with appropriate rise  Protonic IV drip s/p EGD reviewed large gastric ulcer with adherent clot   s/p 2 u prbc with appropriate rise  Protonix IV drip

## 2018-06-19 NOTE — GOALS OF CARE CONVERSATION - PERSONAL ADVANCE DIRECTIVE - CONVERSATION DETAILS
[facility]    Meeting held with pt's daughter, Alexia,  and son in law Orly. Both discussed their hopes for pt to be able to receive nutrition either via po intake or G-tube placement "once ulcer heals". Both expressed this hope is based  upon their recent experiences of pt surviving other major organ failures during this hospitalization. Alexia stated she is not yet amenable to "comfort care" or a hospice plan of care at this time. Hospice RN remains available for support and care planning. There is no consent for hospice care at this time.

## 2018-06-19 NOTE — PROGRESS NOTE ADULT - PROBLEM SELECTOR PLAN 5
- a1c 10.3  - will d/c fingersticks for pt comfort  - will need to restart when pt is feeding again - completed zosyn x 7 days  - weaned to 5L NC  - cont to wean as tolerated

## 2018-06-19 NOTE — PROGRESS NOTE ADULT - PROBLEM SELECTOR PLAN 2
- 2/2 mental state/dementia/declining functional status  - swallow eval from 6/5 noted with PO intake contraindicated  - aspiration precautions   - palliative following re: goc with hospice planning  - pt is poor peg candidate given EGD revealing large gastric ulcer requiring bipolar cautery and clip

## 2018-06-19 NOTE — PROGRESS NOTE ADULT - PROBLEM SELECTOR PLAN 2
- failed S&S  - NPO  - family requesting to feed again - GI following for possible PEG tube   - unable to place NGT  - as per son-in-law aron Lockhart has decided to proceed with PEG  Pt with melena  dropped hgb to 4.9 2 units PRBC given with hgb 8.9-->9.2  - GI EGD large gastric ulcer with adherent clot injected and cauterized case d/w GI will not proceed with PEG at this time for fear of perforation will cont PPI and re-evaluate for PEG early next week -rate controlled on tele   -no anticoagulation due to GIB

## 2018-06-19 NOTE — PROGRESS NOTE ADULT - SUBJECTIVE AND OBJECTIVE BOX
Patient is a 83y old  Male who presents with a chief complaint of AMS (05 Jun 2018 17:26)      SUBJECTIVE / OVERNIGHT EVENTS:    MEDICATIONS  (STANDING):  chlorhexidine 4% Liquid 1 Application(s) Topical <User Schedule>  dextrose 5%. 1000 milliLiter(s) (50 mL/Hr) IV Continuous <Continuous>  dextrose 5%. 1000 milliLiter(s) (50 mL/Hr) IV Continuous <Continuous>  dextrose 50% Injectable 12.5 Gram(s) IV Push once  dextrose 50% Injectable 25 Gram(s) IV Push once  dextrose 50% Injectable 25 Gram(s) IV Push once  pantoprazole Infusion 8 mG/Hr (10 mL/Hr) IV Continuous <Continuous>  sucralfate suspension 1 Gram(s) Oral every 6 hours    MEDICATIONS  (PRN):  dextrose 40% Gel 15 Gram(s) Oral once PRN Blood Glucose LESS THAN 70 milliGRAM(s)/deciliter  glucagon  Injectable 1 milliGRAM(s) IntraMuscular once PRN Glucose LESS THAN 70 milligrams/deciliter        CAPILLARY BLOOD GLUCOSE      POCT Blood Glucose.: 211 mg/dL (19 Jun 2018 12:49)  POCT Blood Glucose.: 209 mg/dL (19 Jun 2018 08:43)  POCT Blood Glucose.: 201 mg/dL (19 Jun 2018 04:29)  POCT Blood Glucose.: 205 mg/dL (18 Jun 2018 22:27)  POCT Blood Glucose.: 192 mg/dL (18 Jun 2018 15:11)    I&O's Summary    18 Jun 2018 07:01  -  19 Jun 2018 07:00  --------------------------------------------------------  IN: 0 mL / OUT: 850 mL / NET: -850 mL        T(C): 36.7 (06-19-18 @ 14:29), Max: 36.7 (06-19-18 @ 14:29)  HR: 92 (06-19-18 @ 14:29) (84 - 92)  BP: 108/64 (06-19-18 @ 14:29) (107/64 - 141/89)  RR: 17 (06-19-18 @ 14:29) (17 - 20)  SpO2: 100% (06-19-18 @ 14:29) (100% - 100%)    PHYSICAL EXAM:  GENERAL: NAD, well-developed  HEAD:  Atraumatic, Normocephalic  EYES: EOMI, PERRLA, conjunctiva and sclera clear  NECK: Supple, No JVD  CHEST/LUNG: Clear to auscultation bilaterally; No wheeze  HEART: Regular rate and rhythm; No murmurs, rubs, or gallops  ABDOMEN: Soft, Nontender, Nondistended; Bowel sounds present  EXTREMITIES:  2+ Peripheral Pulses, No clubbing, cyanosis, or edema  PSYCH: AAOx3  NEUROLOGY: non-focal  SKIN: No rashes or lesions    LABS:                        9.2    9.07  )-----------( 190      ( 18 Jun 2018 14:11 )             28.7     06-18    145  |  103  |  86<H>  ----------------------------<  213<H>  3.4<L>   |  31  |  4.53<H>    Ca    8.2<L>      18 Jun 2018 14:11                  RADIOLOGY & ADDITIONAL TESTS:    Imaging Personally Reviewed:    Consultant(s) Notes Reviewed:      Care Discussed with Consultants/Other Providers: Patient is a 83y old  Male who presents with a chief complaint of AMS (05 Jun 2018 17:26)      SUBJECTIVE / OVERNIGHT EVENTS: Pt in NAD on non re breather mask awake does not converse    MEDICATIONS  (STANDING):  chlorhexidine 4% Liquid 1 Application(s) Topical <User Schedule>  dextrose 5%. 1000 milliLiter(s) (50 mL/Hr) IV Continuous <Continuous>  dextrose 5%. 1000 milliLiter(s) (50 mL/Hr) IV Continuous <Continuous>  dextrose 50% Injectable 12.5 Gram(s) IV Push once  dextrose 50% Injectable 25 Gram(s) IV Push once  dextrose 50% Injectable 25 Gram(s) IV Push once  pantoprazole Infusion 8 mG/Hr (10 mL/Hr) IV Continuous <Continuous>  sucralfate suspension 1 Gram(s) Oral every 6 hours    MEDICATIONS  (PRN):  dextrose 40% Gel 15 Gram(s) Oral once PRN Blood Glucose LESS THAN 70 milliGRAM(s)/deciliter  glucagon  Injectable 1 milliGRAM(s) IntraMuscular once PRN Glucose LESS THAN 70 milligrams/deciliter        CAPILLARY BLOOD GLUCOSE      POCT Blood Glucose.: 211 mg/dL (19 Jun 2018 12:49)  POCT Blood Glucose.: 209 mg/dL (19 Jun 2018 08:43)  POCT Blood Glucose.: 201 mg/dL (19 Jun 2018 04:29)  POCT Blood Glucose.: 205 mg/dL (18 Jun 2018 22:27)  POCT Blood Glucose.: 192 mg/dL (18 Jun 2018 15:11)    I&O's Summary    18 Jun 2018 07:01  -  19 Jun 2018 07:00  --------------------------------------------------------  IN: 0 mL / OUT: 850 mL / NET: -850 mL        T(C): 36.7 (06-19-18 @ 14:29), Max: 36.7 (06-19-18 @ 14:29)  HR: 92 (06-19-18 @ 14:29) (84 - 92)  BP: 108/64 (06-19-18 @ 14:29) (107/64 - 141/89)  RR: 17 (06-19-18 @ 14:29) (17 - 20)  SpO2: 100% (06-19-18 @ 14:29) (100% - 100%)    PHYSICAL EXAM:  GENERAL: NAD, well-developed  HEAD:  Atraumatic, Normocephalic  EYES: EOMI, PERRLA, conjunctiva and sclera clear  NECK: Supple, No JVD  CHEST/LUNG: Clear to auscultation bilaterally; No wheeze  HEART: Regular rate and rhythm; No murmurs, rubs, or gallops  ABDOMEN: Soft, Nontender, Nondistended; Bowel sounds present  EXTREMITIES:  2+ Peripheral Pulses, No clubbing, cyanosis, or edema  PSYCH: AAOx3  NEUROLOGY: non-focal  SKIN: No rashes or lesions    LABS:                        9.2    9.07  )-----------( 190      ( 18 Jun 2018 14:11 )             28.7     06-18    145  |  103  |  86<H>  ----------------------------<  213<H>  3.4<L>   |  31  |  4.53<H>    Ca    8.2<L>      18 Jun 2018 14:11                  RADIOLOGY & ADDITIONAL TESTS:    Imaging Personally Reviewed:    Consultant(s) Notes Reviewed:      Care Discussed with Consultants/Other Providers: Patient is a 83y old  Male who presents with a chief complaint of AMS (05 Jun 2018 17:26)      SUBJECTIVE / OVERNIGHT EVENTS: Pt in NAD on non re breather mask awake does not converse daughter at bedside temp 94.7     MEDICATIONS  (STANDING):  chlorhexidine 4% Liquid 1 Application(s) Topical <User Schedule>  dextrose 5%. 1000 milliLiter(s) (50 mL/Hr) IV Continuous <Continuous>  dextrose 5%. 1000 milliLiter(s) (50 mL/Hr) IV Continuous <Continuous>  dextrose 50% Injectable 12.5 Gram(s) IV Push once  dextrose 50% Injectable 25 Gram(s) IV Push once  dextrose 50% Injectable 25 Gram(s) IV Push once  pantoprazole Infusion 8 mG/Hr (10 mL/Hr) IV Continuous <Continuous>  sucralfate suspension 1 Gram(s) Oral every 6 hours    MEDICATIONS  (PRN):  dextrose 40% Gel 15 Gram(s) Oral once PRN Blood Glucose LESS THAN 70 milliGRAM(s)/deciliter  glucagon  Injectable 1 milliGRAM(s) IntraMuscular once PRN Glucose LESS THAN 70 milligrams/deciliter        CAPILLARY BLOOD GLUCOSE      POCT Blood Glucose.: 211 mg/dL (19 Jun 2018 12:49)  POCT Blood Glucose.: 209 mg/dL (19 Jun 2018 08:43)  POCT Blood Glucose.: 201 mg/dL (19 Jun 2018 04:29)  POCT Blood Glucose.: 205 mg/dL (18 Jun 2018 22:27)  POCT Blood Glucose.: 192 mg/dL (18 Jun 2018 15:11)    I&O's Summary    18 Jun 2018 07:01  -  19 Jun 2018 07:00  --------------------------------------------------------  IN: 0 mL / OUT: 850 mL / NET: -850 mL        T(C): 36.7 (06-19-18 @ 14:29), Max: 36.7 (06-19-18 @ 14:29)  HR: 92 (06-19-18 @ 14:29) (84 - 92)  BP: 108/64 (06-19-18 @ 14:29) (107/64 - 141/89)  RR: 17 (06-19-18 @ 14:29) (17 - 20)  SpO2: 100% (06-19-18 @ 14:29) (100% - 100%)    PHYSICAL EXAM:  GENERAL: cachetic  HEAD:  Atraumatic, Normocephalic  EYES: EOMI, PERRLA, conjunctiva and sclera clear  NECK: Supple, No JVD  CHEST/LUNG: decreased BS at bases  HEART: Regular rate and rhythm; No murmurs, rubs, or gallops  ABDOMEN: Soft, Nontender, Nondistended; Bowel sounds present +rectal tube with dark liquid stool  EXTREMITIES:  2+ Peripheral Pulses, No clubbing, cyanosis, or edema  PSYCH: AAOx0  NEUROLOGY: does not participate with exam   SKIN:  +DU sacrum     LABS:                        9.2    9.07  )-----------( 190      ( 18 Jun 2018 14:11 )             28.7     06-18    145  |  103  |  86<H>  ----------------------------<  213<H>  3.4<L>   |  31  |  4.53<H>    Ca    8.2<L>      18 Jun 2018 14:11                  RADIOLOGY & ADDITIONAL TESTS:    Imaging Personally Reviewed:    Consultant(s) Notes Reviewed:      Care Discussed with Consultants/Other Providers:

## 2018-06-19 NOTE — GOALS OF CARE CONVERSATION - PERSONAL ADVANCE DIRECTIVE - NS PRO AD NO ADVANCE DIRECTIVE
pt AMS and lethargic. Unable to complete at this time/No

## 2018-06-19 NOTE — PROGRESS NOTE ADULT - PROBLEM SELECTOR PLAN 6
- amlodipine stopped, BP acceptable  - monitor - s/p CVVHDF and HD  - last HD 5/30  - renal note appreciated  - no further HD as per family wishes  - IV hydration

## 2018-06-19 NOTE — PROGRESS NOTE ADULT - PROBLEM SELECTOR PROBLEM 3
Acute respiratory failure, unspecified whether with hypoxia or hypercapnia Unstageable pressure ulcer, unspecified location

## 2018-06-19 NOTE — PROGRESS NOTE ADULT - PROBLEM SELECTOR PLAN 4
- s/p CVVHDF and HD  - last HD 5/30  - renal note appreciated  - no further HD as per family wishes  - IV hydration - failed S&S  - NPO  - family requesting to feed again - GI following for possible PEG tube   - unable to place NGT  - as per son-in-law aron Lockhart has decided to proceed with PEG  Pt with melena  dropped hgb to 4.9 2 units PRBC given with hgb 8.9-->9.2  - GI EGD large gastric ulcer with adherent clot injected and cauterized case d/w GI will not proceed with PEG at this time for fear of perforation will cont PPI and re-evaluate for PEG early next week

## 2018-06-19 NOTE — CHART NOTE - NSCHARTNOTEFT_GEN_A_CORE
Rectal Temp 94.7  Pt is DNR, no complaints at present    ICU Vital Signs Last 24 Hrs  T(C): 34.8 (19 Jun 2018 04:49), Max: 36.4 (18 Jun 2018 05:47)  T(F): 94.7 (19 Jun 2018 04:49), Max: 97.6 (18 Jun 2018 05:47)  HR: 84 (19 Jun 2018 04:49) (83 - 85)  BP: 141/89 (19 Jun 2018 04:49) (107/64 - 143/72)  BP(mean): --  ABP: --  ABP(mean): --  RR: 20 (19 Jun 2018 04:49) (18 - 20)  SpO2: 100% (19 Jun 2018 04:49) (99% - 100%)      Plan: Hyperthermia blanket  Continue to monitor  No further workup at this time as pt is DNR

## 2018-06-20 LAB
BUN SERPL-MCNC: 69 MG/DL — HIGH (ref 7–23)
CALCIUM SERPL-MCNC: 7.8 MG/DL — LOW (ref 8.4–10.5)
CHLORIDE SERPL-SCNC: 108 MMOL/L — HIGH (ref 98–107)
CO2 SERPL-SCNC: 30 MMOL/L — SIGNIFICANT CHANGE UP (ref 22–31)
CREAT SERPL-MCNC: 4.3 MG/DL — HIGH (ref 0.5–1.3)
GLUCOSE SERPL-MCNC: 145 MG/DL — HIGH (ref 70–99)
HCT VFR BLD CALC: 26.6 % — LOW (ref 39–50)
HGB BLD-MCNC: 8.4 G/DL — LOW (ref 13–17)
MAGNESIUM SERPL-MCNC: 1.9 MG/DL — SIGNIFICANT CHANGE UP (ref 1.6–2.6)
MCHC RBC-ENTMCNC: 28.5 PG — SIGNIFICANT CHANGE UP (ref 27–34)
MCHC RBC-ENTMCNC: 31.6 % — LOW (ref 32–36)
MCV RBC AUTO: 90.2 FL — SIGNIFICANT CHANGE UP (ref 80–100)
NRBC # FLD: 0.02 — SIGNIFICANT CHANGE UP
PLATELET # BLD AUTO: 178 K/UL — SIGNIFICANT CHANGE UP (ref 150–400)
PMV BLD: 10.6 FL — SIGNIFICANT CHANGE UP (ref 7–13)
POTASSIUM SERPL-MCNC: 3.3 MMOL/L — LOW (ref 3.5–5.3)
POTASSIUM SERPL-SCNC: 3.3 MMOL/L — LOW (ref 3.5–5.3)
RBC # BLD: 2.95 M/UL — LOW (ref 4.2–5.8)
RBC # FLD: 14.7 % — HIGH (ref 10.3–14.5)
SODIUM SERPL-SCNC: 149 MMOL/L — HIGH (ref 135–145)
SURGICAL PATHOLOGY STUDY: SIGNIFICANT CHANGE UP
WBC # BLD: 7.4 K/UL — SIGNIFICANT CHANGE UP (ref 3.8–10.5)
WBC # FLD AUTO: 7.4 K/UL — SIGNIFICANT CHANGE UP (ref 3.8–10.5)

## 2018-06-20 PROCEDURE — 99233 SBSQ HOSP IP/OBS HIGH 50: CPT

## 2018-06-20 RX ORDER — SODIUM CHLORIDE 9 MG/ML
1000 INJECTION, SOLUTION INTRAVENOUS
Qty: 0 | Refills: 0 | Status: DISCONTINUED | OUTPATIENT
Start: 2018-06-20 | End: 2018-06-21

## 2018-06-20 RX ORDER — DEXTROSE 50 % IN WATER 50 %
25 SYRINGE (ML) INTRAVENOUS ONCE
Qty: 0 | Refills: 0 | Status: COMPLETED | OUTPATIENT
Start: 2018-06-20 | End: 2018-06-20

## 2018-06-20 RX ORDER — POTASSIUM CHLORIDE 20 MEQ
10 PACKET (EA) ORAL
Qty: 0 | Refills: 0 | Status: COMPLETED | OUTPATIENT
Start: 2018-06-20 | End: 2018-06-20

## 2018-06-20 RX ORDER — INSULIN LISPRO 100/ML
VIAL (ML) SUBCUTANEOUS EVERY 6 HOURS
Qty: 0 | Refills: 0 | Status: DISCONTINUED | OUTPATIENT
Start: 2018-06-20 | End: 2018-06-28

## 2018-06-20 RX ADMIN — Medication 25 GRAM(S): at 13:07

## 2018-06-20 RX ADMIN — Medication 1: at 05:23

## 2018-06-20 RX ADMIN — Medication 100 MILLIEQUIVALENT(S): at 10:04

## 2018-06-20 RX ADMIN — PANTOPRAZOLE SODIUM 10 MG/HR: 20 TABLET, DELAYED RELEASE ORAL at 13:13

## 2018-06-20 RX ADMIN — SODIUM CHLORIDE 75 MILLILITER(S): 9 INJECTION, SOLUTION INTRAVENOUS at 09:54

## 2018-06-20 RX ADMIN — Medication 100 MILLIEQUIVALENT(S): at 11:39

## 2018-06-20 RX ADMIN — Medication 1: at 17:51

## 2018-06-20 RX ADMIN — SODIUM CHLORIDE 75 MILLILITER(S): 9 INJECTION, SOLUTION INTRAVENOUS at 23:42

## 2018-06-20 RX ADMIN — PANTOPRAZOLE SODIUM 10 MG/HR: 20 TABLET, DELAYED RELEASE ORAL at 02:50

## 2018-06-20 RX ADMIN — Medication 100 MILLIEQUIVALENT(S): at 12:40

## 2018-06-20 RX ADMIN — PANTOPRAZOLE SODIUM 10 MG/HR: 20 TABLET, DELAYED RELEASE ORAL at 23:42

## 2018-06-20 RX ADMIN — SODIUM CHLORIDE 75 MILLILITER(S): 9 INJECTION, SOLUTION INTRAVENOUS at 06:22

## 2018-06-20 RX ADMIN — CHLORHEXIDINE GLUCONATE 1 APPLICATION(S): 213 SOLUTION TOPICAL at 10:02

## 2018-06-20 NOTE — PROGRESS NOTE ADULT - SUBJECTIVE AND OBJECTIVE BOX
Patient is a 83y old  Male who presents with a chief complaint of AMS (05 Jun 2018 17:26)      SUBJECTIVE / OVERNIGHT EVENTS:    MEDICATIONS  (STANDING):  chlorhexidine 4% Liquid 1 Application(s) Topical <User Schedule>  dextrose 5% + sodium chloride 0.9%. 1000 milliLiter(s) (75 mL/Hr) IV Continuous <Continuous>  dextrose 5%. 1000 milliLiter(s) (50 mL/Hr) IV Continuous <Continuous>  dextrose 50% Injectable 12.5 Gram(s) IV Push once  dextrose 50% Injectable 25 Gram(s) IV Push once  dextrose 50% Injectable 25 Gram(s) IV Push once  insulin lispro (HumaLOG) corrective regimen sliding scale   SubCutaneous every 6 hours  pantoprazole Infusion 8 mG/Hr (10 mL/Hr) IV Continuous <Continuous>  sucralfate suspension 1 Gram(s) Oral every 6 hours    MEDICATIONS  (PRN):  dextrose 40% Gel 15 Gram(s) Oral once PRN Blood Glucose LESS THAN 70 milliGRAM(s)/deciliter  glucagon  Injectable 1 milliGRAM(s) IntraMuscular once PRN Glucose LESS THAN 70 milligrams/deciliter        CAPILLARY BLOOD GLUCOSE      POCT Blood Glucose.: 151 mg/dL (20 Jun 2018 04:34)  POCT Blood Glucose.: 182 mg/dL (19 Jun 2018 21:36)  POCT Blood Glucose.: 184 mg/dL (19 Jun 2018 17:46)  POCT Blood Glucose.: 211 mg/dL (19 Jun 2018 12:49)  POCT Blood Glucose.: 209 mg/dL (19 Jun 2018 08:43)    I&O's Summary    19 Jun 2018 07:01  -  20 Jun 2018 07:00  --------------------------------------------------------  IN: 0 mL / OUT: 500 mL / NET: -500 mL        T(C): 37.2 (06-20-18 @ 05:30), Max: 37.2 (06-20-18 @ 05:30)  HR: 94 (06-20-18 @ 05:30) (92 - 94)  BP: 137/78 (06-20-18 @ 05:30) (108/64 - 137/78)  RR: 20 (06-20-18 @ 05:30) (17 - 20)  SpO2: 100% (06-20-18 @ 05:30) (96% - 100%)    PHYSICAL EXAM:  GENERAL: cachetic  HEAD:  Atraumatic, Normocephalic  EYES: EOMI, PERRLA, conjunctiva and sclera clear  NECK: Supple, No JVD  CHEST/LUNG: decreased BS at bases  HEART: Regular rate and rhythm; No murmurs, rubs, or gallops  ABDOMEN: Soft, Nontender, Nondistended; Bowel sounds present +rectal tube with dark liquid stool  EXTREMITIES:  2+ Peripheral Pulses, No clubbing, cyanosis, or edema  PSYCH: AAOx0  NEUROLOGY: does not participate with exam   SKIN:  +DU sacrum       LABS:                        8.4    7.40  )-----------( 178      ( 20 Jun 2018 06:25 )             26.6     06-20    149<H>  |  108<H>  |  69<H>  ----------------------------<  145<H>  3.3<L>   |  30  |  4.30<H>    Ca    7.8<L>      20 Jun 2018 06:25  Mg     1.9     06-20                  RADIOLOGY & ADDITIONAL TESTS:    Imaging Personally Reviewed:    Consultant(s) Notes Reviewed:      Care Discussed with Consultants/Other Providers: Patient is a 83y old  Male who presents with a chief complaint of AMS (05 Jun 2018 17:26)      SUBJECTIVE / OVERNIGHT EVENTS: Pt somnolent no acute complaints review of systems unobtainable due to medical condition    MEDICATIONS  (STANDING):  chlorhexidine 4% Liquid 1 Application(s) Topical <User Schedule>  dextrose 5% + sodium chloride 0.9%. 1000 milliLiter(s) (75 mL/Hr) IV Continuous <Continuous>  dextrose 5%. 1000 milliLiter(s) (50 mL/Hr) IV Continuous <Continuous>  dextrose 50% Injectable 12.5 Gram(s) IV Push once  dextrose 50% Injectable 25 Gram(s) IV Push once  dextrose 50% Injectable 25 Gram(s) IV Push once  insulin lispro (HumaLOG) corrective regimen sliding scale   SubCutaneous every 6 hours  pantoprazole Infusion 8 mG/Hr (10 mL/Hr) IV Continuous <Continuous>  sucralfate suspension 1 Gram(s) Oral every 6 hours    MEDICATIONS  (PRN):  dextrose 40% Gel 15 Gram(s) Oral once PRN Blood Glucose LESS THAN 70 milliGRAM(s)/deciliter  glucagon  Injectable 1 milliGRAM(s) IntraMuscular once PRN Glucose LESS THAN 70 milligrams/deciliter        CAPILLARY BLOOD GLUCOSE      POCT Blood Glucose.: 151 mg/dL (20 Jun 2018 04:34)  POCT Blood Glucose.: 182 mg/dL (19 Jun 2018 21:36)  POCT Blood Glucose.: 184 mg/dL (19 Jun 2018 17:46)  POCT Blood Glucose.: 211 mg/dL (19 Jun 2018 12:49)  POCT Blood Glucose.: 209 mg/dL (19 Jun 2018 08:43)    I&O's Summary    19 Jun 2018 07:01  -  20 Jun 2018 07:00  --------------------------------------------------------  IN: 0 mL / OUT: 500 mL / NET: -500 mL        T(C): 37.2 (06-20-18 @ 05:30), Max: 37.2 (06-20-18 @ 05:30)  HR: 94 (06-20-18 @ 05:30) (92 - 94)  BP: 137/78 (06-20-18 @ 05:30) (108/64 - 137/78)  RR: 20 (06-20-18 @ 05:30) (17 - 20)  SpO2: 100% (06-20-18 @ 05:30) (96% - 100%)    PHYSICAL EXAM:  GENERAL: cachetic  HEAD:  Atraumatic, Normocephalic  EYES: EOMI, PERRLA, conjunctiva and sclera clear  NECK: Supple, No JVD  CHEST/LUNG: decreased BS at bases  HEART: Regular rate and rhythm; No murmurs, rubs, or gallops  ABDOMEN: Soft, Nontender, Nondistended; Bowel sounds present +rectal tube with dark liquid stool  EXTREMITIES:  2+ Peripheral Pulses, No clubbing, cyanosis, or edema  PSYCH: AAOx0  NEUROLOGY: does not participate with exam   SKIN:  +DU sacrum       LABS:                        8.4    7.40  )-----------( 178      ( 20 Jun 2018 06:25 )             26.6     06-20    149<H>  |  108<H>  |  69<H>  ----------------------------<  145<H>  3.3<L>   |  30  |  4.30<H>    Ca    7.8<L>      20 Jun 2018 06:25  Mg     1.9     06-20                  RADIOLOGY & ADDITIONAL TESTS:    Imaging Personally Reviewed:    Consultant(s) Notes Reviewed:      Care Discussed with Consultants/Other Providers:

## 2018-06-20 NOTE — PROGRESS NOTE ADULT - PROBLEM SELECTOR PLAN 4
- failed S&S  - NPO  - family requesting to feed again - GI following for possible PEG tube   - unable to place NGT  - as per son-in-law aron Lockhart has decided to proceed with PEG  Pt with melena  dropped hgb to 4.9 2 units PRBC given with hgb 8.9-->9.2  - GI EGD large gastric ulcer with adherent clot injected and cauterized case d/w GI will not proceed with PEG at this time for fear of perforation will cont PPI and re-evaluate for PEG early next week - failed S&S  - NPO  - family requesting to feed again - GI following for possible PEG tube early next week  - unable to place NGT  - as per son-in-law aron Lockhartyl has decided to proceed with PEG  Pt with melena  dropped hgb to 4.9 2 units PRBC given with hgb 8.9-->9.2  - GI EGD large gastric ulcer with adherent clot injected and cauterized case d/w GI will not proceed with PEG at this time for fear of perforation will cont PPI and re-evaluate for PEG early next week

## 2018-06-20 NOTE — CHART NOTE - NSCHARTNOTEFT_GEN_A_CORE
Pt awaiting peg next week.  Then home with hospice.  Pt is dnr/dni.  Overall goals established and symptoms under control.  Will sign off.  If goals or symptoms change, please call back.  China Nobles DO

## 2018-06-20 NOTE — PROGRESS NOTE ADULT - SUBJECTIVE AND OBJECTIVE BOX
INTERVAL HPI/OVERNIGHT EVENTS:    daughter bedside  rectal tube with loose/watery black to dark brown stools noted    MEDICATIONS  (STANDING):  chlorhexidine 4% Liquid 1 Application(s) Topical <User Schedule>  dextrose 5% + sodium chloride 0.45%. 1000 milliLiter(s) (75 mL/Hr) IV Continuous <Continuous>  dextrose 5%. 1000 milliLiter(s) (50 mL/Hr) IV Continuous <Continuous>  dextrose 50% Injectable 12.5 Gram(s) IV Push once  dextrose 50% Injectable 25 Gram(s) IV Push once  dextrose 50% Injectable 25 Gram(s) IV Push once  insulin lispro (HumaLOG) corrective regimen sliding scale   SubCutaneous every 6 hours  pantoprazole Infusion 8 mG/Hr (10 mL/Hr) IV Continuous <Continuous>  potassium chloride  10 mEq/100 mL IVPB 10 milliEquivalent(s) IV Intermittent every 1 hour  sucralfate suspension 1 Gram(s) Oral every 6 hours    MEDICATIONS  (PRN):  dextrose 40% Gel 15 Gram(s) Oral once PRN Blood Glucose LESS THAN 70 milliGRAM(s)/deciliter  glucagon  Injectable 1 milliGRAM(s) IntraMuscular once PRN Glucose LESS THAN 70 milligrams/deciliter      Allergies    No Known Allergies    Intolerances        Review of Systems: *pt minimally verbal to nonverbal, unable to obtain ROS           Vital Signs Last 24 Hrs  T(C): 37.2 (20 Jun 2018 05:30), Max: 37.2 (20 Jun 2018 05:30)  T(F): 98.9 (20 Jun 2018 05:30), Max: 98.9 (20 Jun 2018 05:30)  HR: 94 (20 Jun 2018 05:30) (92 - 94)  BP: 137/78 (20 Jun 2018 05:30) (108/64 - 137/78)  BP(mean): --  RR: 20 (20 Jun 2018 05:30) (17 - 20)  SpO2: 100% (20 Jun 2018 05:30) (96% - 100%)    PHYSICAL EXAM:    Constitutional: NAD, weakened  HEENT: EOMI, throat clear  Neck: No LAD, supple  Respiratory: rhonchi  Cardiovascular: S1 and S2, RRR, no M  Gastrointestinal: BS+, soft, NT/ND, neg HSM,  Extremities: No peripheral edema, neg clubbing, cyanosis  Vascular: 2+ peripheral pulses  Neurological: A/O x 1, nonverbal  Psychiatric: Normal mood, normal affect  Skin: No rashes      LABS:                        8.4    7.40  )-----------( 178      ( 20 Jun 2018 06:25 )             26.6     06-20    149<H>  |  108<H>  |  69<H>  ----------------------------<  145<H>  3.3<L>   |  30  |  4.30<H>    Ca    7.8<L>      20 Jun 2018 06:25  Mg     1.9     06-20            RADIOLOGY & ADDITIONAL TESTS:

## 2018-06-20 NOTE — PROGRESS NOTE ADULT - PROBLEM SELECTOR PLAN 2
- 2/2 mental state/dementia/declining functional status  - swallow eval from 6/5 noted with PO intake contraindicated  - aspiration precautions   - palliative following re: goc appreciated  - at present pt is poor peg candidate given EGD revealing large gastric ulcer requiring bipolar cautery and clip; will allow for ulcer to heal this week and reassess for possible EGD next week as d/w team and patients daughter, Alexia

## 2018-06-20 NOTE — PROGRESS NOTE ADULT - PROBLEM SELECTOR PLAN 1
- cont to trend cbc and transfuse prn   - s/p EGD w/large gastric ulcer requiring bipolar cautery and clip  - pathology testing   - continue protonix gtt at this time given still with dark stools

## 2018-06-20 NOTE — PROGRESS NOTE ADULT - PROBLEM SELECTOR PLAN 1
s/p EGD reviewed large gastric ulcer with adherent clot   s/p 2 u prbc 6/17 with appropriate rise  Protonix IV drip  -keep h/h >8/24

## 2018-06-20 NOTE — PROGRESS NOTE ADULT - PROBLEM SELECTOR PLAN 6
- s/p CVVHDF and HD  - last HD 5/30  - renal note appreciated  - no further HD as per family wishes  - elevated Na will change IV hydration to D5 1/2 NS  -monitor Cr

## 2018-06-20 NOTE — PROGRESS NOTE ADULT - PROBLEM SELECTOR PLAN 3
-local wound care frequent turning and off loading. -local wound care frequent turning and off loading.  -wound care consult P

## 2018-06-21 LAB
BUN SERPL-MCNC: 59 MG/DL — HIGH (ref 7–23)
CALCIUM SERPL-MCNC: 8.1 MG/DL — LOW (ref 8.4–10.5)
CHLORIDE SERPL-SCNC: 110 MMOL/L — HIGH (ref 98–107)
CO2 SERPL-SCNC: 26 MMOL/L — SIGNIFICANT CHANGE UP (ref 22–31)
CREAT SERPL-MCNC: 4.06 MG/DL — HIGH (ref 0.5–1.3)
GLUCOSE SERPL-MCNC: 126 MG/DL — HIGH (ref 70–99)
HCT VFR BLD CALC: 28.7 % — LOW (ref 39–50)
HGB BLD-MCNC: 9 G/DL — LOW (ref 13–17)
MAGNESIUM SERPL-MCNC: 1.8 MG/DL — SIGNIFICANT CHANGE UP (ref 1.6–2.6)
MCHC RBC-ENTMCNC: 28.8 PG — SIGNIFICANT CHANGE UP (ref 27–34)
MCHC RBC-ENTMCNC: 31.4 % — LOW (ref 32–36)
MCV RBC AUTO: 91.7 FL — SIGNIFICANT CHANGE UP (ref 80–100)
NRBC # FLD: 0 — SIGNIFICANT CHANGE UP
PLATELET # BLD AUTO: 173 K/UL — SIGNIFICANT CHANGE UP (ref 150–400)
PMV BLD: 10.5 FL — SIGNIFICANT CHANGE UP (ref 7–13)
POTASSIUM SERPL-MCNC: 4.1 MMOL/L — SIGNIFICANT CHANGE UP (ref 3.5–5.3)
POTASSIUM SERPL-SCNC: 4.1 MMOL/L — SIGNIFICANT CHANGE UP (ref 3.5–5.3)
RBC # BLD: 3.13 M/UL — LOW (ref 4.2–5.8)
RBC # FLD: 14.8 % — HIGH (ref 10.3–14.5)
SODIUM SERPL-SCNC: 149 MMOL/L — HIGH (ref 135–145)
WBC # BLD: 6.64 K/UL — SIGNIFICANT CHANGE UP (ref 3.8–10.5)
WBC # FLD AUTO: 6.64 K/UL — SIGNIFICANT CHANGE UP (ref 3.8–10.5)

## 2018-06-21 PROCEDURE — 99233 SBSQ HOSP IP/OBS HIGH 50: CPT

## 2018-06-21 RX ORDER — SODIUM CHLORIDE 9 MG/ML
1000 INJECTION, SOLUTION INTRAVENOUS
Qty: 0 | Refills: 0 | Status: DISCONTINUED | OUTPATIENT
Start: 2018-06-21 | End: 2018-06-25

## 2018-06-21 RX ADMIN — SODIUM CHLORIDE 100 MILLILITER(S): 9 INJECTION, SOLUTION INTRAVENOUS at 22:57

## 2018-06-21 RX ADMIN — CHLORHEXIDINE GLUCONATE 1 APPLICATION(S): 213 SOLUTION TOPICAL at 12:34

## 2018-06-21 RX ADMIN — SODIUM CHLORIDE 75 MILLILITER(S): 9 INJECTION, SOLUTION INTRAVENOUS at 09:13

## 2018-06-21 RX ADMIN — Medication 1: at 13:20

## 2018-06-21 RX ADMIN — PANTOPRAZOLE SODIUM 10 MG/HR: 20 TABLET, DELAYED RELEASE ORAL at 22:57

## 2018-06-21 RX ADMIN — SODIUM CHLORIDE 100 MILLILITER(S): 9 INJECTION, SOLUTION INTRAVENOUS at 13:20

## 2018-06-21 RX ADMIN — PANTOPRAZOLE SODIUM 10 MG/HR: 20 TABLET, DELAYED RELEASE ORAL at 09:13

## 2018-06-21 NOTE — PROGRESS NOTE ADULT - PROBLEM SELECTOR PLAN 4
- failed S&S  - NPO  - family requesting to feed again - GI following for possible PEG tube early next week  - unable to place NGT  - as per son-in-law aron Lockhartyl has decided to proceed with PEG  Pt with melena  dropped hgb to 4.9 2 units PRBC given with hgb 8.9-->9.2  - GI EGD large gastric ulcer with adherent clot injected and cauterized case d/w GI will not proceed with PEG at this time for fear of perforation will cont PPI and re-evaluate for PEG early next week - failed S&S  - NPO  - d/w daughter yesterday will attempt to insert NGT for feeds today  - as per son-in-law aron Lockhart has decided to proceed with PEG  Pt with melena  dropped hgb to 4.9 2 units PRBC given with hgb 8.9-->9.2  - GI EGD large gastric ulcer with adherent clot injected and cauterized case d/w GI will not proceed with PEG at this time for fear of perforation will cont PPI and re-evaluate for PEG early next week

## 2018-06-21 NOTE — ADVANCED PRACTICE NURSE CONSULT - REASON FOR CONSULT
Patient seen on skin care rounds after wound care referral received for assessment of skin impairment and recommendations of topical management. Chart reviewed: Pt DNR after palliative care meeting, H/H 9.0/28.7, Serum albumin 1.5g/dL, Total Serum Protein 4.9g/dL, Cam 16.1kg/m2, Cam 12. Patient H/O mild dementia, uncontrolled T2DM, HTN presented to the ED w/ declining functional status, subjective hypothermia, and rigors, admitted to MICU for severe sepsis 2/2 Klebsiella PNA and Enterobacter UTI and ARF, course c/b septic shock, refractory metabolic acidosis and hyperkalemia requiring RRT, and increased work of breathing requiring intubation. Now extubated on 100% NRB. Not tolerating weaning. Likely having aspiration events. Pt seen and followed by Neprhology acute renal failure, Palliative Care for GOC, Gastroenterology for PEG evaluation; at present pt is poor peg candidate given EGD revealing large gastric ulcer requiring bipolar cautery and clip. Full note to follow. Patient seen on skin care rounds after wound care referral received for assessment of skin impairment and recommendations of topical management. Chart reviewed: Pt DNR after palliative care meeting, H/H 9.0/28.7, Serum albumin 1.5g/dL, Total Serum Protein 4.9g/dL, Cam 16.1kg/m2, Cam 12. Patient H/O mild dementia, uncontrolled T2DM, HTN presented to the ED w/ declining functional status, subjective hypothermia, and rigors, admitted to MICU for severe sepsis 2/2 Klebsiella PNA and Enterobacter UTI and ARF, course c/b septic shock, refractory metabolic acidosis and hyperkalemia requiring RRT, and increased work of breathing requiring intubation. Now extubated on 100% NRB. Not tolerating weaning. Likely having aspiration events. Pt seen and followed by Neprhology acute renal failure, Palliative Care for GOC pt with poor progosis, Gastroenterology for PEG evaluation; at present pt is poor peg candidate given EGD revealing large gastric ulcer requiring bipolar cautery and clip. Son-in-law Immauelle at bedside, present during full assessment.

## 2018-06-21 NOTE — PROGRESS NOTE ADULT - PROBLEM SELECTOR PLAN 3
-local wound care frequent turning and off loading.  -wound care consult P -local wound care frequent turning and off loading.  -wound care as per wound care recs

## 2018-06-21 NOTE — PROGRESS NOTE ADULT - PROBLEM SELECTOR PLAN 6
- s/p CVVHDF and HD  - last HD 5/30  - renal note appreciated  - no further HD as per family wishes  - elevated Na will change IV hydration to D5 1/2 NS  -monitor Cr - s/p CVVHDF and HD  - last HD 5/30  - renal note appreciated  - no further HD as per family wishes  - elevated Na will change IV hydration to D5 1/2 NS @ 100  -monitor Cr

## 2018-06-21 NOTE — ADVANCED PRACTICE NURSE CONSULT - ASSESSMENT
General: Pt lethargic, non-verbal, opens eyes to verbal and tactile and stimulation. Pt on non-breather. Pt severely cachetic, bedbound, condom catheter and bowel management device in place. Skin warm, dry, poor skin turgor, scattered areas of hyperpigmentation and hypopigmentation on bilateral upper and lower extremities.    Sacrum extending to left buttock- pt lying on Right side during assessment- unstagable pressure injury complicated by incontinence associated dermatitis- 12cmx7.5cmx0.2cm, irregular borders extending towards left buttock; wound base 60% pink-moist exposed dermis with 40% scattered patches of tan-moist firmly attached slough. Scant serosanguinous drainage no odor. Periwound skin intact, no induration, no increased warmth, no erythema, no edema noted. Lea-rectal area with scattered areas denuded epidermis. Goals of care: Given patient's overall prognosis and current state, wound is not expected to drastically improve. Will focus on symptom management, including atraumatic dressing application and removal, provide antimicrobial in setting of high risk for infection given prognosis, autolytic debridement of necrotic tissue, protect periwound skin.

## 2018-06-21 NOTE — ADVANCED PRACTICE NURSE CONSULT - RECOMMEDATIONS
Topical recommendations:  Sween 24 to bilateral upper and lower extremities.     Sacrum extending to left buttocks- Cleanse wound and periwound skin with SAF-clens, rinse with NS, pat dry. Apply Liquid barrier film to periwound skin. Apply Medihoney gel to wound base, cover with silicone foam with border. Change daily.     Apply TRIAD moisture barrier paste to perirectal area every shift and prn if soiled or compromised.    Continue low air loss bed therapy, continue heel elevation with Z-flex fluidized positioning boots, continue to turn & reposition q2h with Z-rajni fluidized positioning device, soft pillow between bony prominences, continue moisture management with single breathable pad, continue measures to decrease friction/shear/pressure.     Findings and plan discussed with son-in-law at bedside. Education provided on topical wound therapy. All questions and concerns addressed.    Please contact Wound Care Service Line if we can be of further assistance (ext [phone]).

## 2018-06-21 NOTE — PROGRESS NOTE ADULT - PROBLEM SELECTOR PLAN 1
- cont to trend cbc and transfuse prn   - s/p EGD w/large gastric ulcer requiring bipolar cautery and clip  - pathology with no hpylori  - continue protonix gtt at this time given still with dark stools

## 2018-06-21 NOTE — PROGRESS NOTE ADULT - SUBJECTIVE AND OBJECTIVE BOX
INTERVAL HPI/OVERNIGHT EVENTS:    nonverbal/unobtainable  rectal tube with dark brown to black stools noted    MEDICATIONS  (STANDING):  chlorhexidine 4% Liquid 1 Application(s) Topical <User Schedule>  dextrose 5% + sodium chloride 0.45%. 1000 milliLiter(s) (100 mL/Hr) IV Continuous <Continuous>  dextrose 5%. 1000 milliLiter(s) (50 mL/Hr) IV Continuous <Continuous>  dextrose 50% Injectable 12.5 Gram(s) IV Push once  dextrose 50% Injectable 25 Gram(s) IV Push once  dextrose 50% Injectable 25 Gram(s) IV Push once  insulin lispro (HumaLOG) corrective regimen sliding scale   SubCutaneous every 6 hours  pantoprazole Infusion 8 mG/Hr (10 mL/Hr) IV Continuous <Continuous>  sucralfate suspension 1 Gram(s) Oral every 6 hours    MEDICATIONS  (PRN):  dextrose 40% Gel 15 Gram(s) Oral once PRN Blood Glucose LESS THAN 70 milliGRAM(s)/deciliter  glucagon  Injectable 1 milliGRAM(s) IntraMuscular once PRN Glucose LESS THAN 70 milligrams/deciliter      Allergies    No Known Allergies    Intolerances        Review of Systems: *pt minimally verbal to nonverbal, unable to obtain ROS     Vital Signs Last 24 Hrs  T(C): 36.2 (21 Jun 2018 09:45), Max: 36.5 (20 Jun 2018 19:43)  T(F): 97.1 (21 Jun 2018 09:45), Max: 97.7 (20 Jun 2018 19:43)  HR: 86 (21 Jun 2018 06:13) (83 - 90)  BP: 163/89 (21 Jun 2018 06:13) (123/70 - 163/89)  BP(mean): --  RR: 20 (21 Jun 2018 06:13) (20 - 20)  SpO2: 100% (21 Jun 2018 06:13) (100% - 100%)    PHYSICAL EXAM:    Constitutional: NAD/ weakened  HEENT: EOMI, throat clear  Neck: No LAD, supple  Respiratory: +non-rebreather  Cardiovascular: S1 and S2, RRR, no M  Gastrointestinal: BS+, soft, NT/ND, neg HSM,  Extremities: No peripheral edema, neg clubbing, cyanosis  Vascular: 2+ peripheral pulses  Neurological: A/O x 0-1  Psychiatric: Normal mood, normal affect  Skin: No rashes      LABS:                        9.0    6.64  )-----------( 173      ( 21 Jun 2018 06:41 )             28.7     06-21    149<H>  |  110<H>  |  59<H>  ----------------------------<  126<H>  4.1   |  26  |  4.06<H>    Ca    8.1<L>      21 Jun 2018 06:41  Mg     1.8     06-21            RADIOLOGY & ADDITIONAL TESTS:

## 2018-06-21 NOTE — PROGRESS NOTE ADULT - SUBJECTIVE AND OBJECTIVE BOX
Patient is a 83y old  Male who presents with a chief complaint of AMS (05 Jun 2018 17:26)      SUBJECTIVE / OVERNIGHT EVENTS:    MEDICATIONS  (STANDING):  chlorhexidine 4% Liquid 1 Application(s) Topical <User Schedule>  dextrose 5% + sodium chloride 0.45%. 1000 milliLiter(s) (75 mL/Hr) IV Continuous <Continuous>  dextrose 5%. 1000 milliLiter(s) (50 mL/Hr) IV Continuous <Continuous>  dextrose 50% Injectable 12.5 Gram(s) IV Push once  dextrose 50% Injectable 25 Gram(s) IV Push once  dextrose 50% Injectable 25 Gram(s) IV Push once  insulin lispro (HumaLOG) corrective regimen sliding scale   SubCutaneous every 6 hours  pantoprazole Infusion 8 mG/Hr (10 mL/Hr) IV Continuous <Continuous>  sucralfate suspension 1 Gram(s) Oral every 6 hours    MEDICATIONS  (PRN):  dextrose 40% Gel 15 Gram(s) Oral once PRN Blood Glucose LESS THAN 70 milliGRAM(s)/deciliter  glucagon  Injectable 1 milliGRAM(s) IntraMuscular once PRN Glucose LESS THAN 70 milligrams/deciliter        CAPILLARY BLOOD GLUCOSE      POCT Blood Glucose.: 135 mg/dL (21 Jun 2018 06:21)  POCT Blood Glucose.: 108 mg/dL (20 Jun 2018 23:32)  POCT Blood Glucose.: 114 mg/dL (20 Jun 2018 21:04)  POCT Blood Glucose.: 189 mg/dL (20 Jun 2018 17:39)  POCT Blood Glucose.: 205 mg/dL (20 Jun 2018 13:37)  POCT Blood Glucose.: 69 mg/dL (20 Jun 2018 12:35)    I&O's Summary    20 Jun 2018 07:01  -  21 Jun 2018 07:00  --------------------------------------------------------  IN: 0 mL / OUT: 500 mL / NET: -500 mL        T(C): 35.5 (06-21-18 @ 06:13), Max: 36.5 (06-20-18 @ 19:43)  HR: 86 (06-21-18 @ 06:13) (83 - 90)  BP: 163/89 (06-21-18 @ 06:13) (123/70 - 163/89)  RR: 20 (06-21-18 @ 06:13) (20 - 20)  SpO2: 100% (06-21-18 @ 06:13) (100% - 100%)    PHYSICAL EXAM:  GENERAL: NAD, well-developed  HEAD:  Atraumatic, Normocephalic  EYES: EOMI, PERRLA, conjunctiva and sclera clear  NECK: Supple, No JVD  CHEST/LUNG: Clear to auscultation bilaterally; No wheeze  HEART: Regular rate and rhythm; No murmurs, rubs, or gallops  ABDOMEN: Soft, Nontender, Nondistended; Bowel sounds present  EXTREMITIES:  2+ Peripheral Pulses, No clubbing, cyanosis, or edema  PSYCH: AAOx3  NEUROLOGY: non-focal  SKIN: No rashes or lesions    LABS:                        9.0    6.64  )-----------( 173      ( 21 Jun 2018 06:41 )             28.7     06-21    149<H>  |  110<H>  |  59<H>  ----------------------------<  126<H>  4.1   |  26  |  4.06<H>    Ca    8.1<L>      21 Jun 2018 06:41  Mg     1.8     06-21                  RADIOLOGY & ADDITIONAL TESTS:    Imaging Personally Reviewed:    Consultant(s) Notes Reviewed:      Care Discussed with Consultants/Other Providers: Patient is a 83y old  Male who presents with a chief complaint of AMS (05 Jun 2018 17:26)      SUBJECTIVE / OVERNIGHT EVENTS: Pt in NAD on non rebreather non verbal review of systems unobtainable     MEDICATIONS  (STANDING):  chlorhexidine 4% Liquid 1 Application(s) Topical <User Schedule>  dextrose 5% + sodium chloride 0.45%. 1000 milliLiter(s) (75 mL/Hr) IV Continuous <Continuous>  dextrose 5%. 1000 milliLiter(s) (50 mL/Hr) IV Continuous <Continuous>  dextrose 50% Injectable 12.5 Gram(s) IV Push once  dextrose 50% Injectable 25 Gram(s) IV Push once  dextrose 50% Injectable 25 Gram(s) IV Push once  insulin lispro (HumaLOG) corrective regimen sliding scale   SubCutaneous every 6 hours  pantoprazole Infusion 8 mG/Hr (10 mL/Hr) IV Continuous <Continuous>  sucralfate suspension 1 Gram(s) Oral every 6 hours    MEDICATIONS  (PRN):  dextrose 40% Gel 15 Gram(s) Oral once PRN Blood Glucose LESS THAN 70 milliGRAM(s)/deciliter  glucagon  Injectable 1 milliGRAM(s) IntraMuscular once PRN Glucose LESS THAN 70 milligrams/deciliter        CAPILLARY BLOOD GLUCOSE      POCT Blood Glucose.: 135 mg/dL (21 Jun 2018 06:21)  POCT Blood Glucose.: 108 mg/dL (20 Jun 2018 23:32)  POCT Blood Glucose.: 114 mg/dL (20 Jun 2018 21:04)  POCT Blood Glucose.: 189 mg/dL (20 Jun 2018 17:39)  POCT Blood Glucose.: 205 mg/dL (20 Jun 2018 13:37)  POCT Blood Glucose.: 69 mg/dL (20 Jun 2018 12:35)    I&O's Summary    20 Jun 2018 07:01  -  21 Jun 2018 07:00  --------------------------------------------------------  IN: 0 mL / OUT: 500 mL / NET: -500 mL        T(C): 35.5 (06-21-18 @ 06:13), Max: 36.5 (06-20-18 @ 19:43)  HR: 86 (06-21-18 @ 06:13) (83 - 90)  BP: 163/89 (06-21-18 @ 06:13) (123/70 - 163/89)  RR: 20 (06-21-18 @ 06:13) (20 - 20)  SpO2: 100% (06-21-18 @ 06:13) (100% - 100%)    PHYSICAL EXAM:  GENERAL: cachetic  HEAD:  Atraumatic, Normocephalic  EYES: EOMI, PERRLA, conjunctiva and sclera clear  NECK: Supple, No JVD  CHEST/LUNG: decreased BS at bases  HEART: Regular rate and rhythm; No murmurs, rubs, or gallops  ABDOMEN: Soft, Nontender, Nondistended; Bowel sounds present +rectal tube with dark liquid stool  EXTREMITIES:  2+ Peripheral Pulses, No clubbing, cyanosis, or edema  PSYCH: AAOx0  NEUROLOGY: does not participate with exam   SKIN:  +DU sacrum     LABS:                        9.0    6.64  )-----------( 173      ( 21 Jun 2018 06:41 )             28.7     06-21    149<H>  |  110<H>  |  59<H>  ----------------------------<  126<H>  4.1   |  26  |  4.06<H>    Ca    8.1<L>      21 Jun 2018 06:41  Mg     1.8     06-21                  RADIOLOGY & ADDITIONAL TESTS:    Imaging Personally Reviewed:    Consultant(s) Notes Reviewed:      Care Discussed with Consultants/Other Providers: Patient is a 83y old  Male who presents with a chief complaint of AMS (05 Jun 2018 17:26)      SUBJECTIVE / OVERNIGHT EVENTS: Pt in NAD on non rebreather non verbal review of systems unobtainable     MEDICATIONS  (STANDING):  chlorhexidine 4% Liquid 1 Application(s) Topical <User Schedule>  dextrose 5% + sodium chloride 0.45%. 1000 milliLiter(s) (75 mL/Hr) IV Continuous <Continuous>  dextrose 5%. 1000 milliLiter(s) (50 mL/Hr) IV Continuous <Continuous>  dextrose 50% Injectable 12.5 Gram(s) IV Push once  dextrose 50% Injectable 25 Gram(s) IV Push once  dextrose 50% Injectable 25 Gram(s) IV Push once  insulin lispro (HumaLOG) corrective regimen sliding scale   SubCutaneous every 6 hours  pantoprazole Infusion 8 mG/Hr (10 mL/Hr) IV Continuous <Continuous>  sucralfate suspension 1 Gram(s) Oral every 6 hours    MEDICATIONS  (PRN):  dextrose 40% Gel 15 Gram(s) Oral once PRN Blood Glucose LESS THAN 70 milliGRAM(s)/deciliter  glucagon  Injectable 1 milliGRAM(s) IntraMuscular once PRN Glucose LESS THAN 70 milligrams/deciliter        CAPILLARY BLOOD GLUCOSE      POCT Blood Glucose.: 135 mg/dL (21 Jun 2018 06:21)  POCT Blood Glucose.: 108 mg/dL (20 Jun 2018 23:32)  POCT Blood Glucose.: 114 mg/dL (20 Jun 2018 21:04)  POCT Blood Glucose.: 189 mg/dL (20 Jun 2018 17:39)  POCT Blood Glucose.: 205 mg/dL (20 Jun 2018 13:37)  POCT Blood Glucose.: 69 mg/dL (20 Jun 2018 12:35)    I&O's Summary    20 Jun 2018 07:01  -  21 Jun 2018 07:00  --------------------------------------------------------  IN: 0 mL / OUT: 500 mL / NET: -500 mL        T(C): 35.5 (06-21-18 @ 06:13), Max: 36.5 (06-20-18 @ 19:43)  HR: 86 (06-21-18 @ 06:13) (83 - 90)  BP: 163/89 (06-21-18 @ 06:13) (123/70 - 163/89)  RR: 20 (06-21-18 @ 06:13) (20 - 20)  SpO2: 100% (06-21-18 @ 06:13) (100% - 100%)    PHYSICAL EXAM:  GENERAL: cachetic  HEAD:  Atraumatic, Normocephalic  EYES: EOMI, PERRLA, conjunctiva and sclera clear  NECK: Supple, No JVD  CHEST/LUNG: decreased BS at bases  HEART: Regular rate and rhythm; No murmurs, rubs, or gallops  ABDOMEN: Soft, Nontender, Nondistended; Bowel sounds present +rectal tube with dark liquid stool +noland  EXTREMITIES:  2+ Peripheral Pulses, No clubbing, cyanosis, or edema  PSYCH: AAOx0  NEUROLOGY: does not participate with exam   SKIN:  +DU sacrum     LABS:                        9.0    6.64  )-----------( 173      ( 21 Jun 2018 06:41 )             28.7     06-21    149<H>  |  110<H>  |  59<H>  ----------------------------<  126<H>  4.1   |  26  |  4.06<H>    Ca    8.1<L>      21 Jun 2018 06:41  Mg     1.8     06-21                  RADIOLOGY & ADDITIONAL TESTS:    Imaging Personally Reviewed:    Consultant(s) Notes Reviewed:      Care Discussed with Consultants/Other Providers:

## 2018-06-22 LAB
BUN SERPL-MCNC: 53 MG/DL — HIGH (ref 7–23)
CALCIUM SERPL-MCNC: 8.2 MG/DL — LOW (ref 8.4–10.5)
CHLORIDE SERPL-SCNC: 108 MMOL/L — HIGH (ref 98–107)
CO2 SERPL-SCNC: 28 MMOL/L — SIGNIFICANT CHANGE UP (ref 22–31)
CREAT SERPL-MCNC: 3.97 MG/DL — HIGH (ref 0.5–1.3)
GLUCOSE SERPL-MCNC: 159 MG/DL — HIGH (ref 70–99)
HCT VFR BLD CALC: 28.6 % — LOW (ref 39–50)
HGB BLD-MCNC: 8.6 G/DL — LOW (ref 13–17)
MAGNESIUM SERPL-MCNC: 1.8 MG/DL — SIGNIFICANT CHANGE UP (ref 1.6–2.6)
MCHC RBC-ENTMCNC: 29.2 PG — SIGNIFICANT CHANGE UP (ref 27–34)
MCHC RBC-ENTMCNC: 30.1 % — LOW (ref 32–36)
MCV RBC AUTO: 96.9 FL — SIGNIFICANT CHANGE UP (ref 80–100)
NRBC # FLD: 0 — SIGNIFICANT CHANGE UP
PLATELET # BLD AUTO: 180 K/UL — SIGNIFICANT CHANGE UP (ref 150–400)
PMV BLD: 10.7 FL — SIGNIFICANT CHANGE UP (ref 7–13)
POTASSIUM SERPL-MCNC: 4 MMOL/L — SIGNIFICANT CHANGE UP (ref 3.5–5.3)
POTASSIUM SERPL-SCNC: 4 MMOL/L — SIGNIFICANT CHANGE UP (ref 3.5–5.3)
RBC # BLD: 2.95 M/UL — LOW (ref 4.2–5.8)
RBC # FLD: 15 % — HIGH (ref 10.3–14.5)
SODIUM SERPL-SCNC: 146 MMOL/L — HIGH (ref 135–145)
WBC # BLD: 7.26 K/UL — SIGNIFICANT CHANGE UP (ref 3.8–10.5)
WBC # FLD AUTO: 7.26 K/UL — SIGNIFICANT CHANGE UP (ref 3.8–10.5)

## 2018-06-22 PROCEDURE — 71045 X-RAY EXAM CHEST 1 VIEW: CPT | Mod: 26

## 2018-06-22 PROCEDURE — 99233 SBSQ HOSP IP/OBS HIGH 50: CPT

## 2018-06-22 RX ADMIN — SODIUM CHLORIDE 100 MILLILITER(S): 9 INJECTION, SOLUTION INTRAVENOUS at 18:23

## 2018-06-22 RX ADMIN — CHLORHEXIDINE GLUCONATE 1 APPLICATION(S): 213 SOLUTION TOPICAL at 12:57

## 2018-06-22 RX ADMIN — PANTOPRAZOLE SODIUM 10 MG/HR: 20 TABLET, DELAYED RELEASE ORAL at 13:11

## 2018-06-22 RX ADMIN — SODIUM CHLORIDE 100 MILLILITER(S): 9 INJECTION, SOLUTION INTRAVENOUS at 13:11

## 2018-06-22 RX ADMIN — PANTOPRAZOLE SODIUM 10 MG/HR: 20 TABLET, DELAYED RELEASE ORAL at 18:23

## 2018-06-22 RX ADMIN — Medication 1: at 06:28

## 2018-06-22 NOTE — PROGRESS NOTE ADULT - PROBLEM SELECTOR PLAN 10
- venodyne boots given GIB code status DNR
- irma boots code status DNR

## 2018-06-22 NOTE — PROGRESS NOTE ADULT - PROBLEM SELECTOR PLAN 1
s/p EGD reviewed large gastric ulcer with adherent clot s/p cautery  s/p 2 u prbc 6/17 with appropriate rise  Protonix IV drip  -keep h/h >8/24

## 2018-06-22 NOTE — CHART NOTE - NSCHARTNOTEFT_GEN_A_CORE
Event had taken place on 6/21/18 7pm:    PA had long talk with daughter of patient regarding nutrition intake by NG tube.  Risks and benefits had explained thoroughly to the family.    She agreed to think overnight before proceed with it.    Layla HURLEY

## 2018-06-22 NOTE — CHART NOTE - NSCHARTNOTEFT_GEN_A_CORE
The patient's creatinine and sodium continue to trend down.  Would continue with fluids.  monitor BMP continue goals of care will sign off for now please reconsult as needed.

## 2018-06-22 NOTE — PROGRESS NOTE ADULT - PROBLEM SELECTOR PLAN 4
- failed S&S  - NPO  - will attempt for NGT today if family agreeable spoke to alexia   - as per son-in-law aron Lockhartyl has decided to proceed with PEG  Pt with melena  dropped hgb to 4.9 2 units PRBC given with hgb 8.9-->9.2  - GI EGD large gastric ulcer with adherent clot injected and cauterized case d/w GI will not proceed with PEG at this time for fear of perforation will cont PPI and re-evaluate for PEG early next week

## 2018-06-22 NOTE — PROGRESS NOTE ADULT - SUBJECTIVE AND OBJECTIVE BOX
INTERVAL HPI/OVERNIGHT EVENTS:    nonverbal  still with dark stools    MEDICATIONS  (STANDING):  chlorhexidine 4% Liquid 1 Application(s) Topical <User Schedule>  dextrose 5% + sodium chloride 0.45%. 1000 milliLiter(s) (100 mL/Hr) IV Continuous <Continuous>  dextrose 5%. 1000 milliLiter(s) (50 mL/Hr) IV Continuous <Continuous>  dextrose 50% Injectable 12.5 Gram(s) IV Push once  dextrose 50% Injectable 25 Gram(s) IV Push once  dextrose 50% Injectable 25 Gram(s) IV Push once  insulin lispro (HumaLOG) corrective regimen sliding scale   SubCutaneous every 6 hours  pantoprazole Infusion 8 mG/Hr (10 mL/Hr) IV Continuous <Continuous>  sucralfate suspension 1 Gram(s) Oral every 6 hours    MEDICATIONS  (PRN):  dextrose 40% Gel 15 Gram(s) Oral once PRN Blood Glucose LESS THAN 70 milliGRAM(s)/deciliter  glucagon  Injectable 1 milliGRAM(s) IntraMuscular once PRN Glucose LESS THAN 70 milligrams/deciliter      Allergies    No Known Allergies    Intolerances        Review of Systems: *pt minimally verbal to nonverbal, unable to obtain ROS           Vital Signs Last 24 Hrs  T(C): 36.5 (22 Jun 2018 06:34), Max: 36.7 (21 Jun 2018 20:07)  T(F): 97.7 (22 Jun 2018 06:34), Max: 98.1 (21 Jun 2018 20:07)  HR: 91 (22 Jun 2018 04:16) (91 - 96)  BP: 127/64 (22 Jun 2018 04:16) (127/64 - 145/81)  BP(mean): --  RR: 20 (22 Jun 2018 04:16) (20 - 20)  SpO2: 100% (22 Jun 2018 04:16) (100% - 100%)    PHYSICAL EXAM:    Constitutional: NAD  HEENT: EOMI, throat clear  Neck: No LAD, supple  Respiratory: CTA and P  Cardiovascular: S1 and S2, RRR, no M  Gastrointestinal: BS+, soft, NT/ND, neg HSM,  Extremities: No peripheral edema, neg clubbing, cyanosis  Vascular: 2+ peripheral pulses  Neurological: A/O x 0, no focal deficits  Psychiatric: Normal mood, normal affect  Skin: No rashes      LABS:                        8.6    7.26  )-----------( 180      ( 22 Jun 2018 05:30 )             28.6     06-22    146<H>  |  108<H>  |  53<H>  ----------------------------<  159<H>  4.0   |  28  |  3.97<H>    Ca    8.2<L>      22 Jun 2018 05:40  Mg     1.8     06-22            RADIOLOGY & ADDITIONAL TESTS:

## 2018-06-22 NOTE — PROGRESS NOTE ADULT - PROBLEM SELECTOR PLAN 6
- s/p CVVHDF and HD  - last HD 5/30  - renal note appreciated  - no further HD as per family wishes  - elevated Na will change IV hydration to D5 1/2 NS @ 100  -monitor Cr

## 2018-06-22 NOTE — PROGRESS NOTE ADULT - PROBLEM SELECTOR PLAN 2
- 2/2 mental state/dementia/declining functional status  - swallow eval from 6/5 noted with PO intake contraindicated  - aspiration precautions   - palliative following re: goc appreciated  - EGD revealed large gastric ulcer requiring bipolar cautery and clip; will allow for ulcer to heal this week and reassess for possible EGD next week as d/w team and patients daughter, Alexia

## 2018-06-22 NOTE — PROGRESS NOTE ADULT - SUBJECTIVE AND OBJECTIVE BOX
Patient is a 83y old  Male who presents with a chief complaint of AMS (05 Jun 2018 17:26)      SUBJECTIVE / OVERNIGHT EVENTS:    MEDICATIONS  (STANDING):  chlorhexidine 4% Liquid 1 Application(s) Topical <User Schedule>  dextrose 5% + sodium chloride 0.45%. 1000 milliLiter(s) (100 mL/Hr) IV Continuous <Continuous>  dextrose 5%. 1000 milliLiter(s) (50 mL/Hr) IV Continuous <Continuous>  dextrose 50% Injectable 12.5 Gram(s) IV Push once  dextrose 50% Injectable 25 Gram(s) IV Push once  dextrose 50% Injectable 25 Gram(s) IV Push once  insulin lispro (HumaLOG) corrective regimen sliding scale   SubCutaneous every 6 hours  pantoprazole Infusion 8 mG/Hr (10 mL/Hr) IV Continuous <Continuous>  sucralfate suspension 1 Gram(s) Oral every 6 hours    MEDICATIONS  (PRN):  dextrose 40% Gel 15 Gram(s) Oral once PRN Blood Glucose LESS THAN 70 milliGRAM(s)/deciliter  glucagon  Injectable 1 milliGRAM(s) IntraMuscular once PRN Glucose LESS THAN 70 milligrams/deciliter        CAPILLARY BLOOD GLUCOSE      POCT Blood Glucose.: 95 mg/dL (22 Jun 2018 12:23)  POCT Blood Glucose.: 140 mg/dL (22 Jun 2018 07:34)  POCT Blood Glucose.: 174 mg/dL (22 Jun 2018 05:58)  POCT Blood Glucose.: 142 mg/dL (21 Jun 2018 23:52)  POCT Blood Glucose.: 139 mg/dL (21 Jun 2018 21:29)  POCT Blood Glucose.: 114 mg/dL (21 Jun 2018 17:56)    I&O's Summary    21 Jun 2018 07:01  -  22 Jun 2018 07:00  --------------------------------------------------------  IN: 0 mL / OUT: 200 mL / NET: -200 mL        T(C): 36.5 (06-22-18 @ 06:34), Max: 36.7 (06-21-18 @ 20:07)  HR: 91 (06-22-18 @ 04:16) (91 - 96)  BP: 127/64 (06-22-18 @ 04:16) (127/64 - 145/81)  RR: 20 (06-22-18 @ 04:16) (20 - 20)  SpO2: 100% (06-22-18 @ 04:16) (100% - 100%)    PHYSICAL EXAM:  GENERAL: cachetic  HEAD:  Atraumatic, Normocephalic  EYES: EOMI, PERRLA, conjunctiva and sclera clear  NECK: Supple, No JVD  CHEST/LUNG: decreased BS at bases  HEART: Regular rate and rhythm; No murmurs, rubs, or gallops  ABDOMEN: Soft, Nontender, Nondistended; Bowel sounds present +rectal tube with dark liquid stool +noland  EXTREMITIES:  2+ Peripheral Pulses, No clubbing, cyanosis, or edema  PSYCH: AAOx0  NEUROLOGY: does not participate with exam   SKIN:  +DU sacrum     LABS:                        8.6    7.26  )-----------( 180      ( 22 Jun 2018 05:30 )             28.6     06-22    146<H>  |  108<H>  |  53<H>  ----------------------------<  159<H>  4.0   |  28  |  3.97<H>    Ca    8.2<L>      22 Jun 2018 05:40  Mg     1.8     06-22                  RADIOLOGY & ADDITIONAL TESTS:    Imaging Personally Reviewed:    Consultant(s) Notes Reviewed:      Care Discussed with Consultants/Other Providers:

## 2018-06-22 NOTE — CHART NOTE - NSCHARTNOTEFT_GEN_A_CORE
NGT placed today after numerous unsuccessful tries over the course of the last several days. Portable xray ordered at bedside to confirm placement. Will continue to follow. CDW Dr. High

## 2018-06-23 LAB
BASOPHILS # BLD AUTO: 0.03 K/UL — SIGNIFICANT CHANGE UP (ref 0–0.2)
BASOPHILS NFR BLD AUTO: 0.3 % — SIGNIFICANT CHANGE UP (ref 0–2)
BUN SERPL-MCNC: 47 MG/DL — HIGH (ref 7–23)
CALCIUM SERPL-MCNC: 8.2 MG/DL — LOW (ref 8.4–10.5)
CHLORIDE SERPL-SCNC: 108 MMOL/L — HIGH (ref 98–107)
CO2 SERPL-SCNC: 24 MMOL/L — SIGNIFICANT CHANGE UP (ref 22–31)
CREAT SERPL-MCNC: 3.74 MG/DL — HIGH (ref 0.5–1.3)
EOSINOPHIL # BLD AUTO: 0.04 K/UL — SIGNIFICANT CHANGE UP (ref 0–0.5)
EOSINOPHIL NFR BLD AUTO: 0.4 % — SIGNIFICANT CHANGE UP (ref 0–6)
GLUCOSE SERPL-MCNC: 129 MG/DL — HIGH (ref 70–99)
HCT VFR BLD CALC: 28.5 % — LOW (ref 39–50)
HGB BLD-MCNC: 8.5 G/DL — LOW (ref 13–17)
IMM GRANULOCYTES # BLD AUTO: 0.12 # — SIGNIFICANT CHANGE UP
IMM GRANULOCYTES NFR BLD AUTO: 1.1 % — SIGNIFICANT CHANGE UP (ref 0–1.5)
LYMPHOCYTES # BLD AUTO: 1.22 K/UL — SIGNIFICANT CHANGE UP (ref 1–3.3)
LYMPHOCYTES # BLD AUTO: 11.4 % — LOW (ref 13–44)
MAGNESIUM SERPL-MCNC: 1.7 MG/DL — SIGNIFICANT CHANGE UP (ref 1.6–2.6)
MCHC RBC-ENTMCNC: 29.2 PG — SIGNIFICANT CHANGE UP (ref 27–34)
MCHC RBC-ENTMCNC: 29.8 % — LOW (ref 32–36)
MCV RBC AUTO: 97.9 FL — SIGNIFICANT CHANGE UP (ref 80–100)
MONOCYTES # BLD AUTO: 0.93 K/UL — HIGH (ref 0–0.9)
MONOCYTES NFR BLD AUTO: 8.7 % — SIGNIFICANT CHANGE UP (ref 2–14)
NEUTROPHILS # BLD AUTO: 8.32 K/UL — HIGH (ref 1.8–7.4)
NEUTROPHILS NFR BLD AUTO: 78.1 % — HIGH (ref 43–77)
NRBC # FLD: 0 — SIGNIFICANT CHANGE UP
PLATELET # BLD AUTO: 204 K/UL — SIGNIFICANT CHANGE UP (ref 150–400)
PMV BLD: 10.6 FL — SIGNIFICANT CHANGE UP (ref 7–13)
POTASSIUM SERPL-MCNC: 4.4 MMOL/L — SIGNIFICANT CHANGE UP (ref 3.5–5.3)
POTASSIUM SERPL-SCNC: 4.4 MMOL/L — SIGNIFICANT CHANGE UP (ref 3.5–5.3)
RBC # BLD: 2.91 M/UL — LOW (ref 4.2–5.8)
RBC # FLD: 14.9 % — HIGH (ref 10.3–14.5)
SODIUM SERPL-SCNC: 145 MMOL/L — SIGNIFICANT CHANGE UP (ref 135–145)
WBC # BLD: 10.66 K/UL — HIGH (ref 3.8–10.5)
WBC # FLD AUTO: 10.66 K/UL — HIGH (ref 3.8–10.5)

## 2018-06-23 PROCEDURE — 99233 SBSQ HOSP IP/OBS HIGH 50: CPT

## 2018-06-23 PROCEDURE — 71045 X-RAY EXAM CHEST 1 VIEW: CPT | Mod: 26

## 2018-06-23 PROCEDURE — 71250 CT THORAX DX C-: CPT | Mod: 26

## 2018-06-23 RX ADMIN — CHLORHEXIDINE GLUCONATE 1 APPLICATION(S): 213 SOLUTION TOPICAL at 15:28

## 2018-06-23 RX ADMIN — Medication 2: at 13:04

## 2018-06-23 RX ADMIN — Medication 1 GRAM(S): at 18:13

## 2018-06-23 RX ADMIN — Medication 1: at 18:12

## 2018-06-23 RX ADMIN — Medication 1 GRAM(S): at 12:39

## 2018-06-23 RX ADMIN — SODIUM CHLORIDE 100 MILLILITER(S): 9 INJECTION, SOLUTION INTRAVENOUS at 22:26

## 2018-06-23 RX ADMIN — Medication 1 GRAM(S): at 05:01

## 2018-06-23 RX ADMIN — PANTOPRAZOLE SODIUM 10 MG/HR: 20 TABLET, DELAYED RELEASE ORAL at 22:26

## 2018-06-23 RX ADMIN — Medication 1: at 01:16

## 2018-06-23 NOTE — PROGRESS NOTE ADULT - SUBJECTIVE AND OBJECTIVE BOX
Called ICU / pulmonary team as follow up on xray findings from earlier today and to determine as to when consult by their team will be done. Spoke with ICU attending Dr Julien. He states that they do not do bronchoscopies during the night and From his standpoint, patient not in need of a bronchoscopy at this time. Pulmonary to follow up with patient in am as per ICU attending.  Dr Dawson being notified.

## 2018-06-23 NOTE — PROGRESS NOTE ADULT - SUBJECTIVE AND OBJECTIVE BOX
Patient is a 83y old  Male who presents with a chief complaint of AMS (05 Jun 2018 17:26)      patient seen and examine at bed side   no acute issue    MEDICATIONS  (STANDING):  chlorhexidine 4% Liquid 1 Application(s) Topical <User Schedule>  dextrose 5% + sodium chloride 0.45%. 1000 milliLiter(s) (100 mL/Hr) IV Continuous <Continuous>  dextrose 5%. 1000 milliLiter(s) (50 mL/Hr) IV Continuous <Continuous>  dextrose 50% Injectable 12.5 Gram(s) IV Push once  dextrose 50% Injectable 25 Gram(s) IV Push once  dextrose 50% Injectable 25 Gram(s) IV Push once  insulin lispro (HumaLOG) corrective regimen sliding scale   SubCutaneous every 6 hours  pantoprazole Infusion 8 mG/Hr (10 mL/Hr) IV Continuous <Continuous>  sucralfate suspension 1 Gram(s) Oral every 6 hours    MEDICATIONS  (PRN):  dextrose 40% Gel 15 Gram(s) Oral once PRN Blood Glucose LESS THAN 70 milliGRAM(s)/deciliter  glucagon  Injectable 1 milliGRAM(s) IntraMuscular once PRN Glucose LESS THAN 70 milligrams/deciliter      CAPILLARY BLOOD GLUCOSE      CAPILLARY BLOOD GLUCOSE      POCT Blood Glucose.: 144 mg/dL (23 Jun 2018 06:23)  POCT Blood Glucose.: 151 mg/dL (23 Jun 2018 01:03)  POCT Blood Glucose.: 143 mg/dL (22 Jun 2018 19:02)  POCT Blood Glucose.: 95 mg/dL (22 Jun 2018 12:23)        Vital Signs Last 24 Hrs  T(C): 36.9 (23 Jun 2018 04:52), Max: 37.3 (23 Jun 2018 00:51)  T(F): 98.4 (23 Jun 2018 04:52), Max: 99.2 (23 Jun 2018 00:51)  HR: 96 (23 Jun 2018 04:52) (93 - 96)  BP: 148/81 (23 Jun 2018 04:52) (137/81 - 148/81)  BP(mean): --  RR: 19 (23 Jun 2018 04:52) (19 - 20)  SpO2: 100% (23 Jun 2018 04:52) (100% - 100%)    PHYSICAL EXAM:  GENERAL: cachetic  HEAD:  Atraumatic, Normocephalic  EYES: EOMI, PERRLA, conjunctiva and sclera clear  NECK: Supple, No JVD  CHEST/LUNG: decreased BS at bases  HEART: Regular rate and rhythm; No murmurs, rubs, or gallops  ABDOMEN: Soft, Nontender, Nondistended; Bowel sounds present +rectal tube with dark liquid stool +noland  EXTREMITIES:  2+ Peripheral Pulses, No clubbing, cyanosis, or edema  PSYCH: AAOx0  NEUROLOGY: does not participate with exam   SKIN:  +DU sacrum     LABS:                                     8.5    10.66 )-----------( 204      ( 23 Jun 2018 06:26 )             28.5       06-23    145  |  108<H>  |  47<H>  ----------------------------<  129<H>  4.4   |  24  |  3.74<H>    Ca    8.2<L>      23 Jun 2018 06:26  Mg     1.7     06-23                  RADIOLOGY & ADDITIONAL TESTS:    Imaging Personally Reviewed:    Consultant(s) Notes Reviewed:      Care Discussed with Consultants/Other Providers:

## 2018-06-23 NOTE — PROGRESS NOTE ADULT - SUBJECTIVE AND OBJECTIVE BOX
INTERVAL HPI/OVERNIGHT EVENTS:  No new overnight event.  No N/V/D.  Tolerating diet.     MEDICATIONS  (STANDING):  chlorhexidine 4% Liquid 1 Application(s) Topical <User Schedule>  dextrose 5% + sodium chloride 0.45%. 1000 milliLiter(s) (100 mL/Hr) IV Continuous <Continuous>  dextrose 5%. 1000 milliLiter(s) (50 mL/Hr) IV Continuous <Continuous>  dextrose 50% Injectable 12.5 Gram(s) IV Push once  dextrose 50% Injectable 25 Gram(s) IV Push once  dextrose 50% Injectable 25 Gram(s) IV Push once  insulin lispro (HumaLOG) corrective regimen sliding scale   SubCutaneous every 6 hours  pantoprazole Infusion 8 mG/Hr (10 mL/Hr) IV Continuous <Continuous>  sucralfate suspension 1 Gram(s) Oral every 6 hours    MEDICATIONS  (PRN):  dextrose 40% Gel 15 Gram(s) Oral once PRN Blood Glucose LESS THAN 70 milliGRAM(s)/deciliter  glucagon  Injectable 1 milliGRAM(s) IntraMuscular once PRN Glucose LESS THAN 70 milligrams/deciliter      Allergies    No Known Allergies    Intolerances        Review of Systems:    General:  No wt loss, fevers, chills, night sweats,fatigue,   Eyes:  Good vision, no reported pain  ENT:  No sore throat, pain, runny nose, dysphagia  CV:  No pain, palpitatioins, hypo/hypertension  Resp:  No dyspnea, cough, tachypnea, wheezing  GI:  No pain, No nausea, No vomiting, No diarrhea, No constipatiion, No weight loss, No fever, No pruritis, No rectal bleeding, No tarry stools, No dysphagia,  :  No pain, bleeding, incontinence, nocturia  Muscle:  No pain, weakness  Neuro:  No weakness, tingling, memory problems  Psych:  No fatigue, insomnia, mood problems, depression  Endocrine:  No polyuria, polydypsia, cold/heat intolerance  Heme:  No petechiae, ecchymosis, easy bruisability  Skin:  No rash, tattoos, scars, edema      Vital Signs Last 24 Hrs  T(C): 36.9 (23 Jun 2018 04:52), Max: 37.3 (23 Jun 2018 00:51)  T(F): 98.4 (23 Jun 2018 04:52), Max: 99.2 (23 Jun 2018 00:51)  HR: 96 (23 Jun 2018 04:52) (93 - 96)  BP: 148/81 (23 Jun 2018 04:52) (137/81 - 148/81)  BP(mean): --  RR: 19 (23 Jun 2018 04:52) (19 - 20)  SpO2: 100% (23 Jun 2018 04:52) (100% - 100%)    PHYSICAL EXAM:    Constitutional: NAD, well-developed  HEENT: EOMI, throat clear  Neck: No LAD, supple  Respiratory: CTA and P  Cardiovascular: S1 and S2, RRR, no M  Gastrointestinal: BS+, soft, NT/ND, neg HSM,  Extremities: No peripheral edema, neg clubing, cyanosis  Vascular: 2+ peripheral pulses  Neurological: A/O x 3, no focal deficits  Psychiatric: Normal mood, normal affect  Skin: No rashes      LABS:                        8.5    10.66 )-----------( 204      ( 23 Jun 2018 06:26 )             28.5     06-23    145  |  108<H>  |  47<H>  ----------------------------<  129<H>  4.4   |  24  |  3.74<H>    Ca    8.2<L>      23 Jun 2018 06:26  Mg     1.7     06-23            RADIOLOGY & ADDITIONAL TESTS:

## 2018-06-23 NOTE — PROGRESS NOTE ADULT - ASSESSMENT
83M hx mild dementia, uncontrolled T2DM, HTN presented to the ED w/ declining functional status, subjective hypothermia, and rigors, admitted to MICU for severe sepsis 2/2 Klebsiella PNA and Enterobacter UTI and ARF, course c/b septic shock, refractory metabolic acidosis and hyperkalemia requiring RRT, and increased work of breathing requiring intubation. Now extubated on 100%NRB. Not tolerating weaning. Likely having aspiration events.         1.Anemia due to blood loss.     s/p EGD reviewed large gastric ulcer with adherent clot s/p cautery  s/p 2 u prbc 6/17 with appropriate rise  Protonix IV drip  -keep h/h >8/24.     2. Atrial fibrillation, unspecified type.    -rate controlled on tele   -no anticoagulation due to GIB.     3.Unstageable pressure ulcer, unspecified location.    -local wound care frequent turning and off loading.  -wound care as per wound care recs.     4. Dysphagia, unspecified type.    s/p NGT   - as per son-in-law aron Lockhart has decided to proceed with PEG    5.Acute respiratory failure, due to right lung collapse   pulmonary consult   aggressive suctioning   chest PT       6 Acute renal failure.    s/p CVVHDF and HD  - last HD 5/30  - renal note appreciated  - no further HD as per family wishes  - elevated Na will change IV hydration to D5 1/2 NS @ 100  -monitor Cr.    7. DM (diabetes mellitus   a1c 10.3  - check FS  -ISS q6 on D5 1/2 NS.     8. HTN (hypertension).    amlodipine stopped, BP acceptable  - monitor BP.      9 .Alzheimer's dementia without behavioral disturbance, unspecified timing of dementia onset.    supportive care.     10: Need for prophylactic measure. Plan; - venodyne boots given GIB code status DNR.

## 2018-06-23 NOTE — PROGRESS NOTE ADULT - SUBJECTIVE AND OBJECTIVE BOX
Call received from radiology regarding CXR findings. Chest xray with evidence of right lung collapse and  left mid lung opacity possibly infection and small left pleural effusion. CT scan chest recommended as well as pulmonary evaluation for bronchoscopy. MICU team called as they are covering pulmonary service today. They will see patient for consult.

## 2018-06-24 LAB
BASOPHILS # BLD AUTO: 0.02 K/UL — SIGNIFICANT CHANGE UP (ref 0–0.2)
BASOPHILS NFR BLD AUTO: 0.2 % — SIGNIFICANT CHANGE UP (ref 0–2)
BUN SERPL-MCNC: 45 MG/DL — HIGH (ref 7–23)
CALCIUM SERPL-MCNC: 8.3 MG/DL — LOW (ref 8.4–10.5)
CHLORIDE SERPL-SCNC: 107 MMOL/L — SIGNIFICANT CHANGE UP (ref 98–107)
CO2 SERPL-SCNC: 25 MMOL/L — SIGNIFICANT CHANGE UP (ref 22–31)
CREAT SERPL-MCNC: 3.63 MG/DL — HIGH (ref 0.5–1.3)
EOSINOPHIL # BLD AUTO: 0.03 K/UL — SIGNIFICANT CHANGE UP (ref 0–0.5)
EOSINOPHIL NFR BLD AUTO: 0.3 % — SIGNIFICANT CHANGE UP (ref 0–6)
GLUCOSE SERPL-MCNC: 91 MG/DL — SIGNIFICANT CHANGE UP (ref 70–99)
HCT VFR BLD CALC: 26.5 % — LOW (ref 39–50)
HCT VFR BLD CALC: 26.5 % — LOW (ref 39–50)
HGB BLD-MCNC: 8.3 G/DL — LOW (ref 13–17)
HGB BLD-MCNC: 8.3 G/DL — LOW (ref 13–17)
IMM GRANULOCYTES # BLD AUTO: 0.16 # — SIGNIFICANT CHANGE UP
IMM GRANULOCYTES NFR BLD AUTO: 1.4 % — SIGNIFICANT CHANGE UP (ref 0–1.5)
LYMPHOCYTES # BLD AUTO: 1.52 K/UL — SIGNIFICANT CHANGE UP (ref 1–3.3)
LYMPHOCYTES # BLD AUTO: 12.8 % — LOW (ref 13–44)
MAGNESIUM SERPL-MCNC: 1.6 MG/DL — SIGNIFICANT CHANGE UP (ref 1.6–2.6)
MCHC RBC-ENTMCNC: 29.7 PG — SIGNIFICANT CHANGE UP (ref 27–34)
MCHC RBC-ENTMCNC: 29.7 PG — SIGNIFICANT CHANGE UP (ref 27–34)
MCHC RBC-ENTMCNC: 31.3 % — LOW (ref 32–36)
MCHC RBC-ENTMCNC: 31.3 % — LOW (ref 32–36)
MCV RBC AUTO: 95 FL — SIGNIFICANT CHANGE UP (ref 80–100)
MCV RBC AUTO: 95 FL — SIGNIFICANT CHANGE UP (ref 80–100)
MONOCYTES # BLD AUTO: 1.07 K/UL — HIGH (ref 0–0.9)
MONOCYTES NFR BLD AUTO: 9 % — SIGNIFICANT CHANGE UP (ref 2–14)
NEUTROPHILS # BLD AUTO: 9.04 K/UL — HIGH (ref 1.8–7.4)
NEUTROPHILS NFR BLD AUTO: 76.3 % — SIGNIFICANT CHANGE UP (ref 43–77)
NRBC # FLD: 0 — SIGNIFICANT CHANGE UP
NRBC # FLD: 0 — SIGNIFICANT CHANGE UP
PLATELET # BLD AUTO: 214 K/UL — SIGNIFICANT CHANGE UP (ref 150–400)
PLATELET # BLD AUTO: 214 K/UL — SIGNIFICANT CHANGE UP (ref 150–400)
PMV BLD: 10.4 FL — SIGNIFICANT CHANGE UP (ref 7–13)
PMV BLD: 10.4 FL — SIGNIFICANT CHANGE UP (ref 7–13)
POTASSIUM SERPL-MCNC: 3.8 MMOL/L — SIGNIFICANT CHANGE UP (ref 3.5–5.3)
POTASSIUM SERPL-SCNC: 3.8 MMOL/L — SIGNIFICANT CHANGE UP (ref 3.5–5.3)
RBC # BLD: 2.79 M/UL — LOW (ref 4.2–5.8)
RBC # BLD: 2.79 M/UL — LOW (ref 4.2–5.8)
RBC # FLD: 15 % — HIGH (ref 10.3–14.5)
RBC # FLD: 15 % — HIGH (ref 10.3–14.5)
SODIUM SERPL-SCNC: 147 MMOL/L — HIGH (ref 135–145)
WBC # BLD: 11.84 K/UL — HIGH (ref 3.8–10.5)
WBC # BLD: 11.84 K/UL — HIGH (ref 3.8–10.5)
WBC # FLD AUTO: 11.84 K/UL — HIGH (ref 3.8–10.5)
WBC # FLD AUTO: 11.84 K/UL — HIGH (ref 3.8–10.5)

## 2018-06-24 PROCEDURE — 71045 X-RAY EXAM CHEST 1 VIEW: CPT | Mod: 26

## 2018-06-24 PROCEDURE — 99232 SBSQ HOSP IP/OBS MODERATE 35: CPT | Mod: GC

## 2018-06-24 PROCEDURE — 99233 SBSQ HOSP IP/OBS HIGH 50: CPT

## 2018-06-24 RX ADMIN — Medication 2: at 13:14

## 2018-06-24 RX ADMIN — Medication 1 GRAM(S): at 23:56

## 2018-06-24 RX ADMIN — PANTOPRAZOLE SODIUM 10 MG/HR: 20 TABLET, DELAYED RELEASE ORAL at 06:04

## 2018-06-24 RX ADMIN — Medication 2: at 17:40

## 2018-06-24 RX ADMIN — SODIUM CHLORIDE 100 MILLILITER(S): 9 INJECTION, SOLUTION INTRAVENOUS at 06:04

## 2018-06-24 RX ADMIN — Medication 1 GRAM(S): at 17:40

## 2018-06-24 RX ADMIN — Medication 3: at 23:48

## 2018-06-24 RX ADMIN — Medication 1 GRAM(S): at 13:14

## 2018-06-24 RX ADMIN — CHLORHEXIDINE GLUCONATE 1 APPLICATION(S): 213 SOLUTION TOPICAL at 13:09

## 2018-06-24 RX ADMIN — Medication 1 GRAM(S): at 06:03

## 2018-06-24 NOTE — PROGRESS NOTE ADULT - ATTENDING COMMENTS
84 yo man with recurrent mucus plugging  poor functional status and very poor nutritional status  needs aggressive suctioning/chest PT  bronchoscopy not indicated

## 2018-06-24 NOTE — PROGRESS NOTE ADULT - SUBJECTIVE AND OBJECTIVE BOX
INTERVAL HPI/OVERNIGHT EVENTS:  no new events    MEDICATIONS  (STANDING):  chlorhexidine 4% Liquid 1 Application(s) Topical <User Schedule>  dextrose 5% + sodium chloride 0.45%. 1000 milliLiter(s) (100 mL/Hr) IV Continuous <Continuous>  dextrose 5%. 1000 milliLiter(s) (50 mL/Hr) IV Continuous <Continuous>  dextrose 50% Injectable 12.5 Gram(s) IV Push once  dextrose 50% Injectable 25 Gram(s) IV Push once  dextrose 50% Injectable 25 Gram(s) IV Push once  insulin lispro (HumaLOG) corrective regimen sliding scale   SubCutaneous every 6 hours  pantoprazole Infusion 8 mG/Hr (10 mL/Hr) IV Continuous <Continuous>  sucralfate suspension 1 Gram(s) Oral every 6 hours    MEDICATIONS  (PRN):  dextrose 40% Gel 15 Gram(s) Oral once PRN Blood Glucose LESS THAN 70 milliGRAM(s)/deciliter  glucagon  Injectable 1 milliGRAM(s) IntraMuscular once PRN Glucose LESS THAN 70 milligrams/deciliter      Allergies    No Known Allergies    Intolerances        Review of Systems:    General:  No wt loss, fevers, chills, night sweats,fatigue,   Eyes:  Good vision, no reported pain  ENT:  No sore throat, pain, runny nose, dysphagia  CV:  No pain, palpitatioins, hypo/hypertension  Resp:  No dyspnea, cough, tachypnea, wheezing  GI:  No pain, No nausea, No vomiting, No diarrhea, No constipatiion, No weight loss, No fever, No pruritis, No rectal bleeding, No tarry stools, No dysphagia,  :  No pain, bleeding, incontinence, nocturia  Muscle:  No pain, weakness  Neuro:  No weakness, tingling, memory problems  Psych:  No fatigue, insomnia, mood problems, depression  Endocrine:  No polyuria, polydypsia, cold/heat intolerance  Heme:  No petechiae, ecchymosis, easy bruisability  Skin:  No rash, tattoos, scars, edema      Vital Signs Last 24 Hrs  T(C): 36.7 (24 Jun 2018 05:19), Max: 36.8 (23 Jun 2018 21:26)  T(F): 98.1 (24 Jun 2018 05:19), Max: 98.3 (23 Jun 2018 21:26)  HR: 89 (24 Jun 2018 05:19) (89 - 94)  BP: 142/87 (24 Jun 2018 05:19) (135/78 - 142/87)  BP(mean): --  RR: 20 (24 Jun 2018 05:19) (19 - 20)  SpO2: 100% (24 Jun 2018 05:19) (100% - 100%)    PHYSICAL EXAM:    Constitutional: NAD, well-developed  HEENT: EOMI, throat clear  Neck: No LAD, supple  Respiratory: CTA and P  Cardiovascular: S1 and S2, RRR, no M  Gastrointestinal: BS+, soft, NT/ND, neg HSM,  Extremities: No peripheral edema, neg clubing, cyanosis  Vascular: 2+ peripheral pulses  Neurological: A/O x 3, no focal deficits  Psychiatric: Normal mood, normal affect  Skin: No rashes      LABS:                        8.3    11.84 )-----------( 214      ( 24 Jun 2018 06:05 )             26.5     06-24    147<H>  |  107  |  45<H>  ----------------------------<  91  3.8   |  25  |  3.63<H>    Ca    8.3<L>      24 Jun 2018 06:05  Mg     1.6     06-24            RADIOLOGY & ADDITIONAL TESTS:

## 2018-06-24 NOTE — PROGRESS NOTE ADULT - ASSESSMENT
83M hx mild dementia, uncontrolled T2DM, HTN presented to the ED w/ declining functional status, subjective hypothermia, and rigors, admitted to MICU for severe sepsis 2/2 Klebsiella PNA and Enterobacter UTI and ARF, course c/b septic shock, refractory metabolic acidosis and hyperkalemia requiring RRT, and increased work of breathing requiring intubation. Now extubated on 100%NRB. Not tolerating weaning. Likely having aspiration events.         1.Anemia due to blood loss.     s/p EGD reviewed large gastric ulcer with adherent clot s/p cautery  s/p 2 u prbc 6/17 with appropriate rise  Protonix IV drip  -keep h/h >8/24.     2. Atrial fibrillation, unspecified type.    -rate controlled on tele   -no anticoagulation due to GIB.     3.Unstageable pressure ulcer, unspecified location.    -local wound care frequent turning and off loading.  -wound care as per wound care recs.     4. Dysphagia, unspecified type.    s/p NGT   - as per son-in-law Richy trinajamietheodora Hale has decided to proceed with PEG    5.Acute respiratory failure, due to right lung collapse --  pulmonary consult   aggressive suctioning   chest PT   may need bronch ( if OK with pul/ ICU team )  will repeat x ray today       6 Acute renal failure.    s/p CVVHDF and HD  - last HD 5/30  - renal note appreciated  - no further HD as per family wishes  - elevated Na will change IV hydration to D5 1/2 NS @ 100  -monitor Cr.    7. DM (diabetes mellitus   a1c 10.3  - check FS  -ISS q6 on D5 1/2 NS.     8. HTN (hypertension).    amlodipine stopped, BP acceptable  - monitor BP.      9 .Alzheimer's dementia without behavioral disturbance, unspecified timing of dementia onset.    supportive care.     10: Need for prophylactic measure.   Plan; - venodyne boots given GIB   code status DNR.

## 2018-06-24 NOTE — PROGRESS NOTE ADULT - SUBJECTIVE AND OBJECTIVE BOX
Patient seen by pulmonary service. Patient has just been placed on a nonrebreather by nursing staff for a low oxygenation level.   The Case was discussed with pulmonary team. From their standpoint patient may benefit from RCU unit. Dr Montano called and in agreement with plan. Patient no longer requiring telemetry monitor from her standpoint. Patient to transfer to RCU when bed is available.

## 2018-06-24 NOTE — PROGRESS NOTE ADULT - SUBJECTIVE AND OBJECTIVE BOX
CHIEF COMPLAINT: FTT      Interval Events:  Over the weekend, pt had NGT placed at bedside in preparation for feeds, as GI team unable to place PEG 2/2 large bleeding gastric ulcer. CXR for confirmation showed complete atelectasis of right lung. Pulmonary consulted for evaluation of atelectasis with mucous plugging and benefits vs. risks of bronchoscopy.       REVIEW OF SYSTEMS:  Constitutional: [ ] negative [ ] fevers [ ] chills [ ] weight loss [ ] weight gain  HEENT: [ ] negative [ ] dry eyes [ ] eye irritation [ ] postnasal drip [ ] nasal congestion  CV: [ ] negative  [ ] chest pain [ ] orthopnea [ ] palpitations [ ] murmur  Resp: [ ] negative [ ] cough [ ] shortness of breath [ ] dyspnea [ ] wheezing [ ] sputum [ ] hemoptysis  GI: [ ] negative [ ] nausea [ ] vomiting [ ] diarrhea [ ] constipation [ ] abd pain [ ] dysphagia   : [ ] negative [ ] dysuria [ ] nocturia [ ] hematuria [ ] increased urinary frequency  Musculoskeletal: [ ] negative [ ] back pain [ ] myalgias [ ] arthralgias [ ] fracture  Skin: [ ] negative [ ] rash [ ] itch  Neurological: [ ] negative [ ] headache [ ] dizziness [ ] syncope [ ] weakness [ ] numbness  Psychiatric: [ ] negative [ ] anxiety [ ] depression  Endocrine: [ ] negative [ ] diabetes [ ] thyroid problem  Hematologic/Lymphatic: [ ] negative [ ] anemia [ ] bleeding problem  Allergic/Immunologic: [ ] negative [ ] itchy eyes [ ] nasal discharge [ ] hives [ ] angioedema  [ ] All other systems negative  [ x] Unable to assess ROS because _______    OBJECTIVE:  ICU Vital Signs Last 24 Hrs  T(C): 36.7 (24 Jun 2018 05:19), Max: 36.8 (23 Jun 2018 21:26)  T(F): 98.1 (24 Jun 2018 05:19), Max: 98.3 (23 Jun 2018 21:26)  HR: 89 (24 Jun 2018 05:19) (89 - 94)  BP: 142/87 (24 Jun 2018 05:19) (135/78 - 142/87)  BP(mean): --  ABP: --  ABP(mean): --  RR: 20 (24 Jun 2018 05:19) (19 - 20)  SpO2: 100% (24 Jun 2018 05:19) (100% - 100%)        CAPILLARY BLOOD GLUCOSE      POCT Blood Glucose.: 230 mg/dL (24 Jun 2018 12:29)      PHYSICAL EXAM:  General:   HEENT:   Lymph Nodes:  Neck:   Respiratory:   Cardiovascular:   Abdomen:   Extremities:   Skin:   Neurological:  Psychiatry:    HOSPITAL MEDICATIONS:  MEDICATIONS  (STANDING):  chlorhexidine 4% Liquid 1 Application(s) Topical <User Schedule>  dextrose 5% + sodium chloride 0.45%. 1000 milliLiter(s) (100 mL/Hr) IV Continuous <Continuous>  dextrose 5%. 1000 milliLiter(s) (50 mL/Hr) IV Continuous <Continuous>  dextrose 50% Injectable 12.5 Gram(s) IV Push once  dextrose 50% Injectable 25 Gram(s) IV Push once  dextrose 50% Injectable 25 Gram(s) IV Push once  insulin lispro (HumaLOG) corrective regimen sliding scale   SubCutaneous every 6 hours  pantoprazole Infusion 8 mG/Hr (10 mL/Hr) IV Continuous <Continuous>  sucralfate suspension 1 Gram(s) Oral every 6 hours    MEDICATIONS  (PRN):  dextrose 40% Gel 15 Gram(s) Oral once PRN Blood Glucose LESS THAN 70 milliGRAM(s)/deciliter  glucagon  Injectable 1 milliGRAM(s) IntraMuscular once PRN Glucose LESS THAN 70 milligrams/deciliter      LABS:                        8.3    11.84 )-----------( 214      ( 24 Jun 2018 06:05 )             26.5     Hgb Trend: 8.3<--, 8.5<--, 8.6<--, 9.0<--, 8.4<--  06-24    147<H>  |  107  |  45<H>  ----------------------------<  91  3.8   |  25  |  3.63<H>    Ca    8.3<L>      24 Jun 2018 06:05  Mg     1.6     06-24      Creatinine Trend: 3.63<--, 3.74<--, 3.97<--, 4.06<--, 4.30<--, 4.53<--            MICROBIOLOGY:       RADIOLOGY:  [x ] Reviewed and interpreted by me CHIEF COMPLAINT: FTT      Interval Events:  Over the weekend, pt had NGT placed at bedside in preparation for feeds, as GI team unable to place PEG 2/2 large bleeding gastric ulcer. CXR for confirmation showed complete atelectasis of right lung. Pulmonary consulted for evaluation of atelectasis with mucous plugging and benefits vs. risks of bronchoscopy. Pt has had q4hr suctioning via oral airway but only intermittent deep suctioning. Started feeds ~48 hours ago.       REVIEW OF SYSTEMS:  Constitutional: [ ] negative [ ] fevers [ ] chills [ ] weight loss [ ] weight gain  HEENT: [ ] negative [ ] dry eyes [ ] eye irritation [ ] postnasal drip [ ] nasal congestion  CV: [ ] negative  [ ] chest pain [ ] orthopnea [ ] palpitations [ ] murmur  Resp: [ ] negative [ ] cough [ ] shortness of breath [ ] dyspnea [ ] wheezing [ ] sputum [ ] hemoptysis  GI: [ ] negative [ ] nausea [ ] vomiting [ ] diarrhea [ ] constipation [ ] abd pain [ ] dysphagia   : [ ] negative [ ] dysuria [ ] nocturia [ ] hematuria [ ] increased urinary frequency  Musculoskeletal: [ ] negative [ ] back pain [ ] myalgias [ ] arthralgias [ ] fracture  Skin: [ ] negative [ ] rash [ ] itch  Neurological: [ ] negative [ ] headache [ ] dizziness [ ] syncope [ ] weakness [ ] numbness  Psychiatric: [ ] negative [ ] anxiety [ ] depression  Endocrine: [ ] negative [ ] diabetes [ ] thyroid problem  Hematologic/Lymphatic: [ ] negative [ ] anemia [ ] bleeding problem  Allergic/Immunologic: [ ] negative [ ] itchy eyes [ ] nasal discharge [ ] hives [ ] angioedema  [ ] All other systems negative  [ x] Unable to assess ROS because __pt nonverbal_____    OBJECTIVE:  ICU Vital Signs Last 24 Hrs  T(C): 36.7 (24 Jun 2018 05:19), Max: 36.8 (23 Jun 2018 21:26)  T(F): 98.1 (24 Jun 2018 05:19), Max: 98.3 (23 Jun 2018 21:26)  HR: 89 (24 Jun 2018 05:19) (89 - 94)  BP: 142/87 (24 Jun 2018 05:19) (135/78 - 142/87)  BP(mean): --  ABP: --  ABP(mean): --  RR: 20 (24 Jun 2018 05:19) (19 - 20)  SpO2: 100% (24 Jun 2018 05:19) (100% - 100%)        CAPILLARY BLOOD GLUCOSE      POCT Blood Glucose.: 230 mg/dL (24 Jun 2018 12:29)      PHYSICAL EXAM:  General: Frail and cachectic. 100%NRB   HEENT: NCAT, dry oral mucosa, temporal wasting   Respiratory: b/l crackles R>L. Good air movement throughout lung fields b/l  Cardiovascular: RRR, S1/S2, (-) m/g/r  Abdomen: NT/ND  Skin: [facility] MEDICATIONS:  MEDICATIONS  (STANDING):  chlorhexidine 4% Liquid 1 Application(s) Topical <User Schedule>  dextrose 5% + sodium chloride 0.45%. 1000 milliLiter(s) (100 mL/Hr) IV Continuous <Continuous>  dextrose 5%. 1000 milliLiter(s) (50 mL/Hr) IV Continuous <Continuous>  dextrose 50% Injectable 12.5 Gram(s) IV Push once  dextrose 50% Injectable 25 Gram(s) IV Push once  dextrose 50% Injectable 25 Gram(s) IV Push once  insulin lispro (HumaLOG) corrective regimen sliding scale   SubCutaneous every 6 hours  pantoprazole Infusion 8 mG/Hr (10 mL/Hr) IV Continuous <Continuous>  sucralfate suspension 1 Gram(s) Oral every 6 hours    MEDICATIONS  (PRN):  dextrose 40% Gel 15 Gram(s) Oral once PRN Blood Glucose LESS THAN 70 milliGRAM(s)/deciliter  glucagon  Injectable 1 milliGRAM(s) IntraMuscular once PRN Glucose LESS THAN 70 milligrams/deciliter      LABS:                        8.3    11.84 )-----------( 214      ( 24 Jun 2018 06:05 )             26.5     Hgb Trend: 8.3<--, 8.5<--, 8.6<--, 9.0<--, 8.4<--  06-24    147<H>  |  107  |  45<H>  ----------------------------<  91  3.8   |  25  |  3.63<H>    Ca    8.3<L>      24 Jun 2018 06:05  Mg     1.6     06-24      Creatinine Trend: 3.63<--, 3.74<--, 3.97<--, 4.06<--, 4.30<--, 4.53<--            MICROBIOLOGY:       RADIOLOGY:  [x ] Reviewed and interpreted by me

## 2018-06-24 NOTE — PROGRESS NOTE ADULT - ASSESSMENT
83M with PMHx advanced dementia, T2DM, and HTN with prolonged hospitalization initially presenting to [facility] ED 5/28 with failure to thrive and admitted to MICU for septic shock 2/2 aspiration PNA with Klebsiella as well as Enterobacter UTI c/b ARF s/p temporary RRT now resolved. Pt transferred to RCU for recurrent aspiration pneumonitis with intermittent episodes of mucous plugging and hypoxemic respiratory failure s/p intubation with hospital course further c/b GIB 2/2 large peptic ulcer s/p cautery/clipping and AFib transferred to telemetry floors for closer monitoring. Pt with underlying dysphagia 2/2 poor functional status and dementia with complete contraindication for PO intake with recurrent episodes of aspiration and mucous plugging.  -No indication for bronchoscopy. Bronchoscopy would not be appropriate treatment for pt at this time. Pt oxygenating well and in NAD. Mucous plugging likely to be recurrent with aspiration events.   -Aggressive chest PT as tolerated with oral airway and deep suctioning  -OOB and dangling as possible  -No indication for Abx at this time. Pt recently treated with full course Abx for PNA now with likely residual CT Chest findings of inflammatory change without clinical evidence of worsening infection process.  -Will continue to follow while on telemetry monitoring 83M with PMHx advanced dementia, T2DM, and HTN with prolonged hospitalization initially presenting to [facility] ED 5/28 with failure to thrive and admitted to MICU for septic shock 2/2 aspiration PNA with Klebsiella as well as Enterobacter UTI c/b ARF s/p temporary RRT now resolved. Pt transferred to RCU for recurrent aspiration pneumonitis with intermittent episodes of mucous plugging and hypoxemic respiratory failure s/p intubation with hospital course further c/b GIB 2/2 large peptic ulcer s/p cautery/clipping and AFib transferred to telemetry floors for closer monitoring. Pt with underlying dysphagia 2/2 poor functional status and dementia with complete contraindication for PO intake with recurrent episodes of aspiration and mucous plugging.  -No indication for bronchoscopy. Bronchoscopy would not be appropriate treatment for pt at this time. Pt oxygenating well and in NAD. Mucous plugging likely to be recurrent with aspiration events.   -Aggressive chest PT as tolerated with oral airway and deep suctioning via nasal trumpet q4hr  -OOB and dangling as possible  -No indication for Abx at this time. Pt recently treated with full course Abx for PNA now with likely residual CT Chest findings of inflammatory change without clinical evidence of worsening infection process.  -Will continue to follow while on telemetry monitoring. If telemetry no longer indicated would consider RCU consult

## 2018-06-24 NOTE — PROGRESS NOTE ADULT - SUBJECTIVE AND OBJECTIVE BOX
Patient is a 83y old  Male who presents with a chief complaint of AMS (05 Jun 2018 17:26)      patient seen and examine at bed side   no acute issue  not in distress           MEDICATIONS  (STANDING):  chlorhexidine 4% Liquid 1 Application(s) Topical <User Schedule>  dextrose 5% + sodium chloride 0.45%. 1000 milliLiter(s) (100 mL/Hr) IV Continuous <Continuous>  dextrose 5%. 1000 milliLiter(s) (50 mL/Hr) IV Continuous <Continuous>  dextrose 50% Injectable 12.5 Gram(s) IV Push once  dextrose 50% Injectable 25 Gram(s) IV Push once  dextrose 50% Injectable 25 Gram(s) IV Push once  insulin lispro (HumaLOG) corrective regimen sliding scale   SubCutaneous every 6 hours  pantoprazole Infusion 8 mG/Hr (10 mL/Hr) IV Continuous <Continuous>  sucralfate suspension 1 Gram(s) Oral every 6 hours    MEDICATIONS  (PRN):  dextrose 40% Gel 15 Gram(s) Oral once PRN Blood Glucose LESS THAN 70 milliGRAM(s)/deciliter  glucagon  Injectable 1 milliGRAM(s) IntraMuscular once PRN Glucose LESS THAN 70 milligrams/deciliter      CAPILLARY BLOOD GLUCOSE      POCT Blood Glucose.: 102 mg/dL (24 Jun 2018 06:06)  POCT Blood Glucose.: 95 mg/dL (24 Jun 2018 00:33)  POCT Blood Glucose.: 186 mg/dL (23 Jun 2018 17:47)  POCT Blood Glucose.: 235 mg/dL (23 Jun 2018 12:55)      Vital Signs Last 24 Hrs  T(C): 36.7 (24 Jun 2018 05:19), Max: 36.8 (23 Jun 2018 21:26)  T(F): 98.1 (24 Jun 2018 05:19), Max: 98.3 (23 Jun 2018 21:26)  HR: 89 (24 Jun 2018 05:19) (89 - 94)  BP: 142/87 (24 Jun 2018 05:19) (135/78 - 142/87)  BP(mean): --  RR: 20 (24 Jun 2018 05:19) (19 - 20)  SpO2: 100% (24 Jun 2018 05:19) (100% - 100%)    PHYSICAL EXAM:  GENERAL: cachetic  HEAD:  Atraumatic, Normocephalic  EYES: EOMI, PERRLA, conjunctiva and sclera clear  NECK: Supple, No JVD  CHEST/LUNG: decreased BS at bases and right side   HEART: Regular rate and rhythm; No murmurs, rubs, or gallops  ABDOMEN: Soft, Nontender, Nondistended; Bowel sounds present +rectal tube with dark liquid stool +noland  EXTREMITIES:  2+ Peripheral Pulses, No clubbing, cyanosis, or edema  PSYCH: AAOx0  NEUROLOGY: does not participate with exam   SKIN:  +DU sacrum     LABS:                                                       8.3    11.84 )-----------( 214      ( 24 Jun 2018 06:05 )             26.5       06-24    147<H>  |  107  |  45<H>  ----------------------------<  91  3.8   |  25  |  3.63<H>    Ca    8.3<L>      24 Jun 2018 06:05  Mg     1.6     06-24                  RADIOLOGY & ADDITIONAL TESTS:    Imaging Personally Reviewed:    Consultant(s) Notes Reviewed:      Care Discussed with Consultants/Other Providers:

## 2018-06-25 DIAGNOSIS — J69.0 PNEUMONITIS DUE TO INHALATION OF FOOD AND VOMIT: ICD-10-CM

## 2018-06-25 DIAGNOSIS — Z29.9 ENCOUNTER FOR PROPHYLACTIC MEASURES, UNSPECIFIED: ICD-10-CM

## 2018-06-25 DIAGNOSIS — K25.9 GASTRIC ULCER, UNSPECIFIED AS ACUTE OR CHRONIC, WITHOUT HEMORRHAGE OR PERFORATION: ICD-10-CM

## 2018-06-25 LAB
BASOPHILS # BLD AUTO: 0.04 K/UL — SIGNIFICANT CHANGE UP (ref 0–0.2)
BASOPHILS NFR BLD AUTO: 0.3 % — SIGNIFICANT CHANGE UP (ref 0–2)
BUN SERPL-MCNC: 44 MG/DL — HIGH (ref 7–23)
CALCIUM SERPL-MCNC: 8.7 MG/DL — SIGNIFICANT CHANGE UP (ref 8.4–10.5)
CHLORIDE SERPL-SCNC: 107 MMOL/L — SIGNIFICANT CHANGE UP (ref 98–107)
CO2 SERPL-SCNC: 26 MMOL/L — SIGNIFICANT CHANGE UP (ref 22–31)
CREAT SERPL-MCNC: 3.41 MG/DL — HIGH (ref 0.5–1.3)
EOSINOPHIL # BLD AUTO: 0.05 K/UL — SIGNIFICANT CHANGE UP (ref 0–0.5)
EOSINOPHIL NFR BLD AUTO: 0.4 % — SIGNIFICANT CHANGE UP (ref 0–6)
GLUCOSE SERPL-MCNC: 164 MG/DL — HIGH (ref 70–99)
HCT VFR BLD CALC: 27.7 % — LOW (ref 39–50)
HCT VFR BLD CALC: 27.7 % — LOW (ref 39–50)
HGB BLD-MCNC: 8.5 G/DL — LOW (ref 13–17)
HGB BLD-MCNC: 8.5 G/DL — LOW (ref 13–17)
IMM GRANULOCYTES # BLD AUTO: 0.25 # — SIGNIFICANT CHANGE UP
IMM GRANULOCYTES NFR BLD AUTO: 1.8 % — HIGH (ref 0–1.5)
LYMPHOCYTES # BLD AUTO: 1.36 K/UL — SIGNIFICANT CHANGE UP (ref 1–3.3)
LYMPHOCYTES # BLD AUTO: 9.6 % — LOW (ref 13–44)
MAGNESIUM SERPL-MCNC: 1.6 MG/DL — SIGNIFICANT CHANGE UP (ref 1.6–2.6)
MCHC RBC-ENTMCNC: 29.4 PG — SIGNIFICANT CHANGE UP (ref 27–34)
MCHC RBC-ENTMCNC: 29.4 PG — SIGNIFICANT CHANGE UP (ref 27–34)
MCHC RBC-ENTMCNC: 30.7 % — LOW (ref 32–36)
MCHC RBC-ENTMCNC: 30.7 % — LOW (ref 32–36)
MCV RBC AUTO: 95.8 FL — SIGNIFICANT CHANGE UP (ref 80–100)
MCV RBC AUTO: 95.8 FL — SIGNIFICANT CHANGE UP (ref 80–100)
MONOCYTES # BLD AUTO: 1.06 K/UL — HIGH (ref 0–0.9)
MONOCYTES NFR BLD AUTO: 7.4 % — SIGNIFICANT CHANGE UP (ref 2–14)
NEUTROPHILS # BLD AUTO: 11.48 K/UL — HIGH (ref 1.8–7.4)
NEUTROPHILS NFR BLD AUTO: 80.5 % — HIGH (ref 43–77)
NRBC # FLD: 0 — SIGNIFICANT CHANGE UP
NRBC # FLD: 0 — SIGNIFICANT CHANGE UP
PLATELET # BLD AUTO: 216 K/UL — SIGNIFICANT CHANGE UP (ref 150–400)
PLATELET # BLD AUTO: 216 K/UL — SIGNIFICANT CHANGE UP (ref 150–400)
PMV BLD: 10.8 FL — SIGNIFICANT CHANGE UP (ref 7–13)
PMV BLD: 10.8 FL — SIGNIFICANT CHANGE UP (ref 7–13)
POTASSIUM SERPL-MCNC: 3.3 MMOL/L — LOW (ref 3.5–5.3)
POTASSIUM SERPL-SCNC: 3.3 MMOL/L — LOW (ref 3.5–5.3)
RBC # BLD: 2.89 M/UL — LOW (ref 4.2–5.8)
RBC # BLD: 2.89 M/UL — LOW (ref 4.2–5.8)
RBC # FLD: 15.1 % — HIGH (ref 10.3–14.5)
RBC # FLD: 15.1 % — HIGH (ref 10.3–14.5)
SODIUM SERPL-SCNC: 145 MMOL/L — SIGNIFICANT CHANGE UP (ref 135–145)
WBC # BLD: 14.24 K/UL — HIGH (ref 3.8–10.5)
WBC # BLD: 14.24 K/UL — HIGH (ref 3.8–10.5)
WBC # FLD AUTO: 14.24 K/UL — HIGH (ref 3.8–10.5)
WBC # FLD AUTO: 14.24 K/UL — HIGH (ref 3.8–10.5)

## 2018-06-25 PROCEDURE — 99233 SBSQ HOSP IP/OBS HIGH 50: CPT | Mod: GC

## 2018-06-25 RX ORDER — PIPERACILLIN AND TAZOBACTAM 4; .5 G/20ML; G/20ML
3.38 INJECTION, POWDER, LYOPHILIZED, FOR SOLUTION INTRAVENOUS EVERY 12 HOURS
Qty: 0 | Refills: 0 | Status: DISCONTINUED | OUTPATIENT
Start: 2018-06-25 | End: 2018-06-28

## 2018-06-25 RX ORDER — PANTOPRAZOLE SODIUM 20 MG/1
40 TABLET, DELAYED RELEASE ORAL EVERY 12 HOURS
Qty: 0 | Refills: 0 | Status: DISCONTINUED | OUTPATIENT
Start: 2018-06-25 | End: 2018-06-28

## 2018-06-25 RX ORDER — VANCOMYCIN HCL 1 G
1000 VIAL (EA) INTRAVENOUS ONCE
Qty: 0 | Refills: 0 | Status: DISCONTINUED | OUTPATIENT
Start: 2018-06-25 | End: 2018-06-25

## 2018-06-25 RX ORDER — POTASSIUM CHLORIDE 20 MEQ
10 PACKET (EA) ORAL ONCE
Qty: 0 | Refills: 0 | Status: COMPLETED | OUTPATIENT
Start: 2018-06-25 | End: 2018-06-25

## 2018-06-25 RX ORDER — VANCOMYCIN HCL 1 G
750 VIAL (EA) INTRAVENOUS ONCE
Qty: 0 | Refills: 0 | Status: COMPLETED | OUTPATIENT
Start: 2018-06-25 | End: 2018-06-25

## 2018-06-25 RX ORDER — POTASSIUM CHLORIDE 20 MEQ
10 PACKET (EA) ORAL ONCE
Qty: 0 | Refills: 0 | Status: DISCONTINUED | OUTPATIENT
Start: 2018-06-25 | End: 2018-06-25

## 2018-06-25 RX ADMIN — Medication 1 GRAM(S): at 18:21

## 2018-06-25 RX ADMIN — Medication 250 MILLIGRAM(S): at 12:28

## 2018-06-25 RX ADMIN — Medication 100 MILLIEQUIVALENT(S): at 11:50

## 2018-06-25 RX ADMIN — PIPERACILLIN AND TAZOBACTAM 25 GRAM(S): 4; .5 INJECTION, POWDER, LYOPHILIZED, FOR SOLUTION INTRAVENOUS at 19:02

## 2018-06-25 RX ADMIN — Medication 1 GRAM(S): at 06:01

## 2018-06-25 RX ADMIN — PANTOPRAZOLE SODIUM 40 MILLIGRAM(S): 20 TABLET, DELAYED RELEASE ORAL at 18:30

## 2018-06-25 RX ADMIN — Medication 2: at 18:20

## 2018-06-25 RX ADMIN — Medication 1: at 06:01

## 2018-06-25 RX ADMIN — Medication 1: at 12:28

## 2018-06-25 RX ADMIN — CHLORHEXIDINE GLUCONATE 1 APPLICATION(S): 213 SOLUTION TOPICAL at 13:58

## 2018-06-25 RX ADMIN — Medication 1 GRAM(S): at 11:52

## 2018-06-25 NOTE — PROGRESS NOTE ADULT - PROBLEM SELECTOR PLAN 1
- cont to trend cbc and transfuse prn   - s/p EGD w/large gastric ulcer requiring bipolar cautery and clip  - pathology with no hpylori  - Protonix 40mg IVP BID

## 2018-06-25 NOTE — PROGRESS NOTE ADULT - PROBLEM SELECTOR PLAN 2
- 2/2 mental state/dementia/declining functional status  - swallow eval from 6/5 noted with PO intake contraindicated  - aspiration precautions   - palliative following re: goc appreciated  - EGD revealed large gastric ulcer requiring bipolar cautery and clip; considered increase risk to undergo anesthesia given current comorbidities and ulcer. - Recommend palliative to re-evaluate GOC with the family

## 2018-06-25 NOTE — PROGRESS NOTE ADULT - SUBJECTIVE AND OBJECTIVE BOX
INTERVAL HPI/OVERNIGHT EVENTS:    pt nonverbal  rectal tube with dark brown/black liquid stools    MEDICATIONS  (STANDING):  chlorhexidine 4% Liquid 1 Application(s) Topical <User Schedule>  dextrose 5%. 1000 milliLiter(s) (50 mL/Hr) IV Continuous <Continuous>  dextrose 50% Injectable 12.5 Gram(s) IV Push once  dextrose 50% Injectable 25 Gram(s) IV Push once  dextrose 50% Injectable 25 Gram(s) IV Push once  insulin lispro (HumaLOG) corrective regimen sliding scale   SubCutaneous every 6 hours  pantoprazole  Injectable 40 milliGRAM(s) IV Push every 12 hours  piperacillin/tazobactam IVPB. 3.375 Gram(s) IV Intermittent every 12 hours  sucralfate suspension 1 Gram(s) Oral every 6 hours    MEDICATIONS  (PRN):  dextrose 40% Gel 15 Gram(s) Oral once PRN Blood Glucose LESS THAN 70 milliGRAM(s)/deciliter  glucagon  Injectable 1 milliGRAM(s) IntraMuscular once PRN Glucose LESS THAN 70 milligrams/deciliter      Allergies    No Known Allergies    Intolerances        Review of Systems: *pt minimally verbal to nonverbal, unable to obtain ROS           Vital Signs Last 24 Hrs  T(C): 36.1 (25 Jun 2018 05:56), Max: 37.3 (24 Jun 2018 15:43)  T(F): 97 (25 Jun 2018 05:56), Max: 99.1 (24 Jun 2018 15:43)  HR: 85 (25 Jun 2018 11:35) (85 - 104)  BP: 138/73 (25 Jun 2018 05:56) (138/73 - 144/108)  BP(mean): --  RR: 23 (25 Jun 2018 11:35) (19 - 23)  SpO2: 100% (25 Jun 2018 11:35) (100% - 100%)    PHYSICAL EXAM:    Constitutional: NAD  HEENT: EOMI, throat clear  Neck: No LAD, supple  Respiratory: CTA and P  Cardiovascular: S1 and S2, RRR, no M  Gastrointestinal: BS+, soft, NT/ND, neg HSM, +rectal tube  Extremities: No peripheral edema, neg clubbing, cyanosis  Vascular: 2+ peripheral pulses  Neurological: A/O x 0  Psychiatric: Normal mood, normal affect  Skin: No rashes      LABS:                        8.5    14.24 )-----------( 216      ( 25 Jun 2018 06:45 )             27.7     06-25    145  |  107  |  44<H>  ----------------------------<  164<H>  3.3<L>   |  26  |  3.41<H>    Ca    8.7      25 Jun 2018 06:45  Mg     1.6     06-25            RADIOLOGY & ADDITIONAL TESTS:

## 2018-06-25 NOTE — PROGRESS NOTE ADULT - ATTENDING COMMENTS
83 year old male with dementia, poor functional status with prolonged hospital course.  Hypoxemic respiratory failure requiring non-rebreather. Recurrent aspiration and mucus plugging. Continue chest PT. On broad spectrum antibiotics.  Wean off NRB to venti mask.   POCUS shows moderate sized effusion with atelectasis but not amenable for thoracentesis. Will continue antibiotics and monitor.  Follow up with GI regarding repeat EGD and PEG.  Will need to discuss dispo with family.

## 2018-06-25 NOTE — PROGRESS NOTE ADULT - SUBJECTIVE AND OBJECTIVE BOX
CHIEF COMPLAINT:    Interval Events:    REVIEW OF SYSTEMS:  Constitutional:   Eyes:  ENT:  CV:  Resp:  GI:  :  MSK:  Integumentary:  Neurological:  Psychiatric:  Endocrine:  Hematologic/Lymphatic:  Allergic/Immunologic:  [ ] All other systems negative  [ ] Unable to assess ROS because ________    OBJECTIVE:  ICU Vital Signs Last 24 Hrs  T(C): 36.1 (25 Jun 2018 05:56), Max: 37.3 (24 Jun 2018 15:43)  T(F): 97 (25 Jun 2018 05:56), Max: 99.1 (24 Jun 2018 15:43)  HR: 89 (25 Jun 2018 05:56) (89 - 104)  BP: 138/73 (25 Jun 2018 05:56) (138/73 - 144/108)  BP(mean): --  ABP: --  ABP(mean): --  RR: 20 (25 Jun 2018 05:56) (19 - 20)  SpO2: 100% (25 Jun 2018 05:56) (100% - 100%)        06-24 @ 07:01  -  06-25 @ 07:00  --------------------------------------------------------  IN: 880 mL / OUT: 20 mL / NET: 860 mL      CAPILLARY BLOOD GLUCOSE      POCT Blood Glucose.: 175 mg/dL (25 Jun 2018 05:41)      PHYSICAL EXAM:  General:   HEENT:   Lymph Nodes:  Neck:   Respiratory:   Cardiovascular:   Abdomen:   Extremities:   Skin:   Neurological:  Psychiatry:    HOSPITAL MEDICATIONS:  MEDICATIONS  (STANDING):  chlorhexidine 4% Liquid 1 Application(s) Topical <User Schedule>  dextrose 5% + sodium chloride 0.45%. 1000 milliLiter(s) (100 mL/Hr) IV Continuous <Continuous>  dextrose 5%. 1000 milliLiter(s) (50 mL/Hr) IV Continuous <Continuous>  dextrose 50% Injectable 12.5 Gram(s) IV Push once  dextrose 50% Injectable 25 Gram(s) IV Push once  dextrose 50% Injectable 25 Gram(s) IV Push once  insulin lispro (HumaLOG) corrective regimen sliding scale   SubCutaneous every 6 hours  pantoprazole Infusion 8 mG/Hr (10 mL/Hr) IV Continuous <Continuous>  piperacillin/tazobactam IVPB. 3.375 Gram(s) IV Intermittent every 12 hours  potassium chloride  10 mEq/100 mL IVPB 10 milliEquivalent(s) IV Intermittent once  sucralfate suspension 1 Gram(s) Oral every 6 hours  vancomycin  IVPB 1000 milliGRAM(s) IV Intermittent once    MEDICATIONS  (PRN):  dextrose 40% Gel 15 Gram(s) Oral once PRN Blood Glucose LESS THAN 70 milliGRAM(s)/deciliter  glucagon  Injectable 1 milliGRAM(s) IntraMuscular once PRN Glucose LESS THAN 70 milligrams/deciliter      LABS:                        8.5    14.24 )-----------( 216      ( 25 Jun 2018 06:45 )             27.7     06-25    145  |  107  |  44<H>  ----------------------------<  164<H>  3.3<L>   |  26  |  3.41<H>    Ca    8.7      25 Jun 2018 06:45  Mg     1.6     06-25                MICROBIOLOGY:     RADIOLOGY:  [ ] Reviewed and interpreted by me    PULMONARY FUNCTION TESTS:    EKG: CHIEF COMPLAINT: Patient is a 83y old  Male who presents with a chief complaint of AMS (05 Jun 2018 17:26)      Interval Events: transferred from telemetry to RCU    REVIEW OF SYSTEMS:  Constitutional: Denies pain  CV: Denies   Resp: Denies   [x ] All other systems negative      OBJECTIVE:  ICU Vital Signs Last 24 Hrs  T(C): 36.1 (25 Jun 2018 05:56), Max: 37.3 (24 Jun 2018 15:43)  T(F): 97 (25 Jun 2018 05:56), Max: 99.1 (24 Jun 2018 15:43)  HR: 89 (25 Jun 2018 05:56) (89 - 104)  BP: 138/73 (25 Jun 2018 05:56) (138/73 - 144/108)  BP(mean): --  ABP: --  ABP(mean): --  RR: 20 (25 Jun 2018 05:56) (19 - 20)  SpO2: 100% (25 Jun 2018 05:56) (100% - 100%)        06-24 @ 07:01  -  06-25 @ 07:00  --------------------------------------------------------  IN: 880 mL / OUT: 20 mL / NET: 860 mL      CAPILLARY BLOOD GLUCOSE      POCT Blood Glucose.: 175 mg/dL (25 Jun 2018 05:41)      PHYSICAL EXAM:  General:   HEENT:   Lymph Nodes:  Neck:   Respiratory:   Cardiovascular:   Abdomen:   Extremities:   Skin:   Neurological:  Psychiatry:    HOSPITAL MEDICATIONS:  MEDICATIONS  (STANDING):  chlorhexidine 4% Liquid 1 Application(s) Topical <User Schedule>  dextrose 5% + sodium chloride 0.45%. 1000 milliLiter(s) (100 mL/Hr) IV Continuous <Continuous>  dextrose 5%. 1000 milliLiter(s) (50 mL/Hr) IV Continuous <Continuous>  dextrose 50% Injectable 12.5 Gram(s) IV Push once  dextrose 50% Injectable 25 Gram(s) IV Push once  dextrose 50% Injectable 25 Gram(s) IV Push once  insulin lispro (HumaLOG) corrective regimen sliding scale   SubCutaneous every 6 hours  pantoprazole Infusion 8 mG/Hr (10 mL/Hr) IV Continuous <Continuous>  piperacillin/tazobactam IVPB. 3.375 Gram(s) IV Intermittent every 12 hours  potassium chloride  10 mEq/100 mL IVPB 10 milliEquivalent(s) IV Intermittent once  sucralfate suspension 1 Gram(s) Oral every 6 hours  vancomycin  IVPB 1000 milliGRAM(s) IV Intermittent once    MEDICATIONS  (PRN):  dextrose 40% Gel 15 Gram(s) Oral once PRN Blood Glucose LESS THAN 70 milliGRAM(s)/deciliter  glucagon  Injectable 1 milliGRAM(s) IntraMuscular once PRN Glucose LESS THAN 70 milligrams/deciliter      LABS:                        8.5    14.24 )-----------( 216      ( 25 Jun 2018 06:45 )             27.7     06-25    145  |  107  |  44<H>  ----------------------------<  164<H>  3.3<L>   |  26  |  3.41<H>    Ca    8.7      25 Jun 2018 06:45  Mg     1.6     06-25                MICROBIOLOGY:     RADIOLOGY:  [ ] Reviewed and interpreted by me    PULMONARY FUNCTION TESTS:    EKG:

## 2018-06-25 NOTE — CHART NOTE - NSCHARTNOTEFT_GEN_A_CORE
Source:  nursing, EMR, NP     Diet : NPO with tube feed - Nepro carb steady 10ml/hr , increase 10 ml every 4 hrs to the goal of 40 ml/hr 24hrs     Patient lethargic, on BiPAP, receiving Nepro carb steady @ 40 ml/hr , excessive , noted the depressed serum K , no Mag./ Phos. available , met with NP ( ADS ) discussed the prolonged NPO / poor nutrition intake , high risk of re feeding , supplementing electrolytes and cutting down the EN , advancing slowly with close monitoring of electrolytes , supplementing as indicated . Also requested current wt. of the pt. and weekly wt. Pt. continues w/ pressure ulcers .       Enteral :  Above order provides 1728 kcal & 78 gm protein/d.       Current Weight: - no recent wt. available     Pertinent Medications: MEDICATIONS  (STANDING):  chlorhexidine 4% Liquid 1 Application(s) Topical <User Schedule>  dextrose 5%. 1000 milliLiter(s) (50 mL/Hr) IV Continuous <Continuous>  dextrose 50% Injectable 12.5 Gram(s) IV Push once  dextrose 50% Injectable 25 Gram(s) IV Push once  dextrose 50% Injectable 25 Gram(s) IV Push once  insulin lispro (HumaLOG) corrective regimen sliding scale   SubCutaneous every 6 hours  pantoprazole  Injectable 40 milliGRAM(s) IV Push every 12 hours  piperacillin/tazobactam IVPB. 3.375 Gram(s) IV Intermittent every 12 hours  sucralfate suspension 1 Gram(s) Oral every 6 hours    MEDICATIONS  (PRN):  dextrose 40% Gel 15 Gram(s) Oral once PRN Blood Glucose LESS THAN 70 milliGRAM(s)/deciliter  glucagon  Injectable 1 milliGRAM(s) IntraMuscular once PRN Glucose LESS THAN 70 milligrams/deciliter    Pertinent Labs:  06-25 Na145 mmol/L Glu 164 mg/dL<H> K+ 3.3 mmol/L<L> Cr  3.41 mg/dL<H> BUN 44 mg/dL<H> 05-28 [id number] 10.3 %<H>      Estimated Needs:  1405- 1686 kcal/d ( 25 - 30 kcal/kg IBW) protein 56 - 67 gm /d ( 1.0 - 1.2 gm/kg IBW)     Recommend  to decrease Nepro carb steady to 25 ml/hr today and monitor electrolytes , supplement as indicated , advance feeds to the goal of 35 ml/hr x24hrs in a day. This will provide 1512 kcal & 68 gm protein/d .     Monitoring and Evaluation:  Tolerance to diet prescription, weights and follow up per protocol

## 2018-06-25 NOTE — PROGRESS NOTE ADULT - ASSESSMENT
83M with PMHx advanced dementia, T2DM, and HTN with prolonged hospitalization initially presenting to [facility] ED 5/28 with failure to thrive and admitted to MICU for septic shock 2/2 aspiration PNA with Klebsiella as well as Enterobacter UTI c/b ARF s/p temporary RRT now resolved. Pt transferred to RCU for recurrent aspiration pneumonitis with intermittent episodes of mucous plugging and hypoxemic respiratory failure s/p intubation with hospital course further c/b GIB 2/2 large peptic ulcer s/p cautery/clipping and AFib transferred to telemetry floors for closer monitoring. Pt with underlying dysphagia 2/2 poor functional status and dementia with complete contraindication for PO intake with recurrent episodes of aspiration and mucous plugging.  -No indication for bronchoscopy. Bronchoscopy would not be appropriate treatment for pt at this time. Pt oxygenating well and in NAD. Mucous plugging likely to be recurrent with aspiration events.   -Aggressive chest PT as tolerated with oral airway and deep suctioning via nasal trumpet q4hr  -OOB and dangling as possible  -No indication for Abx at this time. Pt recently treated with full course Abx for PNA now with likely residual CT Chest findings of inflammatory change without clinical evidence of worsening infection process.  -Will continue to follow while on telemetry monitoring. If telemetry no longer indicated would consider RCU consult

## 2018-06-26 LAB
BUN SERPL-MCNC: 44 MG/DL — HIGH (ref 7–23)
CALCIUM SERPL-MCNC: 8.8 MG/DL — SIGNIFICANT CHANGE UP (ref 8.4–10.5)
CHLORIDE SERPL-SCNC: 105 MMOL/L — SIGNIFICANT CHANGE UP (ref 98–107)
CO2 SERPL-SCNC: 27 MMOL/L — SIGNIFICANT CHANGE UP (ref 22–31)
CREAT SERPL-MCNC: 3.2 MG/DL — HIGH (ref 0.5–1.3)
GLUCOSE SERPL-MCNC: 157 MG/DL — HIGH (ref 70–99)
HCT VFR BLD CALC: 26.8 % — LOW (ref 39–50)
HGB BLD-MCNC: 8.1 G/DL — LOW (ref 13–17)
MAGNESIUM SERPL-MCNC: 1.7 MG/DL — SIGNIFICANT CHANGE UP (ref 1.6–2.6)
MCHC RBC-ENTMCNC: 28.8 PG — SIGNIFICANT CHANGE UP (ref 27–34)
MCHC RBC-ENTMCNC: 30.2 % — LOW (ref 32–36)
MCV RBC AUTO: 95.4 FL — SIGNIFICANT CHANGE UP (ref 80–100)
NRBC # FLD: 0 — SIGNIFICANT CHANGE UP
PHOSPHATE SERPL-MCNC: 3.3 MG/DL — SIGNIFICANT CHANGE UP (ref 2.5–4.5)
PLATELET # BLD AUTO: 250 K/UL — SIGNIFICANT CHANGE UP (ref 150–400)
PMV BLD: 11 FL — SIGNIFICANT CHANGE UP (ref 7–13)
POTASSIUM SERPL-MCNC: 3.5 MMOL/L — SIGNIFICANT CHANGE UP (ref 3.5–5.3)
POTASSIUM SERPL-SCNC: 3.5 MMOL/L — SIGNIFICANT CHANGE UP (ref 3.5–5.3)
RBC # BLD: 2.81 M/UL — LOW (ref 4.2–5.8)
RBC # FLD: 15.1 % — HIGH (ref 10.3–14.5)
SODIUM SERPL-SCNC: 142 MMOL/L — SIGNIFICANT CHANGE UP (ref 135–145)
VANCOMYCIN TROUGH SERPL-MCNC: 11.3 UG/ML — SIGNIFICANT CHANGE UP (ref 10–20)
WBC # BLD: 13.64 K/UL — HIGH (ref 3.8–10.5)
WBC # FLD AUTO: 13.64 K/UL — HIGH (ref 3.8–10.5)

## 2018-06-26 PROCEDURE — 99233 SBSQ HOSP IP/OBS HIGH 50: CPT | Mod: GC

## 2018-06-26 RX ORDER — VANCOMYCIN HCL 1 G
750 VIAL (EA) INTRAVENOUS ONCE
Qty: 0 | Refills: 0 | Status: COMPLETED | OUTPATIENT
Start: 2018-06-26 | End: 2018-06-26

## 2018-06-26 RX ORDER — VANCOMYCIN HCL 1 G
VIAL (EA) INTRAVENOUS
Qty: 0 | Refills: 0 | Status: DISCONTINUED | OUTPATIENT
Start: 2018-06-26 | End: 2018-06-26

## 2018-06-26 RX ADMIN — Medication 1 GRAM(S): at 11:39

## 2018-06-26 RX ADMIN — PANTOPRAZOLE SODIUM 40 MILLIGRAM(S): 20 TABLET, DELAYED RELEASE ORAL at 06:11

## 2018-06-26 RX ADMIN — Medication 1 GRAM(S): at 06:10

## 2018-06-26 RX ADMIN — PIPERACILLIN AND TAZOBACTAM 25 GRAM(S): 4; .5 INJECTION, POWDER, LYOPHILIZED, FOR SOLUTION INTRAVENOUS at 17:21

## 2018-06-26 RX ADMIN — Medication 1 GRAM(S): at 17:22

## 2018-06-26 RX ADMIN — PIPERACILLIN AND TAZOBACTAM 25 GRAM(S): 4; .5 INJECTION, POWDER, LYOPHILIZED, FOR SOLUTION INTRAVENOUS at 06:11

## 2018-06-26 RX ADMIN — Medication 1: at 06:27

## 2018-06-26 RX ADMIN — PANTOPRAZOLE SODIUM 40 MILLIGRAM(S): 20 TABLET, DELAYED RELEASE ORAL at 17:22

## 2018-06-26 RX ADMIN — Medication 2: at 23:55

## 2018-06-26 RX ADMIN — CHLORHEXIDINE GLUCONATE 1 APPLICATION(S): 213 SOLUTION TOPICAL at 11:39

## 2018-06-26 RX ADMIN — Medication 2: at 18:30

## 2018-06-26 RX ADMIN — Medication 1 GRAM(S): at 23:41

## 2018-06-26 RX ADMIN — Medication 1 GRAM(S): at 01:15

## 2018-06-26 RX ADMIN — Medication 250 MILLIGRAM(S): at 21:37

## 2018-06-26 NOTE — PROGRESS NOTE ADULT - ATTENDING COMMENTS
83 year old male with dementia, poor functional status with prolonged hospital course.  Hypoxemic respiratory failure due to pneumonia intermittently requiring non-rebreather was able to titrate down to nasal canula, but required NRB overnight. Continue to wean O2 as tolerated. Recurrent aspiration and mucus plugging. Continue chest PT. On broad spectrum antibiotics. Now with hypothermia requiring warming blanket. Continue rate control for a-fib - anticoagulation contraindication given recent GI bleed. GI recs noted. Follow up palliative evaluation. Prognosis is guarded.

## 2018-06-26 NOTE — PROGRESS NOTE ADULT - SUBJECTIVE AND OBJECTIVE BOX
CHIEF COMPLAINT:    Interval Events:    REVIEW OF SYSTEMS:  Constitutional:   Eyes:  ENT:  CV:  Resp:  GI:  :  MSK:  Integumentary:  Neurological:  Psychiatric:  Endocrine:  Hematologic/Lymphatic:  Allergic/Immunologic:  [ ] All other systems negative  [ ] Unable to assess ROS because ________    OBJECTIVE:  ICU Vital Signs Last 24 Hrs  T(C): 36.5 (26 Jun 2018 05:00), Max: 36.6 (26 Jun 2018 01:58)  T(F): 97.7 (26 Jun 2018 05:00), Max: 97.9 (26 Jun 2018 01:58)  HR: 98 (26 Jun 2018 05:00) (84 - 98)  BP: 108/62 (26 Jun 2018 05:00) (108/62 - 173/88)  BP(mean): --  ABP: --  ABP(mean): --  RR: 22 (26 Jun 2018 05:00) (22 - 40)  SpO2: 100% (26 Jun 2018 05:00) (94% - 100%)        06-25 @ 07:01  -  06-26 @ 07:00  --------------------------------------------------------  IN: 870 mL / OUT: 0 mL / NET: 870 mL      CAPILLARY BLOOD GLUCOSE      POCT Blood Glucose.: 180 mg/dL (26 Jun 2018 06:20)      PHYSICAL EXAM:  General:   HEENT:   Lymph Nodes:  Neck:   Respiratory:   Cardiovascular:   Abdomen:   Extremities:   Skin:   Neurological:  Psychiatry:    HOSPITAL MEDICATIONS:  MEDICATIONS  (STANDING):  chlorhexidine 4% Liquid 1 Application(s) Topical <User Schedule>  dextrose 5%. 1000 milliLiter(s) (50 mL/Hr) IV Continuous <Continuous>  dextrose 50% Injectable 12.5 Gram(s) IV Push once  dextrose 50% Injectable 25 Gram(s) IV Push once  dextrose 50% Injectable 25 Gram(s) IV Push once  insulin lispro (HumaLOG) corrective regimen sliding scale   SubCutaneous every 6 hours  pantoprazole  Injectable 40 milliGRAM(s) IV Push every 12 hours  piperacillin/tazobactam IVPB. 3.375 Gram(s) IV Intermittent every 12 hours  sucralfate suspension 1 Gram(s) Oral every 6 hours    MEDICATIONS  (PRN):  dextrose 40% Gel 15 Gram(s) Oral once PRN Blood Glucose LESS THAN 70 milliGRAM(s)/deciliter  glucagon  Injectable 1 milliGRAM(s) IntraMuscular once PRN Glucose LESS THAN 70 milligrams/deciliter      LABS:                        8.1    13.64 )-----------( 250      ( 26 Jun 2018 05:24 )             26.8     06-26    142  |  105  |  44<H>  ----------------------------<  157<H>  3.5   |  27  |  3.20<H>    Ca    8.8      26 Jun 2018 05:24  Phos  3.3     06-26  Mg     1.7     06-26                MICROBIOLOGY:     RADIOLOGY:  [ ] Reviewed and interpreted by me    PULMONARY FUNCTION TESTS:    EKG: CHIEF COMPLAINT: Patient is a 83y old  Male who presents with a chief complaint of AMS (05 Jun 2018 17:26)      Interval Events: 100% nrb in use     REVIEW OF SYSTEMS  [x ] Unable to assess ROS because awake but non verbal     OBJECTIVE:  ICU Vital Signs Last 24 Hrs  T(C): 36.5 (26 Jun 2018 05:00), Max: 36.6 (26 Jun 2018 01:58)  T(F): 97.7 (26 Jun 2018 05:00), Max: 97.9 (26 Jun 2018 01:58)  HR: 98 (26 Jun 2018 05:00) (84 - 98)  BP: 108/62 (26 Jun 2018 05:00) (108/62 - 173/88)  BP(mean): --  ABP: --  ABP(mean): --  RR: 22 (26 Jun 2018 05:00) (22 - 40)  SpO2: 100% (26 Jun 2018 05:00) (94% - 100%)        06-25 @ 07:01  -  06-26 @ 07:00  --------------------------------------------------------  IN: 870 mL / OUT: 0 mL / NET: 870 mL      CAPILLARY BLOOD GLUCOSE      POCT Blood Glucose.: 180 mg/dL (26 Jun 2018 06:20)      HOSPITAL MEDICATIONS:  MEDICATIONS  (STANDING):  chlorhexidine 4% Liquid 1 Application(s) Topical <User Schedule>  dextrose 5%. 1000 milliLiter(s) (50 mL/Hr) IV Continuous <Continuous>  dextrose 50% Injectable 12.5 Gram(s) IV Push once  dextrose 50% Injectable 25 Gram(s) IV Push once  dextrose 50% Injectable 25 Gram(s) IV Push once  insulin lispro (HumaLOG) corrective regimen sliding scale   SubCutaneous every 6 hours  pantoprazole  Injectable 40 milliGRAM(s) IV Push every 12 hours  piperacillin/tazobactam IVPB. 3.375 Gram(s) IV Intermittent every 12 hours  sucralfate suspension 1 Gram(s) Oral every 6 hours    MEDICATIONS  (PRN):  dextrose 40% Gel 15 Gram(s) Oral once PRN Blood Glucose LESS THAN 70 milliGRAM(s)/deciliter  glucagon  Injectable 1 milliGRAM(s) IntraMuscular once PRN Glucose LESS THAN 70 milligrams/deciliter      LABS:                        8.1    13.64 )-----------( 250      ( 26 Jun 2018 05:24 )             26.8     06-26    142  |  105  |  44<H>  ----------------------------<  157<H>  3.5   |  27  |  3.20<H>    Ca    8.8      26 Jun 2018 05:24  Phos  3.3     06-26  Mg     1.7     06-26                MICROBIOLOGY:     RADIOLOGY:  [ ] Reviewed and interpreted by me    PULMONARY FUNCTION TESTS:    EKG:

## 2018-06-26 NOTE — PROGRESS NOTE ADULT - SUBJECTIVE AND OBJECTIVE BOX
INTERVAL HPI/OVERNIGHT EVENTS:    nonverbal      MEDICATIONS  (STANDING):  chlorhexidine 4% Liquid 1 Application(s) Topical <User Schedule>  dextrose 5%. 1000 milliLiter(s) (50 mL/Hr) IV Continuous <Continuous>  dextrose 50% Injectable 12.5 Gram(s) IV Push once  dextrose 50% Injectable 25 Gram(s) IV Push once  dextrose 50% Injectable 25 Gram(s) IV Push once  insulin lispro (HumaLOG) corrective regimen sliding scale   SubCutaneous every 6 hours  pantoprazole  Injectable 40 milliGRAM(s) IV Push every 12 hours  piperacillin/tazobactam IVPB. 3.375 Gram(s) IV Intermittent every 12 hours  sucralfate suspension 1 Gram(s) Oral every 6 hours    MEDICATIONS  (PRN):  dextrose 40% Gel 15 Gram(s) Oral once PRN Blood Glucose LESS THAN 70 milliGRAM(s)/deciliter  glucagon  Injectable 1 milliGRAM(s) IntraMuscular once PRN Glucose LESS THAN 70 milligrams/deciliter      Allergies    No Known Allergies    Intolerances        Review of Systems: *pt minimally verbal to nonverbal, unable to obtain ROS           Vital Signs Last 24 Hrs  T(C): 36.5 (26 Jun 2018 05:00), Max: 36.6 (26 Jun 2018 01:58)  T(F): 97.7 (26 Jun 2018 05:00), Max: 97.9 (26 Jun 2018 01:58)  HR: 98 (26 Jun 2018 05:00) (84 - 98)  BP: 108/62 (26 Jun 2018 05:00) (108/62 - 173/88)  BP(mean): --  RR: 22 (26 Jun 2018 05:00) (22 - 40)  SpO2: 100% (26 Jun 2018 05:00) (94% - 100%)    PHYSICAL EXAM:    Constitutional: NAD  HEENT: EOMI, throat clear  Neck: No LAD, supple  Respiratory: CTA and P  Cardiovascular: S1 and S2, RRR, no M  Gastrointestinal: BS+, soft, NT/ND, neg HSM,  Extremities: No peripheral edema, neg clubbing, cyanosis  Vascular: 2+ peripheral pulses  Neurological: A/O x 0  Psychiatric: Normal mood, normal affect  Skin: No rashes      LABS:                        8.1    13.64 )-----------( 250      ( 26 Jun 2018 05:24 )             26.8     06-26    142  |  105  |  44<H>  ----------------------------<  157<H>  3.5   |  27  |  3.20<H>    Ca    8.8      26 Jun 2018 05:24  Phos  3.3     06-26  Mg     1.7     06-26            RADIOLOGY & ADDITIONAL TESTS:

## 2018-06-26 NOTE — PROGRESS NOTE ADULT - ASSESSMENT
83M with PMHx advanced dementia, T2DM, and HTN with prolonged hospitalization initially presenting to [facility] ED 5/28 with failure to thrive and admitted to MICU for septic shock 2/2 aspiration PNA with Klebsiella as well as Enterobacter UTI c/b ARF s/p temporary RRT now resolved. Pt transferred to RCU for recurrent aspiration pneumonitis with intermittent episodes of mucous plugging and hypoxemic respiratory failure s/p intubation with hospital course further

## 2018-06-26 NOTE — PROGRESS NOTE ADULT - RECTAL EXAM
rectal tube to pouch
+ rectal pouch
Flexiseal with watery black stool
Flexiseal with watery stool/stool exam
flexitube in place - melena noted

## 2018-06-27 LAB
BUN SERPL-MCNC: 53 MG/DL — HIGH (ref 7–23)
CALCIUM SERPL-MCNC: 9 MG/DL — SIGNIFICANT CHANGE UP (ref 8.4–10.5)
CHLORIDE SERPL-SCNC: 107 MMOL/L — SIGNIFICANT CHANGE UP (ref 98–107)
CO2 SERPL-SCNC: 23 MMOL/L — SIGNIFICANT CHANGE UP (ref 22–31)
CREAT SERPL-MCNC: 3.37 MG/DL — HIGH (ref 0.5–1.3)
GLUCOSE SERPL-MCNC: 178 MG/DL — HIGH (ref 70–99)
HCT VFR BLD CALC: 25.9 % — LOW (ref 39–50)
HGB BLD-MCNC: 7.5 G/DL — LOW (ref 13–17)
MAGNESIUM SERPL-MCNC: 1.9 MG/DL — SIGNIFICANT CHANGE UP (ref 1.6–2.6)
MCHC RBC-ENTMCNC: 29 % — LOW (ref 32–36)
MCHC RBC-ENTMCNC: 29.1 PG — SIGNIFICANT CHANGE UP (ref 27–34)
MCV RBC AUTO: 100.4 FL — HIGH (ref 80–100)
NRBC # FLD: 0 — SIGNIFICANT CHANGE UP
PHOSPHATE SERPL-MCNC: 3.1 MG/DL — SIGNIFICANT CHANGE UP (ref 2.5–4.5)
PLATELET # BLD AUTO: 232 K/UL — SIGNIFICANT CHANGE UP (ref 150–400)
PMV BLD: 11.1 FL — SIGNIFICANT CHANGE UP (ref 7–13)
POTASSIUM SERPL-MCNC: 3.6 MMOL/L — SIGNIFICANT CHANGE UP (ref 3.5–5.3)
POTASSIUM SERPL-SCNC: 3.6 MMOL/L — SIGNIFICANT CHANGE UP (ref 3.5–5.3)
RBC # BLD: 2.58 M/UL — LOW (ref 4.2–5.8)
RBC # FLD: 15.7 % — HIGH (ref 10.3–14.5)
SODIUM SERPL-SCNC: 142 MMOL/L — SIGNIFICANT CHANGE UP (ref 135–145)
VANCOMYCIN TROUGH SERPL-MCNC: 10.4 UG/ML — SIGNIFICANT CHANGE UP (ref 10–20)
WBC # BLD: 12.44 K/UL — HIGH (ref 3.8–10.5)
WBC # FLD AUTO: 12.44 K/UL — HIGH (ref 3.8–10.5)

## 2018-06-27 PROCEDURE — 99233 SBSQ HOSP IP/OBS HIGH 50: CPT | Mod: GC

## 2018-06-27 RX ORDER — VANCOMYCIN HCL 1 G
750 VIAL (EA) INTRAVENOUS ONCE
Qty: 0 | Refills: 0 | Status: COMPLETED | OUTPATIENT
Start: 2018-06-27 | End: 2018-06-27

## 2018-06-27 RX ADMIN — Medication 1 GRAM(S): at 17:45

## 2018-06-27 RX ADMIN — PIPERACILLIN AND TAZOBACTAM 25 GRAM(S): 4; .5 INJECTION, POWDER, LYOPHILIZED, FOR SOLUTION INTRAVENOUS at 07:01

## 2018-06-27 RX ADMIN — PIPERACILLIN AND TAZOBACTAM 25 GRAM(S): 4; .5 INJECTION, POWDER, LYOPHILIZED, FOR SOLUTION INTRAVENOUS at 17:45

## 2018-06-27 RX ADMIN — PANTOPRAZOLE SODIUM 40 MILLIGRAM(S): 20 TABLET, DELAYED RELEASE ORAL at 17:45

## 2018-06-27 RX ADMIN — CHLORHEXIDINE GLUCONATE 1 APPLICATION(S): 213 SOLUTION TOPICAL at 12:45

## 2018-06-27 RX ADMIN — Medication 1: at 07:03

## 2018-06-27 RX ADMIN — Medication 1: at 12:44

## 2018-06-27 RX ADMIN — Medication 1 GRAM(S): at 07:01

## 2018-06-27 RX ADMIN — Medication 250 MILLIGRAM(S): at 23:20

## 2018-06-27 RX ADMIN — PANTOPRAZOLE SODIUM 40 MILLIGRAM(S): 20 TABLET, DELAYED RELEASE ORAL at 07:00

## 2018-06-27 RX ADMIN — Medication 1 GRAM(S): at 12:44

## 2018-06-27 NOTE — PROGRESS NOTE ADULT - ATTENDING COMMENTS
83 year old male with dementia, poor functional status with prolonged hospital course.  Hypoxemic respiratory failure due to pneumonia, titrated from non-rebreather to nasal canula. Continue chest PT for recurrent aspiration and mucus plugging. Continue broad spectrum antibiotics. Continue rate control for a-fib - anticoagulation contraindicated given recent GI bleed. Given respiratory status improved - will discuss GI regarding PEG. Prognosis is guarded. Discussion had with son-in-law regarding overall goals of care. Will discuss with HCP, Alexia via phone as well. Will discuss dispo.

## 2018-06-27 NOTE — PROGRESS NOTE ADULT - PROBLEM SELECTOR PLAN 1
- improving  - cont to trend cbc and transfuse prn   - s/p EGD w/large gastric ulcer requiring bipolar cautery and clip  - pathology with no hpylori  - Protonix 40mg IVP BID

## 2018-06-27 NOTE — PROGRESS NOTE ADULT - PROBLEM SELECTOR PLAN 2
- 2/2 mental state/dementia/declining functional status  - swallow eval from 6/5 noted with PO intake contraindicated  - aspiration precautions   - palliative following re: goc appreciated  - NGT feeds w/aspiration precautions   - EGD revealed large gastric ulcer requiring bipolar cautery and clip; considered increase risk to undergo anesthesia given current comorbidities and ulcer.   - Recommend palliative to re-evaluate GOC with the family; await outcome

## 2018-06-27 NOTE — PROGRESS NOTE ADULT - MS EXT PE MLT D E PC
no clubbing/no cyanosis/no pedal edema
no cyanosis/no pedal edema/no clubbing
no clubbing/no cyanosis/no pedal edema
no pedal edema/no clubbing/no cyanosis
no pedal edema/no cyanosis/no clubbing
no cyanosis/no clubbing/no pedal edema
no pedal edema/no cyanosis/no clubbing
no clubbing/no cyanosis
no pedal edema/no cyanosis/no clubbing
no pedal edema/no clubbing/no cyanosis
no pedal edema/no cyanosis
no pedal edema/no clubbing/no cyanosis
no pedal edema/no clubbing/no cyanosis
no cyanosis/no pedal edema

## 2018-06-27 NOTE — PROGRESS NOTE ADULT - PROBLEM SELECTOR PLAN 7
Palliative care to speak with dtr regarding GOC  Dr Washington to speak with dtr today at 1pm regarding pt status and GOC

## 2018-06-27 NOTE — PROGRESS NOTE ADULT - SUBJECTIVE AND OBJECTIVE BOX
INTERVAL HPI/OVERNIGHT EVENTS:    dark brown stools noted by staff overnight (empty rectal tube bag this morning)  NGT feeds  nonverbal   son in law bedside       MEDICATIONS  (STANDING):  chlorhexidine 4% Liquid 1 Application(s) Topical <User Schedule>  dextrose 5%. 1000 milliLiter(s) (50 mL/Hr) IV Continuous <Continuous>  dextrose 50% Injectable 12.5 Gram(s) IV Push once  dextrose 50% Injectable 25 Gram(s) IV Push once  dextrose 50% Injectable 25 Gram(s) IV Push once  insulin lispro (HumaLOG) corrective regimen sliding scale   SubCutaneous every 6 hours  pantoprazole  Injectable 40 milliGRAM(s) IV Push every 12 hours  piperacillin/tazobactam IVPB. 3.375 Gram(s) IV Intermittent every 12 hours  sucralfate suspension 1 Gram(s) Oral every 6 hours    MEDICATIONS  (PRN):  dextrose 40% Gel 15 Gram(s) Oral once PRN Blood Glucose LESS THAN 70 milliGRAM(s)/deciliter  glucagon  Injectable 1 milliGRAM(s) IntraMuscular once PRN Glucose LESS THAN 70 milligrams/deciliter      Allergies    No Known Allergies    Intolerances        Review of Systems: *pt minimally verbal to nonverbal, unable to obtain ROS         Vital Signs Last 24 Hrs  T(C): 36.1 (27 Jun 2018 06:58), Max: 37.1 (26 Jun 2018 13:00)  T(F): 97 (27 Jun 2018 06:58), Max: 98.8 (26 Jun 2018 13:00)  HR: 93 (27 Jun 2018 06:58) (93 - 100)  BP: 134/75 (27 Jun 2018 06:58) (102/54 - 134/75)  BP(mean): --  RR: 20 (27 Jun 2018 06:58) (20 - 22)  SpO2: 98% (27 Jun 2018 06:58) (94% - 98%)    PHYSICAL EXAM:    Constitutional: NAD  HEENT: EOMI, throat clear  Neck: No LAD, supple  Respiratory: CTA and P  Cardiovascular: S1 and S2, RRR, no M  Gastrointestinal: BS+, soft, NT/ND, neg HSM, +ngt  Extremities: No peripheral edema, neg clubbing, cyanosis  Vascular: 2+ peripheral pulses  Neurological: A/O x 0  Psychiatric: Normal mood, normal affect  Skin: No rashes      LABS:                        7.5    12.44 )-----------( 232      ( 27 Jun 2018 05:40 )             25.9     06-27    142  |  107  |  53<H>  ----------------------------<  178<H>  3.6   |  23  |  3.37<H>    Ca    9.0      27 Jun 2018 05:40  Phos  3.1     06-27  Mg     1.9     06-27            RADIOLOGY & ADDITIONAL TESTS:

## 2018-06-27 NOTE — PROGRESS NOTE ADULT - EXTREMITIES
detailed exam
No cyanosis, clubbing or edema
detailed exam
detailed exam
No cyanosis, clubbing or edema
detailed exam

## 2018-06-27 NOTE — PROGRESS NOTE ADULT - ATTENDING COMMENTS
patient seen and examined  appears tachypenic  long discussion with daughter over phone  explained that in patients situation peg will not offer any benefit and patient not a candidate for sedation  she feels conflicted  I recommended hospice; she was tearful but appears to understand

## 2018-06-27 NOTE — PROGRESS NOTE ADULT - VASCULAR
Equal and normal pulses (carotid, femoral, dorsalis pedis)
detailed exam
Equal and normal pulses (carotid, femoral, dorsalis pedis)
detailed exam

## 2018-06-27 NOTE — PROGRESS NOTE ADULT - ASSESSMENT
83M with PMHx advanced dementia, T2DM, and HTN with prolonged hospitalization initially presenting to [facility] ED 5/28 with failure to thrive and admitted to MICU for septic shock 2/2 aspiration PNA with Klebsiella as well as Enterobacter UTI c/b ARF s/p temporary RRT now resolved. Pt transferred to RCU for recurrent aspiration pneumonitis with intermittent episodes of mucous plugging and hypoxemic respiratory failure s/p intubation with hospital course further

## 2018-06-27 NOTE — PROGRESS NOTE ADULT - SUBJECTIVE AND OBJECTIVE BOX
CHIEF COMPLAINT:    Interval Events:    REVIEW OF SYSTEMS:  Constitutional:   Eyes:  ENT:  CV:  Resp:  GI:  :  MSK:  Integumentary:  Neurological:  Psychiatric:  Endocrine:  Hematologic/Lymphatic:  Allergic/Immunologic:  [ ] All other systems negative  [ ] Unable to assess ROS because ________    OBJECTIVE:  ICU Vital Signs Last 24 Hrs  T(C): 36.1 (27 Jun 2018 06:58), Max: 37.1 (26 Jun 2018 13:00)  T(F): 97 (27 Jun 2018 06:58), Max: 98.8 (26 Jun 2018 13:00)  HR: 93 (27 Jun 2018 06:58) (93 - 100)  BP: 134/75 (27 Jun 2018 06:58) (102/54 - 134/75)  BP(mean): --  ABP: --  ABP(mean): --  RR: 20 (27 Jun 2018 06:58) (20 - 22)  SpO2: 98% (27 Jun 2018 06:58) (94% - 98%)        06-26 @ 07:01  -  06-27 @ 07:00  --------------------------------------------------------  IN: 0 mL / OUT: 500 mL / NET: -500 mL      CAPILLARY BLOOD GLUCOSE      POCT Blood Glucose.: 185 mg/dL (27 Jun 2018 07:00)      PHYSICAL EXAM:  General:   HEENT:   Lymph Nodes:  Neck:   Respiratory:   Cardiovascular:   Abdomen:   Extremities:   Skin:   Neurological:  Psychiatry:    HOSPITAL MEDICATIONS:  MEDICATIONS  (STANDING):  chlorhexidine 4% Liquid 1 Application(s) Topical <User Schedule>  dextrose 5%. 1000 milliLiter(s) (50 mL/Hr) IV Continuous <Continuous>  dextrose 50% Injectable 12.5 Gram(s) IV Push once  dextrose 50% Injectable 25 Gram(s) IV Push once  dextrose 50% Injectable 25 Gram(s) IV Push once  insulin lispro (HumaLOG) corrective regimen sliding scale   SubCutaneous every 6 hours  pantoprazole  Injectable 40 milliGRAM(s) IV Push every 12 hours  piperacillin/tazobactam IVPB. 3.375 Gram(s) IV Intermittent every 12 hours  sucralfate suspension 1 Gram(s) Oral every 6 hours    MEDICATIONS  (PRN):  dextrose 40% Gel 15 Gram(s) Oral once PRN Blood Glucose LESS THAN 70 milliGRAM(s)/deciliter  glucagon  Injectable 1 milliGRAM(s) IntraMuscular once PRN Glucose LESS THAN 70 milligrams/deciliter      LABS:                        7.5    12.44 )-----------( 232      ( 27 Jun 2018 05:40 )             25.9     06-27    142  |  107  |  53<H>  ----------------------------<  178<H>  3.6   |  23  |  3.37<H>    Ca    9.0      27 Jun 2018 05:40  Phos  3.1     06-27  Mg     1.9     06-27                MICROBIOLOGY:     RADIOLOGY:  [ ] Reviewed and interpreted by me    PULMONARY FUNCTION TESTS:    EKG: CHIEF COMPLAINT: Patient is a 83y old  Male who presents with a chief complaint of AMS (05 Jun 2018 17:26)      Interval Events: none     REVIEW OF SYSTEMS:  [x ] Unable to assess ROS because AMS     OBJECTIVE:  ICU Vital Signs Last 24 Hrs  T(C): 36.1 (27 Jun 2018 06:58), Max: 37.1 (26 Jun 2018 13:00)  T(F): 97 (27 Jun 2018 06:58), Max: 98.8 (26 Jun 2018 13:00)  HR: 93 (27 Jun 2018 06:58) (93 - 100)  BP: 134/75 (27 Jun 2018 06:58) (102/54 - 134/75)  BP(mean): --  ABP: --  ABP(mean): --  RR: 20 (27 Jun 2018 06:58) (20 - 22)  SpO2: 98% (27 Jun 2018 06:58) (94% - 98%)        06-26 @ 07:01  -  06-27 @ 07:00  --------------------------------------------------------  IN: 0 mL / OUT: 500 mL / NET: -500 mL      CAPILLARY BLOOD GLUCOSE      POCT Blood Glucose.: 185 mg/dL (27 Jun 2018 07:00    HOSPITAL MEDICATIONS:  MEDICATIONS  (STANDING):  chlorhexidine 4% Liquid 1 Application(s) Topical <User Schedule>  dextrose 5%. 1000 milliLiter(s) (50 mL/Hr) IV Continuous <Continuous>  dextrose 50% Injectable 12.5 Gram(s) IV Push once  dextrose 50% Injectable 25 Gram(s) IV Push once  dextrose 50% Injectable 25 Gram(s) IV Push once  insulin lispro (HumaLOG) corrective regimen sliding scale   SubCutaneous every 6 hours  pantoprazole  Injectable 40 milliGRAM(s) IV Push every 12 hours  piperacillin/tazobactam IVPB. 3.375 Gram(s) IV Intermittent every 12 hours  sucralfate suspension 1 Gram(s) Oral every 6 hours    MEDICATIONS  (PRN):  dextrose 40% Gel 15 Gram(s) Oral once PRN Blood Glucose LESS THAN 70 milliGRAM(s)/deciliter  glucagon  Injectable 1 milliGRAM(s) IntraMuscular once PRN Glucose LESS THAN 70 milligrams/deciliter      LABS:                        7.5    12.44 )-----------( 232      ( 27 Jun 2018 05:40 )             25.9     06-27    142  |  107  |  53<H>  ----------------------------<  178<H>  3.6   |  23  |  3.37<H>    Ca    9.0      27 Jun 2018 05:40  Phos  3.1     06-27  Mg     1.9     06-27                MICROBIOLOGY:     RADIOLOGY:  [ ] Reviewed and interpreted by me    PULMONARY FUNCTION TESTS:    EKG:

## 2018-06-27 NOTE — PROGRESS NOTE ADULT - PROBLEM SELECTOR PLAN 2
GI note appreciated   S/P clipping /cauterizing of ulcer   Change protonix to iv bid   Hgb stable  GI to speak with family regarding PEG with large gastric ulcer

## 2018-06-27 NOTE — PROGRESS NOTE ADULT - NS MD HP PULSE RADIAL
right normal/left normal
left normal/right normal
left normal/right normal
right normal/left normal
left normal/right normal
left normal/right normal
right normal/left normal

## 2018-06-28 VITALS
HEART RATE: 99 BPM | OXYGEN SATURATION: 96 % | RESPIRATION RATE: 18 BRPM | TEMPERATURE: 98 F | SYSTOLIC BLOOD PRESSURE: 78 MMHG | DIASTOLIC BLOOD PRESSURE: 43 MMHG

## 2018-06-28 LAB
BUN SERPL-MCNC: 59 MG/DL — HIGH (ref 7–23)
CALCIUM SERPL-MCNC: 9.2 MG/DL — SIGNIFICANT CHANGE UP (ref 8.4–10.5)
CHLORIDE SERPL-SCNC: 105 MMOL/L — SIGNIFICANT CHANGE UP (ref 98–107)
CO2 SERPL-SCNC: 27 MMOL/L — SIGNIFICANT CHANGE UP (ref 22–31)
CREAT SERPL-MCNC: 3.46 MG/DL — HIGH (ref 0.5–1.3)
GLUCOSE SERPL-MCNC: 206 MG/DL — HIGH (ref 70–99)
HCT VFR BLD CALC: 23.9 % — LOW (ref 39–50)
HGB BLD-MCNC: 7.4 G/DL — LOW (ref 13–17)
MAGNESIUM SERPL-MCNC: 2 MG/DL — SIGNIFICANT CHANGE UP (ref 1.6–2.6)
MCHC RBC-ENTMCNC: 28.8 PG — SIGNIFICANT CHANGE UP (ref 27–34)
MCHC RBC-ENTMCNC: 31 % — LOW (ref 32–36)
MCV RBC AUTO: 93 FL — SIGNIFICANT CHANGE UP (ref 80–100)
NRBC # FLD: 0 — SIGNIFICANT CHANGE UP
PHOSPHATE SERPL-MCNC: 3.5 MG/DL — SIGNIFICANT CHANGE UP (ref 2.5–4.5)
PLATELET # BLD AUTO: 299 K/UL — SIGNIFICANT CHANGE UP (ref 150–400)
PMV BLD: 11 FL — SIGNIFICANT CHANGE UP (ref 7–13)
POTASSIUM SERPL-MCNC: 3.3 MMOL/L — LOW (ref 3.5–5.3)
POTASSIUM SERPL-SCNC: 3.3 MMOL/L — LOW (ref 3.5–5.3)
RBC # BLD: 2.57 M/UL — LOW (ref 4.2–5.8)
RBC # FLD: 15.9 % — HIGH (ref 10.3–14.5)
SODIUM SERPL-SCNC: 143 MMOL/L — SIGNIFICANT CHANGE UP (ref 135–145)
WBC # BLD: 12.71 K/UL — HIGH (ref 3.8–10.5)
WBC # FLD AUTO: 12.71 K/UL — HIGH (ref 3.8–10.5)

## 2018-06-28 PROCEDURE — 99233 SBSQ HOSP IP/OBS HIGH 50: CPT | Mod: GC

## 2018-06-28 RX ORDER — SUCRALFATE 1 G
1 TABLET ORAL EVERY 6 HOURS
Qty: 0 | Refills: 0 | Status: DISCONTINUED | OUTPATIENT
Start: 2018-06-28 | End: 2018-06-28

## 2018-06-28 RX ORDER — SODIUM CHLORIDE 9 MG/ML
500 INJECTION INTRAMUSCULAR; INTRAVENOUS; SUBCUTANEOUS ONCE
Qty: 0 | Refills: 0 | Status: COMPLETED | OUTPATIENT
Start: 2018-06-28 | End: 2018-06-28

## 2018-06-28 RX ORDER — POTASSIUM CHLORIDE 20 MEQ
20 PACKET (EA) ORAL ONCE
Qty: 0 | Refills: 0 | Status: COMPLETED | OUTPATIENT
Start: 2018-06-28 | End: 2018-06-28

## 2018-06-28 RX ADMIN — Medication 20 MILLIEQUIVALENT(S): at 12:36

## 2018-06-28 RX ADMIN — Medication 3: at 00:57

## 2018-06-28 RX ADMIN — Medication 1 GRAM(S): at 12:36

## 2018-06-28 RX ADMIN — Medication 1 GRAM(S): at 06:17

## 2018-06-28 RX ADMIN — Medication 1: at 12:36

## 2018-06-28 RX ADMIN — PANTOPRAZOLE SODIUM 40 MILLIGRAM(S): 20 TABLET, DELAYED RELEASE ORAL at 06:17

## 2018-06-28 RX ADMIN — PIPERACILLIN AND TAZOBACTAM 25 GRAM(S): 4; .5 INJECTION, POWDER, LYOPHILIZED, FOR SOLUTION INTRAVENOUS at 06:20

## 2018-06-28 RX ADMIN — SODIUM CHLORIDE 250 MILLILITER(S): 9 INJECTION INTRAMUSCULAR; INTRAVENOUS; SUBCUTANEOUS at 18:46

## 2018-06-28 RX ADMIN — CHLORHEXIDINE GLUCONATE 1 APPLICATION(S): 213 SOLUTION TOPICAL at 12:36

## 2018-06-28 RX ADMIN — PIPERACILLIN AND TAZOBACTAM 25 GRAM(S): 4; .5 INJECTION, POWDER, LYOPHILIZED, FOR SOLUTION INTRAVENOUS at 18:46

## 2018-06-28 RX ADMIN — Medication 2: at 06:12

## 2018-06-28 RX ADMIN — PANTOPRAZOLE SODIUM 40 MILLIGRAM(S): 20 TABLET, DELAYED RELEASE ORAL at 18:46

## 2018-06-28 RX ADMIN — Medication 1 GRAM(S): at 18:46

## 2018-06-28 NOTE — PROGRESS NOTE ADULT - MENTAL STATUS
Lethargic. Waxes and wanes. More alert at times.
Minimally responsive. Eyes open.
nonverbal, does not follow commands  alert and opens eyes but does not focus or track
nonverbal, alert and opens eyes but does not follow commands
alert, opens eyes, does not follow commands  nonverbal  does not focus
nonverbal, does not follow commands  opens eyes but does not track
unresponsive
nonverbal  oriented x0
Eyes open intermittently, tracks movement when awake
Lethargic opens eyes to noxious stimuli does not follow commands
lethargic opens eyes to loud verbal /noxious stimuli
More alert earlier today. Mental status waxes and wanes. Non-verbal. Grunts.
lethargic does not respond to verbal stimuli
Lethargic, opens eyes to repeated tactile stimuli
lethargic non verbal opens eyes to tactile stimuli
eyes open tracks movement, weak, does not follow commands

## 2018-06-28 NOTE — PROGRESS NOTE ADULT - ATTENDING COMMENTS
83 year old male with dementia, poor functional status with prolonged hospital course.  Hypoxemic respiratory failure due to pneumonia, titrated from non-rebreather to nasal canula. Continue chest PT for recurrent aspiration and mucus plugging. Continue broad spectrum antibiotics to complete 7 days course. Continue rate control for a-fib - anticoagulation contraindicated given recent GI bleed. Had discussion with GI regarding PEG. Aside from oxygen saturation, the large gastric ulcer makes PEG placement difficult. Discussion had with son-in-law regarding overall goals of care. Also discussed with Alexia who understands but still wishes to proceed with PEG. Will continue ongoing discussion with family. Prognosis is guarded.

## 2018-06-28 NOTE — PROGRESS NOTE ADULT - SUBJECTIVE AND OBJECTIVE BOX
CHIEF COMPLAINT:    Interval Events:    REVIEW OF SYSTEMS:  Constitutional:   Eyes:  ENT:  CV:  Resp:  GI:  :  MSK:  Integumentary:  Neurological:  Psychiatric:  Endocrine:  Hematologic/Lymphatic:  Allergic/Immunologic:  [ ] All other systems negative  [ ] Unable to assess ROS because ________    OBJECTIVE:  ICU Vital Signs Last 24 Hrs  T(C): 36.5 (28 Jun 2018 06:21), Max: 36.5 (28 Jun 2018 06:21)  T(F): 97.7 (28 Jun 2018 06:21), Max: 97.7 (28 Jun 2018 06:21)  HR: 87 (28 Jun 2018 06:21) (87 - 92)  BP: 120/63 (28 Jun 2018 06:21) (110/65 - 124/74)  BP(mean): --  ABP: --  ABP(mean): --  RR: 18 (28 Jun 2018 06:21) (18 - 20)  SpO2: 100% (28 Jun 2018 06:21) (96% - 100%)        06-27 @ 07:01  -  06-28 @ 07:00  --------------------------------------------------------  IN: 0 mL / OUT: 1280 mL / NET: -1280 mL      CAPILLARY BLOOD GLUCOSE      POCT Blood Glucose.: 207 mg/dL (28 Jun 2018 05:06)      PHYSICAL EXAM:  General:   HEENT:   Lymph Nodes:  Neck:   Respiratory:   Cardiovascular:   Abdomen:   Extremities:   Skin:   Neurological:  Psychiatry:    HOSPITAL MEDICATIONS:  MEDICATIONS  (STANDING):  chlorhexidine 4% Liquid 1 Application(s) Topical <User Schedule>  dextrose 5%. 1000 milliLiter(s) (50 mL/Hr) IV Continuous <Continuous>  dextrose 50% Injectable 12.5 Gram(s) IV Push once  dextrose 50% Injectable 25 Gram(s) IV Push once  dextrose 50% Injectable 25 Gram(s) IV Push once  insulin lispro (HumaLOG) corrective regimen sliding scale   SubCutaneous every 6 hours  pantoprazole  Injectable 40 milliGRAM(s) IV Push every 12 hours  piperacillin/tazobactam IVPB. 3.375 Gram(s) IV Intermittent every 12 hours  potassium chloride   Powder 20 milliEquivalent(s) Oral once  sucralfate suspension 1 Gram(s) Oral every 6 hours    MEDICATIONS  (PRN):  dextrose 40% Gel 15 Gram(s) Oral once PRN Blood Glucose LESS THAN 70 milliGRAM(s)/deciliter  glucagon  Injectable 1 milliGRAM(s) IntraMuscular once PRN Glucose LESS THAN 70 milligrams/deciliter      LABS:                        7.4    12.71 )-----------( 299      ( 28 Jun 2018 04:57 )             23.9     06-28    143  |  105  |  59<H>  ----------------------------<  206<H>  3.3<L>   |  27  |  3.46<H>    Ca    9.2      28 Jun 2018 04:57  Phos  3.5     06-28  Mg     2.0     06-28                MICROBIOLOGY:     RADIOLOGY:  [ ] Reviewed and interpreted by me    PULMONARY FUNCTION TESTS:    EKG: CHIEF COMPLAINT: Patient is a 83y old  Male who presents with a chief complaint of AMS (05 Jun 2018 17:26)    Interval Events: No acute change overnight    REVIEW OF SYSTEMS:  [x] Unable to assess ROS because pt is not verbal at this time    OBJECTIVE:  ICU Vital Signs Last 24 Hrs  T(C): 36.5 (28 Jun 2018 06:21), Max: 36.5 (28 Jun 2018 06:21)  T(F): 97.7 (28 Jun 2018 06:21), Max: 97.7 (28 Jun 2018 06:21)  HR: 87 (28 Jun 2018 06:21) (87 - 92)  BP: 120/63 (28 Jun 2018 06:21) (110/65 - 124/74)  BP(mean): --  ABP: --  ABP(mean): --  RR: 18 (28 Jun 2018 06:21) (18 - 20)  SpO2: 100% (28 Jun 2018 06:21) (96% - 100%)        06-27 @ 07:01  -  06-28 @ 07:00  --------------------------------------------------------  IN: 0 mL / OUT: 1280 mL / NET: -1280 mL      CAPILLARY BLOOD GLUCOSE      POCT Blood Glucose.: 207 mg/dL (28 Jun 2018 05:06)    HOSPITAL MEDICATIONS:  MEDICATIONS  (STANDING):  chlorhexidine 4% Liquid 1 Application(s) Topical <User Schedule>  dextrose 5%. 1000 milliLiter(s) (50 mL/Hr) IV Continuous <Continuous>  dextrose 50% Injectable 12.5 Gram(s) IV Push once  dextrose 50% Injectable 25 Gram(s) IV Push once  dextrose 50% Injectable 25 Gram(s) IV Push once  insulin lispro (HumaLOG) corrective regimen sliding scale   SubCutaneous every 6 hours  pantoprazole  Injectable 40 milliGRAM(s) IV Push every 12 hours  piperacillin/tazobactam IVPB. 3.375 Gram(s) IV Intermittent every 12 hours  potassium chloride   Powder 20 milliEquivalent(s) Oral once  sucralfate suspension 1 Gram(s) Oral every 6 hours    MEDICATIONS  (PRN):  dextrose 40% Gel 15 Gram(s) Oral once PRN Blood Glucose LESS THAN 70 milliGRAM(s)/deciliter  glucagon  Injectable 1 milliGRAM(s) IntraMuscular once PRN Glucose LESS THAN 70 milligrams/deciliter      LABS:                        7.4    12.71 )-----------( 299      ( 28 Jun 2018 04:57 )             23.9     06-28    143  |  105  |  59<H>  ----------------------------<  206<H>  3.3<L>   |  27  |  3.46<H>    Ca    9.2      28 Jun 2018 04:57  Phos  3.5     06-28  Mg     2.0     06-28                MICROBIOLOGY:     RADIOLOGY:  [ ] Reviewed and interpreted by me    PULMONARY FUNCTION TESTS:    EKG:

## 2018-06-28 NOTE — PROVIDER CONTACT NOTE (OTHER) - RECOMMENDATIONS
Give bolus
1mg dilaudid given IVP, 80mg lasix given IVP. pt to be sent to micu for intubation
Please put in order for hyperthermia blanket.
notify provider
np assess

## 2018-06-28 NOTE — PROGRESS NOTE ADULT - BREASTS
No masses; no nipple discharge
not examined

## 2018-06-28 NOTE — PROVIDER CONTACT NOTE (OTHER) - BACKGROUND
Patient admitted for sepsis. Patient has a history of dementia, diabetes, and hypertension.
Sepsis  DNR/DNI
pt admitted for COPD exacerbation, norvasc given through ko feeding tube at 18:30
pt admitted with sepsis 2/2 PNA, ARF
Patient admitted with sepsis

## 2018-06-28 NOTE — PROGRESS NOTE ADULT - BACK
not examined
detailed exam
not examined
not examined

## 2018-06-28 NOTE — PROGRESS NOTE ADULT - SUBJECTIVE AND OBJECTIVE BOX
INTERVAL HPI/OVERNIGHT EVENTS:    son in law bedside; lengthy conversation had re: PEG tube/anesthesia   nonverbal/dementia  tolerating NGT feeds  brown bm per covering RN      MEDICATIONS  (STANDING):  chlorhexidine 4% Liquid 1 Application(s) Topical <User Schedule>  dextrose 5%. 1000 milliLiter(s) (50 mL/Hr) IV Continuous <Continuous>  dextrose 50% Injectable 12.5 Gram(s) IV Push once  dextrose 50% Injectable 25 Gram(s) IV Push once  dextrose 50% Injectable 25 Gram(s) IV Push once  insulin lispro (HumaLOG) corrective regimen sliding scale   SubCutaneous every 6 hours  pantoprazole  Injectable 40 milliGRAM(s) IV Push every 12 hours  piperacillin/tazobactam IVPB. 3.375 Gram(s) IV Intermittent every 12 hours  potassium chloride   Powder 20 milliEquivalent(s) Oral once  sucralfate suspension 1 Gram(s) Oral every 6 hours    MEDICATIONS  (PRN):  dextrose 40% Gel 15 Gram(s) Oral once PRN Blood Glucose LESS THAN 70 milliGRAM(s)/deciliter  glucagon  Injectable 1 milliGRAM(s) IntraMuscular once PRN Glucose LESS THAN 70 milligrams/deciliter      Allergies    No Known Allergies    Intolerances        Review of Systems: +nonverbal          Vital Signs Last 24 Hrs  T(C): 36.5 (28 Jun 2018 06:21), Max: 36.5 (28 Jun 2018 06:21)  T(F): 97.7 (28 Jun 2018 06:21), Max: 97.7 (28 Jun 2018 06:21)  HR: 87 (28 Jun 2018 06:21) (87 - 92)  BP: 120/63 (28 Jun 2018 06:21) (110/65 - 124/74)  BP(mean): --  RR: 18 (28 Jun 2018 06:21) (18 - 20)  SpO2: 100% (28 Jun 2018 06:21) (96% - 100%)    PHYSICAL EXAM:    Constitutional: NAD  HEENT: EOMI, throat clear  Neck: No LAD, supple  Respiratory: CTA and P  Cardiovascular: S1 and S2, RRR, no M  Gastrointestinal: BS+, soft, NT/ND, neg HSM, +ngt  Extremities: No peripheral edema, neg clubbing, cyanosis  Vascular: 2+ peripheral pulses  Neurological: A/O x 0  Psychiatric: Normal mood, normal affect  Skin: No rashes      LABS:                        7.4    12.71 )-----------( 299      ( 28 Jun 2018 04:57 )             23.9     06-28    143  |  105  |  59<H>  ----------------------------<  206<H>  3.3<L>   |  27  |  3.46<H>    Ca    9.2      28 Jun 2018 04:57  Phos  3.5     06-28  Mg     2.0     06-28            RADIOLOGY & ADDITIONAL TESTS:

## 2018-06-28 NOTE — PROVIDER CONTACT NOTE (OTHER) - ACTION/TREATMENT ORDERED:
Give bolus 500cc, repeat bp
1mg dilaudid given IVP, 80mg lasix given IVP. pt to be sent to micu for intubation. will continue to monitor
Hyperthermia blanket ordered.
continue to monitor
repeat manually  continue to monitor

## 2018-06-28 NOTE — PROGRESS NOTE ADULT - GASTROINTESTINAL DETAILS
nontender/soft
soft/nontender
nontender/soft
nontender/soft
soft/nontender
nontender/soft
bowel sounds normal/soft/no distention/nontender
nontender/soft/no distention
soft/nontender/no distention
soft/nontender/no distention
normal
no distention/nontender/bowel sounds normal/soft
normal/no distention/nontender/soft
nontender/soft/no distention/bowel sounds normal
soft/nontender/no distention

## 2018-06-28 NOTE — PROGRESS NOTE ADULT - PROBLEM SELECTOR PLAN 1
- now improved  - cont to trend cbc and transfuse prn   - s/p EGD w/large gastric ulcer requiring bipolar cautery and clip  - pathology with no hpylori  - Protonix 40mg IVP BID

## 2018-06-28 NOTE — PROVIDER CONTACT NOTE (OTHER) - REASON
HR 22, RR 40, increased work of breathing, pt desaturating on room air
Patient has a rectal temp of 94.7
Sodium 161
bp of 176/92
bp 78/43

## 2018-06-28 NOTE — PROGRESS NOTE ADULT - CONSTITUTIONAL DETAILS
no distress/cachectic
cachectic
no distress/cachectic
respiratory distress
respiratory distress
respiratory distress/cachectic
respiratory distress
no distress
cachectic/respiratory distress
respiratory distress
no distress/cachectic
respiratory distress/cachectic
respiratory distress/cachectic
no distress/cachectic
cachectic
respiratory distress
no distress/cachectic

## 2018-06-28 NOTE — DISCHARGE NOTE FOR THE EXPIRED PATIENT - HOSPITAL COURSE
Dx: Septic shock 2/2 PNA and UTI, ARF requiring Renal replacement therapy    83M history dementia, T2DM, HTN presenting to the ED w/ declining functional status, subjective hypothermia, and rigors admitted to MICU in 5/28 for severe sepsis progessing to shock 2/2 Klebsiella PNA & Enterobacter UTI, intubated for respiratory distress 5/28, extubated on 6/4, also ARF with severe acidosis, requiring RRT, last HD on 5/30 6/18: Tx from [facility] for AFib    +Septic Shock from PNA -  MICU stay during hospitalization   +GIB - large gastric ulcer s/p clipping s/p Transfusions  +AFib - not a candidate for AC  + Hypernatremia On D5 1/2 NS  Respiratory distress - NRB v/s Bipap prn     Respiratory distress  -100% NRB. Wean as tolerated, now on 5l/ nasal cannula   - Zosyn completed    Palliative care  -DNR/DNI  -Cont abx - completed    Renal failure  -No HD    NPO:   -KFT with Nepro   - GI following  - possible PEG, Ongoing discussion with family    6/28 - Called by RN - pt without spontaneous breathing, no pulse.   Pt pronounced at 7:07 pm.  Family members at bedside.

## 2018-06-28 NOTE — PROGRESS NOTE ADULT - GUM GEN PE MLT EXAM PC
detailed exam
not examined

## 2018-06-28 NOTE — PROVIDER CONTACT NOTE (OTHER) - ASSESSMENT
No acute distress noted by patient.
50% Venti mask 12L NC
Patient is awake and nonverbal, no change from baseline. /89, HR 84, oxygen 100% on nonrebreather, respirations of 20. No acute signs/symptoms distress present.
b/p 176/92, asymptomatic
pt alert, minimally verbal. pt tachycardic, tachypneic, increased work of breathing. pt on nonrebreather

## 2018-06-28 NOTE — PROGRESS NOTE ADULT - PROBLEM SELECTOR PLAN 2
GI note appreciated   S/P clipping /cauterizing of ulcer   Change protonix to iv bid   Hgb stable  GI spoke with family regarding PEG with large gastric ulcer

## 2018-06-28 NOTE — PROGRESS NOTE ADULT - CONSTITUTIONAL
detailed exam

## 2018-06-28 NOTE — PROGRESS NOTE ADULT - NS NEC GEN PE MLT EXAM PC
No bruits; no thyromegaly or nodules
No bruits; no thyromegaly or nodules
detailed exam
No bruits; no thyromegaly or nodules
detailed exam
No bruits; no thyromegaly or nodules
detailed exam
No bruits; no thyromegaly or nodules

## 2018-06-28 NOTE — PROGRESS NOTE ADULT - RS GEN PE MLT RESP DETAILS PC
airway patent/breath sounds equal/rhonchi
airway patent/rhonchi
rhonchi/airway patent/diminished breath sounds, R/breath sounds equal
airway patent/breath sounds equal
airway patent/coarse breath sound/diminished breath sounds, R
airway patent/rhonchi
airway patent/rhonchi/diminished breath sounds, R
airway patent/diminished breath sounds, R/breath sounds equal/diminished breath sounds, L
diminished breath sounds, L/diminished breath sounds, R/airway patent/rhonchi
airway patent/rhonchi/diminished breath sounds, L/diminished breath sounds, R
breath sounds equal/respirations non-labored/airway patent
diminished breath sounds, L/diminished breath sounds, R/airway patent
respirations non-labored/O2 100% NRB in use/clear to auscultation bilaterally
normal/airway patent/respirations non-labored/good air movement/breath sounds equal
airway patent/breath sounds equal/diminshed bilat
airway patent/rhonchi/breath sounds equal
diminished breath sounds, R/airway patent

## 2018-06-28 NOTE — PROGRESS NOTE ADULT - CVS HE PE MLT D E PC
no murmur/no rub/regular rate and rhythm
no rub/no murmur/regular rate and rhythm
no murmur/no rub/regular rate and rhythm
regular rate and rhythm
no rub/no murmur/regular rate and rhythm
regular rate and rhythm
no murmur/regular rate and rhythm/no rub
regular rate and rhythm
regular rate and rhythm

## 2018-06-28 NOTE — PROGRESS NOTE ADULT - ASSESSMENT
83M with PMHx advanced dementia, T2DM, and HTN with prolonged hospitalization initially presenting to [facility] ED 5/28 with failure to thrive and admitted to MICU for septic shock 2/2 aspiration PNA with Klebsiella as well as Enterobacter UTI c/b ARF s/p temporary RRT now resolved. Pt transferred to RCU for recurrent aspiration pneumonitis with intermittent episodes of mucous plugging and hypoxemic respiratory failure s/p intubation and extubation. S/P GI bleed with PRBC transfusions, and SVT with a stay in telemetry.

## 2018-06-28 NOTE — PROGRESS NOTE ADULT - PROBLEM SELECTOR PLAN 2
- 2/2 mental state/dementia/declining functional status  - swallow eval from 6/5 noted with PO intake contraindicated  - aspiration precautions   - palliative following re: goc appreciated  - NGT feeds w/aspiration precautions   - EGD revealed large gastric ulcer requiring bipolar cautery and clip; considered high risk to undergo anesthesia given current comorbidities and ulcer  - lengthy conversation had with daughter, Alexia, and Dr. Pena via telephone. It was explained that in patients situation peg will not offer any benefit and patient not a candidate for sedation. Alexia is conflicted and needs more time

## 2018-06-28 NOTE — PROGRESS NOTE ADULT - CARDIOVASCULAR DETAILS
tachycardia
tachycardia
irregular rate and rhythm
positive S2/positive S1
positive S1/positive S2

## 2018-06-28 NOTE — PROVIDER CONTACT NOTE (OTHER) - SITUATION
HR 22, RR 40, increased work of breathing, pt desaturating on room air
Patient has a rectal temp of 94.7 degrees F
bp of 176/92
sodium 161
Patient noted with bp 78/43

## 2018-06-29 NOTE — PROGRESS NOTE ADULT - SUBJECTIVE AND OBJECTIVE BOX
CHIEF COMPLAINT:    Interval Events:    REVIEW OF SYSTEMS:  Constitutional:   Eyes:  ENT:  CV:  Resp:  GI:  :  MSK:  Integumentary:  Neurological:  Psychiatric:  Endocrine:  Hematologic/Lymphatic:  Allergic/Immunologic:  [ ] All other systems negative  [ ] Unable to assess ROS because ________    OBJECTIVE:  ICU Vital Signs Last 24 Hrs  T(C): 36.7 (28 Jun 2018 18:30), Max: 36.7 (28 Jun 2018 18:30)  T(F): 98 (28 Jun 2018 18:30), Max: 98 (28 Jun 2018 18:30)  HR: 99 (28 Jun 2018 18:30) (97 - 99)  BP: 78/43 (28 Jun 2018 18:30) (78/43 - 108/76)  BP(mean): --  ABP: --  ABP(mean): --  RR: 18 (28 Jun 2018 18:30) (17 - 18)  SpO2: 96% (28 Jun 2018 18:30) (94% - 96%)        CAPILLARY BLOOD GLUCOSE      POCT Blood Glucose.: 138 mg/dL (28 Jun 2018 17:44)      PHYSICAL EXAM:  General:   HEENT:   Lymph Nodes:  Neck:   Respiratory:   Cardiovascular:   Abdomen:   Extremities:   Skin:   Neurological:  Psychiatry:    HOSPITAL MEDICATIONS:  MEDICATIONS  (STANDING):  chlorhexidine 4% Liquid 1 Application(s) Topical <User Schedule>  dextrose 5%. 1000 milliLiter(s) (50 mL/Hr) IV Continuous <Continuous>  dextrose 50% Injectable 12.5 Gram(s) IV Push once  dextrose 50% Injectable 25 Gram(s) IV Push once  dextrose 50% Injectable 25 Gram(s) IV Push once  insulin lispro (HumaLOG) corrective regimen sliding scale   SubCutaneous every 6 hours  pantoprazole  Injectable 40 milliGRAM(s) IV Push every 12 hours  piperacillin/tazobactam IVPB. 3.375 Gram(s) IV Intermittent every 12 hours  sucralfate suspension 1 Gram(s) Oral every 6 hours    MEDICATIONS  (PRN):  dextrose 40% Gel 15 Gram(s) Oral once PRN Blood Glucose LESS THAN 70 milliGRAM(s)/deciliter  glucagon  Injectable 1 milliGRAM(s) IntraMuscular once PRN Glucose LESS THAN 70 milligrams/deciliter      LABS:                        7.4    12.71 )-----------( 299      ( 28 Jun 2018 04:57 )             23.9     06-28    143  |  105  |  59<H>  ----------------------------<  206<H>  3.3<L>   |  27  |  3.46<H>    Ca    9.2      28 Jun 2018 04:57  Phos  3.5     06-28  Mg     2.0     06-28                MICROBIOLOGY:     RADIOLOGY:  [ ] Reviewed and interpreted by me    PULMONARY FUNCTION TESTS:    EKG:

## 2018-06-29 NOTE — PROGRESS NOTE ADULT - PROVIDER SPECIALTY LIST ADULT
Cardiology
Gastroenterology
Hospitalist
Hospitalist
Internal Medicine
MICU
Nephrology
Palliative Care
Pulmonology
Gastroenterology
Nephrology
Nephrology
Palliative Care
Pulmonology

## 2018-06-29 NOTE — CHART NOTE - NSCHARTNOTEFT_GEN_A_CORE
6/28/2018   7pm    Called to evaluate patient who had no spontaneous breathing. Upon evaluation, no spontaneous breathing noted, no heart sounds, no pulses.  Pt pronounced at 7:07pm. Son and son-in-law at bedside and informed. Emotional support given.     Barb AWAD NP

## 2018-07-12 NOTE — DISCHARGE NOTE ADULT - LAUNCH MEDICATION RECONCILIATION
1444: Dr Rylie Godwin called to floor and stated he needs patient to stay due to lead being out of location. Pt to go for lead revision 7/13 afternoon. Dr Gale Muro made aware. <<-----Click here for Discharge Medication Review

## 2018-10-29 NOTE — ED ADULT NURSE NOTE - FALL HARM RISK TYPE OF ASSESSMENT
Admission

## 2019-07-01 NOTE — CHART NOTE - NSCHARTNOTESELECT_GEN_ALL_CORE
Transfer Note Odomzo Counseling- I discussed with the patient the risks of Odomzo including but not limited to nausea, vomiting, diarrhea, constipation, weight loss, changes in the sense of taste, decreased appetite, muscle spasms, and hair loss.  The patient verbalized understanding of the proper use and possible adverse effects of Odomzo.  All of the patient's questions and concerns were addressed.

## 2020-02-17 NOTE — PROGRESS NOTE ADULT - PROBLEM SELECTOR PROBLEM 6
HTN (hypertension) pt. was admitted with sepsis on admission , later it was ruled out Acute renal failure

## 2020-08-03 NOTE — ED ADULT TRIAGE NOTE - CHIEF COMPLAINT QUOTE
Summary of Hospitalization:  Lluvia Crocker is an 89-year-old woman with past medical history significant for breast cancer status post right mastectomy, colon cancer s/p adjunctive chemotherapy, chronic atrial fibrillation on anticoagulation with warfarin, diabetes mellitus 2, hyperlipidemia, hypertension who presented to [facility] on 07/30/2022 for complaints of hemoptysis.  Patient noted to have 2 episodes coughing up blood.  She reported not feeling well over the prior week or so.  She did report a cough, nausea and intermittent vomiting.  She denies hematemesis or melena.  CT of the chest demonstrated bilateral ground-glass opacities which raise concern for COVID-19 infection.  Patient was transferred from [facility] to [facility] ICU for pulmonology services which may require bronchoscopy.  COVID labs were ultimately positive.    Following admission she was continued on supplemental oxygen as well as empiric abx and steroids.  Attempt was made for possible Remdesivir but poor creatinine clearance to allow.  Patient was noted to have some bloody looking stools on 8/1 and started on protonix infusion with serial h/h.  GI consultation obtained as hb down from 11 to 8.  EGD performed with anesthesia assistance on 8/1 without acute findings.  Patient did have 2 episodes of bradycardia and then asystole for up to 20 seconds with resolution.  Bradycardia improved with atropine.  EP consultation obtained.  Patient developed chest pain with EKG changes on 8/1 consistent with NSTEMI, some improvement with nitroglycerin.  Cardiology consultation obtained, unable to proceed with ischemic workup due to bleeding risk.  Patient had resolution in bradycardia, suspect that NSTEMI contributing.     24 hour summary:  No issues overnight.  Bradycardia resolved, patient HR now in 60s-70s, will discuss with EP as was on Diltiazem and Digoxin previously.  Rehab therpies.  Resume coumadin nightly as no further bleeding.    Patient  dementia pt fall from standing position , c/o lower back pain , no loc , no head trauma no blood thinner to transfer to the floor, case discussed with Dr. Martinez.      Visit Vitals  BP (!) 161/71 (BP Location: LFA - Left forearm, Patient Position: Semi-Harvey's)   Pulse 73   Temp 97.9 °F (36.6 °C) (Oral)   Resp 16   Ht 5' (1.524 m)   Wt 49.9 kg   SpO2 93%   BMI 21.48 kg/m²     Examination:  Appearence: 89 year old year old female who appears   Neurologic:  Alert and oriented x 3  HEENT: Pupils equal and Pupils reactive to light  Neck:  Supple  Chest:  Symmetric  Lungs:  Bibasilar rales  Heart:  Regular rate and rhythm  Abdomen: Soft  Extremities: Edema absent    Lines:  No velasco or cent line    Antibiotics:  Azithromycin - started 7/31-8/1  Ceftriaxone - started 7/31-8/1    ICU Diagnosis/Plan:  •  Hemoptysis   Resolved     •  Supratherapeutic INR   Received K centra at [facility] with little improvement   Resolved    •  Community-acquired pneumonia   COVID-19 positive   Empiric abx   CT of chest with ground-glass opacities   No evidence of PE     COVID pneumonia   Steroids   Unable to use Remdesivir due to renal function   Improved    •  Acute hypoxemic respiratory failure   Secondary to pneumonia     •  Atrial fibrillation with RVR   EP consult   Previous home digoxin and diltiazem on hold    •  Hypertension   Hold PTA antihypertensives initially   Resume when able    •  Diabetes mellitus type II   Glycemic monitoring   Sliding scale insulin   Consistent carb diet when able    •  Bradycardia with prolonged pause   EP consult   Resolved   Suspect NSTEMI related    •  GI bleed   Suspected   EGD negative   Unable to perform colonoscopy    •  Myocardial infarction   Cardiology consult   Unable to have intervention due to bleeding issues.     Nitro drip-weaned off   Aspirin   Statin    •  Acute blood loss anemia   Transfuse 1 unit PRBC on 8/1.      MAINTENANCE THERAPIES  DVT prophylaxis:  SCDs. Sub q heparin  GI prophylaxis: Protonix  Nutrition:  ADAT  Sedation Holiday: Patient is not sedated, no sedation holiday  required    Plan developed as per intensivist.    Billy Miranda PA-C  8/3/2020  10:12 AM     dementia pt fall from standing position , c/o lower back pain , no loc , no head trauma no blood thinner, has unsteady gait

## 2020-12-30 NOTE — PROGRESS NOTE ADULT - SUBJECTIVE AND OBJECTIVE BOX
As per information/history obtained from the PADT(patient assessment and documentation tool) - He complains of pain in the lower back with radiation to the NA He rates the pain 4/10 and describes it as aching, burning, stabbing. Pain is made worse by: standing. Current treatment regimen has helped relieve about 70% of the pain. He denies side effects from the current pain regimen. Patient reports that since the last follow up visit the physical functioning is unchanged, family/social relationships are unchanged, mood is unchanged and sleep patterns are unchanged, and that the overall functioning is unchanged. Patient denies neurological bowel or bladder. Patient denies misusing/abusing his narcotic pain medications or using any illegal drugs. There are No indicators for possible drug abuse, addiction or diversion problems. Upon obtaining the medical history from Mr. Adrian Manica regarding today's office visit for his presenting problems, patient states he has been doing fair and the pain has been manageable, doing stretching exercises. He says he is using Percocet 4 per day along with Voltaren and Cymbalta. He mentions he is using other adjuvants also. He denies any constipation symptoms. ALLERGIES: Patients list of allergies were reviewed     MEDICATIONS: Mr. Adrian Mancia list of medications were reviewed. His current medications are   Outpatient Medications Prior to Visit   Medication Sig Dispense Refill    furosemide (LASIX) 20 MG tablet TAKE ONE TO TWO TABLETS BY MOUTH EVERY MORNING AS NEEDED FOR EDEMA 60 tablet 0    pregabalin (LYRICA) 150 MG capsule Take 1 capsule by mouth 3 times daily for 30 days. 90 capsule 0    DULoxetine (CYMBALTA) 60 MG extended release capsule Take 1 capsule by mouth daily 30 capsule 1    oxyCODONE-acetaminophen (PERCOCET) 7.5-325 MG per tablet Take 1 tablet by mouth every 6 hours as needed for Pain (max 4 per day) for up to 28 days.  112 tablet 0 [facility] Diseases & Hypertension  FOLLOW UP NOTE  [phone]--------------------------------------------------------------------------------  Chief Complaint:Sepsis      24 hour events/subjective: Patient seen and evaluated at bedside. Patient is non verbal and does not follow commands.     PAST HISTORY  --------------------------------------------------------------------------------  No significant changes to PMH, PSH, FHx, SHx, unless otherwise noted    ALLERGIES & MEDICATIONS  --------------------------------------------------------------------------------  Allergies    No Known Allergies    Intolerances      Standing Inpatient Medications  amLODIPine   Tablet 5 milliGRAM(s) Oral daily  chlorhexidine 4% Liquid 1 Application(s) Topical <User Schedule>  dextrose 5%. 1000 milliLiter(s) IV Continuous <Continuous>  dextrose 50% Injectable 12.5 Gram(s) IV Push once  dextrose 50% Injectable 25 Gram(s) IV Push once  dextrose 50% Injectable 25 Gram(s) IV Push once  heparin  Injectable 5000 Unit(s) SubCutaneous every 8 hours  insulin lispro (HumaLOG) corrective regimen sliding scale   SubCutaneous every 6 hours  sevelamer carbonate 800 milliGRAM(s) Oral every 8 hours  sodium bicarbonate 1300 milliGRAM(s) Oral two times a day    PRN Inpatient Medications  acetaminophen    Suspension 650 milliGRAM(s) Oral every 6 hours PRN  dextrose 40% Gel 15 Gram(s) Oral once PRN  glucagon  Injectable 1 milliGRAM(s) IntraMuscular once PRN      REVIEW OF SYSTEMS  --------------------------------------------------------------------------------  Unable to obtain.     VITALS/PHYSICAL EXAM  --------------------------------------------------------------------------------  T(C): 36.2 (06-06-18 @ 12:00), Max: 36.3 (06-05-18 @ 22:27)  HR: 92 (06-06-18 @ 12:00) (84 - 94)  BP: 163/90 (06-06-18 @ 12:00) (152/84 - 176/92)  RR: 18 (06-06-18 @ 12:00) (18 - 24)  SpO2: 100% (06-06-18 @ 12:00) (95% - 100%)  Wt(kg): --        06-05-18 @ 07:01  -  06-06-18 @ 07:00  --------------------------------------------------------  IN: 0 mL / OUT: 300 mL / NET: -300 mL    06-06-18 @ 07:01  -  06-06-18 @ 13:35  --------------------------------------------------------  IN: 0 mL / OUT: 300 mL / NET: -300 mL      Physical Exam:  	   Gen: NAD  	Pulm: CTA B/L  	CV: RRR,  	Abd:  soft  	LE: Warm, no edema  	Neuro:  confused   	Skin: Warm, without rashes  	Vascular access: none       LABS/STUDIES  --------------------------------------------------------------------------------              9.1    7.11  >-----------<  223      [06-06-18 @ 05:45]              28.0     143  |  103  |  108  ----------------------------<  202      [06-06-18 @ 05:45]  3.4   |  15  |  5.04        Ca     9.0     [06-06-18 @ 05:45]      Mg     2.4     [06-05-18 @ 02:35]      Phos  8.0     [06-06-18 @ 05:45]            Creatinine Trend:  SCr 5.04 [06-06 @ 05:45]  SCr 4.79 [06-05 @ 02:35]  SCr 4.48 [06-04 @ 03:00]  SCr 4.20 [06-03 @ 02:50]  SCr 3.98 [06-02 @ 19:21]      Urine Sodium 38      [06-02-18 @ 06:16]  Urine Chloride 21      [06-02-18 @ 06:16]  Urine Osmolality 283      [06-02-18 @ 06:16]      HBsAg NEGATIVE      [05-30-18 @ 13:45]  penicillin v potassium (VEETID) 500 MG tablet TAKE TWO TABLETS BY MOUTH TWICE A  tablet 10    diclofenac (VOLTAREN) 75 MG EC tablet Take 1 tablet by mouth 2 times daily as needed for Pain 60 tablet 1    cephALEXin (KEFLEX) 500 MG capsule Take 1 capsule by mouth 4 times daily 40 capsule 0    naloxone 4 MG/0.1ML LIQD nasal spray 1 spray by Nasal route as needed for Opioid Reversal 1 each 5    vitamin B-12 (CYANOCOBALAMIN) 1000 MCG tablet Take 3,000 mcg by mouth daily       Compression Stockings MISC by Does not apply route Thigh high 1 each 0    Elastic Bandages & Supports (MEDICAL COMPRESSION THIGH HIGH) MISC Thigh high compression stockings, 30-40 mm Hg 2 each 1     No facility-administered medications prior to visit. REVIEW OF SYSTEMS:    Respiratory: Negative for apnea, chest tightness and shortness of breath or change in baseline breathing. PHYSICAL EXAM:   Nursing note and vitals reviewed. There were no vitals taken for this visit. Constitutional: He appears well-developed and well-nourished. No acute distress. Cardiovascular: Normal rate, regular rhythm, normal heart sounds, and does not have murmur. Pulmonary/Chest: Effort normal. No respiratory distress. He does not have wheezes in the lung fields. He has no rales. Neurological/Psychiatric:He is alert and oriented to person, place, and time. Coordination is  normal.  His mood isAppropriate and affect is Flat/blunted and Anxious . His    IMPRESSION:   1.  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 BWC Sprain of ligament of lumbosacral joint, subsequent encounter    3. BWC Sprain of left knee/leg, subsequent encounter    4. BWC Sprain of ligament of lumbosacral joint, initial encounter    5.  BWC Sprain of left knee/leg, initial encounter        PLAN:  Informed verbal consent was obtained -[place] was obtained and reviewed  for the last one year and no indicators of drug misuse  were found. Any other controlled substance prescriptions  seen on the record have been accounted for, I am aware of the patient receiving these medications. Yesi Longoria [place] record will be rechecked as part of office protocol.    -He was advised weight reduction, diet changes- 800-1200 roc diet, diet diary, exercising, nutritional  consult increased physical activity as tolerated   -He was advised to increase fluids ( 5-7  glasses of fluid daily), limit caffeine, avoid cheese products, increase dietary fiber, increase activity and exercise as tolerated and relax regularly and enjoy meals   -walking/stretching exercises as advised    -continue with Percocet 4 per day     Current Outpatient Medications   Medication Sig Dispense Refill    furosemide (LASIX) 20 MG tablet TAKE ONE TO TWO TABLETS BY MOUTH EVERY MORNING AS NEEDED FOR EDEMA 60 tablet 0    pregabalin (LYRICA) 150 MG capsule Take 1 capsule by mouth 3 times daily for 30 days. 90 capsule 0    DULoxetine (CYMBALTA) 60 MG extended release capsule Take 1 capsule by mouth daily 30 capsule 1    oxyCODONE-acetaminophen (PERCOCET) 7.5-325 MG per tablet Take 1 tablet by mouth every 6 hours as needed for Pain (max 4 per day) for up to 28 days.  112 tablet 0    penicillin v potassium (VEETID) 500 MG tablet TAKE TWO TABLETS BY MOUTH TWICE A  tablet 10    diclofenac (VOLTAREN) 75 MG EC tablet Take 1 tablet by mouth 2 times daily as needed for Pain 60 tablet 1    cephALEXin (KEFLEX) 500 MG capsule Take 1 capsule by mouth 4 times daily 40 capsule 0    naloxone 4 MG/0.1ML LIQD nasal spray 1 spray by Nasal route as needed for Opioid Reversal 1 each 5    vitamin B-12 (CYANOCOBALAMIN) 1000 MCG tablet Take 3,000 mcg by mouth daily       Compression Stockings MISC by Does not apply route Thigh high 1 each 0 Ability to lift up to 10-20 lbs. Ability to go up and down stairs. Sit 30-60 minutes  Without having to stand up frequently. - he is maintaining/progressing towards his work related goals with the current regimen. He was advised against drinking alcohol with the narcotic pain medicines, advised against driving or handling machinery while adjusting the dose of medicines or if having cognitive  issues related to the current medications. Risk of overdose and death, if medicines not taken as prescribed, were also discussed. If the patient develops new symptoms or if the symptoms worsen, the patient should call the office. While transcribing every attempt was made to maintain the accuracy of the note in terms of it's contents,there may have been some errors made inadvertently. Thank you for allowing me to participate in the care of this patient.     Tay Tamayo MD.    Cc: Jon Saavedra MD

## 2021-12-02 NOTE — ED PROVIDER NOTE - INPATIENT RESIDENT/ACP NOTIFIED
Discussed on the phone with the patient and told him he should not drive 20 was too dangerous told him we can see him here in the office   lent

## 2023-01-13 NOTE — ED ADULT TRIAGE NOTE - SOURCE OF INFORMATION
EMS Diet: NPO except meds  DVT Proph: Lovenox  Dispo: Likely home Diet: Clear liquid diet, advance pending review of CT with radiology  DVT Proph: Lovenox  Dispo: Likely home

## 2023-01-17 NOTE — OCCUPATIONAL THERAPY INITIAL EVALUATION ADULT - LEVEL OF INDEPENDENCE, OT EVAL
Patient returns today to follow-up after her recent CT scan.  She has not noticed any recent changes, but does feel that hernia is larger than it was 3 years ago.  The patient denies any pain other than some mild discomfort around Thanksgiving after eating, which resolved quickly.    Past medical and surgical histories are reviewed.    Physical exam: The patient again has a prominent upper abdominal bulge.  The defect itself is difficult to palpate, though the most superior aspect of the hernia is definitely reducible.  There appears to be an area just below this where there is a second bulge which is only partially reducible.  This is all nontender.    CT scan is reviewed with the patient and her daughter.  This reveals a slightly enlarged upper abdominal hernia containing colon.  There is also a fat-containing hernia, possibly with multiple defects.  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    Assessment and plan: We had a long discussion about the complexity of her case given the multiple defects, which measure approximately 5 cm in width, as well as the absence of the left rectus muscle and previous surgical history.  The patient thinks she will probably want to have this fixed at some time.  She had previously been told at the [facility] that she needed to lose 30 pounds prior to surgery.  She states that she has lost about 15 pounds since then, but would like to try to lose a little bit more weight.  I do think it would be advantageous if she could lose a bit more weight prior to surgery, though I would certainly be willing to do surgery at any time.  I have recommended robotic assisted repair of the patient's incisional hernia with mesh, likely an intraperitoneal onlay mesh (IPOM).  We discussed that there is some possibility that we would be unable to get the midline to close, in which case we would need to consider a bridging mesh.  We also discussed the possibility that there  could be too much scar tissue to successfully proceed with a robotic repair.  If that is the case, my leaning would be to abandon the procedure rather than to convert to an open, as I am not sure that a successful open repair is practical.  The patient is in agreement.  She will return to see me once she has reached her goal weight or once she wishes to schedule surgery.    A total of 40 minutes was spent today in chart review, patient examination and discussion, and documentation.    Kolby Cole MD  [facility]    Please route or send letter to:  Primary Care Provider (PCP)       unable to perform

## 2023-02-09 NOTE — PROGRESS NOTE ADULT - PROBLEM SELECTOR PROBLEM 2
Replied to patient via e-advice.  Patient update routed to Dr. Burnett as MD PATTERSONI.   Alzheimer's dementia without behavioral disturbance, unspecified timing of dementia onset

## 2023-02-27 NOTE — PROGRESS NOTE ADULT - ATTENDING COMMENTS
I have seen and examined the patient. I agree with the above history, physical exam, and plan of care except for as detailed below.    84 y/o M w/dementia admitted for declining functional status found to have septic shock secondary to enterobacter UTI c/b acute renal failure. Patient was made comfort measures only, but has been slowly improving so now decision made to resume curative measures.    - NG tube placement for tube feeds  - Labs  - GI consult for PEG placement  - Continue Zosyn  - D/C glycopyrrolate  - Continue dilaudid PRN I have seen and examined the patient. I agree with the above history, physical exam, and plan of care except for as detailed below.    82 y/o M w/dementia admitted for declining functional status found to have septic shock secondary to enterobacter UTI c/b acute hypoxemic respiratory failure and acute renal failure. Patient was made comfort measures only, but has been slowly improving so now decision made to resume curative measures.    - Continue supplemental O2 wean as tolerated  - NG tube placement for tube feeds  - Labs  - GI consult for PEG placement  - Continue Zosyn  - D/C glycopyrrolate  - Continue dilaudid PRN normal

## 2023-07-13 NOTE — PATIENT PROFILE ADULT. - NS SC CAGE ALCOHOL GUILTY ABOUT
Spoke to the patient and let him know that his referal was placed to the giovanni region. Patient will call provider. ALBERTO   ALEN

## 2023-08-02 NOTE — BEHAVIORAL HEALTH ASSESSMENT NOTE - PATIENT'S CHIEF COMPLAINT
What Is The Reason For Today's Visit?: Full Body Skin Examination What Is The Reason For Today's Visit? (Being Monitored For X): the development of new lesions How Severe Are Your Spot(S)?: mild "not bad"

## 2024-02-29 NOTE — PHYSICAL THERAPY INITIAL EVALUATION ADULT - PATIENT PROFILE REVIEW, REHAB EVAL
Patient: Bradford Prado    Procedure: Procedure(s):  Colonoscopy  Esophagoscopy, gastroscopy, duodenoscopy (EGD), combined  COLONOSCOPY, WITH POLYPECTOMY AND BIOPSY       Anesthesia Type:  MAC    Note:  Disposition: Outpatient   Postop Pain Control: Uneventful            Sign Out: Well controlled pain   PONV: No   Neuro/Psych: Uneventful            Sign Out: Acceptable/Baseline neuro status   Airway/Respiratory: Uneventful            Sign Out: Acceptable/Baseline resp. status   CV/Hemodynamics: Uneventful            Sign Out: Acceptable CV status; No obvious hypovolemia; No obvious fluid overload   Other NRE: NONE   DID A NON-ROUTINE EVENT OCCUR?            Last vitals:  Vitals Value Taken Time   /58 02/29/24 1120   Temp     Pulse 68 02/29/24 1130   Resp 28 02/29/24 1130   SpO2 92 % 02/29/24 1120       Electronically Signed By: Samantha Dawson  February 29, 2024  11:36 AM   yes/consulted with RN Amy ANTHONY prior to tx, reports pt. ok for skilled therapy session

## 2024-05-07 NOTE — ED PROVIDER NOTE - CPE EDP GASTRO NORM
normal...
Bed/Stretcher in lowest position, wheels locked, appropriate side rails in place/Call bell, personal items and telephone in reach/Instruct patient to call for assistance before getting out of bed/chair/stretcher/Non-slip footwear applied when patient is off stretcher/[place] to call system/Physically safe environment - no spills, clutter or unnecessary equipment/Purposeful proactive rounding/Room/bathroom lighting operational, light cord in reach

## 2024-06-18 NOTE — PROGRESS NOTE ADULT - ASSESSMENT
Detail Level: Simple 82 yo male with ES dementia admitted with severe sepsis s/p inubation now extubated with renal failure and presumed aspiration pna actively dying Additional Notes: ***Not Controlled***\\n- Affected areas: chest, arms and lower legs \\n- Talked about reducing back ground noise by following dry itchy handout \\n\\n- Lower extremities extremely dry and recommended to moisturize daily to minimize background noise \\n- Uses dove soap in showers and will continue \\n- Plan to start epiceram to help barrier repair \\n\\nPlan:\\n- Continue triamcinolone 0.1% cream as needed for flares \\n- Initiate Epiceram after showers daily \\n- Continue gentle skin care with dove soap Render Risk Assessment In Note?: no

## 2024-09-13 NOTE — PROGRESS NOTE ADULT - ATTENDING COMMENTS
Hemodynamic, septic shock with ARF  1.  ARF--initially rxed with CRRT, now IHD.  Trend daily for HD requirement and return of function  2.  HypoNa--now WNL.  Decreased free water clearance with 1 and avoid large volumes hypotonic fluid  3.  Metabolic acidosis--with offseting respiratory disorder.  HD, hemodynamic optimization should be helpful 1

## 2024-09-26 NOTE — OCCUPATIONAL THERAPY INITIAL EVALUATION ADULT - FINE MOTOR COORDINATION, RIGHT HAND, DIADOCHOKINESIS SKILLS, OT EVAL
Is This A New Presentation, Or A Follow-Up?: Skin Lesions
How Severe Is Your Skin Lesion?: mild
mild impairment

## 2024-11-05 NOTE — ED ADULT TRIAGE NOTE - CADM TRG TX PRIOR TO ARRIVAL
Protocol For Photochemotherapy For Severe Photoresponsive Dermatoses: Petrolatum And Broad Band Uvb: The patient received Photochemotherapyfor severe photoresponsive dermatoses: Petrolatum and Broad Band UVB requiring at least 4 to 8 hours of care under direct physician supervision. Skin Type: I Consent: Written consent obtained.  The risks were reviewed with the patient including but not limited to: burn, pigmentary changes, pain, blistering, scabbing, redness, increased risk of skin cancers, and the remote possibility of scarring. Protocol For Photochemotherapy For Severe Photoresponsive Dermatoses: Petrolatum And Nbuvb: The patient received Photochemotherapy for severe photoresponsive dermatoses: Petrolatum and NBUVB requiring at least 4 to 8 hours of care under direct physician supervision. Protocol For Photochemotherapy: Tar And Broad Band Uvb (Goeckerman Treatment): The patient received Photochemotherapy: Tar and Broad Band UVB (Goeckerman treatment). Total Body Energy: 653.00 Protocol For Photochemotherapy: Baby Oil And Nbuvb: The patient received Photochemotherapy: Baby Oil and NBUVB (baby oil applied to all lesions prior to phototherapy). Protocol For Puva: The patient received PUVA. Protocol For Nb Uva: The patient received NB UVA. Render Post-Care In The Note: no Protocol For Nbuvb: The patient received NBUVB. Protocol For Photochemotherapy: Petrolatum And Broad Band Uvb: The patient received Photochemotherapy: Petrolatum and Broad Band UVB. Protocol For Photochemotherapy: Mineral Oil And Nbuvb: The patient received Photochemotherapy: Mineral Oil and NBUVB (mineral oil applied to all lesions by the patient per their preference rather then by medical staff prior to phototherapy) Protocol For Photochemotherapy For Severe Photoresponsive Dermatoses: Puva: The patient received Photochemotherapy for severe photoresponsive dermatoses: PUVA requiring at least 4 to 8 hours of care under direct physician supervision. see ambulance record Protocol: Photochemotherapy: Mineral Oil and NBUVB Protocol For Uva: The patient received UVA. Total Treatment Time: 3:52 Detail Level: Zone Protocol For Photochemotherapy: Mineral Oil And Broad Band Uvb: The patient received Photochemotherapy: Mineral Oil and Broad Band UVB. Protocol For Bath Puva: The patient received Bath PUVA. Protocol For Photochemotherapy: Triamcinolone Ointment And Nbuvb: The patient received Photochemotherapy: Triamcinolone and NBUVB (triamcinolone ointment applied to all lesions prior to phototherapy). Protocol For Uva1: The patient received UVA1. Protocol For Photochemotherapy: Tar And Nbuvb (Goeckerman Treatment): The patient received Photochemotherapy: Tar and NBUVB (Goeckerman treatment). Post-Care Instructions: I reviewed with the patient in detail post-care instructions. Patient is to wear sun protection. Patients may expect sunburn like redness, discomfort and scabbing. Protocol For Photochemotherapy For Severe Photoresponsive Dermatoses: Tar And Broad Band Uvb (Goeckerman Treatment): The patient received Photochemotherapy for severe photoresponsive dermatoses: Tar and Broad Band UVB (Goeckerman treatment) requiring at least 4 to 8 hours of care under direct physician supervision. Protocol For Photochemotherapy For Severe Photoresponsive Dermatoses: Tar And Nbuvb (Goeckerman Treatment): The patient received Photochemotherapy for severe photoresponsive dermatoses: Tar and NBUVB (Goeckerman treatment) requiring at least 4 to 8 hours of care under direct physician supervision. Protocol For Protocol For Photochemotherapy For Severe Photoresponsive Dermatoses: Bath Puva: The patient received Photochemotherapy for severe photoresponsive dermatoses: Bath PUVA requiring at least 4 to 8 hours of care under direct physician supervision. Name Of Supervising Technician: Krysatl Bustos Protocol For Broad Band Uvb: The patient received Broad Band UVB. Protocol For Photochemotherapy: Petrolatum And Nbuvb: The patient received Photochemotherapy: Petrolatum and NBUVB (petrolatum applied to all lesions prior to phototherapy). Treatment Number: 33

## 2025-01-02 NOTE — PROGRESS NOTE ADULT - I WAS PHYSICALLY PRESENT FOR THE KEY PORTIONS OF THE EVALUATION AND MANAGEMENT (E/M) SERVICE PROVIDED.  I AGREE WITH THE ABOVE HISTORY, PHYSICAL, AND PLAN WHICH I HAVE REVIEWED AND EDITED WHERE APPROPRIATE
Statement Selected
[Never] : Never
Statement Selected

## 2025-05-28 NOTE — PROGRESS NOTE ADULT - PROBLEM SELECTOR PROBLEM 1
Acute respiratory failure, unspecified whether with hypoxia or hypercapnia
Acute respiratory failure, unspecified whether with hypoxia or hypercapnia
RUSH (acute kidney injury)
[place]
Dysphagia, unspecified type
RUSH (acute kidney injury)
Acute respiratory failure, unspecified whether with hypoxia or hypercapnia
Anemia due to blood loss
RUSH (acute kidney injury)
[place]
Acute respiratory failure, unspecified whether with hypoxia or hypercapnia
No
Acute respiratory failure, unspecified whether with hypoxia or hypercapnia
Pneumonia, aspiration
Acute respiratory failure, unspecified whether with hypoxia or hypercapnia
Pneumonia, aspiration
Acute respiratory failure, unspecified whether with hypoxia or hypercapnia
normal...
Pneumonia, aspiration
Acute respiratory failure, unspecified whether with hypoxia or hypercapnia
Pneumonia, aspiration
Acute respiratory failure, unspecified whether with hypoxia or hypercapnia
Acute respiratory failure, unspecified whether with hypoxia or hypercapnia